# Patient Record
Sex: FEMALE | Race: WHITE | Employment: OTHER | URBAN - METROPOLITAN AREA
[De-identification: names, ages, dates, MRNs, and addresses within clinical notes are randomized per-mention and may not be internally consistent; named-entity substitution may affect disease eponyms.]

---

## 2017-02-27 ENCOUNTER — HOSPITAL ENCOUNTER (OUTPATIENT)
Dept: MRI IMAGING | Age: 72
Discharge: HOME | End: 2017-02-27
Attending: FAMILY MEDICINE
Payer: MEDICARE

## 2017-02-27 ENCOUNTER — HOSPITAL ENCOUNTER (OUTPATIENT)
Dept: ULTRASOUND IMAGING | Age: 72
Discharge: HOME | End: 2017-02-27
Attending: FAMILY MEDICINE
Payer: MEDICARE

## 2017-02-27 DIAGNOSIS — G45.9 TRANSIENT CEREBRAL ISCHEMIA, UNSPECIFIED TYPE: ICD-10-CM

## 2017-02-27 DIAGNOSIS — I77.9 BILATERAL CAROTID ARTERY DISEASE (HCC): ICD-10-CM

## 2017-02-27 DIAGNOSIS — R20.0 NUMBNESS: ICD-10-CM

## 2017-02-27 PROCEDURE — 93880 EXTRACRANIAL BILAT STUDY: CPT

## 2017-02-27 PROCEDURE — 70551 MRI BRAIN STEM W/O DYE: CPT

## 2017-06-13 PROBLEM — R06.02 SHORTNESS OF BREATH: Status: ACTIVE | Noted: 2017-06-13

## 2017-06-13 PROBLEM — Z86.73 H/O TIA (TRANSIENT ISCHEMIC ATTACK) AND STROKE: Status: ACTIVE | Noted: 2017-06-13

## 2017-06-13 PROBLEM — I50.22 SYSTOLIC CHF, CHRONIC (HCC): Status: ACTIVE | Noted: 2017-06-13

## 2017-07-19 ENCOUNTER — HOSPITAL ENCOUNTER (OUTPATIENT)
Dept: LAB | Age: 72
Discharge: HOME | End: 2017-07-19
Attending: INTERNAL MEDICINE

## 2017-07-19 ENCOUNTER — HOSPITAL ENCOUNTER (OUTPATIENT)
Dept: LAB | Age: 72
Discharge: HOME | End: 2017-07-19
Attending: INTERNAL MEDICINE
Payer: MEDICARE

## 2017-07-19 DIAGNOSIS — R06.02 SHORTNESS OF BREATH: ICD-10-CM

## 2017-07-19 DIAGNOSIS — I50.22 SYSTOLIC CHF, CHRONIC (HCC): ICD-10-CM

## 2017-07-19 LAB
ANION GAP BLD CALC-SCNC: 7 MMOL/L
BASOPHILS # BLD AUTO: 0.1 K/UL (ref 0–0.2)
BASOPHILS # BLD: 1 % (ref 0–2)
BUN SERPL-MCNC: 15 MG/DL (ref 8–23)
CALCIUM SERPL-MCNC: 9.5 MG/DL (ref 8.3–10.4)
CHLORIDE SERPL-SCNC: 103 MMOL/L (ref 98–107)
CO2 SERPL-SCNC: 30 MMOL/L (ref 21–32)
CREAT SERPL-MCNC: 0.8 MG/DL (ref 0.6–1)
DIFFERENTIAL METHOD BLD: ABNORMAL
EOSINOPHIL # BLD: 0.1 K/UL (ref 0–0.8)
EOSINOPHIL NFR BLD: 2 % (ref 0.5–7.8)
ERYTHROCYTE [DISTWIDTH] IN BLOOD BY AUTOMATED COUNT: 12.7 % (ref 11.9–14.6)
GLUCOSE SERPL-MCNC: 96 MG/DL (ref 65–100)
HCT VFR BLD AUTO: 41 % (ref 35.8–46.3)
HGB BLD-MCNC: 13.4 G/DL (ref 11.7–15.4)
INR PPP: 1 (ref 0.9–1.2)
LYMPHOCYTES # BLD AUTO: 28 % (ref 13–44)
LYMPHOCYTES # BLD: 1.9 K/UL (ref 0.5–4.6)
MCH RBC QN AUTO: 30.2 PG (ref 26.1–32.9)
MCHC RBC AUTO-ENTMCNC: 32.7 G/DL (ref 31.4–35)
MCV RBC AUTO: 92.6 FL (ref 79.6–97.8)
MONOCYTES # BLD: 1.1 K/UL (ref 0.1–1.3)
MONOCYTES NFR BLD AUTO: 16 % (ref 4–12)
NEUTS SEG # BLD: 3.6 K/UL (ref 1.7–8.2)
NEUTS SEG NFR BLD AUTO: 53 % (ref 43–78)
PLATELET # BLD AUTO: 187 K/UL (ref 150–450)
PMV BLD AUTO: 10.6 FL (ref 10.8–14.1)
POTASSIUM SERPL-SCNC: 3.9 MMOL/L (ref 3.5–5.1)
PROTHROMBIN TIME: 10 SEC (ref 9.6–12)
RBC # BLD AUTO: 4.43 M/UL (ref 4.05–5.25)
SODIUM SERPL-SCNC: 140 MMOL/L (ref 136–145)
WBC # BLD AUTO: 6.8 K/UL (ref 4.3–11.1)

## 2017-07-19 PROCEDURE — 80048 BASIC METABOLIC PNL TOTAL CA: CPT | Performed by: INTERNAL MEDICINE

## 2017-07-19 PROCEDURE — 85025 COMPLETE CBC W/AUTO DIFF WBC: CPT | Performed by: INTERNAL MEDICINE

## 2017-07-19 PROCEDURE — 36415 COLL VENOUS BLD VENIPUNCTURE: CPT | Performed by: INTERNAL MEDICINE

## 2017-07-19 PROCEDURE — 85610 PROTHROMBIN TIME: CPT | Performed by: INTERNAL MEDICINE

## 2017-07-20 NOTE — PROGRESS NOTES
Patient pre-assessment complete for [facility] poss with Dr Vidhya Michael scheduled for 17 at 11am, arrival time 9am. Patient verified using . Patient instructed to bring all home medications in labeled bottles on the day of procedure. NPO status reinforced. Patient informed to take a full dose aspirin 325mg  or 81 mg x 4 on the day of procedure. Instructed they can take all other medications excluding vitamins & supplements. Patient verbalizes understanding of all instructions & denies any questions at this time.

## 2017-07-21 ENCOUNTER — HOSPITAL ENCOUNTER (OUTPATIENT)
Dept: CARDIAC CATH/INVASIVE PROCEDURES | Age: 72
Discharge: HOME | End: 2017-07-21
Attending: INTERNAL MEDICINE | Admitting: INTERNAL MEDICINE
Payer: MEDICARE

## 2017-07-21 VITALS
WEIGHT: 134 LBS | SYSTOLIC BLOOD PRESSURE: 108 MMHG | OXYGEN SATURATION: 93 % | DIASTOLIC BLOOD PRESSURE: 53 MMHG | RESPIRATION RATE: 18 BRPM | BODY MASS INDEX: 23.74 KG/M2 | TEMPERATURE: 98.4 F | HEIGHT: 63 IN | HEART RATE: 76 BPM

## 2017-07-21 LAB
ATRIAL RATE: 75 BPM
CALCULATED P AXIS, ECG09: 63 DEGREES
CALCULATED R AXIS, ECG10: -61 DEGREES
CALCULATED T AXIS, ECG11: 2 DEGREES
DIAGNOSIS, 93000: NORMAL
P-R INTERVAL, ECG05: 142 MS
Q-T INTERVAL, ECG07: 416 MS
QRS DURATION, ECG06: 112 MS
QTC CALCULATION (BEZET), ECG08: 464 MS
VENTRICULAR RATE, ECG03: 75 BPM

## 2017-07-21 PROCEDURE — 77030004534 HC CATH ANGI DX INFN CARD -A

## 2017-07-21 PROCEDURE — 77030029997 HC DEV COM RDL R BND TELE -B

## 2017-07-21 PROCEDURE — C1769 GUIDE WIRE: HCPCS

## 2017-07-21 PROCEDURE — 77030015766

## 2017-07-21 PROCEDURE — 93458 L HRT ARTERY/VENTRICLE ANGIO: CPT

## 2017-07-21 PROCEDURE — C1894 INTRO/SHEATH, NON-LASER: HCPCS

## 2017-07-21 PROCEDURE — 74011250636 HC RX REV CODE- 250/636: Performed by: INTERNAL MEDICINE

## 2017-07-21 PROCEDURE — 74011000250 HC RX REV CODE- 250: Performed by: INTERNAL MEDICINE

## 2017-07-21 PROCEDURE — 74011000258 HC RX REV CODE- 258: Performed by: INTERNAL MEDICINE

## 2017-07-21 PROCEDURE — 93005 ELECTROCARDIOGRAM TRACING: CPT | Performed by: INTERNAL MEDICINE

## 2017-07-21 PROCEDURE — 74011636320 HC RX REV CODE- 636/320: Performed by: INTERNAL MEDICINE

## 2017-07-21 PROCEDURE — 74011250636 HC RX REV CODE- 250/636

## 2017-07-21 PROCEDURE — 99152 MOD SED SAME PHYS/QHP 5/>YRS: CPT

## 2017-07-21 RX ORDER — GUAIFENESIN 100 MG/5ML
81-324 LIQUID (ML) ORAL
Status: DISCONTINUED | OUTPATIENT
Start: 2017-07-21 | End: 2017-07-21 | Stop reason: SDUPTHER

## 2017-07-21 RX ORDER — GUAIFENESIN 100 MG/5ML
81-324 LIQUID (ML) ORAL
Status: DISCONTINUED | OUTPATIENT
Start: 2017-07-21 | End: 2017-07-21 | Stop reason: HOSPADM

## 2017-07-21 RX ORDER — SODIUM CHLORIDE 9 MG/ML
75 INJECTION, SOLUTION INTRAVENOUS CONTINUOUS
Status: DISCONTINUED | OUTPATIENT
Start: 2017-07-21 | End: 2017-07-21 | Stop reason: HOSPADM

## 2017-07-21 RX ORDER — ONDANSETRON 2 MG/ML
4 INJECTION INTRAMUSCULAR; INTRAVENOUS
Status: CANCELLED | OUTPATIENT
Start: 2017-07-21

## 2017-07-21 RX ORDER — LIDOCAINE HYDROCHLORIDE 20 MG/ML
1-20 INJECTION, SOLUTION INFILTRATION; PERINEURAL
Status: DISCONTINUED | OUTPATIENT
Start: 2017-07-21 | End: 2017-07-21 | Stop reason: HOSPADM

## 2017-07-21 RX ORDER — DIAZEPAM 5 MG/1
5 TABLET ORAL ONCE
Status: DISCONTINUED | OUTPATIENT
Start: 2017-07-21 | End: 2017-07-21 | Stop reason: HOSPADM

## 2017-07-21 RX ORDER — FENTANYL CITRATE 50 UG/ML
25-100 INJECTION, SOLUTION INTRAMUSCULAR; INTRAVENOUS
Status: DISCONTINUED | OUTPATIENT
Start: 2017-07-21 | End: 2017-07-21 | Stop reason: HOSPADM

## 2017-07-21 RX ORDER — HYDROCODONE BITARTRATE AND ACETAMINOPHEN 5; 325 MG/1; MG/1
1 TABLET ORAL
Status: CANCELLED | OUTPATIENT
Start: 2017-07-21

## 2017-07-21 RX ORDER — HEPARIN SODIUM 200 [USP'U]/100ML
3 INJECTION, SOLUTION INTRAVENOUS CONTINUOUS
Status: DISCONTINUED | OUTPATIENT
Start: 2017-07-21 | End: 2017-07-21 | Stop reason: HOSPADM

## 2017-07-21 RX ORDER — SODIUM CHLORIDE 0.9 % (FLUSH) 0.9 %
5-10 SYRINGE (ML) INJECTION EVERY 8 HOURS
Status: CANCELLED | OUTPATIENT
Start: 2017-07-21

## 2017-07-21 RX ORDER — MIDAZOLAM HYDROCHLORIDE 1 MG/ML
.5-5 INJECTION, SOLUTION INTRAMUSCULAR; INTRAVENOUS
Status: DISCONTINUED | OUTPATIENT
Start: 2017-07-21 | End: 2017-07-21 | Stop reason: HOSPADM

## 2017-07-21 RX ORDER — SODIUM CHLORIDE 0.9 % (FLUSH) 0.9 %
5-10 SYRINGE (ML) INJECTION AS NEEDED
Status: CANCELLED | OUTPATIENT
Start: 2017-07-21

## 2017-07-21 RX ORDER — ACETAMINOPHEN 325 MG/1
650 TABLET ORAL
Status: CANCELLED | OUTPATIENT
Start: 2017-07-21

## 2017-07-21 RX ORDER — SODIUM CHLORIDE 9 MG/ML
75 INJECTION, SOLUTION INTRAVENOUS CONTINUOUS
Status: CANCELLED | OUTPATIENT
Start: 2017-07-21

## 2017-07-21 RX ADMIN — FENTANYL CITRATE 25 MCG: 50 INJECTION, SOLUTION INTRAMUSCULAR; INTRAVENOUS at 11:03

## 2017-07-21 RX ADMIN — FENTANYL CITRATE 50 MCG: 50 INJECTION, SOLUTION INTRAMUSCULAR; INTRAVENOUS at 10:47

## 2017-07-21 RX ADMIN — MIDAZOLAM HYDROCHLORIDE 1 MG: 1 INJECTION, SOLUTION INTRAMUSCULAR; INTRAVENOUS at 11:03

## 2017-07-21 RX ADMIN — HEPARIN SODIUM 2 ML: 10000 INJECTION, SOLUTION INTRAVENOUS; SUBCUTANEOUS at 11:03

## 2017-07-21 RX ADMIN — MIDAZOLAM HYDROCHLORIDE 2 MG: 1 INJECTION, SOLUTION INTRAMUSCULAR; INTRAVENOUS at 10:48

## 2017-07-21 RX ADMIN — LIDOCAINE HYDROCHLORIDE 60 MG: 20 INJECTION, SOLUTION INFILTRATION; PERINEURAL at 11:00

## 2017-07-21 RX ADMIN — SODIUM CHLORIDE 75 ML/HR: 900 INJECTION, SOLUTION INTRAVENOUS at 09:35

## 2017-07-21 RX ADMIN — HEPARIN SODIUM 3 ML/HR: 200 INJECTION, SOLUTION INTRAVENOUS at 10:47

## 2017-07-21 RX ADMIN — IOPAMIDOL 70 ML: 755 INJECTION, SOLUTION INTRAVENOUS at 11:06

## 2017-07-21 NOTE — PROCEDURES
Janis Ferrer 42       Name:  Michael Alonzo   MR#:  [id number]   :  1945   Account #:  [de-identified]   Date of Adm:  2017       DATE OF PROCEDURE: 2017     PROCEDURES: Left heart catheterization, selective coronary   arteriography, left ventriculogram via the right radial   approach. INDICATION: LV dysfunction and abnormal stress test suggestive   of apical defect and considered moderate risk scan. [facility]   Cardiovascular class III anginal symptoms of dyspnea with mild   exertion. Cardiac catheterization arranged by Dr. Alannah Oleary. TECHNICAL FACTORS: After informed consent was obtained, the   patient was brought to the cardiac catheterization lab. Right   radial artery was prepped and draped in the usual sterile   fashion. Utilizing modified Seldinger technique and   micropuncture needle, the right radial artery was entered. A 6-  Armenian Terumo Slender sheath was placed without difficulty. A   radial cocktail consisting of 2000 units of heparin, 2 mg   verapamil, 200 mcg nitroglycerin was administered. A 5-Armenian   Tiger 4.0 catheter was used to selectively engage the ostium of   the left main coronary artery and right coronary artery   respectively. Selective injections in various projections were   performed. A pigtail catheter was used to cross the aortic valve   and enter the left ventricle. Hemodynamic measurements and left   ventriculogram were obtained. Left ventricular aortic pressure   gradient was obtained by pullback technique. At the conclusion of diagnostic procedure, the radial sheath was   removed and a pneumatic band was placed with excellent   hemostasis. No complications were encountered. SEDATION: The patient received 3 mg Versed and 75 mcg of   fentanyl. Sedation was administered by Sabra Rhoades RN, under my   supervision. Sedation start time was 10:50 a.m. with the   procedure end time of 11:12 a.m.      CONTRAST: Isovue 70.     HEMODYNAMIC RESULTS     1. Aortic pressure was 103/59 with a mean of 76.   2. Left ventricular end-diastolic pressure was 6.   3. There was no significant gradient across the aortic valve. ANGIOGRAPHIC RESULTS   1. Left main coronary artery: Large caliber vessel. Angiographically normal.   2. LAD: Medium caliber vessel proximally, becomes small caliber   after the origin of the first diagonal artery; 10% to 20%   proximal irregularities with mild calcification, 10% mid luminal   irregularities. 3. First diagonal artery: Small caliber vessel. Patent. 4. Ramus intermediate: Medium caliber vessel, 10% to 20%   proximal stenosis. 5. Left circumflex: Medium caliber vessel, 20% mid stenosis. 6. First obtuse marginal artery: Small caliber vessel. Patent. 7. Right coronary artery: Medium caliber dominant vessel, 20%   mid stenosis. 8. Right PDA: Very small caliber vessel. Patent. 9. Right posterolateral branch: Medium caliber vessel. Patent. 10. Left ventriculogram performed in BOLAND projection shows mildly   reduced LV systolic function, EF 61% to 50%. Mild global   hypokinesis. No mitral regurgitation. Aortic root is nondilated. CONCLUSION   1. Mild nonobstructive coronary artery disease. 2. Mildly reduced left ventricular systolic function. PLAN: Ongoing medical therapy. Followup with Dr. Pattie Lion in 2   weeks for arteriotomy site check.         MD TONY Olguin / Rosa Odom   D:  07/21/2017   11:28   T:  07/21/2017   12:22   Job #:  [id number]

## 2017-07-21 NOTE — PROGRESS NOTES
TRANSFER - OUT REPORT:    Verbal report given to gianfranco hernandez(name) on Cheli Jobs  being transferred to cpru(unit) for routine progression of care       Report consisted of patients Situation, Background, Assessment and   Recommendations(SBAR). Information from the following report(s) SBAR was reviewed with the receiving nurse. Lines:   Peripheral IV 07/21/17 Right Antecubital (Active)       Peripheral IV 07/21/17 Left Antecubital (Active)        Opportunity for questions and clarification was provided.       Patient transported with:   Brittany Lui  No intervention  Right wrist r band 10ml  No bleeding or hematoma  Versed 3mg  Fentanyl 75mcg

## 2017-07-21 NOTE — IP AVS SNAPSHOT
Edward Duff 
 
 
 [address] [facility] [address] [facility] 
[phone] Patient: Leia Jacobo MRN: [id number] XMA:4/20/3404 Discharge Summary 7/21/2017 Leia Jacobo MRN[de-identified]  [id number] Admission Information Provider Pager Service Admission Date Expected D/C Date Yolanda Pires MD  CARDIAC CATH LAB 7/21/2017 Actual LOS Patient Class 0 days OUTPATIENT Follow-up Information Follow up With Details Comments Contact Info Alfred Grajeda DO On 8/21/2017 1130 am  [address] [facility] [address] [facility] [address] 
[phone] Current Discharge Medication List  
  
ASK your doctor about these medications Dose & Instructions Dispensing Information Comments Morning Noon Evening Bedtime  
 albuterol 90 mcg/actuation inhaler Commonly known as:  PROAIR HFA Your last dose was: Your next dose is:    
   
   
 Dose:  2 Puff Take 2 Puffs by inhalation every four (4) hours as needed for Wheezing or Shortness of Breath. Quantity:  3 Inhaler Refills:  3  
     
   
   
   
  
 aspirin delayed-release 81 mg tablet Your last dose was: Your next dose is: Take  by mouth daily. Refills:  0  
     
   
   
   
  
 atorvastatin 40 mg tablet Commonly known as:  LIPITOR Your last dose was: Your next dose is: TAKE 1 TABLET DAILY Quantity:  90 Tab Refills:  3 CALCIUM + D PO Your last dose was: Your next dose is:    
   
   
 Dose:  1 Tab Take 1 Tab by mouth daily. 600 mg calcium with vitamin d Refills:  0  
     
   
   
   
  
 citalopram 20 mg tablet Commonly known as:  Colleantoinette Diaz Your last dose was: Your next dose is:    
   
   
 Dose:  20 mg Take 1 Tab by mouth daily. Quantity:  90 Tab Refills:  3 mesalamine 500 mg CR capsule Commonly known as:  PENTASA Your last dose was: Your next dose is: Take 2 tablets BID Quantity:  1 Cap Refills:  0  
     
   
   
   
  
 metoprolol succinate 50 mg XL tablet Commonly known as:  TOPROL-XL Your last dose was: Your next dose is:    
   
   
 Dose:  50 mg Take 1 Tab by mouth daily. Quantity:  90 Tab Refills:  3  
     
   
   
   
  
 nitroglycerin 0.4 mg SL tablet Commonly known as:  NITROSTAT Your last dose was: Your next dose is:    
   
   
 Place 1 sl under the tongue q 5 min prn cp, max 3 sl in a 15-min time period. Call 911 if no relief after the 3rd sl. Quantity:  1 Bottle Refills:  prn PREVACID 30 mg capsule Generic drug:  lansoprazole Your last dose was: Your next dose is: Take  by mouth Daily (before breakfast). Refills:  0  
     
   
   
   
  
 traMADol 50 mg tablet Commonly known as:  ULTRAM  
   
Your last dose was: Your next dose is: Take 1 tablet by mouth TID Quantity:  270 Tab Refills:  1 General Information Please provide this summary of care documentation to your next provider. Allergies Unspecified:  Hydrocodone-homatropine;  Morphine Current Immunizations  Never Reviewed Name Date Influenza Vaccine 11/14/2012 Pneumococcal Conjugate (PCV-13) 7/6/2016 Pneumococcal Vaccine (Unspecified Type) 3/20/2013 Discharge Instructions Discharge Instructions HEART CATHETERIZATION/ANGIOGRAPHY DISCHARGE INSTRUCTIONS 1. Check puncture site frequently for swelling or bleeding. If there is any bleeding, lie down and apply pressure over the area with a clean towel or washcloth.  Notify your doctor for any redness, swelling, drainage, or oozing from the puncture site. Notify your doctor for any fever or chills. 2. If the extremity becomes cold, numb, or painful call Dr. Jennifer Murphy at [phone]. 
3. Activity should be limited for the next 48 hours. Climb stairs as little as possible and avoid any stooping, bending, or strenuous activity for 48 hours. No heavy lifting (anything over 10 pounds) for 3 days. 4. You may resume your usual diet. Drink more fluids than usual. 
5. Have a responsible person drive you home and stay with you for at least 24 hours after your heart catheterization/angiography. 6. You may remove bandage from your {ARM/GROIN:25124} in 24 hours. You may shower in 24 hours. No tub baths, hot tubs, or swimming for 1 week. Do not place any lotions, creams, powders, or ointments over puncture site for 1 week. You may place a clean band-aid over the puncture site each day for 5 days. Change daily. I have read the above instructions and have had the opportunity to ask questions. Patient: ________________________   Date: 7/21/2017 Witness: _______________________   Date: 7/21/2017 Discharge Orders None  
  
` Patient Signature:  ____________________________________________________________ Date:  ____________________________________________________________  
  
 Richmond Knowles Provider Signature:  ____________________________________________________________ Date:  ____________________________________________________________

## 2017-07-21 NOTE — DISCHARGE INSTRUCTIONS
HEART CATHETERIZATION/ANGIOGRAPHY DISCHARGE INSTRUCTIONS    1. Check puncture site frequently for swelling or bleeding. If there is any bleeding, lie down and apply pressure over the area with a clean towel or washcloth. Notify your doctor for any redness, swelling, drainage, or oozing from the puncture site. Notify your doctor for any fever or chills. 2. If the extremity becomes cold, numb, or painful call Dr. Kimi Arango at [phone].  3. Activity should be limited for the next 48 hours. Climb stairs as little as possible and avoid any stooping, bending, or strenuous activity for 48 hours. No heavy lifting (anything over 10 pounds) for 3 days. 4. You may resume your usual diet. Drink more fluids than usual.  5. Have a responsible person drive you home and stay with you for at least 24 hours after your heart catheterization/angiography. 6. You may remove bandage from your {ARM/GROIN:80300} in 24 hours. You may shower in 24 hours. No tub baths, hot tubs, or swimming for 1 week. Do not place any lotions, creams, powders, or ointments over puncture site for 1 week. You may place a clean band-aid over the puncture site each day for 5 days. Change daily. I have read the above instructions and have had the opportunity to ask questions.       Patient: ________________________   Date: 7/21/2017    Witness: _______________________   Date: 7/21/2017

## 2017-07-21 NOTE — PROCEDURES
Brief Cardiac Procedure Note    Patient: Park Reid MRN: [id number]  SSN: [id number]    YOB: 1945  Age: 74 y.o. Sex: female      Date of Procedure: 7/21/2017     Pre-procedure Diagnosis: Chest pain    Post-procedure Diagnosis: Non-cardiac chest pain    Procedure: Left Heart Catheterization    Brief Description of Procedure: As above    Performed By: Lucrecia Manzanares MD     Assistants: None    Anesthesia: Moderate Sedation    Estimated Blood Loss: Less than 10 mL      Specimens: None    Implants: None    Findings: Mild non-obstructive CAD. Normal LV function    Complications: None    Recommendations: Continue medical therapy.     Signed By: Lucrecia Manzanares MD     July 21, 2017

## 2017-07-21 NOTE — PROGRESS NOTES
Discharge instructions given per orders, voiced good understanding of post cath care, medications & follow up care. Denies any questions. VSS, INTs dc'd, right radial puncture site dry, clean, with sterile dressing intact. No s/s of bleeding or hematoma noted at time of discharge.

## 2017-07-21 NOTE — PROGRESS NOTES
Patient received to [address] [place] [address] room # 8  Ambulatory from [facility]. Patient scheduled for [facility] today with Dr Elmore Simmonds. Procedure reviewed & questions answered, voiced good understanding consent obtained & placed on chart. All medications and medical history reviewed. Will prep patient per orders. Patient & family updated on plan of care.

## 2017-07-21 NOTE — PROGRESS NOTES
Dr. Laura Bhatt at bedside to discuss results of heart cath with patient and family. Opportunity given for questions and comments.

## 2017-08-21 PROBLEM — I42.8 NICM (NONISCHEMIC CARDIOMYOPATHY) (HCC): Status: ACTIVE | Noted: 2017-08-21

## 2018-03-26 ENCOUNTER — ANESTHESIA EVENT (OUTPATIENT)
Dept: SURGERY | Age: 73
End: 2018-03-26
Payer: MEDICARE

## 2018-03-26 NOTE — PROGRESS NOTES
Patient pre-assessment complete for ICD with Dr Satya Wagner scheduled for 3/27/18 at 3pm, arrival time 1pm. Patient verified using . Patient instructed to bring all home medications in labeled bottles on the day of procedure. NPO status reinforced. Instructed they can take all other medications excluding vitamins & supplements. Patient verbalizes understanding of all instructions & denies any questions at this time. Per patient procedure was scheduled for 10am with arrival at [address] with Dr Satya Wagner to confirm time of 3pm. Pt called back & informed 3pm is the correct procedure time with arrival at 1pm. Voiced good understanding.

## 2018-03-27 ENCOUNTER — APPOINTMENT (OUTPATIENT)
Dept: GENERAL RADIOLOGY | Age: 73
End: 2018-03-27
Attending: INTERNAL MEDICINE
Payer: MEDICARE

## 2018-03-27 ENCOUNTER — APPOINTMENT (OUTPATIENT)
Dept: CARDIAC CATH/INVASIVE PROCEDURES | Age: 73
End: 2018-03-27
Payer: MEDICARE

## 2018-03-27 ENCOUNTER — HOSPITAL ENCOUNTER (OUTPATIENT)
Age: 73
Setting detail: OBSERVATION
Discharge: HOME | End: 2018-03-28
Attending: INTERNAL MEDICINE | Admitting: INTERNAL MEDICINE
Payer: MEDICARE

## 2018-03-27 ENCOUNTER — ANESTHESIA (OUTPATIENT)
Dept: SURGERY | Age: 73
End: 2018-03-27
Payer: MEDICARE

## 2018-03-27 PROBLEM — I42.9 CARDIOMYOPATHY (HCC): Status: ACTIVE | Noted: 2018-03-27

## 2018-03-27 PROBLEM — Z95.810 PRESENCE OF AUTOMATIC (IMPLANTABLE) CARDIAC DEFIBRILLATOR: Status: ACTIVE | Noted: 2018-03-27

## 2018-03-27 LAB
ANION GAP SERPL CALC-SCNC: 4 MMOL/L (ref 7–16)
ATRIAL RATE: 81 BPM
BUN SERPL-MCNC: 18 MG/DL (ref 8–23)
CALCIUM SERPL-MCNC: 10.2 MG/DL (ref 8.3–10.4)
CALCULATED P AXIS, ECG09: 61 DEGREES
CALCULATED R AXIS, ECG10: -56 DEGREES
CALCULATED T AXIS, ECG11: 52 DEGREES
CHLORIDE SERPL-SCNC: 106 MMOL/L (ref 98–107)
CO2 SERPL-SCNC: 30 MMOL/L (ref 21–32)
CREAT SERPL-MCNC: 0.95 MG/DL (ref 0.6–1)
DIAGNOSIS, 93000: NORMAL
ERYTHROCYTE [DISTWIDTH] IN BLOOD BY AUTOMATED COUNT: 13.6 % (ref 11.9–14.6)
GLUCOSE SERPL-MCNC: 90 MG/DL (ref 65–100)
HCT VFR BLD AUTO: 42.5 % (ref 35.8–46.3)
HGB BLD-MCNC: 14.1 G/DL (ref 11.7–15.4)
INR PPP: 0.9
MAGNESIUM SERPL-MCNC: 1.6 MG/DL (ref 1.8–2.4)
MCH RBC QN AUTO: 30.4 PG (ref 26.1–32.9)
MCHC RBC AUTO-ENTMCNC: 33.2 G/DL (ref 31.4–35)
MCV RBC AUTO: 91.6 FL (ref 79.6–97.8)
P-R INTERVAL, ECG05: 140 MS
PLATELET # BLD AUTO: 210 K/UL (ref 150–450)
PMV BLD AUTO: 10.5 FL (ref 10.8–14.1)
POTASSIUM SERPL-SCNC: 4.2 MMOL/L (ref 3.5–5.1)
PROTHROMBIN TIME: 12.1 SEC (ref 11.5–14.5)
Q-T INTERVAL, ECG07: 400 MS
QRS DURATION, ECG06: 106 MS
QTC CALCULATION (BEZET), ECG08: 464 MS
RBC # BLD AUTO: 4.64 M/UL (ref 4.05–5.25)
SODIUM SERPL-SCNC: 140 MMOL/L (ref 136–145)
VENTRICULAR RATE, ECG03: 81 BPM
WBC # BLD AUTO: 7.4 K/UL (ref 4.3–11.1)

## 2018-03-27 PROCEDURE — 74011000250 HC RX REV CODE- 250

## 2018-03-27 PROCEDURE — 74011250636 HC RX REV CODE- 250/636: Performed by: INTERNAL MEDICINE

## 2018-03-27 PROCEDURE — C1894 INTRO/SHEATH, NON-LASER: HCPCS

## 2018-03-27 PROCEDURE — 85610 PROTHROMBIN TIME: CPT | Performed by: INTERNAL MEDICINE

## 2018-03-27 PROCEDURE — 99218 HC RM OBSERVATION: CPT

## 2018-03-27 PROCEDURE — 77030012935 HC DRSG AQUACEL BMS -B

## 2018-03-27 PROCEDURE — 76060000032 HC ANESTHESIA 0.5 TO 1 HR: Performed by: INTERNAL MEDICINE

## 2018-03-27 PROCEDURE — 77030013687 HC GD NDL BARD -B

## 2018-03-27 PROCEDURE — C1892 INTRO/SHEATH,FIXED,PEEL-AWAY: HCPCS

## 2018-03-27 PROCEDURE — 85027 COMPLETE CBC AUTOMATED: CPT | Performed by: INTERNAL MEDICINE

## 2018-03-27 PROCEDURE — C1722 AICD, SINGLE CHAMBER: HCPCS

## 2018-03-27 PROCEDURE — 74011000272 HC RX REV CODE- 272: Performed by: INTERNAL MEDICINE

## 2018-03-27 PROCEDURE — 74011250636 HC RX REV CODE- 250/636

## 2018-03-27 PROCEDURE — 74011250636 HC RX REV CODE- 250/636: Performed by: ANESTHESIOLOGY

## 2018-03-27 PROCEDURE — 83735 ASSAY OF MAGNESIUM: CPT | Performed by: INTERNAL MEDICINE

## 2018-03-27 PROCEDURE — 93005 ELECTROCARDIOGRAM TRACING: CPT | Performed by: INTERNAL MEDICINE

## 2018-03-27 PROCEDURE — 74011250637 HC RX REV CODE- 250/637: Performed by: INTERNAL MEDICINE

## 2018-03-27 PROCEDURE — 33249 INSJ/RPLCMT DEFIB W/LEAD(S): CPT

## 2018-03-27 PROCEDURE — 71045 X-RAY EXAM CHEST 1 VIEW: CPT

## 2018-03-27 PROCEDURE — 80048 BASIC METABOLIC PNL TOTAL CA: CPT | Performed by: INTERNAL MEDICINE

## 2018-03-27 PROCEDURE — 74011000250 HC RX REV CODE- 250: Performed by: INTERNAL MEDICINE

## 2018-03-27 PROCEDURE — A4565 SLINGS: HCPCS

## 2018-03-27 PROCEDURE — 76937 US GUIDE VASCULAR ACCESS: CPT

## 2018-03-27 PROCEDURE — 77030008467 HC STPLR SKN COVD -B

## 2018-03-27 PROCEDURE — C1777 LEAD, AICD, ENDO SINGLE COIL: HCPCS

## 2018-03-27 RX ORDER — ATORVASTATIN CALCIUM 40 MG/1
40 TABLET, FILM COATED ORAL
Status: DISCONTINUED | OUTPATIENT
Start: 2018-03-27 | End: 2018-03-28 | Stop reason: HOSPADM

## 2018-03-27 RX ORDER — EPHEDRINE SULFATE 50 MG/ML
INJECTION, SOLUTION INTRAVENOUS AS NEEDED
Status: DISCONTINUED | OUTPATIENT
Start: 2018-03-27 | End: 2018-03-27 | Stop reason: HOSPADM

## 2018-03-27 RX ORDER — CEFAZOLIN SODIUM/WATER 2 G/20 ML
2 SYRINGE (ML) INTRAVENOUS ONCE
Status: COMPLETED | OUTPATIENT
Start: 2018-03-27 | End: 2018-03-27

## 2018-03-27 RX ORDER — NALBUPHINE HYDROCHLORIDE 20 MG/ML
5 INJECTION, SOLUTION INTRAMUSCULAR; INTRAVENOUS; SUBCUTANEOUS
Status: DISCONTINUED | OUTPATIENT
Start: 2018-03-27 | End: 2018-03-27 | Stop reason: HOSPADM

## 2018-03-27 RX ORDER — METOPROLOL SUCCINATE 50 MG/1
50 TABLET, EXTENDED RELEASE ORAL DAILY
Status: DISCONTINUED | OUTPATIENT
Start: 2018-03-28 | End: 2018-03-28 | Stop reason: HOSPADM

## 2018-03-27 RX ORDER — SODIUM CHLORIDE 0.9 % (FLUSH) 0.9 %
5-10 SYRINGE (ML) INJECTION AS NEEDED
Status: DISCONTINUED | OUTPATIENT
Start: 2018-03-27 | End: 2018-03-28 | Stop reason: HOSPADM

## 2018-03-27 RX ORDER — FENTANYL CITRATE 50 UG/ML
INJECTION, SOLUTION INTRAMUSCULAR; INTRAVENOUS AS NEEDED
Status: DISCONTINUED | OUTPATIENT
Start: 2018-03-27 | End: 2018-03-27 | Stop reason: HOSPADM

## 2018-03-27 RX ORDER — CITALOPRAM 20 MG/1
20 TABLET, FILM COATED ORAL DAILY
Status: DISCONTINUED | OUTPATIENT
Start: 2018-03-28 | End: 2018-03-28 | Stop reason: HOSPADM

## 2018-03-27 RX ORDER — SODIUM CHLORIDE, SODIUM LACTATE, POTASSIUM CHLORIDE, CALCIUM CHLORIDE 600; 310; 30; 20 MG/100ML; MG/100ML; MG/100ML; MG/100ML
INJECTION, SOLUTION INTRAVENOUS
Status: DISCONTINUED | OUTPATIENT
Start: 2018-03-27 | End: 2018-03-27 | Stop reason: HOSPADM

## 2018-03-27 RX ORDER — BUDESONIDE 3 MG/1
6 CAPSULE, COATED PELLETS ORAL 3 TIMES DAILY
Status: DISCONTINUED | OUTPATIENT
Start: 2018-03-27 | End: 2018-03-28 | Stop reason: HOSPADM

## 2018-03-27 RX ORDER — CEFAZOLIN SODIUM/WATER 2 G/20 ML
2 SYRINGE (ML) INTRAVENOUS EVERY 8 HOURS
Status: COMPLETED | OUTPATIENT
Start: 2018-03-28 | End: 2018-03-28

## 2018-03-27 RX ORDER — SODIUM CHLORIDE 0.9 % (FLUSH) 0.9 %
5-10 SYRINGE (ML) INJECTION EVERY 8 HOURS
Status: DISCONTINUED | OUTPATIENT
Start: 2018-03-27 | End: 2018-03-28 | Stop reason: HOSPADM

## 2018-03-27 RX ORDER — ASPIRIN 81 MG/1
81 TABLET ORAL DAILY
Status: DISCONTINUED | OUTPATIENT
Start: 2018-03-28 | End: 2018-03-28 | Stop reason: HOSPADM

## 2018-03-27 RX ORDER — ONDANSETRON 2 MG/ML
4 INJECTION INTRAMUSCULAR; INTRAVENOUS
Status: DISCONTINUED | OUTPATIENT
Start: 2018-03-27 | End: 2018-03-28 | Stop reason: HOSPADM

## 2018-03-27 RX ORDER — ACETAMINOPHEN 325 MG/1
650 TABLET ORAL
Status: DISCONTINUED | OUTPATIENT
Start: 2018-03-27 | End: 2018-03-28 | Stop reason: HOSPADM

## 2018-03-27 RX ORDER — MAGNESIUM SULFATE HEPTAHYDRATE 40 MG/ML
2 INJECTION, SOLUTION INTRAVENOUS ONCE
Status: COMPLETED | OUTPATIENT
Start: 2018-03-27 | End: 2018-03-28

## 2018-03-27 RX ORDER — HYDROMORPHONE HYDROCHLORIDE 2 MG/ML
0.5 INJECTION, SOLUTION INTRAMUSCULAR; INTRAVENOUS; SUBCUTANEOUS
Status: DISCONTINUED | OUTPATIENT
Start: 2018-03-27 | End: 2018-03-27 | Stop reason: HOSPADM

## 2018-03-27 RX ORDER — SODIUM CHLORIDE, SODIUM LACTATE, POTASSIUM CHLORIDE, CALCIUM CHLORIDE 600; 310; 30; 20 MG/100ML; MG/100ML; MG/100ML; MG/100ML
100 INJECTION, SOLUTION INTRAVENOUS CONTINUOUS
Status: DISCONTINUED | OUTPATIENT
Start: 2018-03-27 | End: 2018-03-28 | Stop reason: HOSPADM

## 2018-03-27 RX ORDER — FLUMAZENIL 0.1 MG/ML
0.2 INJECTION INTRAVENOUS
Status: DISCONTINUED | OUTPATIENT
Start: 2018-03-27 | End: 2018-03-27 | Stop reason: HOSPADM

## 2018-03-27 RX ORDER — PROPOFOL 10 MG/ML
INJECTION, EMULSION INTRAVENOUS
Status: DISCONTINUED | OUTPATIENT
Start: 2018-03-27 | End: 2018-03-27 | Stop reason: HOSPADM

## 2018-03-27 RX ORDER — NALOXONE HYDROCHLORIDE 0.4 MG/ML
0.1 INJECTION, SOLUTION INTRAMUSCULAR; INTRAVENOUS; SUBCUTANEOUS
Status: DISCONTINUED | OUTPATIENT
Start: 2018-03-27 | End: 2018-03-27 | Stop reason: HOSPADM

## 2018-03-27 RX ORDER — PANTOPRAZOLE SODIUM 40 MG/1
40 TABLET, DELAYED RELEASE ORAL
Status: DISCONTINUED | OUTPATIENT
Start: 2018-03-28 | End: 2018-03-28 | Stop reason: HOSPADM

## 2018-03-27 RX ORDER — LIDOCAINE HYDROCHLORIDE 10 MG/ML
0.1 INJECTION INFILTRATION; PERINEURAL AS NEEDED
Status: DISCONTINUED | OUTPATIENT
Start: 2018-03-27 | End: 2018-03-27 | Stop reason: HOSPADM

## 2018-03-27 RX ORDER — PROPOFOL 10 MG/ML
INJECTION, EMULSION INTRAVENOUS AS NEEDED
Status: DISCONTINUED | OUTPATIENT
Start: 2018-03-27 | End: 2018-03-27 | Stop reason: HOSPADM

## 2018-03-27 RX ORDER — MESALAMINE 500 MG/1
1000 CAPSULE, EXTENDED RELEASE ORAL 2 TIMES DAILY
Status: DISCONTINUED | OUTPATIENT
Start: 2018-03-28 | End: 2018-03-28 | Stop reason: HOSPADM

## 2018-03-27 RX ORDER — BUPIVACAINE HYDROCHLORIDE AND EPINEPHRINE 5; 5 MG/ML; UG/ML
30 INJECTION, SOLUTION EPIDURAL; INTRACAUDAL; PERINEURAL ONCE
Status: COMPLETED | OUTPATIENT
Start: 2018-03-27 | End: 2018-03-27

## 2018-03-27 RX ADMIN — BUDESONIDE 6 MG: 3 CAPSULE ORAL at 22:36

## 2018-03-27 RX ADMIN — ATORVASTATIN CALCIUM 40 MG: 40 TABLET, FILM COATED ORAL at 22:35

## 2018-03-27 RX ADMIN — SODIUM CHLORIDE, SODIUM LACTATE, POTASSIUM CHLORIDE, AND CALCIUM CHLORIDE 100 ML/HR: 600; 310; 30; 20 INJECTION, SOLUTION INTRAVENOUS at 22:40

## 2018-03-27 RX ADMIN — FENTANYL CITRATE 25 MCG: 50 INJECTION, SOLUTION INTRAMUSCULAR; INTRAVENOUS at 20:00

## 2018-03-27 RX ADMIN — MAGNESIUM SULFATE HEPTAHYDRATE 2 G: 40 INJECTION, SOLUTION INTRAVENOUS at 22:41

## 2018-03-27 RX ADMIN — PROPOFOL 30 MG: 10 INJECTION, EMULSION INTRAVENOUS at 20:04

## 2018-03-27 RX ADMIN — NEOMYCIN AND POLYMYXIN B SULFATES: 40; 200000 IRRIGANT IRRIGATION at 20:04

## 2018-03-27 RX ADMIN — SODIUM CHLORIDE, SODIUM LACTATE, POTASSIUM CHLORIDE, CALCIUM CHLORIDE: 600; 310; 30; 20 INJECTION, SOLUTION INTRAVENOUS at 19:47

## 2018-03-27 RX ADMIN — FENTANYL CITRATE 25 MCG: 50 INJECTION, SOLUTION INTRAMUSCULAR; INTRAVENOUS at 20:07

## 2018-03-27 RX ADMIN — BUPIVACAINE HYDROCHLORIDE AND EPINEPHRINE BITARTRATE 150 MG: 5; .005 INJECTION, SOLUTION EPIDURAL; INTRACAUDAL; PERINEURAL at 20:09

## 2018-03-27 RX ADMIN — PROPOFOL 100 MCG/KG/MIN: 10 INJECTION, EMULSION INTRAVENOUS at 20:03

## 2018-03-27 RX ADMIN — EPHEDRINE SULFATE 10 MG: 50 INJECTION, SOLUTION INTRAVENOUS at 20:16

## 2018-03-27 RX ADMIN — PROPOFOL 20 MG: 10 INJECTION, EMULSION INTRAVENOUS at 20:07

## 2018-03-27 RX ADMIN — Medication 2 G: at 19:54

## 2018-03-27 RX ADMIN — EPHEDRINE SULFATE 10 MG: 50 INJECTION, SOLUTION INTRAVENOUS at 20:08

## 2018-03-27 RX ADMIN — FENTANYL CITRATE 50 MCG: 50 INJECTION, SOLUTION INTRAMUSCULAR; INTRAVENOUS at 19:54

## 2018-03-27 RX ADMIN — Medication 10 ML: at 22:46

## 2018-03-27 NOTE — IP AVS SNAPSHOT
Linette Robert 
 
 
 [address] [facility] [address] [facility] 
[phone] Patient: Meño Hare MRN: [id number] [facility]:1/67/8506 A check fay indicates which time of day the medication should be taken. My Medications CHANGE how you take these medications Instructions Each Dose to Equal  
 Morning Noon Evening Bedtime  
 traMADol 50 mg tablet Commonly known as:  ULTRAM  
What changed:   
- when to take this 
- reasons to take this 
- additional instructions Take 1 tablet by mouth TID CONTINUE taking these medications Instructions Each Dose to Equal  
 Morning Noon Evening Bedtime  
 aspirin delayed-release 81 mg tablet Take  by mouth daily. atorvastatin 40 mg tablet Commonly known as:  LIPITOR  
   
 TAKE 1 TABLET DAILY  
     
   
   
   
  
 budesonide 3 mg capsule Commonly known as:  ENTOCORT EC Take 6 mg by mouth three (3) times daily. 6 mg CALCIUM + D PO Take 1 Tab by mouth daily. 600 mg calcium with vitamin d  
 1 Tab  
    
  
   
   
   
  
 citalopram 20 mg tablet Commonly known as:  Genice Annette Take 1 Tab by mouth daily. 20 mg  
    
  
   
   
   
  
 mesalamine 500 mg CR capsule Commonly known as:  PENTASA Take 2 tablets BID  
     
   
   
   
  
 metoprolol succinate 50 mg XL tablet Commonly known as:  TOPROL-XL Take 1 Tab by mouth daily. 50 mg  
    
  
   
   
   
  
 nitroglycerin 0.4 mg SL tablet Commonly known as:  NITROSTAT Place 1 sl under the tongue q 5 min prn cp, max 3 sl in a 15-min time period. Call 911 if no relief after the 3rd sl. PREVACID 30 mg capsule Generic drug:  lansoprazole Take  by mouth Daily (before breakfast).

## 2018-03-27 NOTE — PROGRESS NOTES
The patient received shared decision making for ICD. All questions were answered prior to procedure.    Soto Neal NP.

## 2018-03-27 NOTE — IP AVS SNAPSHOT
Lakia Quam 
 
 
 2[address] [facility] 322 [facility] 
[phone] Patient: Padmaja Wilson MRN: [id number] AXC:8/23/[id number] About your hospitalization You were admitted on:  March 27, 2018 You last received care in the:  [facility] 3 TELEMETRY You were discharged on:  March 28, 2018 Why you were hospitalized Your primary diagnosis was:  Not on File Your diagnoses also included:  Presence Of Automatic (Implantable) Cardiac Defibrillator, Cardiomyopathy (Hcc) Follow-up Information Follow up With Details Comments Contact Info Judi Rider MD  message left, office will call you with appointment [address] [place] [address] 
[phone] [facility] OFFICE On 4/10/2018 site check 4/10 @ 9:30 with Ware 2 [id number] [place] [address] 
[phone] Your Scheduled Appointments Tuesday April 10, 2018  9:30 AM EDT  
OFFICE DEVICE CHECKS with [facility] 39 [facility] OFFICE ([address] [place] [address]) [address] [place] [address]  
[phone] This upcoming device check will take place IN OUR OFFICE. Please arrive 15 minutes early to review any necessary paperwork requirements. If you have any further questions or need to change this appointment, we are happy to help and can be reached at [phone]. Discharge Orders None A check fay indicates which time of day the medication should be taken. My Medications CHANGE how you take these medications Instructions Each Dose to Equal  
 Morning Noon Evening Bedtime  
 traMADol 50 mg tablet Commonly known as:  ULTRAM  
What changed:   
- when to take this 
- reasons to take this 
- additional instructions Take 1 tablet by mouth TID CONTINUE taking these medications Instructions Each Dose to Equal  
 Morning Noon Evening Bedtime  
 aspirin delayed-release 81 mg tablet Take  by mouth daily. atorvastatin 40 mg tablet Commonly known as:  LIPITOR  
   
 TAKE 1 TABLET DAILY  
     
   
   
   
  
 budesonide 3 mg capsule Commonly known as:  ENTOCORT EC Take 6 mg by mouth three (3) times daily. 6 mg CALCIUM + D PO Take 1 Tab by mouth daily. 600 mg calcium with vitamin d  
 1 Tab  
    
  
   
   
   
  
 citalopram 20 mg tablet Commonly known as:  Leldon Leriche Take 1 Tab by mouth daily. 20 mg  
    
  
   
   
   
  
 mesalamine 500 mg CR capsule Commonly known as:  PENTASA Take 2 tablets BID  
     
   
   
   
  
 metoprolol succinate 50 mg XL tablet Commonly known as:  TOPROL-XL Take 1 Tab by mouth daily. 50 mg  
    
  
   
   
   
  
 nitroglycerin 0.4 mg SL tablet Commonly known as:  NITROSTAT Place 1 sl under the tongue q 5 min prn cp, max 3 sl in a 15-min time period. Call 911 if no relief after the 3rd sl. PREVACID 30 mg capsule Generic drug:  lansoprazole Take  by mouth Daily (before breakfast). Opioid Education Prescription Opioids: What You Need to Know: 
 
Prescription opioids can be used to help relieve moderate-to-severe pain and are often prescribed following a surgery or injury, or for certain health conditions. These medications can be an important part of treatment but also come with serious risks. Opioids are strong pain medicines. Examples include hydrocodone, oxycodone, fentanyl, and morphine. Heroin is an example of an illegal opioid. It is important to work with your health care provider to make sure you are getting the safest, most effective care. WHAT ARE THE RISKS AND SIDE EFFECTS OF OPIOID USE? Prescription opioids carry serious risks of addiction and overdose, especially with prolonged use. An opioid overdose, often marked by slow breathing, can cause sudden death. The use of prescription opioids can have a number of side effects as well, even when taken as directed. · Tolerance-meaning you might need to take more of a medication for the same pain relief · Physical dependence-meaning you have symptoms of withdrawal when the medication is stopped. Withdrawal symptoms can include nausea, sweating, chills, diarrhea, stomach cramps, and muscle aches. Withdrawal can last up to several weeks, depending on which drug you took and how long you took it. · Increased sensitivity to pain · Constipation · Nausea, vomiting, and dry mouth · Sleepiness and dizziness · Confusion · Depression · Low levels of testosterone that can result in lower sex drive, energy, and strength · Itching and sweating RISKS ARE GREATER WITH:      
· History of drug misuse, substance use disorder, or overdose · Mental health conditions (such as depression or anxiety) · Sleep apnea · Older age (72 years or older) · Pregnancy Avoid alcohol while taking prescription opioids. Also, unless specifically advised by your health care provider, medications to avoid include: · Benzodiazepines (such as Xanax or Valium) · Muscle relaxants (such as Soma or Flexeril) · Hypnotics (such as Ambien or Lunesta) · Other prescription opioids KNOW YOUR OPTIONS Talk to your health care provider about ways to manage your pain that don't involve prescription opioids. Some of these options may actually work better and have fewer risks and side effects. Options may include: 
· Pain relievers such as acetaminophen, ibuprofen, and naproxen · Some medications that are also used for depression or seizures · Physical therapy and exercise · Counseling to help patients learn how to cope better with triggers of pain and stress. · Application of heat or cold compress · Massage therapy · Relaxation techniques Be Informed Make sure you know the name of your medication, how much and how often to take it, and its potential risks & side effects. IF YOU ARE PRESCRIBED OPIOIDS FOR PAIN: 
· Never take opioids in greater amounts or more often than prescribed. Remember the goal is not to be pain-free but to manage your pain at a tolerable level. · Follow up with your primary care provider to: · Work together to create a plan on how to manage your pain. · Talk about ways to help manage your pain that don't involve prescription opioids. · Talk about any and all concerns and side effects. · Help prevent misuse and abuse. · Never sell or share prescription opioids · Help prevent misuse and abuse. · Store prescription opioids in a secure place and out of reach of others (this may include visitors, children, friends, and family). · Safely dispose of unused/unwanted prescription opioids: Find your community drug take-back program or your pharmacy mail-back program, or flush them down the toilet, following guidance from the Food and Drug Administration (www.fda.gov/Drugs/ResourcesForYou). · Visit www.cdc.gov/drugoverdose to learn about the risks of opioid abuse and overdose. · If you believe you may be struggling with addiction, tell your health care provider and ask for guidance or call [facility] at 2-403-306-LNOU. Discharge Instructions Learning About ICDs (Implantable Cardioverter-Defibrillators) What is an ICD (implantable cardioverter-defibrillator)? An ICD (implantable cardioverter-defibrillator) is a small, battery-powered device. It fixes serious changes in your heartbeat. ICDs are used in people who have had a life-threatening heart rhythm or are at high risk of having one. The ICD is placed under the skin of the chest. It's attached to one or two wires (called leads). Most of the time, these leads go into the heart through a vein. How does an ICD work? An ICD is always checking your heart rate and rhythm. If the ICD detects a life-threatening, rapid heart rhythm, it tries to slow the rhythm to get it back to normal. If the bad rhythm doesn't stop, the ICD sends an electric shock to the heart. This restores a normal rhythm. The device then goes back to its watchful mode. An ICD also can fix a heart rate that is too fast or too slow. It does this without using a shock. It can send out electrical pulses to speed up a heart rate that is too slow. Or it can slow down a fast heart rate by matching the pace and bringing the heart rate back to normal. 
Your doctor will check the ICD regularly to make sure it is working right and isn't causing any problems. Your doctor will also check the battery to see if it needs to be replaced. How is an ICD placed? Your doctor will put the ICD under the skin in your chest during minor surgery. You will likely have medicine to make you feel relaxed and sleepy during the surgery. Your doctor makes a small cut (incision) in your upper chest. He or she puts one or two leads (wires) through the cut. Most of the time, the leads go into a large blood vessel in the upper chest. Then your doctor guides the leads through the blood vessel into your heart. Your doctor connects the leads to the ICD and places it in your chest. Then the incision is closed. Your doctor also programs the ICD. Most people spend the night in the hospital, just to make sure that the device is working and that there are no problems from the surgery. How does it feel to get a shock? The shock from an ICD hurts briefly. People feel it in different ways.  It's been described as feeling like a punch in the chest. But the shock is a sign that the ICD is doing its job. It's there to save your life. You won't feel any pain if the ICD uses electrical pulses to fix a heart rate that is too fast or too slow. There's no way to know how often a shock might occur. It might never happen. Not knowing when or if a shock might happen may make you nervous. Knowing what to do if you get shocked can help. Ask your doctor for an action plan. This plan will guide you step-by-step if a shock happens. What else should you know? You can live a normal life with your ICD. Here are a few tips for living well with your ICD. · Avoid strong magnetic and electrical fields. These can keep your device from working right. Most office equipment and home appliances are safe to use. Check with your doctor about which things you should use with caution and which you should stay away from. · Be sure that any doctor, dentist, or other health professional you see knows that you have an ICD. · Always carry a card that tells what kind of device you have. Wear medical alert jewelry that says you have an ICD. You can buy this at most [employer]. · If you do get a shock, follow your action plan for what to do. · You can lead an active life with an ICD. Ask your doctor what sort of activity and intensity is safe for you. As you plan for your future and your end of life, be sure to include plans for your ICD. You can make the decision to turn off your ICD as part of the medical treatment you want at the end of life. Follow-up care is a key part of your treatment and safety. Be sure to make and go to all appointments, and call your doctor if you are having problems. It's also a good idea to know your test results and keep a list of the medicines you take. Where can you learn more? Go to http://nicola-john paul.info/. Enter Q223 in the search box to learn more about \"Learning About ICDs (Implantable Cardioverter-Defibrillators). \" Current as of: September 21, 2016 Content Version: 11.4 © 2417-5528 [employer]. Care instructions adapted under license by [employer]TE (which disclaims liability or warranty for this information). If you have questions about a medical condition or this instruction, always ask your healthcare professional. [address] any warranty or liability for your use of this information. Learning About ICD Shocks What are ICDs and ICD shocks? An implantable cardioverter-defibrillator (ICD) is a device that is placed under the skin of your chest. It has thin wires (called leads). Most of the time, these leads are placed inside the heart. The ICD is always checking your heart. If it detects a life-threatening rapid heart rhythm, it tries to slow the rhythm to get it back to normal. If the dangerous rhythm does not stop, the ICD sends an electric shock to the heart to restore a normal rhythm. The device then goes back to its watchful mode. The idea of living with an ICD and getting shocked worries some people. The shock can be uncomfortable. It may feel like you are being kicked in the chest. For many people, getting a shock can cause anxiety and depression. It's normal to be worried about living with an ICD. After all, you don't know when a shock might occur, and a shock could be a reminder that your heart is not as healthy as it could be. But an ICD is an important part of your treatment. It can save your life. If you take a few simple steps, you can feel better about having an ICD. How can you get over your fears about the ICD? Know your ICD treatment · Learn how the ICD works, what it does, and how it keeps you safe. This can help reduce any anxiety you may feel. · Keep your regular doctor appointments. Your doctor: ¨ Sets both the rate at which a shock will occur and the level of shock needed to restore your heart to a normal rate. ¨ Checks to see whether the ICD has given you any shocks since your last visit. This helps your doctor know if your medicines need to be adjusted. ¨ Checks the ICD battery and replaces it as needed. · Talk with others who have an ICD. Ask them if they have been shocked and what it was like. Ask them how they cope with it. Talking with others can help you feel better. · Always carry your ICD identity card, a list of all the medicines you are taking, and your doctor's name and phone number. This will help you get the best possible treatment if you get a shock and need help. Make an action plan Talk to your doctor about making an action plan for what to do if you get shocked. Here is an example: · After one shock: 
¨ Call 911 or other emergency services right away if you feel bad or have symptoms like chest pain. ¨ Call your doctor soon if you feel fine right away after the shock. Your doctor may want to talk about the shock and schedule a follow-up visit. · If you get a second shock in a 24-hour period, call your doctor right away. Call even if you feel fine right away. Stay calm after a shock · Follow the action plan you made with your doctor. · Do some breathing exercises. They may help you relax. ¨ Sit or lie in a comfortable position. Put one hand on your belly just below your ribs and the other hand on your chest. 
¨ Take a deep breath in through your nose, and let your belly push your hand out. Your chest should not move. ¨ Breathe out through pursed lips as if you were whistling. Feel the hand on your belly go in, and use it to push all the air out. ¨ Breathe in and out like this until you feel more relaxed. · Keep a good attitude. When you've had a shock, you may question how healthy you are or worry about getting another shock. But try to focus on the positive things in your life, like loving relationships, pleasant activities, or good friends. · Don't make changes in what you do. You may want to avoid an action because you think it caused the shock. But a shock can occur at any time, and you can't prevent shocks by your actions alone. Don't stop doing things you enjoy to try to avoid a shock. Follow-up care is a key part of your treatment and safety. Be sure to make and go to all appointments, and call your doctor if you are having problems. It's also a good idea to know your test results and keep a list of the medicines you take. Where can you learn more? Go to http://nicola-john paul.info/. Enter K316 in the search box to learn more about \"Learning About ICD Shocks. \" Current as of: September 21, 2016 Content Version: 11.4 © 4225-1401 Litographs. Care instructions adapted under license by en[employer]ast (which disclaims liability or warranty for this information). If you have questions about a medical condition or this instruction, always ask your healthcare professional. [place] [address] any warranty or liability for your use of this information. PACEMAKER INSTRUCTIONS SHEET 
· Keep your incision dry for 10 days. DO NOT put salves, ointments, and/or lotions on the incision. Only take a tub bath during this time; NO showers. · The pieces of tape on the incision will come off by themselves when you begin washing the site. Please do not pull or tear them off. · You may use your pacemaker arm; but DO NOT raise the arm higher than your shoulder for the first two weeks to prevent the pacemaker lead from moving. DO NOT immobilize your pacemaker arm. · Call us IMMEDIATELY if you develop fever, pain, redness, and/or drainage at the pacemaker arm. · Do not lift more than 10 pounds for 2 weeks and 20 pounds for 1 month. · Microwaves WILL NOT harm your pacemaker. Warning does not apply to you.  
· Avoid activities which can reprogram your pacemaker such as arc welding, ham radios, and tanning booths. At airports, always show your pacemaker identification card. You may walk through the metal detector, but do not allow the hand held wand near your pacemaker. Do not have a MRI or NMR scan without talking to your cardiologist. 
· Your pacemakers function will be evaluated over the telephone. Within 3 weeks you should receive a schedule. If you do not receive a schedule, or if you have questions, you may call [phone]. · Remove the battery from the transmitter after each use. Always keep a spare 9 volt battery. · The pacemaker battery is tested by the heart rate with the magnet applied. This test is done each time you transmit your ECG so it is very important that you follow your schedule. · Carry your pacemaker identification card at all times. Within 6 weeks, you should receive your permanent card. Keep your temporary card as a spare. Call [phone] if you do not receive your card or if you lose your card. · Always show the doctor or dentist your pacemaker identification card. · If you have questions about your pacemaker or office appointment, please call the office of [address] [place] [address] 121 cardiology at [phone]. · Following the pacemaker implant, you will need to return to the clinic to see your doctor within 1 week. If you did not receive an appointment, please call [phone]. ACO Transitions of Care Introducing [employer] [address] Sarah Price offers a voluntary care coordination program to provide high quality service and care to Coni Murphy fee-for-service beneficiaries. Versa Oliver was designed to help you enhance your health and well-being through the following services: ? Transitions of Care  support for individuals who are transitioning from one care setting to another (example: Hospital to home). ?  Chronic and Complex Care Coordination  support for individuals and caregivers of those with serious or chronic illnesses or with more than one chronic (ongoing) condition and those who take a number of different medications. If you meet specific medical criteria, a Pending sale to [facility] [address] may call you directly to coordinate your care with your primary care physician and your other care providers. For questions about the [facility] programs, please, contact your physicians office. For general questions or additional information about Accountable Care Organizations: 
Please visit www.medicare.gov/acos. html or call 1-800-MEDICARE (4-396.160.3532) TTY users should call 2-240.896.6933. [employer] Announcement We are excited to announce that we are making your provider's discharge notes available to you in [employer]. You will see these notes when they are completed and signed by the physician that discharged you from your recent hospital stay. If you have any questions or concerns about any information you see in [employer], please call the Health Information Department where you were seen or reach out to your Primary Care Provider for more information about your plan of care. Introducing [facility]! Viktor Beard introduces [employer] patient portal. Now you can access parts of your medical record, email your doctor's office, and request medication refills online. 1. In your internet browser, go to [email or web address] 2. Click on the First Time User? Click Here link in the Sign In box. You will see the New Member Sign Up page. 3. Enter your [employer] Access Code exactly as it appears below. You will not need to use this code after youve completed the sign-up process. If you do not sign up before the expiration date, you must request a new code. · [employer] Access Code: [email or web address] Expires: 6/20/2018  2:42 PM 
 
4.  Enter the last four digits of your Social Security Number (xxxx) and Date of Birth (mm/dd/yyyy) as indicated and click Submit. You will be taken to the next sign-up page. 5. Create a [employer] Mediat ID. This will be your myMedScore login ID and cannot be changed, so think of one that is secure and easy to remember. 6. Create a [employer] password. You can change your password at any time. 7. Enter your Password Reset Question and Answer. This can be used at a later time if you forget your password. 8. Enter your e-mail address. You will receive e-mail notification when new information is available in [address] 9. Click Sign Up. You can now view and download portions of your medical record. 10. Click the Download Summary menu link to download a portable copy of your medical information. If you have questions, please visit the Frequently Asked Questions section of the myMedScore website. Remember, [employer]Score is NOT to be used for urgent needs. For medical emergencies, dial 911. Now available from your iPhone and Android! Introducing José Miguel Negrete As a [facility] patient, I wanted to make you aware of our electronic visit tool called José Miguel Birdfin. [facility] 24/7 allows you to connect within minutes with a medical provider 24 hours a day, seven days a week via a mobile device or tablet or logging into a secure website from your computer. You can access José Miguel Negrete from anywhere in the [place]. A virtual visit might be right for you when you have a simple condition and feel like you just dont want to get out of bed, or cant get away from work for an appointment, when your regular [facility] provider is not available (evenings, weekends or holidays), or when youre out of town and need minor care. Electronic visits cost only $49 and if the [facility] 24/7 provider determines a prescription is needed to treat your condition, one can be electronically transmitted to a nearby pharmacy*. Please take a moment to enroll today if you have not already done so. The enrollment process is free and takes just a few minutes. To enroll, please download the [employer] 24/7 radha to your tablet or phone, or visit [email or web address] to enroll on your computer. And, as an [address] [place] [address] patient with a [employer] account, the results of your visits will be scanned into your electronic medical record and your primary care provider will be able to view the scanned results. We urge you to continue to see your regular [employer] provider for your ongoing medical care. And while your primary care provider may not be the one available when you seek a [employer] virtual visit, the peace of mind you get from getting a real diagnosis real time can be priceless. For more information on [employer], view our Frequently Asked Questions (FAQs) at [email or web address]. Sincerely, 
 
Clint Saldana MD 
Chief Medical Officer [facility] Sarah Price *:  certain medications cannot be prescribed via [employer] Providers Seen During Your Hospitalization Provider Specialty Primary office phone Aly Marrero MD Cardiology [phone] Your Primary Care Physician (PCP) Primary Care Physician Office Phone Office Fax Davidkennedy Rondon [phone] You are allergic to the following Allergen Reactions Hydrocodone-Homatropine Unknown (comments) Morphine Other (comments)  
 hallucinations Recent Documentation Height Weight Breastfeeding? BMI OB Status Smoking Status 1.6 m 59.1 kg No 23.06 kg/m2 Postmenopausal Current [facility] Emergency Contacts Name Discharge Info Relation Home Work Mobile Claudette Bermudez DISCHARGE CAREGIVER [3] Daughter [21] [phone] Fausto Wallace  Daughter [21] [phone] Patient Belongings The following personal items are in your possession at time of discharge: 
     Visual Aid: Glasses, With patient Please provide this summary of care documentation to your next provider. Signatures-by signing, you are acknowledging that this After Visit Summary has been reviewed with you and you have received a copy. Patient Signature:  ____________________________________________________________ Date:  ____________________________________________________________  
  
Nanetta [place]as Provider Signature:  ____________________________________________________________ Date:  ____________________________________________________________

## 2018-03-27 NOTE — ANESTHESIA PREPROCEDURE EVALUATION
Anesthetic History   No history of anesthetic complications            Review of Systems / Medical History      Pulmonary    COPD: moderate      Smoker  Asthma        Neuro/Psych         TIA and psychiatric history     Cardiovascular                  Exercise tolerance: <4 METS  Comments: Recent cath 45 to 50% EF, mild nonobstructive CAD   GI/Hepatic/Renal     GERD: well controlled           Endo/Other        Arthritis     Other Findings            Physical Exam    Airway  Mallampati: II  TM Distance: > 6 cm  Neck ROM: normal range of motion   Mouth opening: Normal     Cardiovascular    Rhythm: regular           Dental    Dentition: Full upper dentures     Pulmonary                 Abdominal  GI exam deferred       Other Findings            Anesthetic Plan    ASA: 3  Anesthesia type: total IV anesthesia          Induction: Intravenous  Anesthetic plan and risks discussed with: Patient

## 2018-03-28 VITALS
DIASTOLIC BLOOD PRESSURE: 80 MMHG | OXYGEN SATURATION: 92 % | HEART RATE: 82 BPM | WEIGHT: 130.2 LBS | HEIGHT: 63 IN | SYSTOLIC BLOOD PRESSURE: 118 MMHG | BODY MASS INDEX: 23.07 KG/M2 | TEMPERATURE: 98.4 F | RESPIRATION RATE: 17 BRPM

## 2018-03-28 LAB
ANION GAP SERPL CALC-SCNC: 5 MMOL/L (ref 7–16)
ATRIAL RATE: 91 BPM
BUN SERPL-MCNC: 19 MG/DL (ref 8–23)
CALCIUM SERPL-MCNC: 8.9 MG/DL (ref 8.3–10.4)
CALCULATED P AXIS, ECG09: 41 DEGREES
CALCULATED R AXIS, ECG10: -69 DEGREES
CALCULATED T AXIS, ECG11: 68 DEGREES
CHLORIDE SERPL-SCNC: 104 MMOL/L (ref 98–107)
CO2 SERPL-SCNC: 32 MMOL/L (ref 21–32)
CREAT SERPL-MCNC: 0.81 MG/DL (ref 0.6–1)
DIAGNOSIS, 93000: NORMAL
GLUCOSE SERPL-MCNC: 115 MG/DL (ref 65–100)
MAGNESIUM SERPL-MCNC: 2.3 MG/DL (ref 1.8–2.4)
P-R INTERVAL, ECG05: 146 MS
POTASSIUM SERPL-SCNC: 3.7 MMOL/L (ref 3.5–5.1)
Q-T INTERVAL, ECG07: 400 MS
QRS DURATION, ECG06: 120 MS
QTC CALCULATION (BEZET), ECG08: 492 MS
SODIUM SERPL-SCNC: 141 MMOL/L (ref 136–145)
VENTRICULAR RATE, ECG03: 91 BPM

## 2018-03-28 PROCEDURE — 99218 HC RM OBSERVATION: CPT

## 2018-03-28 PROCEDURE — 83735 ASSAY OF MAGNESIUM: CPT | Performed by: INTERNAL MEDICINE

## 2018-03-28 PROCEDURE — 74011250637 HC RX REV CODE- 250/637: Performed by: INTERNAL MEDICINE

## 2018-03-28 PROCEDURE — 36415 COLL VENOUS BLD VENIPUNCTURE: CPT | Performed by: INTERNAL MEDICINE

## 2018-03-28 PROCEDURE — 80048 BASIC METABOLIC PNL TOTAL CA: CPT | Performed by: INTERNAL MEDICINE

## 2018-03-28 PROCEDURE — 74011250636 HC RX REV CODE- 250/636: Performed by: INTERNAL MEDICINE

## 2018-03-28 RX ADMIN — PANTOPRAZOLE SODIUM 40 MG: 40 TABLET, DELAYED RELEASE ORAL at 09:17

## 2018-03-28 RX ADMIN — Medication 2 G: at 11:00

## 2018-03-28 RX ADMIN — Medication 2 G: at 04:28

## 2018-03-28 RX ADMIN — CITALOPRAM HYDROBROMIDE 20 MG: 20 TABLET ORAL at 09:17

## 2018-03-28 RX ADMIN — METOPROLOL SUCCINATE 50 MG: 50 TABLET, EXTENDED RELEASE ORAL at 09:16

## 2018-03-28 RX ADMIN — BUDESONIDE 6 MG: 3 CAPSULE ORAL at 08:00

## 2018-03-28 RX ADMIN — ASPIRIN 81 MG: 81 TABLET, COATED ORAL at 09:16

## 2018-03-28 RX ADMIN — ACETAMINOPHEN 650 MG: 325 TABLET ORAL at 01:59

## 2018-03-28 NOTE — PROGRESS NOTES
IV removed. Site clean dry and intact. Left subclavian site with no swelling or bleeding. Tele monitor removed and returned to monitor room. Pt ready for discharge.

## 2018-03-28 NOTE — ANESTHESIA POSTPROCEDURE EVALUATION
Post-Anesthesia Evaluation and Assessment    Patient: Alex Melo MRN: [id number]  SSN: [id number]    YOB: 1945  Age: 74 y.o. Sex: female       Cardiovascular Function/Vital Signs  Visit Vitals    /67 (BP 1 Location: Right arm, BP Patient Position: At rest)    Pulse 97    Temp 36.7 °C (98 °F)    Resp 20    Ht 5' 3\" (1.6 m)    Wt 59.9 kg (132 lb)    SpO2 97%    Breastfeeding No    BMI 23.38 kg/m2       Patient is status post total IV anesthesia anesthesia for Procedure(s):  ICD. Nausea/Vomiting: None    Postoperative hydration reviewed and adequate. Pain:  Pain Scale 1: Numeric (0 - 10) (03/27/18 2046)  Pain Intensity 1: 0 (03/27/18 2046)   Managed    Neurological Status: At baseline    Mental Status and Level of Consciousness: Arousable    Pulmonary Status:   O2 Device: Nasal cannula (03/27/18 2046)   Adequate oxygenation and airway patent    Complications related to anesthesia: None    Post-anesthesia assessment completed.  No concerns    Signed By: Diana Wellington MD     March 27, 2018

## 2018-03-28 NOTE — ROUTINE PROCESS
TRANSFER - OUT REPORT:    Verbal report given to Jesse Perez on Brandon Ed  being transferred to 3rd floor tele  for routine progression of care       Report consisted of patients Situation, Background, Assessment and   Recommendations(SBAR). Information from the following report(s) SBAR was reviewed with the receiving nurse. Lines:   Peripheral IV 03/27/18 Left Antecubital (Active)        Opportunity for questions and clarification was provided. Patient transported with:   O2 @ 2 liters     Dr Toro Camilo notified of patient status. Discharged from care. Pt transported to the floor awake, vss, and icd incision site dry and intact.

## 2018-03-28 NOTE — PROGRESS NOTES
TRANSFER - OUT REPORT:    Verbal report given to Jesse Perez on [facility]  being transferred to [facility] (unit) for routine progression of care       Report consisted of patients Situation, Background, Assessment and   Recommendations(SBAR). Information from the following report(s) SBAR, Procedure Summary and Recent Results was reviewed with the receiving nurse. Lines:   Peripheral IV 03/27/18 Left Antecubital (Active)        Opportunity for questions and clarification was provided.       Patient transported with:   Registered Nurse

## 2018-03-28 NOTE — PROGRESS NOTES
Admission skin assessment completed. Pt's skin warm, dry, intact. Heels intact. Dressing to left subclavian s/p pacer.  Unable to visualize sacrum due to strict bedrest post-procedure    3/28/18 0230 sacrum visualized and intact

## 2018-03-28 NOTE — DISCHARGE SUMMARY
Physician Discharge Summary     Patient ID:  Briseyda Metcalf  [id number]  15 y.o.  1945    Admit date: 3/27/2018    Discharge date and time: No discharge date for patient encounter. Admitting Physician: Vicenta Kumari MD     Primary Physician: Destiney Colindres MD    Discharge Physician: Manny Cooper MD    Admission Diagnoses: ACC/AHA stage C chronic systolic heart failure ([address] [I50.2*    Discharge Diagnoses:   Patient Active Problem List    Diagnosis Date Noted    Presence of automatic (implantable) cardiac defibrillator 03/27/2018    Cardiomyopathy ([address] 03/27/2018    NICM (nonischemic cardiomyopathy) ([facility] 58/39/7119    Systolic CHF, chronic ([address] 06/13/2017    Shortness of breath 06/13/2017    H/O TIA (transient ischemic attack) and stroke 06/13/2017    Tobacco use disorder 07/20/2016    Asbestosis ([address] 07/20/2016    Carotid artery disease (HCC)     Elevated LFTs     Lymphoma ([address]     Abnormal mammogram     Colitis, ulcerative ([address]     Chronic hoarseness     Menopause     Hypomagnesemia 08/27/2015    Colon cancer ([address] 08/27/2015    Osteoarthrosis, unspecified whether generalized or localized, unspecified site 08/27/2015    Depression 07/09/2015    GERD (gastroesophageal reflux disease) 07/09/2015    Osteoporosis 07/09/2015       Cardiology Procedures this admission:    1. ICD implant    Hospital Course: Patient had cardiac device placement. Post device CXR and device check were stable. Please see operation report for full implant details. Discharge Exam:     Visit Vitals    /54    Pulse 95    Temp 98.8 °F (37.1 °C)    Resp 18    Ht 5' 3\" (1.6 m)    Wt 130 lb 3.2 oz (59.1 kg)    SpO2 93%    Breastfeeding No    BMI 23.06 kg/m2     General Appearance:  Well developed, well nourished,alert and oriented x 3, and individual in no acute distress. Ears/Nose/Mouth/Throat:   Hearing grossly normal.         Neck: Supple.    Chest:   Lungs clear to auscultation bilaterally. Cardiovascular:  Regular rate and rhythm, S1, S2 normal, no murmur. Abdomen:   Soft, non-tender, bowel sounds are active. Extremities: No edema bilaterally. Skin: Warm and dry. Disposition: Patient will be discharged home    Patient Instructions:   Current Discharge Medication List      CONTINUE these medications which have NOT CHANGED    Details   budesonide (ENTOCORT EC) 3 mg capsule Take 6 mg by mouth three (3) times daily. metoprolol succinate (TOPROL-XL) 50 mg XL tablet Take 1 Tab by mouth daily. Qty: 90 Tab, Refills: 3    Associated Diagnoses: Palpitation; Hyperlipidemia, unspecified hyperlipidemia type      atorvastatin (LIPITOR) 40 mg tablet TAKE 1 TABLET DAILY  Qty: 90 Tab, Refills: 3      citalopram (CELEXA) 20 mg tablet Take 1 Tab by mouth daily. Qty: 90 Tab, Refills: 3      nitroglycerin (NITROSTAT) 0.4 mg SL tablet Place 1 sl under the tongue q 5 min prn cp, max 3 sl in a 15-min time period. Call 911 if no relief after the 3rd sl. Qty: 1 Bottle, Refills: prn      CALCIUM CARBONATE/VITAMIN D3 (CALCIUM + D PO) Take 1 Tab by mouth daily. 600 mg calcium with vitamin d      mesalamine (PENTASA) 500 mg CR capsule Take 2 tablets BID  Qty: 1 Cap, Refills: 0      traMADol (ULTRAM) 50 mg tablet Take 1 tablet by mouth TID  Qty: 270 Tab, Refills: 1      aspirin delayed-release 81 mg tablet Take  by mouth daily. lansoprazole (PREVACID) 30 mg capsule Take  by mouth Daily (before breakfast). Referenced discharge instructions provided by nursing for diet and activity.     Follow-up with Pacemaker/ICD Clinic in 12 to 14 days    Signed:  Gladys Wong MD  3/28/2018  7:35 AM

## 2018-03-28 NOTE — PROGRESS NOTES
TRANSFER - IN REPORT:    Verbal report received from [facility] (name) on Padmaja Wilson  being received from [facility] (unit) for routine progression of care      Report consisted of patients Situation, Background, Assessment and   Recommendations(SBAR). Information from the following report(s) SBAR, Kardex, [facility] and Recent Results was reviewed with the receiving nurse. Opportunity for questions and clarification was provided.         Awaiting pt's arrival to floor

## 2018-03-28 NOTE — PHYSICIAN ADVISORY
Letter of Determination: Outpatient Status Appropriate    This patient was originally hospitalized as Outpatient Status with observation services on 3/27/2018 for scheduled implantable cardiac defibrillator placement. This patient is appropriate for Outpatient Status in accordance with CMS regulation Section 43 .3 and the Inpatient Only List. There does not appear to be sufficient medical necessity documented to support observation services. It is our recommendation that this patient's hospitalization status should be OUTPATIENT status.      The final decision regarding the patient's hospitalization status depends on the attending physician's judgement.       uSjey Underwood MD, UNIQUE,   Physician Vitor Ball.

## 2018-03-28 NOTE — DISCHARGE INSTRUCTIONS
Learning About ICDs (Implantable Cardioverter-Defibrillators)  What is an ICD (implantable cardioverter-defibrillator)? An ICD (implantable cardioverter-defibrillator) is a small, battery-powered device. It fixes serious changes in your heartbeat. ICDs are used in people who have had a life-threatening heart rhythm or are at high risk of having one. The ICD is placed under the skin of the chest. It's attached to one or two wires (called leads). Most of the time, these leads go into the heart through a vein. How does an ICD work? An ICD is always checking your heart rate and rhythm. If the ICD detects a life-threatening, rapid heart rhythm, it tries to slow the rhythm to get it back to normal. If the bad rhythm doesn't stop, the ICD sends an electric shock to the heart. This restores a normal rhythm. The device then goes back to its watchful mode. An ICD also can fix a heart rate that is too fast or too slow. It does this without using a shock. It can send out electrical pulses to speed up a heart rate that is too slow. Or it can slow down a fast heart rate by matching the pace and bringing the heart rate back to normal.  Your doctor will check the ICD regularly to make sure it is working right and isn't causing any problems. Your doctor will also check the battery to see if it needs to be replaced. How is an ICD placed? Your doctor will put the ICD under the skin in your chest during minor surgery. You will likely have medicine to make you feel relaxed and sleepy during the surgery. Your doctor makes a small cut (incision) in your upper chest. He or she puts one or two leads (wires) through the cut. Most of the time, the leads go into a large blood vessel in the upper chest. Then your doctor guides the leads through the blood vessel into your heart. Your doctor connects the leads to the ICD and places it in your chest. Then the incision is closed. Your doctor also programs the ICD.   Most people spend the night in the hospital, just to make sure that the device is working and that there are no problems from the surgery. How does it feel to get a shock? The shock from an ICD hurts briefly. People feel it in different ways. It's been described as feeling like a punch in the chest. But the shock is a sign that the ICD is doing its job. It's there to save your life. You won't feel any pain if the ICD uses electrical pulses to fix a heart rate that is too fast or too slow. There's no way to know how often a shock might occur. It might never happen. Not knowing when or if a shock might happen may make you nervous. Knowing what to do if you get shocked can help. Ask your doctor for an action plan. This plan will guide you step-by-step if a shock happens. What else should you know? You can live a normal life with your ICD. Here are a few tips for living well with your ICD. · Avoid strong magnetic and electrical fields. These can keep your device from working right. Most office equipment and home appliances are safe to use. Check with your doctor about which things you should use with caution and which you should stay away from. · Be sure that any doctor, dentist, or other health professional you see knows that you have an ICD. · Always carry a card that tells what kind of device you have. Wear medical alert jewelry that says you have an ICD. You can buy this at most drugstores. · If you do get a shock, follow your action plan for what to do. · You can lead an active life with an ICD. Ask your doctor what sort of activity and intensity is safe for you. As you plan for your future and your end of life, be sure to include plans for your ICD. You can make the decision to turn off your ICD as part of the medical treatment you want at the end of life. Follow-up care is a key part of your treatment and safety. Be sure to make and go to all appointments, and call your doctor if you are having problems.  It's also a good idea to know your test results and keep a list of the medicines you take. Where can you learn more? Go to http://nicola-john paul.info/. Enter B116 in the search box to learn more about \"Learning About ICDs (Implantable Cardioverter-Defibrillators). \"  Current as of: September 21, 2016  Content Version: 11.4  © 7807-1753 [employer]. Care instructions adapted under license by [employer] (which disclaims liability or warranty for this information). If you have questions about a medical condition or this instruction, always ask your healthcare professional. [place] [address] any warranty or liability for your use of this information. Learning About ICD Shocks  What are ICDs and ICD shocks? An implantable cardioverter-defibrillator (ICD) is a device that is placed under the skin of your chest. It has thin wires (called leads). Most of the time, these leads are placed inside the heart. The ICD is always checking your heart. If it detects a life-threatening rapid heart rhythm, it tries to slow the rhythm to get it back to normal. If the dangerous rhythm does not stop, the ICD sends an electric shock to the heart to restore a normal rhythm. The device then goes back to its watchful mode. The idea of living with an ICD and getting shocked worries some people. The shock can be uncomfortable. It may feel like you are being kicked in the chest. For many people, getting a shock can cause anxiety and depression. It's normal to be worried about living with an ICD. After all, you don't know when a shock might occur, and a shock could be a reminder that your heart is not as healthy as it could be. But an ICD is an important part of your treatment. It can save your life. If you take a few simple steps, you can feel better about having an ICD. How can you get over your fears about the ICD?   Know your ICD treatment  · Learn how the ICD works, what it does, and how it keeps you safe. This can help reduce any anxiety you may feel. · Keep your regular doctor appointments. Your doctor:  ¨ Sets both the rate at which a shock will occur and the level of shock needed to restore your heart to a normal rate. ¨ Checks to see whether the ICD has given you any shocks since your last visit. This helps your doctor know if your medicines need to be adjusted. ¨ Checks the ICD battery and replaces it as needed. · Talk with others who have an ICD. Ask them if they have been shocked and what it was like. Ask them how they cope with it. Talking with others can help you feel better. · Always carry your ICD identity card, a list of all the medicines you are taking, and your doctor's name and phone number. This will help you get the best possible treatment if you get a shock and need help. Make an action plan  Talk to your doctor about making an action plan for what to do if you get shocked. Here is an example:  · After one shock:  ¨ Call 911 or other emergency services right away if you feel bad or have symptoms like chest pain. ¨ Call your doctor soon if you feel fine right away after the shock. Your doctor may want to talk about the shock and schedule a follow-up visit. · If you get a second shock in a 24-hour period, call your doctor right away. Call even if you feel fine right away. Stay calm after a shock  · Follow the action plan you made with your doctor. · Do some breathing exercises. They may help you relax. ¨ Sit or lie in a comfortable position. Put one hand on your belly just below your ribs and the other hand on your chest.  ¨ Take a deep breath in through your nose, and let your belly push your hand out. Your chest should not move. ¨ Breathe out through pursed lips as if you were whistling. Feel the hand on your belly go in, and use it to push all the air out. ¨ Breathe in and out like this until you feel more relaxed. · Keep a good attitude.  When you've had a shock, you may question how healthy you are or worry about getting another shock. But try to focus on the positive things in your life, like loving relationships, pleasant activities, or good friends. · Don't make changes in what you do. You may want to avoid an action because you think it caused the shock. But a shock can occur at any time, and you can't prevent shocks by your actions alone. Don't stop doing things you enjoy to try to avoid a shock. Follow-up care is a key part of your treatment and safety. Be sure to make and go to all appointments, and call your doctor if you are having problems. It's also a good idea to know your test results and keep a list of the medicines you take. Where can you learn more? Go to http://nicola-john paul.info/. Enter F284 in the search box to learn more about \"Learning About ICD Shocks. \"  Current as of: September 21, 2016  Content Version: 11.4  © 3768-5238 [employer]. Care instructions adapted under license by [employer] (which disclaims liability or warranty for this information). If you have questions about a medical condition or this instruction, always ask your healthcare professional. [address] any warranty or liability for your use of this information. PACEMAKER INSTRUCTIONS SHEET  · Keep your incision dry for 10 days. DO NOT put salves, ointments, and/or lotions on the incision. Only take a tub bath during this time; NO showers. · The pieces of tape on the incision will come off by themselves when you begin washing the site. Please do not pull or tear them off. · You may use your pacemaker arm; but DO NOT raise the arm higher than your shoulder for the first two weeks to prevent the pacemaker lead from moving. DO NOT immobilize your pacemaker arm. · Call us IMMEDIATELY if you develop fever, pain, redness, and/or drainage at the pacemaker arm.   · Do not lift more than 10 pounds for 2 weeks and 20 pounds for 1 month. · Microwaves WILL NOT harm your pacemaker. Warning does not apply to you. · Avoid activities which can reprogram your pacemaker such as arc welding, ham radios, and tanning booths. At airports, always show your pacemaker identification card. You may walk through the metal detector, but do not allow the hand held wand near your pacemaker. Do not have a MRI or NMR scan without talking to your cardiologist.  · Your pacemakers function will be evaluated over the telephone. Within 3 weeks you should receive a schedule. If you do not receive a schedule, or if you have questions, you may call [phone]. · Remove the battery from the transmitter after each use. Always keep a spare 9 volt battery. · The pacemaker battery is tested by the heart rate with the magnet applied. This test is done each time you transmit your ECG so it is very important that you follow your schedule. · Carry your pacemaker identification card at all times. Within 6 weeks, you should receive your permanent card. Keep your temporary card as a spare. Call [phone] if you do not receive your card or if you lose your card. · Always show the doctor or dentist your pacemaker identification card. · If you have questions about your pacemaker or office appointment, please call the office of [address] [place] [address] [address] cardiology at [phone]. · Following the pacemaker implant, you will need to return to the clinic to see your doctor within 1 week. If you did not receive an appointment, please call [phone].

## 2018-03-28 NOTE — PROGRESS NOTES
Bedside and Verbal shift change report given to [facility], RN (oncoming nurse) by self (offgoing nurse). Report included the following information SBAR, Kardex, MAR and Recent Results.

## 2018-03-28 NOTE — PROCEDURES
PRE-ELECTROPHYSIOLOGY STUDY DIAGNOSES  1. Chronic systolic heart failure, ejection fraction of 30%  2. Non ischemic dilated cardiomyopathy. 3. Class III heart failure symptoms. 4. Chronic systolic heart failure for a minimum of 3 months duration on optimal medical therapy. 5. Primary prevention indications for defibrillator  6. Life expectancy greater than 1 year. PROCEDURE PERFORMED  1. Insertion of an implantable cardioverter defibrillator. Anesthesia: MAC     Estimated Blood Loss: Less than 10 mL     Specimens: * No specimens in log *     Procedure: After informed consent was obtained, the patient was brought to the Electrophysiology Laboratory in a fasting state and was prepped and draped in sterile fashion. Prophylactic antibiotic was administered 10 minutes prior to skin incision: (Ancef 2 gms). Conscious sedation was administered with continuous oxygen saturation measurement and blood pressure measurement by anesthesiology. Local anesthetic (sensorcaine) was delivered to the left pectoral region and an incision was made parallel to the deltopectoral groove. A subcutaneous pocket was created using blunt dissection and electrocautery, and adequate hemostasis was established. Access x 1 was obtained using a modified Seldinger technique under ultrasound guidance with placement of a J tipped wire down into the RA and advanced below the diaphragm. Next, placement of a 7Fr peel-away sheath over the guidewire was performed. A permanent RV single coil ICD lead was then advanced under fluoroscopic guidance via the 8Fr sheath to the location of the RV apex where a stable position with satisfactory sensing and pacing characteristics was obtained. The peel away sheath was removed and the guiding sheath was split. A hemostatic suture was placed around the lead sleeve and then secured to the muscle. The lead slack and position was assessed under fluoroscopy while securing the lead collar to ensure proper length. Final lead testing via the pacing system analyzer (PSA) demonstrated stable sensing, impedance and pacing thresholds. High output pacing @ 10V was performed during deep inspiration without causing diaphragmatic pacing. The lead pin was then cleaned with antibiotic soaked gauze, dried gently, and attached to a new ICD generator. Pins were directly observed to pass the tip electrode, and the ring hex wrench screws were secured, and lead tug tested. The device and lead were gently positioned within the pocket. The pocket was irrigated copiously with a saline antimicrobial solution prior to placing the device in the pocket. The wound was closed with multiple layers of absorbable suture followed by 3-0 prolene. Fluoroscopic images were interpreted by me, in multiple projections. Final device position was confirmed by stored fluoroscopic image from device pocket to lead tips in AP projection. The patient tolerated the procedure well and left the lab in good condition.      Lead Data:    Pulse Generator Model #  Serial # Location Implant   [id number] [facility] [id number] Left Pectoral Implant       Lead Model Number  Serial Number Lead position Implant   RV [id number] [facility] [id number] RV Depue Implant       Lead Sensitivity and Threshold  Lead R or P sensitivity (mv) Threshold (V) Threshold PW (msec) Impedance (ohms) Final output Voltage (V) Final PW (msec)   RV 12 0.50 0.5 1000 2.5 .50     Bradycardia Settings  Ghassan Mode LRL URL Pace AVD (ms) Sense AVD (ms) Rate Response Mode Switching Mode SW Rate   VVI 40 120 NA NA N N N       Tachycardia Settings  Zone Type VT-1 VT-2 VF   ON/OFF/  MONITOR MONITOR ON ON   Zone Rate 150 171  100 Intervals 222   1st Therapy Type  ATP-burst x3 Shock   Energy (J)  85% 30   2nd Therapy Type  Shock Shock   Energy (J)  30 40   3rd Therapy Type  Shock Shock   Energy (J)  40 40   4th Therapy Type  Shock Shock   Energy (J)  40 40   5th Therapy Type  Shock Shock   Energy (J)  40 40   6th Therapy Type   Shock   Energy (J)   40     Defibrillation Threshold Testing  DFT# How induced Successful test? Shock Imped (ohms) Energy (J) Charge time (sec) Rescue needed? Defib threshold (J)   No                      Contrast:  0 ml    Fluoro Time:   1.6 minutes    Complications: None    IMPRESSION: Successful implantation of [employer] implantable cardioverter defibrillator for primary prevention of sudden death. Aurora Ware MD, MS  Clinical Cardiac Electrophysiology  3/27/2018  8:31 PM

## 2018-03-28 NOTE — PROGRESS NOTES
Bedside and Verbal shift change report given to self (oncoming nurse) by Juve Morrison (offgoing nurse). Report included the following information SBAR, Kardex, MAR and Recent Results.

## 2018-03-29 ENCOUNTER — HOSPITAL ENCOUNTER (EMERGENCY)
Age: 73
Discharge: HOME | End: 2018-03-29
Attending: EMERGENCY MEDICINE
Payer: MEDICARE

## 2018-03-29 ENCOUNTER — APPOINTMENT (OUTPATIENT)
Dept: GENERAL RADIOLOGY | Age: 73
End: 2018-03-29
Attending: EMERGENCY MEDICINE
Payer: MEDICARE

## 2018-03-29 VITALS
DIASTOLIC BLOOD PRESSURE: 56 MMHG | TEMPERATURE: 98.2 F | HEART RATE: 85 BPM | RESPIRATION RATE: 16 BRPM | OXYGEN SATURATION: 93 % | SYSTOLIC BLOOD PRESSURE: 118 MMHG

## 2018-03-29 DIAGNOSIS — I95.1 ORTHOSTATIC HYPOTENSION: Primary | ICD-10-CM

## 2018-03-29 LAB
ALBUMIN SERPL-MCNC: 2.7 G/DL (ref 3.2–4.6)
ALBUMIN/GLOB SERPL: 0.8 {RATIO} (ref 1.2–3.5)
ALP SERPL-CCNC: 51 U/L (ref 50–136)
ALT SERPL-CCNC: 12 U/L (ref 12–65)
ANION GAP SERPL CALC-SCNC: 5 MMOL/L (ref 7–16)
AST SERPL-CCNC: 24 U/L (ref 15–37)
ATRIAL RATE: 76 BPM
BASOPHILS # BLD: 0 K/UL (ref 0–0.2)
BASOPHILS NFR BLD: 0 % (ref 0–2)
BILIRUB SERPL-MCNC: 0.6 MG/DL (ref 0.2–1.1)
BNP SERPL-MCNC: 238 PG/ML
BUN SERPL-MCNC: 10 MG/DL (ref 8–23)
CALCIUM SERPL-MCNC: 8.5 MG/DL (ref 8.3–10.4)
CALCULATED P AXIS, ECG09: 68 DEGREES
CALCULATED R AXIS, ECG10: -63 DEGREES
CALCULATED T AXIS, ECG11: 35 DEGREES
CHLORIDE SERPL-SCNC: 110 MMOL/L (ref 98–107)
CO2 SERPL-SCNC: 28 MMOL/L (ref 21–32)
CREAT SERPL-MCNC: 0.68 MG/DL (ref 0.6–1)
DIAGNOSIS, 93000: NORMAL
DIFFERENTIAL METHOD BLD: NORMAL
EOSINOPHIL # BLD: 0.1 K/UL (ref 0–0.8)
EOSINOPHIL NFR BLD: 2 % (ref 0.5–7.8)
ERYTHROCYTE [DISTWIDTH] IN BLOOD BY AUTOMATED COUNT: 13.4 % (ref 11.9–14.6)
GLOBULIN SER CALC-MCNC: 3.4 G/DL (ref 2.3–3.5)
GLUCOSE SERPL-MCNC: 94 MG/DL (ref 65–100)
HCT VFR BLD AUTO: 37.4 % (ref 35.8–46.3)
HGB BLD-MCNC: 11.9 G/DL (ref 11.7–15.4)
IMM GRANULOCYTES # BLD: 0 K/UL (ref 0–0.5)
IMM GRANULOCYTES NFR BLD AUTO: 0 % (ref 0–5)
LYMPHOCYTES # BLD: 2.2 K/UL (ref 0.5–4.6)
LYMPHOCYTES NFR BLD: 27 % (ref 13–44)
MCH RBC QN AUTO: 29.2 PG (ref 26.1–32.9)
MCHC RBC AUTO-ENTMCNC: 31.8 G/DL (ref 31.4–35)
MCV RBC AUTO: 91.9 FL (ref 79.6–97.8)
MONOCYTES # BLD: 0.9 K/UL (ref 0.1–1.3)
MONOCYTES NFR BLD: 11 % (ref 4–12)
NEUTS SEG # BLD: 4.7 K/UL (ref 1.7–8.2)
NEUTS SEG NFR BLD: 60 % (ref 43–78)
P-R INTERVAL, ECG05: 168 MS
PLATELET # BLD AUTO: 153 K/UL (ref 150–450)
PMV BLD AUTO: 10.9 FL (ref 10.8–14.1)
POTASSIUM SERPL-SCNC: 3.8 MMOL/L (ref 3.5–5.1)
PROT SERPL-MCNC: 6.1 G/DL (ref 6.3–8.2)
Q-T INTERVAL, ECG07: 404 MS
QRS DURATION, ECG06: 112 MS
QTC CALCULATION (BEZET), ECG08: 454 MS
RBC # BLD AUTO: 4.07 M/UL (ref 4.05–5.25)
SODIUM SERPL-SCNC: 143 MMOL/L (ref 136–145)
VENTRICULAR RATE, ECG03: 76 BPM
WBC # BLD AUTO: 7.9 K/UL (ref 4.3–11.1)

## 2018-03-29 PROCEDURE — 99285 EMERGENCY DEPT VISIT HI MDM: CPT | Performed by: EMERGENCY MEDICINE

## 2018-03-29 PROCEDURE — 80053 COMPREHEN METABOLIC PANEL: CPT | Performed by: EMERGENCY MEDICINE

## 2018-03-29 PROCEDURE — 71045 X-RAY EXAM CHEST 1 VIEW: CPT

## 2018-03-29 PROCEDURE — 83880 ASSAY OF NATRIURETIC PEPTIDE: CPT | Performed by: EMERGENCY MEDICINE

## 2018-03-29 PROCEDURE — 85025 COMPLETE CBC W/AUTO DIFF WBC: CPT | Performed by: EMERGENCY MEDICINE

## 2018-03-29 PROCEDURE — 93005 ELECTROCARDIOGRAM TRACING: CPT | Performed by: EMERGENCY MEDICINE

## 2018-03-29 NOTE — Clinical Note
Monitor your blood pressure Drink plenty of fluids Call and arrange follow-up with cardiology Return to the ER for any new or worsening symptoms

## 2018-03-29 NOTE — ED NOTES
Patient ambulatory to and from restroom with this RN. Patient denies experiencing dizziness or any chest pain at this time. Patient has returned to stretcher with cardiac monitoring and cycling vitals in place. Will continue to monitor. Family at bedside.

## 2018-03-29 NOTE — DISCHARGE INSTRUCTIONS
Orthostatic Hypotension: Care Instructions  Your Care Instructions    Orthostatic hypotension is a quick drop in blood pressure. It happens when you get up from sitting or lying down. You may feel faint, lightheaded, or dizzy. When a person sits up or stands up, the body changes the way it pumps blood. This can slow the flow of blood to the brain for a very short time. And that can make you feel lightheaded. Many medicines can cause this problem, especially in older people. Lack of fluids (dehydration) or illnesses such as diabetes or heart disease also can cause it. Follow-up care is a key part of your treatment and safety. Be sure to make and go to all appointments, and call your doctor if you are having problems. It's also a good idea to know your test results and keep a list of the medicines you take. How can you care for yourself at home? · Tell your doctor about any problems you have with your medicines. · If your doctor prescribes medicine to help prevent a low blood pressure problem, take it exactly as prescribed. Call your doctor if you think you are having a problem with your medicine. · Drink plenty of fluids, enough so that your urine is light yellow or clear like water. Choose water and other caffeine-free clear liquids. If you have kidney, heart, or liver disease and have to limit fluids, talk with your doctor before you increase the amount of fluids you drink. · Limit or avoid alcohol and caffeine. · Get up slowly from bed or after sitting for a long time. If you are in bed, roll to your side and swing your legs over the edge of the bed and onto the floor. Push your body up to a sitting position. Wait for a while before you slowly stand up. If you are dizzy or lightheaded, sit or lie down. When should you call for help? Call 911 anytime you think you may need emergency care. For example, call if:  ? · You passed out (lost consciousness). ? Watch closely for changes in your health, and be sure to contact your doctor if:  ? · You do not get better as expected. Where can you learn more? Go to http://nicola-john paul.info/. Enter T904 in the search box to learn more about \"Orthostatic Hypotension: Care Instructions. \"  Current as of: September 21, 2016  Content Version: 11.4  © 3731-5934 [employer]. Care instructions adapted under license by [employer] (which disclaims liability or warranty for this information). If you have questions about a medical condition or this instruction, always ask your healthcare professional. [place] [address] any warranty or liability for your use of this information.

## 2018-03-29 NOTE — ED TRIAGE NOTES
Patient arrives via [facility] from home. Patient woke up this morning, went to make coffee and became dizziness and began not feeling well. Patient reports lying down in bed and felt fine afterwards. EMS reports positive orthostatics 140/ supine to 100/ standing. Patient recently had pacemaker placed several days ago. Patient alert and oriented x4, appears in no acute distress.

## 2018-03-29 NOTE — ED NOTES
I have reviewed discharge instructions with the patient. The patient verbalized understanding. Patient left ED via Discharge Method: ambulatory to Home with transport from daughters. The patient is ambulatory upon exit and appears in no acute distress. The patient has been provided discharge instructions and follow up information. The patient and daughters do not have any questions at this time. Opportunity for questions and clarification provided. Patient given 0 scripts. To continue your aftercare when you leave the hospital, you may receive an automated call from our care team to check in on how you are doing. This is a free service and part of our promise to provide the best care and service to meet your aftercare needs.  If you have questions, or wish to unsubscribe from this service please call [phone]. Thank you for Choosing our [facility] Emergency Department.

## 2018-03-29 NOTE — ED PROVIDER NOTES
HPI Comments: Patient presents to the ER complaining of dizziness and near syncope. Patient reportedly was found this morning, After eating breakfast she stood up and walked into the living room chills and became very lightheaded and dizzy, report some diaphoresis and nausea. Reports she had to sit down with some improvement in symptoms. EMS was called, paralysis from be orthostatic with a blood pressure changed from 140 supine to 075 systolic standing. IV fluids were started. Patient had a recent hospitalization for ICD placement secondary to congestive heart failure. She denies any fevers or chills. Does report some minimal cough. Denies any vomiting or diarrhea. Patient is a 74 y.o. female presenting with dizziness. The history is provided by the patient. Dizziness   This is a new problem. The current episode started 1 to 2 hours ago. The problem has been resolved. There was left facial focality noted. Pertinent negatives include no focal weakness, no slurred speech, no speech difficulty, no movement disorder and no unresponsiveness. Associated symptoms include nausea. Pertinent negatives include no chest pain and no confusion.         Past Medical History:   Diagnosis Date    Abnormal mammogram     Acute chest syndrome ([address] 7/20/2016    Asbestosis ([address] 7/20/2016    Asthma     Carotid artery disease (HCC)     Carotid artery stenosis without cerebral infarction 7/20/2016    Chronic hoarseness     Chronic obstructive pulmonary disease (HCC)     Colitis, ulcerative (HCC)     Colon cancer (HCC)     x2    Depression 7/9/2015    Dyslipidemia 7/20/2016    Elevated LFTs     GERD (gastroesophageal reflux disease) 7/9/2015    GERD & CROHNS & hx of colon cancer    Heart failure ([address]     High cholesterol 7/9/2015    Hyperlipidemia 7/20/2016    Hypertriglyceridemia     Hypomagnesemia     Lymphoma (HCC)     Menopause     Osteoarthrosis, unspecified whether generalized or localized, unspecified site 8/27/2015    Osteoporosis 7/9/2015    Stroke [facility])     TIA    Tobacco use disorder 7/20/2016       Past Surgical History:   Procedure Laterality Date    HX APPENDECTOMY      HX BREAST BIOPSY      Benign    HX CHOLECYSTECTOMY      HX COLECTOMY      COLON CANCER x 2         Family History:   Problem Relation Age of Onset    Cancer Mother      Bone CA    Heart Failure Father        Social History     Social History    Marital status:      Spouse name: N/A    Number of children: N/A    Years of education: N/A     Occupational History    Not on file. Social History Main Topics    Smoking status: Current Every Day Smoker     Packs/day: 0.50    Smokeless tobacco: Never Used    Alcohol use No    Drug use: No    Sexual activity: Not on file     Other Topics Concern    Not on file     Social History Narrative         ALLERGIES: Hydrocodone-homatropine and Morphine    Review of Systems   Constitutional: Negative for fatigue and unexpected weight change. HENT: Negative for dental problem and drooling. Eyes: Negative for photophobia and visual disturbance. Respiratory: Negative for chest tightness and stridor. Cardiovascular: Negative for chest pain and leg swelling. Gastrointestinal: Positive for nausea. Negative for abdominal pain. Endocrine: Negative for polydipsia and polyphagia. Genitourinary: Negative for dysuria and flank pain. Musculoskeletal: Negative for back pain. Skin: Negative for pallor and rash. Allergic/Immunologic: Negative for food allergies and immunocompromised state. Neurological: Positive for dizziness and light-headedness. Negative for tremors, focal weakness, speech difficulty and weakness. Hematological: Negative for adenopathy. Does not bruise/bleed easily. Psychiatric/Behavioral: Negative for behavioral problems and confusion. All other systems reviewed and are negative. There were no vitals filed for this visit. Physical Exam   Constitutional: She appears well-developed and well-nourished. HENT:   Head: Normocephalic and atraumatic. Mouth/Throat: Oropharynx is clear and moist.   Eyes: Conjunctivae and EOM are normal. Pupils are equal, round, and reactive to light. No scleral icterus. Neck: Normal range of motion. Neck supple. No tracheal deviation present. No thyromegaly present. Cardiovascular: Normal rate, regular rhythm and intact distal pulses. Pulmonary/Chest: Effort normal and breath sounds normal. No respiratory distress. She has no wheezes. Abdominal: Soft. Bowel sounds are normal. She exhibits no distension. There is no tenderness. Musculoskeletal: Normal range of motion. She exhibits no edema, tenderness or deformity. Nursing note and vitals reviewed. MDM  Number of Diagnoses or Management Options  Diagnosis management comments: Symptoms seem consistent with orthostatic near syncope  We'll check basic labs here as well as chest x-ray given her report of cough  Also obtain EKG and continue to monitor vital signs    12:19 PM  Normal basic labs here including normal EKG  Symptomatically patient feels improved. She has ambulated to the bathroom without any difficulty. We'll discharge home, have her monitor her blood pressure at home.   Follow up with cardiology as scheduled       Amount and/or Complexity of Data Reviewed  Clinical lab tests: ordered and reviewed  Tests in the radiology section of CPT®: ordered and reviewed    Risk of Complications, Morbidity, and/or Mortality  Presenting problems: moderate  Diagnostic procedures: moderate  Management options: moderate    Patient Progress  Patient progress: stable        ED Course       Procedures      Results Include:    Recent Results (from the past 24 hour(s))   EKG, 12 LEAD, INITIAL    Collection Time: 03/29/18  9:38 AM   Result Value Ref Range    Ventricular Rate 76 BPM    Atrial Rate 76 BPM    P-R Interval 168 ms    QRS Duration 112 ms    Q-T Interval 404 ms    QTC Calculation (Bezet) 454 ms    Calculated P Axis 68 degrees    Calculated R Axis -63 degrees    Calculated T Axis 35 degrees    Diagnosis       !! AGE AND GENDER SPECIFIC ECG ANALYSIS !! Normal sinus rhythm  Incomplete right bundle branch block  Left anterior fascicular block  Septal infarct , age undetermined  Abnormal ECG  When compared with ECG of 27-MAR-2018 22:46,  T wave inversion no longer evident in Anterior leads  Confirmed by Demi Dsouza MD (), IGLESIA BATISTA ([id number]) on 3/29/2018 11:42:10 AM     CBC WITH AUTOMATED DIFF    Collection Time: 03/29/18  9:46 AM   Result Value Ref Range    WBC 7.9 4.3 - 11.1 K/uL    RBC 4.07 4.05 - 5.25 M/uL    HGB 11.9 11.7 - 15.4 g/dL    HCT 37.4 35.8 - 46.3 %    MCV 91.9 79.6 - 97.8 FL    MCH 29.2 26.1 - 32.9 PG    MCHC 31.8 31.4 - 35.0 g/dL    RDW 13.4 11.9 - 14.6 %    PLATELET 404 997 - 541 K/uL    MPV 10.9 10.8 - 14.1 FL    DF AUTOMATED      NEUTROPHILS 60 43 - 78 %    LYMPHOCYTES 27 13 - 44 %    MONOCYTES 11 4.0 - 12.0 %    EOSINOPHILS 2 0.5 - 7.8 %    BASOPHILS 0 0.0 - 2.0 %    IMMATURE GRANULOCYTES 0 0.0 - 5.0 %    ABS. NEUTROPHILS 4.7 1.7 - 8.2 K/UL    ABS. LYMPHOCYTES 2.2 0.5 - 4.6 K/UL    ABS. MONOCYTES 0.9 0.1 - 1.3 K/UL    ABS. EOSINOPHILS 0.1 0.0 - 0.8 K/UL    ABS. BASOPHILS 0.0 0.0 - 0.2 K/UL    ABS. IMM. GRANS. 0.0 0.0 - 0.5 K/UL   METABOLIC PANEL, COMPREHENSIVE    Collection Time: 03/29/18  9:46 AM   Result Value Ref Range    Sodium 143 136 - 145 mmol/L    Potassium 3.8 3.5 - 5.1 mmol/L    Chloride 110 (H) 98 - 107 mmol/L    CO2 28 21 - 32 mmol/L    Anion gap 5 (L) 7 - 16 mmol/L    Glucose 94 65 - 100 mg/dL    BUN 10 8 - 23 MG/DL    Creatinine 0.68 0.6 - 1.0 MG/DL    GFR est AA >60 >60 ml/min/1.73m2    GFR est non-AA >60 >60 ml/min/1.73m2    Calcium 8.5 8.3 - 10.4 MG/DL    Bilirubin, total 0.6 0.2 - 1.1 MG/DL    ALT (SGPT) 12 12 - 65 U/L    AST (SGOT) 24 15 - 37 U/L    Alk.  phosphatase 51 50 - 136 U/L    Protein, total 6.1 (L) 6.3 - 8.2 g/dL    Albumin 2.7 (L) 3.2 - 4.6 g/dL    Globulin 3.4 2.3 - 3.5 g/dL    A-G Ratio 0.8 (L) 1.2 - 3.5     BNP    Collection Time: 03/29/18  9:46 AM   Result Value Ref Range     pg/mL

## 2018-08-14 PROBLEM — I47.29 NSVT (NONSUSTAINED VENTRICULAR TACHYCARDIA): Status: ACTIVE | Noted: 2018-08-14

## 2018-10-12 ENCOUNTER — HOSPITAL ENCOUNTER (OUTPATIENT)
Dept: MAMMOGRAPHY | Age: 73
Discharge: HOME | End: 2018-10-12
Attending: FAMILY MEDICINE
Payer: MEDICARE

## 2018-10-12 DIAGNOSIS — Z12.39 BREAST CANCER SCREENING: ICD-10-CM

## 2018-10-12 DIAGNOSIS — Z00.00 MEDICARE ANNUAL WELLNESS VISIT, SUBSEQUENT: ICD-10-CM

## 2018-10-12 PROCEDURE — 77067 SCR MAMMO BI INCL CAD: CPT

## 2018-11-02 ENCOUNTER — HOSPITAL ENCOUNTER (OUTPATIENT)
Dept: ULTRASOUND IMAGING | Age: 73
Discharge: HOME | End: 2018-11-02
Attending: FAMILY MEDICINE
Payer: MEDICARE

## 2018-11-02 DIAGNOSIS — G45.9 TIA (TRANSIENT ISCHEMIC ATTACK): ICD-10-CM

## 2018-11-02 DIAGNOSIS — I77.9 BILATERAL CAROTID ARTERY DISEASE, UNSPECIFIED TYPE (HCC): ICD-10-CM

## 2018-11-02 PROCEDURE — 93880 EXTRACRANIAL BILAT STUDY: CPT

## 2018-11-14 ENCOUNTER — HOSPITAL ENCOUNTER (EMERGENCY)
Age: 73
Discharge: HOME | End: 2018-11-14
Attending: EMERGENCY MEDICINE
Payer: MEDICARE

## 2018-11-14 ENCOUNTER — APPOINTMENT (OUTPATIENT)
Dept: GENERAL RADIOLOGY | Age: 73
End: 2018-11-14
Attending: EMERGENCY MEDICINE
Payer: MEDICARE

## 2018-11-14 ENCOUNTER — HOME HEALTH ADMISSION (OUTPATIENT)
Dept: HOME HEALTH SERVICES | Facility: HOME HEALTH | Age: 73
End: 2018-11-14
Payer: MEDICARE

## 2018-11-14 VITALS
SYSTOLIC BLOOD PRESSURE: 112 MMHG | TEMPERATURE: 98.3 F | RESPIRATION RATE: 16 BRPM | OXYGEN SATURATION: 97 % | DIASTOLIC BLOOD PRESSURE: 70 MMHG | HEART RATE: 80 BPM

## 2018-11-14 DIAGNOSIS — S39.012A BACK STRAIN, INITIAL ENCOUNTER: Primary | ICD-10-CM
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
## 2018-11-14 LAB
ALBUMIN SERPL-MCNC: 3.3 G/DL (ref 3.2–4.6)
ALBUMIN/GLOB SERPL: 0.8 {RATIO} (ref 1.2–3.5)
ALP SERPL-CCNC: 55 U/L (ref 50–136)
ALT SERPL-CCNC: 27 U/L (ref 12–65)
ANION GAP SERPL CALC-SCNC: 2 MMOL/L (ref 7–16)
AST SERPL-CCNC: 23 U/L (ref 15–37)
BASOPHILS # BLD: 0 K/UL (ref 0–0.2)
BASOPHILS NFR BLD: 0 % (ref 0–2)
BILIRUB SERPL-MCNC: 1 MG/DL (ref 0.2–1.1)
BUN SERPL-MCNC: 12 MG/DL (ref 8–23)
CALCIUM SERPL-MCNC: 9.3 MG/DL (ref 8.3–10.4)
CHLORIDE SERPL-SCNC: 98 MMOL/L (ref 98–107)
CO2 SERPL-SCNC: 34 MMOL/L (ref 21–32)
CREAT SERPL-MCNC: 0.93 MG/DL (ref 0.6–1)
DIFFERENTIAL METHOD BLD: ABNORMAL
EOSINOPHIL # BLD: 0 K/UL (ref 0–0.8)
EOSINOPHIL NFR BLD: 0 % (ref 0.5–7.8)
ERYTHROCYTE [DISTWIDTH] IN BLOOD BY AUTOMATED COUNT: 13.2 %
GLOBULIN SER CALC-MCNC: 4.2 G/DL (ref 2.3–3.5)
GLUCOSE SERPL-MCNC: 123 MG/DL (ref 65–100)
HCT VFR BLD AUTO: 41.9 % (ref 35.8–46.3)
HGB BLD-MCNC: 13.5 G/DL (ref 11.7–15.4)
IMM GRANULOCYTES # BLD: 0.1 K/UL (ref 0–0.5)
IMM GRANULOCYTES NFR BLD AUTO: 1 % (ref 0–5)
LIPASE SERPL-CCNC: 74 U/L (ref 73–393)
LYMPHOCYTES # BLD: 1.7 K/UL (ref 0.5–4.6)
LYMPHOCYTES NFR BLD: 18 % (ref 13–44)
MCH RBC QN AUTO: 30.5 PG (ref 26.1–32.9)
MCHC RBC AUTO-ENTMCNC: 32.2 G/DL (ref 31.4–35)
MCV RBC AUTO: 94.8 FL (ref 79.6–97.8)
MONOCYTES # BLD: 1.4 K/UL (ref 0.1–1.3)
MONOCYTES NFR BLD: 15 % (ref 4–12)
NEUTS SEG # BLD: 6 K/UL (ref 1.7–8.2)
NEUTS SEG NFR BLD: 65 % (ref 43–78)
NRBC # BLD: 0 K/UL (ref 0–0.2)
PLATELET # BLD AUTO: 188 K/UL (ref 150–450)
PMV BLD AUTO: 11.1 FL (ref 9.4–12.3)
POTASSIUM SERPL-SCNC: 3.5 MMOL/L (ref 3.5–5.1)
PROT SERPL-MCNC: 7.5 G/DL (ref 6.3–8.2)
RBC # BLD AUTO: 4.42 M/UL (ref 4.05–5.2)
SODIUM SERPL-SCNC: 134 MMOL/L (ref 136–145)
WBC # BLD AUTO: 9.2 K/UL (ref 4.3–11.1)

## 2018-11-14 PROCEDURE — 99283 EMERGENCY DEPT VISIT LOW MDM: CPT | Performed by: EMERGENCY MEDICINE

## 2018-11-14 PROCEDURE — 80053 COMPREHEN METABOLIC PANEL: CPT

## 2018-11-14 PROCEDURE — 85025 COMPLETE CBC W/AUTO DIFF WBC: CPT

## 2018-11-14 PROCEDURE — 83690 ASSAY OF LIPASE: CPT

## 2018-11-14 PROCEDURE — 72100 X-RAY EXAM L-S SPINE 2/3 VWS: CPT

## 2018-11-14 RX ORDER — METHOCARBAMOL 750 MG/1
750 TABLET, FILM COATED ORAL 4 TIMES DAILY
Qty: 30 TAB | Refills: 0 | Status: SHIPPED | OUTPATIENT
Start: 2018-11-14 | End: 2018-11-19

## 2018-11-14 RX ORDER — OXYCODONE HYDROCHLORIDE 5 MG/1
5 TABLET ORAL
Qty: 15 TAB | Refills: 0 | Status: SHIPPED | OUTPATIENT
Start: 2018-11-14 | End: 2019-01-04

## 2018-11-14 RX ORDER — AMOXICILLIN AND CLAVULANATE POTASSIUM 875; 125 MG/1; MG/1
1 TABLET, FILM COATED ORAL 2 TIMES DAILY
Qty: 14 TAB | Refills: 0 | Status: SHIPPED | OUTPATIENT
Start: 2018-11-14 | End: 2019-01-04

## 2018-11-14 NOTE — ED TRIAGE NOTES
Patient complains of mid back pain that started Sunday while she was bending over. Patient she is unable to stand for more than a few minutes due to pain. Patient states pain radiates into her diffuse upper abdomen as well. Patient also complains of cat bite to left hand that happened on Sunday as well and has been get more red and swollen since. Patient unsure of last tetanus shot was and states it was her cat who is up to date on his shots.

## 2018-11-14 NOTE — ED PROVIDER NOTES
Patient states she has been having back pain since Saturday. She states she bent over forward to  some shoes off the floor when she had the sudden onset of lower back discomfort. She states it did not really hurt at that time but started hurting about an hour later. She has had constant achy lower back pain since that time much worse with ambulation. She went to [facility] where CT scans were done of her abdomen without any definitive diagnosis. She was sent home with a prescription for prednisone which she has been taking without any improvement. She has some Ultram at home which she has been taking with some improvement in her symptoms. She came here today because she continues to have pain. Patient also states that her cat bit her on the left hand a few days ago. The area has become swollen and red. She has not taken any medicine for her symptoms. Elements of this note were created using speech recognition software. As such, errors of speech recognition may be present. Past Medical History:  
Diagnosis Date  Abnormal mammogram   
 Acute chest syndrome ([address] 7/20/2016  Asbestosis ([address] 7/20/2016  Asthma  Carotid artery disease ([address]  Carotid artery stenosis without cerebral infarction 7/20/2016  Chronic hoarseness  Chronic obstructive pulmonary disease ([address]  Colitis, ulcerative ([address]  Colon cancer ([address] x2  
 Depression 7/9/2015  Dyslipidemia 7/20/2016  Elevated LFTs  GERD (gastroesophageal reflux disease) 7/9/2015 GERD & CROHNS & hx of colon cancer  Heart failure ([address]  High cholesterol 7/9/2015  Hyperlipidemia 7/20/2016  Hypertriglyceridemia  Hypomagnesemia  Lymphoma ([address]  Menopause  Osteoarthrosis, unspecified whether generalized or localized, unspecified site 8/27/2015  Osteoporosis 7/9/2015  Stroke ([address] TIA  Tobacco use disorder 7/20/2016 Past Surgical History:  
Procedure Laterality Date  HX APPENDECTOMY  HX BREAST BIOPSY Benign  HX CHOLECYSTECTOMY  HX COLECTOMY    
 COLON CANCER x 2 Family History:  
Problem Relation Age of Onset  Cancer Mother Bone CA  Heart Failure Father  Breast Cancer Neg Hx Social History Socioeconomic History  Marital status:  Spouse name: Not on file  Number of children: Not on file  Years of education: Not on file  Highest education level: Not on file Social Needs  Financial resource strain: Not on file  Food insecurity - worry: Not on file  Food insecurity - inability: Not on file  Transportation needs - medical: Not on file  Transportation needs - non-medical: Not on file Occupational History  Not on file Tobacco Use  Smoking status: Current Every Day Smoker Packs/day: 0.50  Smokeless tobacco: Never Used Substance and Sexual Activity  Alcohol use: No  
 Drug use: No  
 Sexual activity: Not on file Other Topics Concern  Not on file Social History Narrative  Not on file ALLERGIES: Hydrocodone-homatropine; Hydromorphone; and Morphine Review of Systems Constitutional: Negative for chills and fever. Gastrointestinal: Negative for nausea and vomiting. All other systems reviewed and are negative. Vitals:  
 11/14/18 1342 BP: 114/73 Pulse: 88 Resp: 18 Temp: 98.3 °F (36.8 °C) SpO2: 96% Physical Exam  
Constitutional: She is oriented to person, place, and time. She appears well-developed and well-nourished. HENT:  
Head: Normocephalic and atraumatic. Eyes: Conjunctivae are normal. Pupils are equal, round, and reactive to light. Neck: Normal range of motion. Neck supple. Musculoskeletal: She exhibits tenderness. She exhibits no edema. Back: 
 
     Hands: 
Tenderness to palpation lower back as indicated.  She also has some mild edema to the dorsum of her left hand as indicated with mild erythema Neurological: She is alert and oriented to person, place, and time. Skin: Skin is warm and dry. Psychiatric: She has a normal mood and affect. Her behavior is normal.  
Nursing note and vitals reviewed. MDM Number of Diagnoses or Management Options Back strain, initial encounter: new and does not require workup Cat bite, initial encounter:  
Diagnosis management comments: 3:56 PM discussed results with patient. Will have  see patient and daughter for home health evaluation Amount and/or Complexity of Data Reviewed Tests in the radiology section of CPT®: ordered and reviewed Risk of Complications, Morbidity, and/or Mortality Presenting problems: moderate Diagnostic procedures: low Management options: low Patient Progress Patient progress: improved Procedures

## 2018-11-14 NOTE — PROGRESS NOTES
[facility] Face to Face Encounter Patients Name: Pete Crane    YOB: 1945 Ordering Physician: Dr. Deion Augustine Primary Diagnosis: back pain Date of Face to Face:   11/14/2018 Face to Face Encounter findings are related to primary reason for home care:   yes. 1. I certify that the patient needs intermittent care as follows: skilled nursing care:  skilled observation/assessment, patient education 
physical therapy: strengthening, stretching/ROM, transfer training and gait/stair training 
occupational therapy:  ADL safety (ie. cooking, bathing, dressing), ROM and pt/caregiver education 2. I certify that this patient is homebound, that is: 1) patient requires the use of a none device, special transportation, or assistance of another to leave the home; or 2) patient's condition makes leaving the home medically contraindicated; and 3) patient has a normal inability to leave the home and leaving the home requires considerable and taxing effort. Patient may leave the home for infrequent and short duration for medical reasons, and occasional absences for non-medical reasons. Homebound status is due to the following functional limitations: Patient with strength deficits limiting the performance of all ADL's without caregiver assistance or the use of an assistive device. Patient with poor safety awareness and is at risk for falls without assistance of another person and the use of an assistive device. Patient with poor ambulation endurance limiting their safe ability to ascend/descend the required number of steps to leave the home. 3. I certify that this patient is under my care and that I, or a nurse practitioner or  [id number], or clinical nurse specialist, or certified nurse midwife, working with me, had a Face-to-Face Encounter that meets the physician Face-to-Face Encounter requirements.   The following are the clinical findings from the [address] [place] [address] encounter that support the need for skilled services and is a summary of the encounter:  
 
See attached progess note Elvira Griggs RN 
11/14/2018 THE FOLLOWING TO BE COMPLETED BY THE COMMUNITY PHYSICIAN: 
 
I concur with the findings described above from the F2F encounter that this patient is homebound and in need of a skilled service. Certifying Physician: _____________________________________ Printed Certifying Physician Name: _____________________________________ Date: _________________

## 2018-11-14 NOTE — PROGRESS NOTES
Met with patient and family per MD request/ patient agreeable to [place]. Referral entry, order and F2F already in system for [facility] (PT/OT/RN). Verified address and phone.

## 2018-11-14 NOTE — ED NOTES
I have reviewed discharge instructions with the patient. The patient verbalized understanding. Patient left ED via Discharge Method: ambulatory to Home with family. Opportunity for questions and clarification provided. Patient given 3 scripts. To continue your aftercare when you leave the hospital, you may receive an automated call from our care team to check in on how you are doing. This is a free service and part of our promise to provide the best care and service to meet your aftercare needs.  If you have questions, or wish to unsubscribe from this service please call [phone]. Thank you for Choosing our [facility] Emergency Department.

## 2018-11-16 ENCOUNTER — HOME CARE VISIT (OUTPATIENT)
Dept: SCHEDULING | Facility: HOME HEALTH | Age: 73
End: 2018-11-16
Payer: MEDICARE

## 2018-11-16 PROCEDURE — G0299 HHS/HOSPICE OF RN EA 15 MIN: HCPCS

## 2018-11-16 PROCEDURE — 400013 HH SOC

## 2018-11-16 PROCEDURE — 3331090001 HH PPS REVENUE CREDIT

## 2018-11-16 PROCEDURE — 3331090002 HH PPS REVENUE DEBIT

## 2018-11-17 PROCEDURE — 3331090002 HH PPS REVENUE DEBIT

## 2018-11-17 PROCEDURE — 3331090001 HH PPS REVENUE CREDIT

## 2018-11-18 PROCEDURE — 3331090002 HH PPS REVENUE DEBIT

## 2018-11-18 PROCEDURE — 3331090001 HH PPS REVENUE CREDIT

## 2018-11-19 VITALS
RESPIRATION RATE: 20 BRPM | HEIGHT: 63 IN | OXYGEN SATURATION: 95 % | HEART RATE: 87 BPM | BODY MASS INDEX: 23.21 KG/M2 | DIASTOLIC BLOOD PRESSURE: 78 MMHG | WEIGHT: 131 LBS | TEMPERATURE: 97.7 F | SYSTOLIC BLOOD PRESSURE: 134 MMHG

## 2018-11-19 PROCEDURE — 3331090001 HH PPS REVENUE CREDIT

## 2018-11-19 PROCEDURE — 3331090002 HH PPS REVENUE DEBIT

## 2018-11-20 ENCOUNTER — HOME CARE VISIT (OUTPATIENT)
Dept: SCHEDULING | Facility: HOME HEALTH | Age: 73
End: 2018-11-20
Payer: MEDICARE

## 2018-11-20 ENCOUNTER — HOME CARE VISIT (OUTPATIENT)
Dept: HOME HEALTH SERVICES | Facility: HOME HEALTH | Age: 73
End: 2018-11-20
Payer: MEDICARE

## 2018-11-20 VITALS
DIASTOLIC BLOOD PRESSURE: 66 MMHG | HEART RATE: 75 BPM | TEMPERATURE: 97.8 F | SYSTOLIC BLOOD PRESSURE: 110 MMHG | RESPIRATION RATE: 18 BRPM

## 2018-11-20 PROCEDURE — 3331090002 HH PPS REVENUE DEBIT

## 2018-11-20 PROCEDURE — 3331090001 HH PPS REVENUE CREDIT

## 2018-11-20 PROCEDURE — G0151 HHCP-SERV OF PT,EA 15 MIN: HCPCS

## 2018-11-21 PROCEDURE — 3331090001 HH PPS REVENUE CREDIT

## 2018-11-21 PROCEDURE — 3331090002 HH PPS REVENUE DEBIT

## 2018-11-22 PROCEDURE — 3331090001 HH PPS REVENUE CREDIT

## 2018-11-22 PROCEDURE — 3331090002 HH PPS REVENUE DEBIT

## 2018-11-23 ENCOUNTER — HOME CARE VISIT (OUTPATIENT)
Dept: SCHEDULING | Facility: HOME HEALTH | Age: 73
End: 2018-11-23
Payer: MEDICARE

## 2018-11-23 ENCOUNTER — APPOINTMENT (OUTPATIENT)
Dept: GENERAL RADIOLOGY | Age: 73
End: 2018-11-23
Payer: MEDICARE

## 2018-11-23 ENCOUNTER — HOSPITAL ENCOUNTER (EMERGENCY)
Age: 73
Discharge: HOME | End: 2018-11-23
Payer: MEDICARE

## 2018-11-23 ENCOUNTER — APPOINTMENT (OUTPATIENT)
Dept: CT IMAGING | Age: 73
End: 2018-11-23
Payer: MEDICARE

## 2018-11-23 VITALS
HEIGHT: 63 IN | TEMPERATURE: 98.1 F | BODY MASS INDEX: 23.39 KG/M2 | DIASTOLIC BLOOD PRESSURE: 65 MMHG | WEIGHT: 132 LBS | SYSTOLIC BLOOD PRESSURE: 102 MMHG | RESPIRATION RATE: 18 BRPM | OXYGEN SATURATION: 99 % | HEART RATE: 98 BPM

## 2018-11-23 DIAGNOSIS — S22.070A CLOSED WEDGE COMPRESSION FRACTURE OF TENTH THORACIC VERTEBRA, INITIAL ENCOUNTER: Primary | ICD-10-CM
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
## 2018-11-23 PROCEDURE — 3331090001 HH PPS REVENUE CREDIT

## 2018-11-23 PROCEDURE — 72100 X-RAY EXAM L-S SPINE 2/3 VWS: CPT

## 2018-11-23 PROCEDURE — 73080 X-RAY EXAM OF ELBOW: CPT

## 2018-11-23 PROCEDURE — 99283 EMERGENCY DEPT VISIT LOW MDM: CPT

## 2018-11-23 PROCEDURE — 72128 CT CHEST SPINE W/O DYE: CPT

## 2018-11-23 PROCEDURE — 3331090002 HH PPS REVENUE DEBIT

## 2018-11-23 NOTE — ED NOTES
I have reviewed discharge instructions with the patient. The patient verbalized understanding. Patient left ED via wheelchair to Home with daughter. Opportunity for questions and clarification provided. Patient given 0 scripts. To continue your aftercare when you leave the hospital, you may receive an automated call from our care team to check in on how you are doing. This is a free service and part of our promise to provide the best care and service to meet your aftercare needs.  If you have questions, or wish to unsubscribe from this service please call [phone]. Thank you for Choosing our [facility] Emergency Department.

## 2018-11-23 NOTE — DISCHARGE INSTRUCTIONS
Preventing Falls: Care Instructions  Your Care Instructions    Getting around your home safely can be a challenge if you have injuries or health problems that make it easy for you to fall. Loose rugs and furniture in walkways are among the dangers for many older people who have problems walking or who have poor eyesight. People who have conditions such as arthritis, osteoporosis, or dementia also have to be careful not to fall. You can make your home safer with a few simple measures. Follow-up care is a key part of your treatment and safety. Be sure to make and go to all appointments, and call your doctor if you are having problems. It's also a good idea to know your test results and keep a list of the medicines you take. How can you care for yourself at home? Taking care of yourself  · You may get dizzy if you do not drink enough water. To prevent dehydration, drink plenty of fluids, enough so that your urine is light yellow or clear like water. Choose water and other caffeine-free clear liquids. If you have kidney, heart, or liver disease and have to limit fluids, talk with your doctor before you increase the amount of fluids you drink. · Exercise regularly to improve your strength, muscle tone, and balance. Walk if you can. Swimming may be a good choice if you cannot walk easily. · Have your vision and hearing checked each year or any time you notice a change. If you have trouble seeing and hearing, you might not be able to avoid objects and could lose your balance. · Know the side effects of the medicines you take. Ask your doctor or pharmacist whether the medicines you take can affect your balance. Sleeping pills or sedatives can affect your balance. · Limit the amount of alcohol you drink. Alcohol can impair your balance and other senses. · Ask your doctor whether calluses or corns on your feet need to be removed.  If you wear loose-fitting shoes because of calluses or corns, you can lose your balance and fall. · Talk to your doctor if you have numbness in your feet. Preventing falls at home  · Remove raised doorway thresholds, throw rugs, and clutter. Repair loose carpet or raised areas in the floor. · Move furniture and electrical cords to keep them out of walking paths. · Use nonskid floor wax, and wipe up spills right away, especially on ceramic tile floors. · If you use a walker or cane, put rubber tips on it. If you use crutches, clean the bottoms of them regularly with an abrasive pad, such as steel wool. · Keep your house well lit, especially [place], and outside walkways. Use night-lights in areas such as hallways and bathrooms. Add extra light switches or use remote switches (such as switches that go on or off when you clap your hands) to make it easier to turn lights on if you have to get up during the night. · Install sturdy handrails on stairways. · Move items in your cabinets so that the things you use a lot are on the lower shelves (about waist level). · Keep a cordless phone and a flashlight with new batteries by your bed. If possible, put a phone in each of the main rooms of your house, or carry a cell phone in case you fall and cannot reach a phone. Or, you can wear a device around your neck or wrist. You push a button that sends a signal for help. · Wear low-heeled shoes that fit well and give your feet good support. Use footwear with nonskid soles. Check the heels and soles of your shoes for wear. Repair or replace worn heels or soles. · Do not wear socks without shoes on wood floors. · Walk on the grass when the sidewalks are slippery. If you live in an area that gets snow and ice in the winter, sprinkle salt on slippery steps and sidewalks. Preventing falls in the bath  · Install grab bars and nonskid mats inside and outside your shower or tub and near the toilet and sinks. · Use shower chairs and bath benches.   · Use a hand-held shower head that will allow you to sit while showering. · Get into a tub or shower by putting the weaker leg in first. Get out of a tub or shower with your strong side first.  · Repair loose toilet seats and consider installing a raised toilet seat to make getting on and off the toilet easier. · Keep your bathroom door unlocked while you are in the shower. Where can you learn more? Go to http://nicola-john paul.info/. Enter 0476 [id number] in the search box to learn more about \"Preventing Falls: Care Instructions. \"  Current as of: March 16, 2018  Content Version: 11.8  © 1214-7599 [employer]. Care instructions adapted under license by [employer] (which disclaims liability or warranty for this information). If you have questions about a medical condition or this instruction, always ask your healthcare professional. [facility] any warranty or liability for your use of this information. Learning About Osteopenia  What is osteopenia? Osteopenia is a decrease in thickness, or density, in bones. That means the bones become thinner and weaker. It is much more common in women than in men. It is an early form of osteoporosis, a condition in which the bones are so thin and weak that they can break easily. It's important to know that osteopenia is not a disease. It can happen normally with aging. Having osteopenia means that there is a greater risk that you may get osteoporosis. It also means that you are more likely to break a bone than someone who does not have osteopenia. But not everyone with osteopenia gets osteoporosis or breaks a bone. Osteopenia doesn't cause any symptoms. It's usually found with a type of X-ray called a bone density test. Osteopenia means that your bone density result (T-score) is between -1.0 and -2.5. What increases the risk for osteopenia? Things that increase your risk include:  · Having a family history of osteoporosis. · Being thin.   · Being white or .  · Getting too little physical activity. · Smoking. · Drinking too much alcohol often. · Using certain medicines such as steroids. How can you prevent osteoporosis? There are things you can do to slow down osteopenia and prevent osteoporosis. Certain lifestyle changes will help slow the loss of bone density. · Eat food that has plenty of calcium and vitamin D. Yogurt, cheese, milk, and dark green vegetables are high in calcium. Eggs, fatty fish, cereal, and fortified milk are high in vitamin D.  · Talk to your doctor about taking a calcium supplement that has vitamin D in it. · Get regular exercise. ? Do 30 minutes of weight-bearing exercise on most days of the week. Walking, jogging, stair climbing, and dancing are good choices. ? Do resistance exercises with weights or elastic bands 2 or 3 days a week. · Limit alcohol to 2 drinks a day for men and 1 drink a day for women. Too much alcohol can cause health problems. · Do not smoke. Smoking can make bones thin faster. If you need help quitting, talk to your doctor about stop-smoking programs and medicines. These can increase your chances of quitting for good. Prescription medicines are available for treating bone thinning. But these are more often used to treat osteoporosis. Follow-up care is a key part of your treatment and safety. Be sure to make and go to all appointments, and call your doctor if you are having problems. It's also a good idea to know your test results and keep a list of the medicines you take. Where can you learn more? Go to http://nicola-john paul.info/. Enter E160 in the search box to learn more about \"Learning About Osteopenia. \"  Current as of: March 16, 2018  Content Version: 11.8  © 7509-5209 [employer]. Care instructions adapted under license by [employer] (which disclaims liability or warranty for this information).  If you have questions about a medical condition or this instruction, always ask your healthcare professional. [place] [address] any warranty or liability for your use of this information.

## 2018-11-23 NOTE — ED PROVIDER NOTES
38-year-old female complaining of left elbow pain and low back pain. Patient was in the bathroom last night and turned get her wheelchair and lost her balance and fell striking her elbow and into her back. Patient has an expansive list medical problems. The history is provided by the patient and a relative. Back Pain This is a new problem. The current episode started 1 to 2 hours ago. The problem occurs constantly. The pain is associated with a fall. The pain is present in the lumbar spine. The pain is at a severity of 5/10. The pain is moderate. Pertinent negatives include no chest pain, no headaches, no abdominal pain and no leg pain. She has tried nothing for the symptoms. Risk factors include a history of osteoporosis. The patient's surgical history non-contributory Past Medical History:  
Diagnosis Date  Abnormal mammogram   
 Acute chest syndrome ([address] 7/20/2016  Asbestosis ([address] 7/20/2016  Asthma  Carotid artery disease ([address]  Carotid artery stenosis without cerebral infarction 7/20/2016  Chronic hoarseness  Chronic obstructive pulmonary disease ([address]  Colitis, ulcerative ([address]  Colon cancer ([address] x2  
 Depression 7/9/2015  Dyslipidemia 7/20/2016  Elevated LFTs  GERD (gastroesophageal reflux disease) 7/9/2015 GERD & CROHNS & hx of colon cancer  Heart failure ([address]  High cholesterol 7/9/2015  Hyperlipidemia 7/20/2016  Hypertriglyceridemia  Hypomagnesemia  Lymphoma ([address]  Menopause  Osteoarthrosis, unspecified whether generalized or localized, unspecified site 8/27/2015  Osteoporosis 7/9/2015  Stroke ([address] TIA  Tobacco use disorder 7/20/2016 Past Surgical History:  
Procedure Laterality Date  HX APPENDECTOMY  HX BREAST BIOPSY Benign  HX CHOLECYSTECTOMY  HX COLECTOMY    
 COLON CANCER x 2 Family History:  
Problem Relation Age of Onset  Cancer Mother Bone CA  Heart Failure Father  Breast Cancer Neg Hx Social History Socioeconomic History  Marital status:  Spouse name: Not on file  Number of children: Not on file  Years of education: Not on file  Highest education level: Not on file Social Needs  Financial resource strain: Not on file  Food insecurity - worry: Not on file  Food insecurity - inability: Not on file  Transportation needs - medical: Not on file  Transportation needs - non-medical: Not on file Occupational History  Not on file Tobacco Use  Smoking status: Current Every Day Smoker Packs/day: 0.50  Smokeless tobacco: Never Used Substance and Sexual Activity  Alcohol use: No  
 Drug use: No  
 Sexual activity: Not on file Other Topics Concern  Not on file Social History Narrative  Not on file ALLERGIES: Hydrocodone-homatropine; Hydromorphone; and Morphine Review of Systems Constitutional: Negative. Negative for activity change. HENT: Negative. Eyes: Negative. Respiratory: Negative. Cardiovascular: Negative. Negative for chest pain. Gastrointestinal: Negative. Negative for abdominal pain. Genitourinary: Negative. Musculoskeletal: Positive for back pain. Skin: Negative. Neurological: Negative. Negative for headaches. Psychiatric/Behavioral: Negative. All other systems reviewed and are negative. Vitals:  
 11/23/18 0440 BP: 120/69 Pulse: 92 Resp: 16 Temp: 97.9 °F (36.6 °C) SpO2: 92% Weight: 59.9 kg (132 lb) Height: 5' 3\" (1.6 m) Physical Exam  
Constitutional: She is oriented to person, place, and time. She appears well-developed and well-nourished. No distress. HENT:  
Head: Normocephalic and atraumatic.   
Right Ear: External ear normal.  
Left Ear: External ear normal.  
Nose: Nose normal.  
Eyes: Conjunctivae and EOM are normal. Pupils are equal, round, and reactive to light. Right eye exhibits no discharge. Left eye exhibits no discharge. No scleral icterus. Neck: Normal range of motion. Cardiovascular: Regular rhythm. Pulmonary/Chest: Effort normal and breath sounds normal. No stridor. No respiratory distress. She has no wheezes. She has no rales. Abdominal: Soft. Bowel sounds are normal. She exhibits no distension. There is no tenderness. Musculoskeletal: Normal range of motion. Left elbow: Tenderness found. Lumbar back: She exhibits pain. Neurological: She is alert and oriented to person, place, and time. She exhibits normal muscle tone. Coordination normal.  
Skin: Skin is warm and dry. No rash noted. Psychiatric: She has a normal mood and affect. Her behavior is normal.  
Nursing note and vitals reviewed. MDM Number of Diagnoses or Management Options Closed wedge compression fracture of tenth thoracic vertebra, initial encounter [facility]): new and requires workup Fall, initial encounter: new and requires workup Diagnosis management comments: Patient has what appears to be new compression fracture of T10 on T11 kyphoplasty of T12 no retropulsion of bony fragments. Most likely due to the fall and having Osteopenia. Patient has appointment with him on Monday. Patient has plenty of pain medicines. Amount and/or Complexity of Data Reviewed Tests in the radiology section of CPT®: ordered and reviewed Risk of Complications, Morbidity, and/or Mortality Presenting problems: moderate Diagnostic procedures: moderate Management options: moderate Patient Progress Patient progress: stable Procedures

## 2018-11-23 NOTE — ED TRIAGE NOTES
Pt arrives from home with daughter. Pt complains of left elbow pain and low back pain following a fall tonight. States she was up to bathroom when she lost her balance turning for her wheelchair. Her left elbow struck her dresser on fall to floor. No head injury denies LOC or blood thinners.

## 2018-11-24 PROCEDURE — 3331090002 HH PPS REVENUE DEBIT

## 2018-11-24 PROCEDURE — 3331090001 HH PPS REVENUE CREDIT

## 2018-11-25 PROCEDURE — 3331090001 HH PPS REVENUE CREDIT

## 2018-11-25 PROCEDURE — 3331090002 HH PPS REVENUE DEBIT

## 2018-11-26 ENCOUNTER — HOME CARE VISIT (OUTPATIENT)
Dept: HOME HEALTH SERVICES | Facility: HOME HEALTH | Age: 73
End: 2018-11-26
Payer: MEDICARE

## 2018-11-26 PROCEDURE — 3331090002 HH PPS REVENUE DEBIT

## 2018-11-26 PROCEDURE — 3331090001 HH PPS REVENUE CREDIT

## 2018-11-27 ENCOUNTER — HOME CARE VISIT (OUTPATIENT)
Dept: HOME HEALTH SERVICES | Facility: HOME HEALTH | Age: 73
End: 2018-11-27
Payer: MEDICARE

## 2018-11-27 PROCEDURE — 3331090002 HH PPS REVENUE DEBIT

## 2018-11-27 PROCEDURE — 3331090001 HH PPS REVENUE CREDIT

## 2018-11-27 PROCEDURE — 3331090003 HH PPS REVENUE ADJ

## 2018-11-28 PROCEDURE — 3331090002 HH PPS REVENUE DEBIT

## 2018-11-28 PROCEDURE — 3331090001 HH PPS REVENUE CREDIT

## 2018-11-29 ENCOUNTER — HOME CARE VISIT (OUTPATIENT)
Dept: HOME HEALTH SERVICES | Facility: HOME HEALTH | Age: 73
End: 2018-11-29
Payer: MEDICARE

## 2018-11-29 PROCEDURE — 3331090002 HH PPS REVENUE DEBIT

## 2018-11-29 PROCEDURE — 3331090001 HH PPS REVENUE CREDIT

## 2018-11-30 PROCEDURE — 3331090002 HH PPS REVENUE DEBIT

## 2018-11-30 PROCEDURE — 3331090001 HH PPS REVENUE CREDIT

## 2018-12-01 PROCEDURE — 3331090001 HH PPS REVENUE CREDIT

## 2018-12-01 PROCEDURE — 3331090002 HH PPS REVENUE DEBIT

## 2018-12-02 PROCEDURE — 3331090001 HH PPS REVENUE CREDIT

## 2018-12-02 PROCEDURE — 3331090002 HH PPS REVENUE DEBIT

## 2018-12-03 PROCEDURE — 3331090002 HH PPS REVENUE DEBIT

## 2018-12-03 PROCEDURE — 3331090001 HH PPS REVENUE CREDIT

## 2018-12-04 PROCEDURE — 3331090001 HH PPS REVENUE CREDIT

## 2018-12-04 PROCEDURE — 3331090002 HH PPS REVENUE DEBIT

## 2018-12-05 PROCEDURE — 3331090001 HH PPS REVENUE CREDIT

## 2018-12-05 PROCEDURE — 3331090002 HH PPS REVENUE DEBIT

## 2018-12-06 PROCEDURE — 3331090001 HH PPS REVENUE CREDIT

## 2018-12-06 PROCEDURE — 3331090002 HH PPS REVENUE DEBIT

## 2018-12-07 ENCOUNTER — HOME CARE VISIT (OUTPATIENT)
Dept: HOME HEALTH SERVICES | Facility: HOME HEALTH | Age: 73
End: 2018-12-07
Payer: MEDICARE

## 2018-12-07 PROCEDURE — 3331090002 HH PPS REVENUE DEBIT

## 2018-12-07 PROCEDURE — 3331090001 HH PPS REVENUE CREDIT

## 2018-12-08 PROCEDURE — 3331090001 HH PPS REVENUE CREDIT

## 2018-12-08 PROCEDURE — 3331090002 HH PPS REVENUE DEBIT

## 2018-12-09 PROCEDURE — 3331090001 HH PPS REVENUE CREDIT

## 2018-12-09 PROCEDURE — 3331090002 HH PPS REVENUE DEBIT

## 2018-12-10 PROCEDURE — 3331090001 HH PPS REVENUE CREDIT

## 2018-12-10 PROCEDURE — 3331090002 HH PPS REVENUE DEBIT

## 2018-12-11 PROCEDURE — 3331090002 HH PPS REVENUE DEBIT

## 2018-12-11 PROCEDURE — 3331090001 HH PPS REVENUE CREDIT

## 2018-12-12 PROCEDURE — 3331090002 HH PPS REVENUE DEBIT

## 2018-12-12 PROCEDURE — 3331090001 HH PPS REVENUE CREDIT

## 2018-12-13 PROCEDURE — 3331090002 HH PPS REVENUE DEBIT

## 2018-12-13 PROCEDURE — 3331090001 HH PPS REVENUE CREDIT

## 2018-12-14 PROCEDURE — 3331090002 HH PPS REVENUE DEBIT

## 2018-12-14 PROCEDURE — 3331090001 HH PPS REVENUE CREDIT

## 2018-12-15 PROCEDURE — 3331090001 HH PPS REVENUE CREDIT

## 2018-12-15 PROCEDURE — 3331090002 HH PPS REVENUE DEBIT

## 2018-12-16 PROCEDURE — 3331090002 HH PPS REVENUE DEBIT

## 2018-12-16 PROCEDURE — 3331090001 HH PPS REVENUE CREDIT

## 2018-12-17 PROCEDURE — 3331090002 HH PPS REVENUE DEBIT

## 2018-12-17 PROCEDURE — 3331090001 HH PPS REVENUE CREDIT

## 2018-12-18 PROCEDURE — 3331090002 HH PPS REVENUE DEBIT

## 2018-12-18 PROCEDURE — 3331090001 HH PPS REVENUE CREDIT

## 2018-12-19 PROCEDURE — 3331090001 HH PPS REVENUE CREDIT

## 2018-12-19 PROCEDURE — 3331090002 HH PPS REVENUE DEBIT

## 2018-12-20 PROCEDURE — 3331090002 HH PPS REVENUE DEBIT

## 2018-12-20 PROCEDURE — 3331090001 HH PPS REVENUE CREDIT

## 2018-12-21 PROCEDURE — 3331090002 HH PPS REVENUE DEBIT

## 2018-12-21 PROCEDURE — 3331090001 HH PPS REVENUE CREDIT

## 2018-12-22 PROCEDURE — 3331090001 HH PPS REVENUE CREDIT

## 2018-12-22 PROCEDURE — 3331090002 HH PPS REVENUE DEBIT

## 2018-12-23 PROCEDURE — 3331090002 HH PPS REVENUE DEBIT

## 2018-12-23 PROCEDURE — 3331090001 HH PPS REVENUE CREDIT

## 2018-12-24 PROCEDURE — 3331090001 HH PPS REVENUE CREDIT

## 2018-12-24 PROCEDURE — 3331090002 HH PPS REVENUE DEBIT

## 2018-12-25 PROCEDURE — 3331090002 HH PPS REVENUE DEBIT

## 2018-12-25 PROCEDURE — 3331090001 HH PPS REVENUE CREDIT

## 2018-12-26 PROCEDURE — 3331090002 HH PPS REVENUE DEBIT

## 2018-12-26 PROCEDURE — 3331090001 HH PPS REVENUE CREDIT

## 2018-12-27 PROCEDURE — 3331090001 HH PPS REVENUE CREDIT

## 2018-12-27 PROCEDURE — 3331090002 HH PPS REVENUE DEBIT

## 2018-12-28 PROCEDURE — 3331090001 HH PPS REVENUE CREDIT

## 2018-12-28 PROCEDURE — 3331090002 HH PPS REVENUE DEBIT

## 2018-12-29 PROCEDURE — 3331090002 HH PPS REVENUE DEBIT

## 2018-12-29 PROCEDURE — 3331090001 HH PPS REVENUE CREDIT

## 2018-12-30 PROCEDURE — 3331090001 HH PPS REVENUE CREDIT

## 2018-12-30 PROCEDURE — 3331090002 HH PPS REVENUE DEBIT

## 2018-12-31 PROCEDURE — 3331090002 HH PPS REVENUE DEBIT

## 2018-12-31 PROCEDURE — 3331090001 HH PPS REVENUE CREDIT

## 2019-01-01 PROCEDURE — 3331090001 HH PPS REVENUE CREDIT

## 2019-01-01 PROCEDURE — 3331090002 HH PPS REVENUE DEBIT

## 2019-01-02 PROCEDURE — 3331090002 HH PPS REVENUE DEBIT

## 2019-01-02 PROCEDURE — 3331090001 HH PPS REVENUE CREDIT

## 2019-01-03 ENCOUNTER — HOME CARE VISIT (OUTPATIENT)
Dept: HOME HEALTH SERVICES | Facility: HOME HEALTH | Age: 74
End: 2019-01-03
Payer: MEDICARE

## 2019-01-03 PROCEDURE — 3331090002 HH PPS REVENUE DEBIT

## 2019-01-03 PROCEDURE — 3331090001 HH PPS REVENUE CREDIT

## 2019-01-30 ENCOUNTER — HOSPITAL ENCOUNTER (OUTPATIENT)
Dept: MAMMOGRAPHY | Age: 74
Discharge: HOME | End: 2019-01-30
Attending: FAMILY MEDICINE
Payer: MEDICARE

## 2019-01-30 DIAGNOSIS — M81.0 SENILE OSTEOPOROSIS: ICD-10-CM

## 2019-01-30 PROCEDURE — 77080 DXA BONE DENSITY AXIAL: CPT

## 2019-10-02 ENCOUNTER — HOSPITAL ENCOUNTER (OUTPATIENT)
Dept: LAB | Age: 74
Discharge: HOME | End: 2019-10-02
Payer: MEDICARE

## 2019-10-02 DIAGNOSIS — I42.8 NICM (NONISCHEMIC CARDIOMYOPATHY) (HCC): ICD-10-CM

## 2019-10-02 DIAGNOSIS — R06.02 SHORTNESS OF BREATH: ICD-10-CM

## 2019-10-02 DIAGNOSIS — I42.0 DILATED CARDIOMYOPATHY (HCC): ICD-10-CM

## 2019-10-02 DIAGNOSIS — I50.22 SYSTOLIC CHF, CHRONIC (HCC): ICD-10-CM

## 2019-10-02 LAB
ANION GAP SERPL CALC-SCNC: 6 MMOL/L (ref 7–16)
BNP SERPL-MCNC: 131 PG/ML
BUN SERPL-MCNC: 13 MG/DL (ref 8–23)
CALCIUM SERPL-MCNC: 10.1 MG/DL (ref 8.3–10.4)
CHLORIDE SERPL-SCNC: 101 MMOL/L (ref 98–107)
CO2 SERPL-SCNC: 30 MMOL/L (ref 21–32)
CREAT SERPL-MCNC: 0.9 MG/DL (ref 0.6–1)
GLUCOSE SERPL-MCNC: 89 MG/DL (ref 65–100)
MAGNESIUM SERPL-MCNC: 1.3 MG/DL (ref 1.8–2.4)
POTASSIUM SERPL-SCNC: 4 MMOL/L (ref 3.5–5.1)
SODIUM SERPL-SCNC: 137 MMOL/L (ref 136–145)

## 2019-10-02 PROCEDURE — 83880 ASSAY OF NATRIURETIC PEPTIDE: CPT

## 2019-10-02 PROCEDURE — 80048 BASIC METABOLIC PNL TOTAL CA: CPT

## 2019-10-02 PROCEDURE — 83735 ASSAY OF MAGNESIUM: CPT

## 2019-10-02 PROCEDURE — 36415 COLL VENOUS BLD VENIPUNCTURE: CPT

## 2019-10-30 ENCOUNTER — HOSPITAL ENCOUNTER (OUTPATIENT)
Dept: GENERAL RADIOLOGY | Age: 74
Discharge: HOME | End: 2019-10-30
Attending: PHYSICIAN ASSISTANT
Payer: MEDICARE

## 2019-10-30 DIAGNOSIS — R06.09 DOE (DYSPNEA ON EXERTION): ICD-10-CM

## 2019-10-30 PROCEDURE — 71046 X-RAY EXAM CHEST 2 VIEWS: CPT

## 2019-11-15 ENCOUNTER — HOSPITAL ENCOUNTER (OUTPATIENT)
Dept: CT IMAGING | Age: 74
Discharge: HOME | End: 2019-11-15
Attending: PHYSICIAN ASSISTANT
Payer: MEDICARE

## 2019-11-15 VITALS — BODY MASS INDEX: 29.45 KG/M2 | HEIGHT: 60 IN | WEIGHT: 150 LBS

## 2019-11-15 DIAGNOSIS — Z77.128 EXPOSURE TO ENVIRONMENTAL HAZARD: ICD-10-CM

## 2019-11-15 DIAGNOSIS — Z87.891 PERSONAL HISTORY OF SMOKING: ICD-10-CM

## 2019-11-15 DIAGNOSIS — F17.200 TOBACCO USE DISORDER: ICD-10-CM

## 2019-11-15 DIAGNOSIS — J61 ASBESTOSIS (HCC): ICD-10-CM

## 2019-11-15 DIAGNOSIS — R06.02 SHORTNESS OF BREATH: ICD-10-CM

## 2019-11-15 PROCEDURE — G0297 LDCT FOR LUNG CA SCREEN: HCPCS

## 2019-11-17 NOTE — PROGRESS NOTES
Please call pt and let her know that her screening CT shows no sign of lung nodules or masses. Everything is clear. She will need another one in one year for continued surveillance. I will place order now. Thank you!

## 2019-11-21 ENCOUNTER — HOSPITAL ENCOUNTER (OUTPATIENT)
Dept: LAB | Age: 74
Discharge: HOME | End: 2019-11-21
Attending: INTERNAL MEDICINE
Payer: MEDICARE

## 2019-11-21 DIAGNOSIS — I50.22 SYSTOLIC CHF, CHRONIC (HCC): ICD-10-CM

## 2019-11-21 LAB
ALBUMIN SERPL-MCNC: 3.5 G/DL (ref 3.2–4.6)
ALBUMIN/GLOB SERPL: 0.9 {RATIO} (ref 1.2–3.5)
ALP SERPL-CCNC: 56 U/L (ref 50–136)
ALT SERPL-CCNC: 20 U/L (ref 12–65)
ANION GAP SERPL CALC-SCNC: 7 MMOL/L (ref 7–16)
AST SERPL-CCNC: 22 U/L (ref 15–37)
BILIRUB SERPL-MCNC: 0.9 MG/DL (ref 0.2–1.1)
BNP SERPL-MCNC: 629 PG/ML
BUN SERPL-MCNC: 15 MG/DL (ref 8–23)
CALCIUM SERPL-MCNC: 9 MG/DL (ref 8.3–10.4)
CHLORIDE SERPL-SCNC: 104 MMOL/L (ref 98–107)
CO2 SERPL-SCNC: 28 MMOL/L (ref 21–32)
CREAT SERPL-MCNC: 1.1 MG/DL (ref 0.6–1)
GLOBULIN SER CALC-MCNC: 3.8 G/DL (ref 2.3–3.5)
GLUCOSE SERPL-MCNC: 93 MG/DL (ref 65–100)
MAGNESIUM SERPL-MCNC: 1.9 MG/DL (ref 1.8–2.4)
POTASSIUM SERPL-SCNC: 3.9 MMOL/L (ref 3.5–5.1)
PROT SERPL-MCNC: 7.3 G/DL (ref 6.3–8.2)
SODIUM SERPL-SCNC: 139 MMOL/L (ref 136–145)
TSH SERPL DL<=0.005 MIU/L-ACNC: 1.08 UIU/ML (ref 0.36–3.74)

## 2019-11-21 PROCEDURE — 84443 ASSAY THYROID STIM HORMONE: CPT

## 2019-11-21 PROCEDURE — 83735 ASSAY OF MAGNESIUM: CPT

## 2019-11-21 PROCEDURE — 80053 COMPREHEN METABOLIC PANEL: CPT

## 2019-11-21 PROCEDURE — 83880 ASSAY OF NATRIURETIC PEPTIDE: CPT

## 2019-11-21 PROCEDURE — 36415 COLL VENOUS BLD VENIPUNCTURE: CPT

## 2019-11-22 PROBLEM — J44.9 CHRONIC OBSTRUCTIVE PULMONARY DISEASE (HCC): Status: ACTIVE | Noted: 2019-11-22

## 2020-08-11 ENCOUNTER — HOSPITAL ENCOUNTER (OUTPATIENT)
Dept: GENERAL RADIOLOGY | Age: 75
Discharge: HOME | End: 2020-08-11
Attending: INTERNAL MEDICINE
Payer: MEDICARE

## 2020-08-11 DIAGNOSIS — R05.9 COUGH: ICD-10-CM

## 2020-08-11 PROCEDURE — 71046 X-RAY EXAM CHEST 2 VIEWS: CPT

## 2020-11-18 ENCOUNTER — HOSPITAL ENCOUNTER (OUTPATIENT)
Dept: CT IMAGING | Age: 75
Discharge: HOME | End: 2020-11-18
Attending: PHYSICIAN ASSISTANT
Payer: MEDICARE

## 2020-11-18 VITALS — WEIGHT: 160 LBS | BODY MASS INDEX: 29.44 KG/M2 | HEIGHT: 62 IN

## 2020-11-18 DIAGNOSIS — Z87.891 PERSONAL HISTORY OF SMOKING: ICD-10-CM

## 2020-11-18 DIAGNOSIS — F17.200 TOBACCO USE DISORDER: ICD-10-CM

## 2020-11-18 DIAGNOSIS — J61 ASBESTOSIS (HCC): ICD-10-CM

## 2020-11-18 PROCEDURE — G0297 LDCT FOR LUNG CA SCREEN: HCPCS

## 2021-05-07 ENCOUNTER — HOSPITAL ENCOUNTER (OUTPATIENT)
Dept: LAB | Age: 76
Discharge: HOME | End: 2021-05-07
Payer: MEDICARE

## 2021-05-07 ENCOUNTER — HOSPITAL ENCOUNTER (OUTPATIENT)
Dept: GENERAL RADIOLOGY | Age: 76
Discharge: HOME | End: 2021-05-07
Payer: MEDICARE

## 2021-05-07 DIAGNOSIS — I50.22 SYSTOLIC CHF, CHRONIC (HCC): ICD-10-CM

## 2021-05-07 DIAGNOSIS — R06.02 SHORTNESS OF BREATH: ICD-10-CM

## 2021-05-07 LAB — BNP SERPL-MCNC: 589 PG/ML

## 2021-05-07 PROCEDURE — 83880 ASSAY OF NATRIURETIC PEPTIDE: CPT

## 2021-05-07 PROCEDURE — 36415 COLL VENOUS BLD VENIPUNCTURE: CPT

## 2021-05-22 ENCOUNTER — HOSPITAL ENCOUNTER (OUTPATIENT)
Dept: MAMMOGRAPHY | Age: 76
Discharge: HOME | End: 2021-05-22
Attending: INTERNAL MEDICINE
Payer: MEDICARE

## 2021-05-22 DIAGNOSIS — M81.0 OSTEOPOROSIS, POST-MENOPAUSAL: ICD-10-CM

## 2021-05-22 PROCEDURE — 77080 DXA BONE DENSITY AXIAL: CPT

## 2021-06-22 ENCOUNTER — HOSPITAL ENCOUNTER (OUTPATIENT)
Dept: PHYSICAL THERAPY | Age: 76
Discharge: HOME | End: 2021-06-22
Attending: INTERNAL MEDICINE

## 2021-06-28 ENCOUNTER — HOSPITAL ENCOUNTER (OUTPATIENT)
Dept: PHYSICAL THERAPY | Age: 76
Discharge: HOME | End: 2021-06-28

## 2021-06-28 NOTE — PROGRESS NOTES
Sathish Villegas  : 1945  [place]  Secondary: [address] [facility]  [address] , [address], [place]  [phone]   [phone]        OUTPATIENT DAILY NOTE    NAME/AGE/GENDER: Sathish Villegas is a 64 y.o. female. DATE: 2021    Ms. Jacky Mei for today's appointment due to not having transportation.     Gregoria Soulier, PT

## 2021-07-16 ENCOUNTER — HOSPITAL ENCOUNTER (OUTPATIENT)
Dept: LAB | Age: 76
Discharge: HOME | End: 2021-07-16
Payer: MEDICARE

## 2021-07-16 DIAGNOSIS — I50.22 SYSTOLIC CHF, CHRONIC (HCC): ICD-10-CM

## 2021-07-16 LAB
ALBUMIN SERPL-MCNC: 3.5 G/DL (ref 3.2–4.6)
ALBUMIN/GLOB SERPL: 0.9 {RATIO} (ref 1.2–3.5)
ALP SERPL-CCNC: 65 U/L (ref 50–136)
ALT SERPL-CCNC: 25 U/L (ref 12–65)
ANION GAP SERPL CALC-SCNC: 4 MMOL/L (ref 7–16)
AST SERPL-CCNC: 26 U/L (ref 15–37)
BASOPHILS # BLD: 0.1 K/UL (ref 0–0.2)
BASOPHILS NFR BLD: 1 % (ref 0–2)
BILIRUB SERPL-MCNC: 0.8 MG/DL (ref 0.2–1.1)
BNP SERPL-MCNC: 1207 PG/ML
BUN SERPL-MCNC: 11 MG/DL (ref 8–23)
CALCIUM SERPL-MCNC: 9.3 MG/DL (ref 8.3–10.4)
CHLORIDE SERPL-SCNC: 106 MMOL/L (ref 98–107)
CO2 SERPL-SCNC: 27 MMOL/L (ref 21–32)
CREAT SERPL-MCNC: 0.84 MG/DL (ref 0.6–1)
DIFFERENTIAL METHOD BLD: ABNORMAL
EOSINOPHIL # BLD: 0.2 K/UL (ref 0–0.8)
EOSINOPHIL NFR BLD: 3 % (ref 0.5–7.8)
ERYTHROCYTE [DISTWIDTH] IN BLOOD BY AUTOMATED COUNT: 15.7 % (ref 11.9–14.6)
GLOBULIN SER CALC-MCNC: 3.7 G/DL (ref 2.3–3.5)
GLUCOSE SERPL-MCNC: 92 MG/DL (ref 65–100)
HCT VFR BLD AUTO: 39.6 % (ref 35.8–46.3)
HGB BLD-MCNC: 12.6 G/DL (ref 11.7–15.4)
IMM GRANULOCYTES # BLD AUTO: 0 K/UL (ref 0–0.5)
IMM GRANULOCYTES NFR BLD AUTO: 0 % (ref 0–5)
LYMPHOCYTES # BLD: 1.3 K/UL (ref 0.5–4.6)
LYMPHOCYTES NFR BLD: 19 % (ref 13–44)
MAGNESIUM SERPL-MCNC: 1.9 MG/DL (ref 1.8–2.4)
MCH RBC QN AUTO: 28.8 PG (ref 26.1–32.9)
MCHC RBC AUTO-ENTMCNC: 31.8 G/DL (ref 31.4–35)
MCV RBC AUTO: 90.6 FL (ref 79.6–97.8)
MONOCYTES # BLD: 0.9 K/UL (ref 0.1–1.3)
MONOCYTES NFR BLD: 14 % (ref 4–12)
NEUTS SEG # BLD: 4.2 K/UL (ref 1.7–8.2)
NEUTS SEG NFR BLD: 63 % (ref 43–78)
NRBC # BLD: 0 K/UL (ref 0–0.2)
PLATELET # BLD AUTO: 182 K/UL (ref 150–450)
PMV BLD AUTO: 11.1 FL (ref 9.4–12.3)
POTASSIUM SERPL-SCNC: 4.3 MMOL/L (ref 3.5–5.1)
PROT SERPL-MCNC: 7.2 G/DL (ref 6.3–8.2)
RBC # BLD AUTO: 4.37 M/UL (ref 4.05–5.2)
SODIUM SERPL-SCNC: 137 MMOL/L (ref 136–145)
TSH SERPL DL<=0.005 MIU/L-ACNC: 1.54 UIU/ML (ref 0.36–3.74)
WBC # BLD AUTO: 6.7 K/UL (ref 4.3–11.1)

## 2021-07-16 PROCEDURE — 83735 ASSAY OF MAGNESIUM: CPT

## 2021-07-16 PROCEDURE — 85025 COMPLETE CBC W/AUTO DIFF WBC: CPT

## 2021-07-16 PROCEDURE — 84443 ASSAY THYROID STIM HORMONE: CPT

## 2021-07-16 PROCEDURE — 80053 COMPREHEN METABOLIC PANEL: CPT

## 2021-07-16 PROCEDURE — 83880 ASSAY OF NATRIURETIC PEPTIDE: CPT

## 2021-07-16 PROCEDURE — 36415 COLL VENOUS BLD VENIPUNCTURE: CPT

## 2021-11-22 ENCOUNTER — HOSPITAL ENCOUNTER (OUTPATIENT)
Dept: CT IMAGING | Age: 76
Discharge: HOME | End: 2021-11-22
Attending: INTERNAL MEDICINE
Payer: MEDICARE

## 2021-11-22 DIAGNOSIS — J44.9 CHRONIC OBSTRUCTIVE PULMONARY DISEASE, UNSPECIFIED COPD TYPE (HCC): ICD-10-CM

## 2021-11-22 DIAGNOSIS — J92.0 PLEURAL PLAQUE DUE TO ASBESTOS EXPOSURE: ICD-10-CM

## 2021-11-22 PROCEDURE — 71250 CT THORAX DX C-: CPT

## 2022-01-01 NOTE — DISCHARGE INSTRUCTIONS
Follow-up with your doctor next week. Return to the emergency department if your hand starts to look worse despite the antibiotics.
12

## 2022-03-18 PROBLEM — I47.29 NSVT (NONSUSTAINED VENTRICULAR TACHYCARDIA): Status: ACTIVE | Noted: 2018-08-14

## 2022-03-19 PROBLEM — Z86.73 H/O TIA (TRANSIENT ISCHEMIC ATTACK) AND STROKE: Status: ACTIVE | Noted: 2017-06-13

## 2022-03-19 PROBLEM — J44.9 CHRONIC OBSTRUCTIVE PULMONARY DISEASE (HCC): Status: ACTIVE | Noted: 2019-11-22

## 2022-03-19 PROBLEM — I42.8 NICM (NONISCHEMIC CARDIOMYOPATHY) (HCC): Status: ACTIVE | Noted: 2017-08-21

## 2022-03-19 PROBLEM — Z95.810 PRESENCE OF AUTOMATIC (IMPLANTABLE) CARDIAC DEFIBRILLATOR: Status: ACTIVE | Noted: 2018-03-27

## 2022-03-19 PROBLEM — I42.9 CARDIOMYOPATHY (HCC): Status: ACTIVE | Noted: 2018-03-27

## 2022-03-19 PROBLEM — R06.02 SHORTNESS OF BREATH: Status: ACTIVE | Noted: 2017-06-13

## 2022-03-20 PROBLEM — I50.22 SYSTOLIC CHF, CHRONIC (HCC): Status: ACTIVE | Noted: 2017-06-13

## 2022-07-19 DIAGNOSIS — M81.0 POSTMENOPAUSAL OSTEOPOROSIS: Primary | ICD-10-CM

## 2022-07-20 ENCOUNTER — TELEMEDICINE (OUTPATIENT)
Dept: RHEUMATOLOGY | Age: 77
End: 2022-07-20
Payer: MEDICARE

## 2022-07-20 DIAGNOSIS — R06.02 SOBOE (SHORTNESS OF BREATH ON EXERTION): ICD-10-CM

## 2022-07-20 DIAGNOSIS — M81.0 POSTMENOPAUSAL OSTEOPOROSIS: Primary | ICD-10-CM

## 2022-07-20 DIAGNOSIS — Z51.81 ENCOUNTER FOR MONITORING DENOSUMAB THERAPY: ICD-10-CM

## 2022-07-20 DIAGNOSIS — Z79.899 ENCOUNTER FOR MONITORING DENOSUMAB THERAPY: ICD-10-CM

## 2022-07-20 DIAGNOSIS — M81.0 POSTMENOPAUSAL OSTEOPOROSIS: ICD-10-CM

## 2022-07-20 DIAGNOSIS — J31.0 CHRONIC RHINITIS: ICD-10-CM

## 2022-07-20 LAB
ALBUMIN SERPL-MCNC: 3.7 G/DL (ref 3.2–4.6)
ALBUMIN/GLOB SERPL: 0.9 {RATIO} (ref 1.2–3.5)
ALP SERPL-CCNC: 56 U/L (ref 50–136)
ALT SERPL-CCNC: 21 U/L (ref 12–65)
ANION GAP SERPL CALC-SCNC: 2 MMOL/L (ref 7–16)
AST SERPL-CCNC: 22 U/L (ref 15–37)
BILIRUB SERPL-MCNC: 0.9 MG/DL (ref 0.2–1.1)
BUN SERPL-MCNC: 17 MG/DL (ref 8–23)
CALCIUM SERPL-MCNC: 10 MG/DL (ref 8.3–10.4)
CHLORIDE SERPL-SCNC: 101 MMOL/L (ref 98–107)
CO2 SERPL-SCNC: 31 MMOL/L (ref 21–32)
CREAT SERPL-MCNC: 1.1 MG/DL (ref 0.6–1)
GLOBULIN SER CALC-MCNC: 4.1 G/DL (ref 2.3–3.5)
GLUCOSE SERPL-MCNC: 107 MG/DL (ref 65–100)
POTASSIUM SERPL-SCNC: 4.5 MMOL/L (ref 3.5–5.1)
PROT SERPL-MCNC: 7.8 G/DL (ref 6.3–8.2)
SODIUM SERPL-SCNC: 134 MMOL/L (ref 136–145)

## 2022-07-20 PROCEDURE — 99214 OFFICE O/P EST MOD 30 MIN: CPT | Performed by: INTERNAL MEDICINE

## 2022-07-20 PROCEDURE — 1123F ACP DISCUSS/DSCN MKR DOCD: CPT | Performed by: INTERNAL MEDICINE

## 2022-07-20 NOTE — Clinical Note
Can you get patient set up for her next Prolia injection 6 months after this one on same day office visit?

## 2022-07-20 NOTE — PROGRESS NOTES
Jan 2019              - recurrent fractures:multiple compression fractures L spine s/p kyphoplasty in 2012, 2018,  hx fractured ribs              - Prolia: started April 2019 - last May 2020 - then Jan 2021              - Failed therapies: fosamax              - Risk factors: early menopause in 45s, multiple falls, Fam hx of OP in mother & sisters  Crohn's - following with Dr. 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 Reports more SOBOE chronically - worse this morning in particular when she was trying to rush to get to labs. No assoc cough, fever. Reports runny nose- asks if this might be related to prolia - ongoing few months ago. Labs are not available yet. Crohn's is under good control. Using PPI  No recent falls or fractures. No planned dental procedures. Hasn't seen dentist recently but thinks she might need to have all her lower teeth pulled. Hasn't seen dentist in years. Previously told she has gum issues    No issues with frequent infections      Taking citracal 2 tabs daily. Yogurt: 2-3x/week, cheese daily basis    Not active at home - usually watches TV and not physically active. The most recent, and if available, past bone density study details are as follows. Date L1-4 spine? BMD L1-4 spine T-score Femoral Neck BMD Femoral Neck T-score Lowest  T-score    2019  0.988 -1.7 0.6 -3.2      6/2021 1.117 -0.6 0.627 -3.0                          REVIEW OF SYSTEMS:  A total of 10 systems (musculoskeletal system as stated in the HPI and the following 9 systems) were reviewed with patient today and were negative except as stated in the HPI and except for the following (depicted with an \"X\"):        \"X\" Constitutional  \"X\" HEENT /Mouth  \"X\" Cardiovascular and  Respiratory (2 systems)  \"X\" Gastrointestinal Fever/chills   Hair loss  x Shortness of breath   Upset stomach    Falls   Dry mouth  x Coughing   Diarrhea / constipation    Wt loss   Mouth sores   Wheezing   Heartburn    Wt gain   Ringing ears  x Chest pain   Dark or bloody stools    Night sweats   Diff. swallowing   None of above   Nausea or vomiting   X None of above  X None of above     x None of above                \"X\" Integumentary  \"X\" Neurological  \"X\" Genitourinary  \"X\" Psychiatric    Easy bruising   Numbness/ tingling   Female problems   Depression    Rashes   Weakness   Problems with urination   Feeling anxious    Sun sensitivity   Headaches  X None of above   Problems sleeping   X None of above  x None of above     X None of above       Adherent to medications without side effects. PHYSICAL EXAMINATION:  Constitutional: well developed, no acute distress  HEENT: head is normocephalic & atraumatic. Hearing grossly normal. conjunctiva & EOM are normal   Pulmonary: normal effort without accessory muscle use  Neurologic: alert, no cranial nerve deficit  Skin: normal in color, no facial flushing  Psychiatric: normal mood and affect    Other pertinent observable physical exam findings:-        We discussed the expected course, resolution and complications of the diagnosis(es) in detail. Medication risks, benefits, costs, interactions, and alternatives were discussed as indicated. I advised her to contact the office if her condition worsens, changes or fails to improve as anticipated. She expressed understanding with the diagnosis(es) and plan. Pursuant to the emergency declaration under the [facility], [facility] waiver authority and the [employer] and [employer] General Act, this Virtual  Visit was conducted, with patient's consent, to reduce the patient's risk of exposure to COVID-19 and provide continuity of care for an established patient.      Services were provided through a video synchronous discussion virtually to substitute for in-person clinic visit.     Rosie Arciniega MD

## 2022-07-25 ENCOUNTER — TELEPHONE (OUTPATIENT)
Dept: RHEUMATOLOGY | Age: 77
End: 2022-07-25

## 2022-07-25 NOTE — TELEPHONE ENCOUNTER
----- Message from Kris Baron MA sent at 7/20/2022  1:55 PM EDT -----  Regarding: prolia  Needs Prolia 7/26 or later    MD Kris Coyne MA  Can you get patient set up for her next Prolia injection 6 months after thisone on same day office visit?

## 2022-07-25 NOTE — TELEPHONE ENCOUNTER
----- Message from Jose Ramos MA sent at 7/25/2022  8:42 AM EDT -----  Regarding: MD Jose Alcazar MA    Please let patient know her labs are normal. Elina Dominguez for Prolia

## 2022-07-27 ENCOUNTER — NURSE ONLY (OUTPATIENT)
Dept: RHEUMATOLOGY | Age: 77
End: 2022-07-27
Payer: MEDICARE

## 2022-07-27 VITALS — HEART RATE: 90 BPM | SYSTOLIC BLOOD PRESSURE: 111 MMHG | DIASTOLIC BLOOD PRESSURE: 58 MMHG | TEMPERATURE: 97.4 F

## 2022-07-27 DIAGNOSIS — M81.0 POSTMENOPAUSAL OSTEOPOROSIS: Primary | ICD-10-CM

## 2022-07-27 PROCEDURE — 96372 THER/PROPH/DIAG INJ SC/IM: CPT | Performed by: INTERNAL MEDICINE

## 2022-07-27 NOTE — PROGRESS NOTES
[id number] [address], [address]  Office : [phone], Fax: [phone]       # 7 Prolia 60mg/ml injected [place] today into right  arm. Patient tolerated injection well. Advised patient to continue with calcium and vitamin D post injection. Advised patient to call with any problems post injection. Medication ordered by Dr. Raj Lafleur. Pre-infusion/injection questionairre for osteoporosis    1. Have you had or are you planning any dental work?    no  2. Are you taking your calcium and vitamin D? yes    3. When was your last osteoporosis treatment? 1/26/2022    4. Have you had any recent fractures?    no

## 2022-07-30 RX ORDER — SACUBITRIL AND VALSARTAN 49; 51 MG/1; MG/1
TABLET, FILM COATED ORAL
Qty: 180 TABLET | Refills: 3 | Status: SHIPPED | OUTPATIENT
Start: 2022-07-30

## 2022-08-08 PROCEDURE — 93295 DEV INTERROG REMOTE 1/2/MLT: CPT | Performed by: INTERNAL MEDICINE

## 2022-08-08 PROCEDURE — 93296 REM INTERROG EVL PM/IDS: CPT | Performed by: INTERNAL MEDICINE

## 2022-09-14 ENCOUNTER — OFFICE VISIT (OUTPATIENT)
Dept: FAMILY MEDICINE CLINIC | Facility: CLINIC | Age: 77
End: 2022-09-14
Payer: MEDICARE

## 2022-09-14 VITALS
BODY MASS INDEX: 28.49 KG/M2 | HEIGHT: 62 IN | SYSTOLIC BLOOD PRESSURE: 122 MMHG | WEIGHT: 154.8 LBS | DIASTOLIC BLOOD PRESSURE: 80 MMHG

## 2022-09-14 DIAGNOSIS — Z95.810 PRESENCE OF AUTOMATIC (IMPLANTABLE) CARDIAC DEFIBRILLATOR: Primary | ICD-10-CM

## 2022-09-14 DIAGNOSIS — I50.22 SYSTOLIC CHF, CHRONIC (HCC): ICD-10-CM

## 2022-09-14 DIAGNOSIS — I10 PRIMARY HYPERTENSION: ICD-10-CM

## 2022-09-14 DIAGNOSIS — R26.81 UNSTEADY: Primary | ICD-10-CM

## 2022-09-14 DIAGNOSIS — Z95.810 PRESENCE OF AUTOMATIC (IMPLANTABLE) CARDIAC DEFIBRILLATOR: ICD-10-CM

## 2022-09-14 DIAGNOSIS — R41.3 MEMORY LOSS: ICD-10-CM

## 2022-09-14 DIAGNOSIS — J44.9 CHRONIC OBSTRUCTIVE PULMONARY DISEASE, UNSPECIFIED COPD TYPE (HCC): ICD-10-CM

## 2022-09-14 PROCEDURE — 1090F PRES/ABSN URINE INCON ASSESS: CPT | Performed by: FAMILY MEDICINE

## 2022-09-14 PROCEDURE — 3023F SPIROM DOC REV: CPT | Performed by: FAMILY MEDICINE

## 2022-09-14 PROCEDURE — G8399 PT W/DXA RESULTS DOCUMENT: HCPCS | Performed by: FAMILY MEDICINE

## 2022-09-14 PROCEDURE — 99214 OFFICE O/P EST MOD 30 MIN: CPT | Performed by: FAMILY MEDICINE

## 2022-09-14 PROCEDURE — G8427 DOCREV CUR MEDS BY ELIG CLIN: HCPCS | Performed by: FAMILY MEDICINE

## 2022-09-14 PROCEDURE — 1123F ACP DISCUSS/DSCN MKR DOCD: CPT | Performed by: FAMILY MEDICINE

## 2022-09-14 PROCEDURE — 1036F TOBACCO NON-USER: CPT | Performed by: FAMILY MEDICINE

## 2022-09-14 PROCEDURE — G8417 CALC BMI ABV UP PARAM F/U: HCPCS | Performed by: FAMILY MEDICINE

## 2022-09-14 SDOH — ECONOMIC STABILITY: FOOD INSECURITY: WITHIN THE PAST 12 MONTHS, YOU WORRIED THAT YOUR FOOD WOULD RUN OUT BEFORE YOU GOT MONEY TO BUY MORE.: NEVER TRUE

## 2022-09-14 SDOH — ECONOMIC STABILITY: FOOD INSECURITY: WITHIN THE PAST 12 MONTHS, THE FOOD YOU BOUGHT JUST DIDN'T LAST AND YOU DIDN'T HAVE MONEY TO GET MORE.: NEVER TRUE

## 2022-09-14 ASSESSMENT — PATIENT HEALTH QUESTIONNAIRE - PHQ9
SUM OF ALL RESPONSES TO PHQ QUESTIONS 1-9: 0
1. LITTLE INTEREST OR PLEASURE IN DOING THINGS: 0
SUM OF ALL RESPONSES TO PHQ QUESTIONS 1-9: 0
2. FEELING DOWN, DEPRESSED OR HOPELESS: 0
SUM OF ALL RESPONSES TO PHQ9 QUESTIONS 1 & 2: 0

## 2022-09-14 ASSESSMENT — SOCIAL DETERMINANTS OF HEALTH (SDOH): HOW HARD IS IT FOR YOU TO PAY FOR THE VERY BASICS LIKE FOOD, HOUSING, MEDICAL CARE, AND HEATING?: NOT HARD AT ALL

## 2022-09-14 NOTE — PROGRESS NOTES
SUBJECTIVE:   Luba Eubanks is a 76 y.o. female who has a past medical history significant for hypertension, high cholesterol, chronic systolic heart failure, ulcerative colitis, lymphoma, TIA, COPD, recurrent depression and bilateral carotid artery disease. Review of systems reveals no complaints of chest pain, shortness of breath, orthopnea or PND. GI and  review of systems is unremarkable. Current medicines are listed in EMR and reviewed today. Patient is accompanied today by her daughter. Currently there is concerns about the patient's deteriorating functional status and her ability to live independently. There is been some decline in cognitive function and memory. Her mobility has also declined. Patient would like to continue to live independently as long as possible and they are requesting a form be filled out from the department of veterans affairs to allow for home assistance. Patient needs assistance with bathing secondary to instability and unsteadiness. She cannot prepare her own meals except for those that are microwavable. Family is helping with medication management. She does have transportation issues as she no longer drives. There are no reports of active chest pain, shortness of breath, orthopnea or PND. Current medicines are listed in the EMR and reviewed today. HPI  See above    Past Medical History, Past Surgical History, Family history, Social History, and Medications were all reviewed with the patient today and updated as necessary.        Current Outpatient Medications   Medication Sig Dispense Refill    cyanocobalamin 1000 MCG tablet Take 1,000 mcg by mouth daily      ENTRESTO 49-51 MG per tablet TAKE 1 TABLET TWICE A  tablet 3    OXYGEN 2 lpm qhs      acetaminophen (TYLENOL) 500 MG tablet Take by mouth every 6 hours as needed      aspirin 81 MG EC tablet Take by mouth daily      atorvastatin (LIPITOR) 40 MG tablet TAKE 1 TABLET DAILY      budesonide (PULMICORT) 0.5 MG/2ML nebulizer suspension Inhale 500 mcg into the lungs 2 times daily      citalopram (CELEXA) 20 MG tablet TAKE 1 TABLET DAILY      denosumab (PROLIA) 60 MG/ML SOSY SC injection Inject 60 mg into the skin      diclofenac sodium (VOLTAREN) 1 % GEL Apply 4 g topically 4 times daily      furosemide (LASIX) 40 MG tablet Take 40 mg by mouth daily      ipratropium-albuterol (DUONEB) 0.5-2.5 (3) MG/3ML SOLN nebulizer solution Inhale 3 mLs into the lungs 3 times daily as needed      lansoprazole (PREVACID) 30 MG delayed release capsule Take 15 mg by mouth every morning (before breakfast)      magnesium oxide (MAG-OX) 400 (240 Mg) MG tablet Take 400 mg by mouth 3 times daily      mesalamine (PENTASA) 500 MG extended release capsule The details of the medication are not available because there are pending changes by a home health clinician.      metoprolol succinate (TOPROL XL) 50 MG extended release tablet Take 50 mg by mouth daily      nitroGLYCERIN (NITROSTAT) 0.4 MG SL tablet Place 1 sl under the tongue q 5 min prn cp, max 3 sl in a 15-min time period. Call 911 if no relief after the 3rd sl. No current facility-administered medications for this visit.      Allergies   Allergen Reactions    Tramadol Other (See Comments)     Causes hallucinations    Hydromorphone Other (See Comments)     hallucinations    Morphine Other (See Comments)     hallucinations     Patient Active Problem List   Diagnosis    NSVT (nonsustained ventricular tachycardia) (HCC)    Abnormal mammogram    Hypomagnesemia    Depression    GERD (gastroesophageal reflux disease)    Shortness of breath    Cardiomyopathy (HCC)    Presence of automatic (implantable) cardiac defibrillator    H/O TIA (transient ischemic attack) and stroke    Chronic obstructive pulmonary disease (HCC)    Colitis, ulcerative ([address]    Personal history of tobacco use, presenting hazards to health    Menopause    Lymphoma ([address]    Elevated LFTs    NICM (nonischemic cardiomyopathy) (HCC)    Osteoporosis    Systolic CHF, chronic (HCC)    Chronic hoarseness    Colon cancer (HCC)    Carotid artery disease (HCC)     Past Medical History:   Diagnosis Date    Abnormal mammogram     Acute chest syndrome ([facility] 7/20/2016    Asbestosis ([facility] 7/20/2016    Asthma     Carotid artery disease (HCC)     Carotid artery stenosis without cerebral infarction 7/20/2016    CHF (congestive heart failure) (HCC)     Chronic hoarseness     Chronic obstructive pulmonary disease (HCC)     Colitis, ulcerative (HCC)     Colitis, ulcerative (HCC)     Colon cancer (HCC)     x2    Compression fx, lumbar spine ([facility]     she had 4, 1 from bending over and the other 2 from falling    Depression 7/9/2015    Dyslipidemia 7/20/2016    Elevated LFTs     GERD (gastroesophageal reflux disease) 7/9/2015    GERD & CROHNS & hx of colon cancer    Heart failure ([facility]     High cholesterol 7/9/2015    Hyperlipidemia 7/20/2016    Hypertriglyceridemia     Hypokalemia     Hypomagnesemia     Lymphoma (HCC)     Menopause     Osteoarthrosis, unspecified whether generalized or localized, unspecified site 8/27/2015    Osteoporosis 7/9/2015    Stroke ([facility]     TIA    Tobacco use disorder 7/20/2016     Past Surgical History:   Procedure Laterality Date    APPENDECTOMY      BREAST BIOPSY      Benign    CHOLECYSTECTOMY      OTHER SURGICAL HISTORY  11/2018    back surgery, cement put in, Dr Peggy Adams x 2     Family History   Problem Relation Age of Onset    Cancer Mother         Bone CA, brain tumor    Heart Failure Father     Breast Cancer Neg Hx      Social History     Tobacco Use    Smoking status: Former     Packs/day: 1.00     Types: Cigarettes     Quit date: 11/26/2018     Years since quitting: 3.8    Smokeless tobacco: Never   Substance Use Topics    Alcohol use: No         Review of Systems  See above    OBJECTIVE:  /80   Ht 5' 2\" (1.575 m)   Wt 154 lb 12.8 oz (70.2 kg)   BMI 28.31 kg/m²      Physical Exam  Constitutional:       General: She is not in acute distress. Appearance: Normal appearance. She is not ill-appearing. HENT:      Head: Normocephalic and atraumatic. Cardiovascular:      Rate and Rhythm: Normal rate and regular rhythm. Heart sounds: Normal heart sounds. No murmur heard. Pulmonary:      Effort: Pulmonary effort is normal.      Breath sounds: Normal breath sounds. No wheezing or rhonchi. Musculoskeletal:         General: Normal range of motion. Cervical back: Normal range of motion and neck supple. Right lower leg: No edema. Left lower leg: No edema. Comments: Patient has generalized weakness in both upper and lower extremities. She is ambulating with the use of a walker. Skin:     General: Skin is warm. Findings: No rash. Neurological:      Mental Status: She is alert. Psychiatric:         Mood and Affect: Mood normal.         Behavior: Behavior normal.         Thought Content: Thought content normal.         Judgment: Judgment normal.       Medical problems and test results were reviewed with the patient today. ASSESSMENT and PLAN    1. Unsteadiness. Fall precautions provided. I do believe the patient would qualify and benefit from home assistance. 2.  Hypertension. /80. Continue current therapy. 3.  Cognitive impairment. Family is engaged in assisting with transportation and medication management. No cooking is occurring in the home other than microwavable food. 4.  COPD. Patient reports breathing is \"okay\". 5.  Chronic systolic heart failure. Encourage daily weights and follow-up with cardiology. No orthopnea reported. Elements of this note have been dictated using speech recognition software. As a result, errors of speech recognition may have occurred.

## 2022-09-28 RX ORDER — METOPROLOL SUCCINATE 50 MG/1
50 TABLET, EXTENDED RELEASE ORAL DAILY
Qty: 90 TABLET | Refills: 3 | Status: SHIPPED | OUTPATIENT
Start: 2022-09-28

## 2022-09-28 RX ORDER — LANOLIN ALCOHOL/MO/W.PET/CERES
400 CREAM (GRAM) TOPICAL 3 TIMES DAILY
Qty: 90 TABLET | Refills: 3 | Status: SHIPPED | OUTPATIENT
Start: 2022-09-28

## 2022-09-28 RX ORDER — METOPROLOL SUCCINATE 50 MG/1
TABLET, EXTENDED RELEASE ORAL
Qty: 90 TABLET | Refills: 3 | OUTPATIENT
Start: 2022-09-28

## 2022-09-28 NOTE — TELEPHONE ENCOUNTER
MEDICATION REFILL REQUEST      Name of Medication:  magnesium Oxide  Dose:  400 mg  Frequency:  3 a day  Quantity:  270  Days' supply:  90    Pharmacy Name/Location:  no pharmacy inf o left on message    MEDICATION REFILL REQUEST      Name of Medication:  Metoprolol Succinate  Dose:  50 mg  Frequency:  1 a day  Quantity:  90  Days' supply:  ?80    Pharmacy Name/Location:  No Pharmacy info left on message

## 2022-10-21 ENCOUNTER — TELEPHONE (OUTPATIENT)
Dept: CARDIOLOGY CLINIC | Age: 77
End: 2022-10-21

## 2022-10-21 NOTE — TELEPHONE ENCOUNTER
----- Message from Misael Arizmendi DO sent at 10/20/2022 10:14 PM EDT -----  Please call the patient. CAROTID US was ok, nothing major or concerning, NO BAD blockages SEEN on the study. Keep regular appointment time, no med changes. Will review more at follow up and get plan for the future.     Thanks,   Ajay Cordova

## 2022-11-03 ENCOUNTER — OFFICE VISIT (OUTPATIENT)
Dept: CARDIOLOGY CLINIC | Age: 77
End: 2022-11-03
Payer: MEDICARE

## 2022-11-03 VITALS
HEIGHT: 62 IN | DIASTOLIC BLOOD PRESSURE: 62 MMHG | BODY MASS INDEX: 28.71 KG/M2 | SYSTOLIC BLOOD PRESSURE: 116 MMHG | HEART RATE: 84 BPM | WEIGHT: 156 LBS

## 2022-11-03 DIAGNOSIS — I42.0 DILATED CARDIOMYOPATHY (HCC): ICD-10-CM

## 2022-11-03 DIAGNOSIS — I50.22 SYSTOLIC CHF, CHRONIC (HCC): ICD-10-CM

## 2022-11-03 DIAGNOSIS — I47.29 NSVT (NONSUSTAINED VENTRICULAR TACHYCARDIA): ICD-10-CM

## 2022-11-03 DIAGNOSIS — Z95.810 PRESENCE OF AUTOMATIC (IMPLANTABLE) CARDIAC DEFIBRILLATOR: ICD-10-CM

## 2022-11-03 DIAGNOSIS — I42.8 NICM (NONISCHEMIC CARDIOMYOPATHY) (HCC): Primary | ICD-10-CM

## 2022-11-03 DIAGNOSIS — I25.119 CORONARY ARTERY DISEASE INVOLVING NATIVE CORONARY ARTERY OF NATIVE HEART WITH ANGINA PECTORIS (HCC): ICD-10-CM

## 2022-11-03 DIAGNOSIS — I65.23 BILATERAL CAROTID ARTERY STENOSIS: ICD-10-CM

## 2022-11-03 LAB
ALBUMIN SERPL-MCNC: 3.9 G/DL (ref 3.2–4.6)
ALBUMIN/GLOB SERPL: 1.1 {RATIO} (ref 0.4–1.6)
ALP SERPL-CCNC: 49 U/L (ref 50–136)
ALT SERPL-CCNC: 24 U/L (ref 12–65)
ANION GAP SERPL CALC-SCNC: 5 MMOL/L (ref 2–11)
AST SERPL-CCNC: 16 U/L (ref 15–37)
BILIRUB SERPL-MCNC: 0.9 MG/DL (ref 0.2–1.1)
BUN SERPL-MCNC: 17 MG/DL (ref 8–23)
CALCIUM SERPL-MCNC: 10.3 MG/DL (ref 8.3–10.4)
CHLORIDE SERPL-SCNC: 100 MMOL/L (ref 101–110)
CO2 SERPL-SCNC: 30 MMOL/L (ref 21–32)
CREAT SERPL-MCNC: 1.1 MG/DL (ref 0.6–1)
ERYTHROCYTE [DISTWIDTH] IN BLOOD BY AUTOMATED COUNT: 13 % (ref 11.9–14.6)
GLOBULIN SER CALC-MCNC: 3.4 G/DL (ref 2.8–4.5)
GLUCOSE SERPL-MCNC: 102 MG/DL (ref 65–100)
HCT VFR BLD AUTO: 43.6 % (ref 35.8–46.3)
HGB BLD-MCNC: 13.6 G/DL (ref 11.7–15.4)
MCH RBC QN AUTO: 29.6 PG (ref 26.1–32.9)
MCHC RBC AUTO-ENTMCNC: 31.2 G/DL (ref 31.4–35)
MCV RBC AUTO: 95 FL (ref 82–102)
NRBC # BLD: 0 K/UL (ref 0–0.2)
NT PRO BNP: 347 PG/ML
PLATELET # BLD AUTO: 182 K/UL (ref 150–450)
PMV BLD AUTO: 11.2 FL (ref 9.4–12.3)
POTASSIUM SERPL-SCNC: 5.1 MMOL/L (ref 3.5–5.1)
PROT SERPL-MCNC: 7.3 G/DL (ref 6.3–8.2)
RBC # BLD AUTO: 4.59 M/UL (ref 4.05–5.2)
SODIUM SERPL-SCNC: 135 MMOL/L (ref 133–143)
WBC # BLD AUTO: 7.5 K/UL (ref 4.3–11.1)

## 2022-11-03 PROCEDURE — G8417 CALC BMI ABV UP PARAM F/U: HCPCS | Performed by: INTERNAL MEDICINE

## 2022-11-03 PROCEDURE — 1123F ACP DISCUSS/DSCN MKR DOCD: CPT | Performed by: INTERNAL MEDICINE

## 2022-11-03 PROCEDURE — G8484 FLU IMMUNIZE NO ADMIN: HCPCS | Performed by: INTERNAL MEDICINE

## 2022-11-03 PROCEDURE — G8427 DOCREV CUR MEDS BY ELIG CLIN: HCPCS | Performed by: INTERNAL MEDICINE

## 2022-11-03 PROCEDURE — 1090F PRES/ABSN URINE INCON ASSESS: CPT | Performed by: INTERNAL MEDICINE

## 2022-11-03 PROCEDURE — 1036F TOBACCO NON-USER: CPT | Performed by: INTERNAL MEDICINE

## 2022-11-03 PROCEDURE — 99215 OFFICE O/P EST HI 40 MIN: CPT | Performed by: INTERNAL MEDICINE

## 2022-11-03 PROCEDURE — G8399 PT W/DXA RESULTS DOCUMENT: HCPCS | Performed by: INTERNAL MEDICINE

## 2022-11-03 RX ORDER — CITALOPRAM 20 MG/1
TABLET ORAL
Qty: 90 TABLET | Refills: 3 | Status: SHIPPED | OUTPATIENT
Start: 2022-11-03

## 2022-11-03 RX ORDER — USTEKINUMAB 90 MG/ML
INJECTION, SOLUTION SUBCUTANEOUS
COMMUNITY
Start: 2022-09-28

## 2022-11-03 NOTE — PROGRESS NOTES
[address], [address] [facility], [address] [place] [address]  PHONE: [phone]     22    NAME:  Natalio Polanco  : 1945  MRN: [id number]       SUBJECTIVE:   Natalio Polanco is a 76 y.o. female seen for a follow up visit regarding the following:     Chief Complaint   Patient presents with    Cardiomyopathy          HPI:   Here for vascular dz follow up.   2017 TIA sx  She has crohn's dz. Echo  showing EF 30-35%, mod MR   2017 and 2018 and 10/2022 Carotid with mod DEIDRE and LICA stenosis   [facility] : mild CAD   3/2018: ICD implant   NST 2021: low risk   Echo 3/2022: EF 40-45%     Not as active now. CHUA worsening now, SOB worsening. More angina now. Using walker, seeing ortho for knee injections. No edema, no lasix needed. She has NYHA Class II sx now. Energy poor now. Stamina poor. Patient denies recent history of orthopnea, PND, excessive dizziness and/or syncope. Past Medical History, Past Surgical History, Family history, Social History, and Medications were all reviewed with the patient today and updated as necessary.      Current Outpatient Medications   Medication Sig Dispense Refill    STELARA 90 MG/ML SOSY prefilled syringe Every 8 weeks      magnesium oxide (MAG-OX) 400 (240 Mg) MG tablet Take 1 tablet by mouth 3 times daily 90 tablet 3    metoprolol succinate (TOPROL XL) 50 MG extended release tablet Take 1 tablet by mouth daily 90 tablet 3    cyanocobalamin 1000 MCG tablet Take 1,000 mcg by mouth daily      ENTRESTO 49-51 MG per tablet TAKE 1 TABLET TWICE A  tablet 3    OXYGEN 2 lpm qhs      acetaminophen (TYLENOL) 500 MG tablet Take by mouth every 6 hours as needed      aspirin 81 MG EC tablet Take by mouth daily      atorvastatin (LIPITOR) 40 MG tablet TAKE 1 TABLET DAILY      budesonide (PULMICORT) 0.5 MG/2ML nebulizer suspension Inhale 500 mcg into the lungs 2 times daily      citalopram (CELEXA) 20 MG tablet TAKE 1 TABLET DAILY denosumab (PROLIA) 60 MG/ML SOSY SC injection Inject 60 mg into the skin      diclofenac sodium (VOLTAREN) 1 % GEL Apply 4 g topically 4 times daily      furosemide (LASIX) 40 MG tablet Take 40 mg by mouth daily      ipratropium-albuterol (DUONEB) 0.5-2.5 (3) MG/3ML SOLN nebulizer solution Inhale 3 mLs into the lungs 3 times daily as needed      lansoprazole (PREVACID) 30 MG delayed release capsule Take 15 mg by mouth every morning (before breakfast)      mesalamine (PENTASA) 500 MG extended release capsule The details of the medication are not available because there are pending changes by a home health clinician. nitroGLYCERIN (NITROSTAT) 0.4 MG SL tablet Place 1 sl under the tongue q 5 min prn cp, max 3 sl in a 15-min time period. Call 911 if no relief after the 3rd sl. No current facility-administered medications for this visit.         Allergies   Allergen Reactions    Tramadol Other (See Comments)     Causes hallucinations    Hydromorphone Other (See Comments)     hallucinations    Morphine Other (See Comments)     hallucinations     Patient Active Problem List    Diagnosis Date Noted    Presence of automatic (implantable) cardiac defibrillator 03/27/2018     Priority: High    Coronary artery disease involving native coronary artery of native heart with angina pectoris ([address] 11/03/2022     Priority: Medium    Chronic obstructive pulmonary disease ([address] 11/22/2019    NSVT (nonsustained ventricular tachycardia) 08/14/2018    Cardiomyopathy ([address] 03/27/2018    NICM (nonischemic cardiomyopathy) ([address] 08/21/2017    Shortness of breath 06/13/2017    H/O TIA (transient ischemic attack) and stroke 68/46/6761    Systolic CHF, chronic ([address] 06/13/2017    Personal history of tobacco use, presenting hazards to health 07/20/2016    Abnormal mammogram     Colitis, ulcerative ([address]     Menopause     Lymphoma (HCC)     Elevated LFTs     Chronic hoarseness     Carotid artery disease (HCC)     Hypomagnesemia 08/27/2015    Colon cancer ([facility] 08/27/2015    Depression 07/09/2015    GERD (gastroesophageal reflux disease) 07/09/2015    Osteoporosis 07/09/2015      Past Surgical History:   Procedure Laterality Date    APPENDECTOMY      BREAST BIOPSY      Benign    CHOLECYSTECTOMY      OTHER SURGICAL HISTORY  11/2018    back surgery, cement put in, Dr Marilyn Cho x 2     Family History   Problem Relation Age of Onset    Cancer Mother         Bone CA, brain tumor    Heart Failure Father     Breast Cancer Neg Hx      Social History     Tobacco Use    Smoking status: Former     Packs/day: 1.00     Types: Cigarettes     Quit date: 11/26/2018     Years since quitting: 3.9    Smokeless tobacco: Never   Substance Use Topics    Alcohol use: No         ROS:  Constitutional:   Negative for fevers and unexplained weight loss. Eyes:   Negative for visual disturbance. ENT:   Negative for significant hearing loss and tinnitus. Respiratory:   Negative for hemoptysis. Cardiovascular:   Negative except as noted in HPI. Gastrointestinal:   Negative for melena and abdominal pain. Genitourinary:   Negative for hematuria, renal stones. Integumentary:   Negative for rash or non-healing wounds  Hematologic/Lymphatic:   Negative for excessive bleeding hx or clotting disorder. Musculoskeletal:  Negative for active, unexplained/severe joint pain. Neurological:   Negative for stroke. Behavioral/Psych:   Negative for suicidal ideations. Endocrine:   Negative for uncontrolled diabetic symptoms including polyuria, polydipsia and poor wound healing.          PHYSICAL EXAM:     /62   Pulse 84   Ht 5' 2\" (1.575 m)   Wt 156 lb (70.8 kg)   BMI 28.53 kg/m²    General/Constitutional:   Alert and oriented x 3, no acute distress  HEENT:   normocephalic, atraumatic, moist mucous membranes  Neck:   No JVD or carotid bruits bilaterally  Cardiovascular:   regular rate and rhythm, no murmur/rub/gallop appreciated  Pulmonary:   clear to auscultation bilaterally, no respiratory distress  Abdomen:   soft, non-tender, non-distended  Ext:   No sig LE edema bilaterally  Skin:  warm and dry, no obvious rashes seen  Neuro:   no obvious sensory or motor deficits  Psychiatric:   normal mood and affect      Lab Results   Component Value Date/Time     07/20/2022 10:20 AM    K 4.5 07/20/2022 10:20 AM     07/20/2022 10:20 AM    CO2 31 07/20/2022 10:20 AM    BUN 17 07/20/2022 10:20 AM    CREATININE 1.10 07/20/2022 10:20 AM    GLUCOSE 107 07/20/2022 10:20 AM    CALCIUM 10.0 07/20/2022 10:20 AM        Lab Results   Component Value Date    WBC 6.7 07/16/2021    HGB 12.6 07/16/2021    HCT 39.6 07/16/2021    MCV 90.9 02/17/2022     07/16/2021       Lab Results   Component Value Date    TSH 1.370 02/17/2022       No results found for: LABA1C  No results found for: EAG    Lab Results   Component Value Date    CHOL 288 (H) 02/17/2022    CHOL 154 09/24/2019     Lab Results   Component Value Date    TRIG 246 (H) 02/17/2022    TRIG 114 09/24/2019     Lab Results   Component Value Date    HDL 51 02/17/2022    HDL 60 09/24/2019     Lab Results   Component Value Date    LDLCALC 190 (H) 02/17/2022    LDLCALC 71 09/24/2019     Lab Results   Component Value Date    LABVLDL 23 09/24/2019    VLDL 47 (H) 02/17/2022     No results found for: CHOLHDLRATIO        I have Independently reviewed prior care notes, any ER records available, cardiac testing, labs and results with the patient and before seeing the patient today. Also independently reviewed outside records when available. ASSESSMENT:    Edie Vaughn was seen today for cardiomyopathy.     Diagnoses and all orders for this visit:    NICM (nonischemic cardiomyopathy) ([facility]    NSVT (nonsustained ventricular tachycardia)    Presence of automatic (implantable) cardiac defibrillator    Systolic CHF, chronic [facility])  -     Case Request Cardiac Cath Lab  -     Comprehensive Metabolic Panel; Future  -     CBC; Future  -     Brain Natriuretic Peptide; Future  -     Case Request Cardiac Cath Lab    Bilateral carotid artery stenosis    Dilated cardiomyopathy [facility])  -     Case Request Cardiac Cath Lab    Coronary artery disease involving native coronary artery of native heart with angina pectoris ([facility]        PLAN:      1. NICM/cSHF:  Remain on GDMT. On entresto. Lasix PRN. rare lasix use now. 2. CAD:  Remain on ASA and statin. She has quit smoking now. More angina now, worsening. No edema. CHUA worsening. Could be COPD related, but will get LHC/RHC. Seeing Dr. Eric Cabral with Pulm soon as well. Plan on LHC and RHC given accelerating sx, CHUA, SOB. Check labs beforehand. Plan for Left and RIGHT Heart Catheterization and possible Percutaneous Coronary Intervention (PCI) at [facility] due to Worsening angina within the last 2 mos as described in HPI. Discussed risk of cardiac catheterization and potential PCI with the patient in detail. These risks include, but are not limited to, bleeding, stroke, heart attack, cardiac arrhythmias, allergic reactions, atheroemboli, acute kidney injury and cardiac arrest/death. Local complications at the site of catheter insertion were also reviewed and discussed. The patient voiced complete understanding about these risks. The patient agrees to proceed with the aforementioned associated risks. 3. Carotid dz:      Remain on ASA and statin. US ok.       4. TIA:  Follow for pAF. Remain on ASA and statin. Follow on device. 5. NSVT: remain on BB, follow on device checks. 6. ICD: Follow on remote checks. Last reviewed. 7. HPL: added back lipitor. Patient has been instructed and agrees to call our office with any issues or other concerns related to their cardiac condition(s) and/or complaint(s).         Return for Return After Test.       MARNIE SANON DO  11/3/2022

## 2022-11-11 NOTE — PROGRESS NOTES
Patient pre-assessment complete for ansley de la paz scheduled for [address] [place] [address], arrival time 0600. Patient verified using . Patient instructed to bring a list of all home medications on the day of procedure. NPO status reinforced. Patient informed to take a full dose aspirin 325mg  or 81 mg x 4 on the day of procedure. Patient instructed to HOLD Lasix. Instructed they can take all other medications excluding vitamins & supplements. Patient verbalizes understanding of all instructions & denies any questions at this time.

## 2022-11-14 ENCOUNTER — HOSPITAL ENCOUNTER (OUTPATIENT)
Age: 77
Setting detail: OUTPATIENT SURGERY
Discharge: HOME HEALTH | End: 2022-11-14
Attending: INTERNAL MEDICINE | Admitting: INTERNAL MEDICINE
Payer: MEDICARE

## 2022-11-14 VITALS
HEIGHT: 62 IN | WEIGHT: 156 LBS | TEMPERATURE: 98.1 F | SYSTOLIC BLOOD PRESSURE: 108 MMHG | RESPIRATION RATE: 20 BRPM | HEART RATE: 68 BPM | DIASTOLIC BLOOD PRESSURE: 66 MMHG | OXYGEN SATURATION: 100 % | BODY MASS INDEX: 28.71 KG/M2

## 2022-11-14 DIAGNOSIS — R06.02 SHORTNESS OF BREATH: ICD-10-CM

## 2022-11-14 LAB
ANION GAP SERPL CALC-SCNC: 3 MMOL/L (ref 2–11)
BUN SERPL-MCNC: 20 MG/DL (ref 8–23)
CALCIUM SERPL-MCNC: 9.8 MG/DL (ref 8.3–10.4)
CHLORIDE SERPL-SCNC: 101 MMOL/L (ref 101–110)
CO2 SERPL-SCNC: 30 MMOL/L (ref 21–32)
CREAT SERPL-MCNC: 1.2 MG/DL (ref 0.6–1)
ECHO BSA: 1.76 M2
EKG ATRIAL RATE: 77 BPM
EKG DIAGNOSIS: NORMAL
EKG P AXIS: 74 DEGREES
EKG P-R INTERVAL: 150 MS
EKG Q-T INTERVAL: 412 MS
EKG QRS DURATION: 106 MS
EKG QTC CALCULATION (BAZETT): 466 MS
EKG R AXIS: -61 DEGREES
EKG T AXIS: 0 DEGREES
EKG VENTRICULAR RATE: 77 BPM
GLUCOSE SERPL-MCNC: 122 MG/DL (ref 65–100)
POTASSIUM SERPL-SCNC: 4.1 MMOL/L (ref 3.5–5.1)
SODIUM SERPL-SCNC: 134 MMOL/L (ref 133–143)

## 2022-11-14 PROCEDURE — C1894 INTRO/SHEATH, NON-LASER: HCPCS | Performed by: INTERNAL MEDICINE

## 2022-11-14 PROCEDURE — 93453 R&L HRT CATH W/VENTRICLGRPHY: CPT | Performed by: INTERNAL MEDICINE

## 2022-11-14 PROCEDURE — 93460 R&L HRT ART/VENTRICLE ANGIO: CPT | Performed by: INTERNAL MEDICINE

## 2022-11-14 PROCEDURE — C1769 GUIDE WIRE: HCPCS | Performed by: INTERNAL MEDICINE

## 2022-11-14 PROCEDURE — C1751 CATH, INF, PER/CENT/MIDLINE: HCPCS | Performed by: INTERNAL MEDICINE

## 2022-11-14 PROCEDURE — 2500000003 HC RX 250 WO HCPCS: Performed by: INTERNAL MEDICINE

## 2022-11-14 PROCEDURE — 99153 MOD SED SAME PHYS/QHP EA: CPT | Performed by: INTERNAL MEDICINE

## 2022-11-14 PROCEDURE — C1760 CLOSURE DEV, VASC: HCPCS | Performed by: INTERNAL MEDICINE

## 2022-11-14 PROCEDURE — 6360000002 HC RX W HCPCS: Performed by: INTERNAL MEDICINE

## 2022-11-14 PROCEDURE — 82810 BLOOD GASES O2 SAT ONLY: CPT | Performed by: INTERNAL MEDICINE

## 2022-11-14 PROCEDURE — C1887 CATHETER, GUIDING: HCPCS | Performed by: INTERNAL MEDICINE

## 2022-11-14 PROCEDURE — 6360000004 HC RX CONTRAST MEDICATION: Performed by: INTERNAL MEDICINE

## 2022-11-14 PROCEDURE — 2709999900 HC NON-CHARGEABLE SUPPLY: Performed by: INTERNAL MEDICINE

## 2022-11-14 PROCEDURE — 99152 MOD SED SAME PHYS/QHP 5/>YRS: CPT | Performed by: INTERNAL MEDICINE

## 2022-11-14 PROCEDURE — 80048 BASIC METABOLIC PNL TOTAL CA: CPT

## 2022-11-14 PROCEDURE — 93005 ELECTROCARDIOGRAM TRACING: CPT | Performed by: INTERNAL MEDICINE

## 2022-11-14 DEVICE — ANGIO-SEAL VIP VASCULAR CLOSURE DEVICE
Type: IMPLANTABLE DEVICE | Status: FUNCTIONAL
Brand: ANGIO-SEAL

## 2022-11-14 RX ORDER — SODIUM CHLORIDE 0.9 % (FLUSH) 0.9 %
5-40 SYRINGE (ML) INJECTION EVERY 12 HOURS SCHEDULED
OUTPATIENT
Start: 2022-11-14

## 2022-11-14 RX ORDER — MIDAZOLAM HYDROCHLORIDE 1 MG/ML
INJECTION INTRAMUSCULAR; INTRAVENOUS PRN
Status: DISCONTINUED | OUTPATIENT
Start: 2022-11-14 | End: 2022-11-14 | Stop reason: HOSPADM

## 2022-11-14 RX ORDER — SODIUM CHLORIDE 9 MG/ML
INJECTION, SOLUTION INTRAVENOUS CONTINUOUS
Status: DISCONTINUED | OUTPATIENT
Start: 2022-11-14 | End: 2022-11-14 | Stop reason: HOSPADM

## 2022-11-14 RX ORDER — HEPARIN SODIUM 200 [USP'U]/100ML
INJECTION, SOLUTION INTRAVENOUS CONTINUOUS PRN
Status: DISCONTINUED | OUTPATIENT
Start: 2022-11-14 | End: 2022-11-14 | Stop reason: HOSPADM

## 2022-11-14 RX ORDER — ASPIRIN 81 MG/1
324 TABLET, CHEWABLE ORAL ONCE
Status: DISCONTINUED | OUTPATIENT
Start: 2022-11-14 | End: 2022-11-14 | Stop reason: HOSPADM

## 2022-11-14 RX ORDER — HYDRALAZINE HYDROCHLORIDE 20 MG/ML
10 INJECTION INTRAMUSCULAR; INTRAVENOUS EVERY 10 MIN PRN
OUTPATIENT
Start: 2022-11-14

## 2022-11-14 RX ORDER — ONDANSETRON 2 MG/ML
4 INJECTION INTRAMUSCULAR; INTRAVENOUS EVERY 6 HOURS PRN
OUTPATIENT
Start: 2022-11-14

## 2022-11-14 RX ORDER — LIDOCAINE HYDROCHLORIDE 10 MG/ML
INJECTION, SOLUTION INFILTRATION; PERINEURAL PRN
Status: DISCONTINUED | OUTPATIENT
Start: 2022-11-14 | End: 2022-11-14 | Stop reason: HOSPADM

## 2022-11-14 RX ORDER — SODIUM CHLORIDE 9 MG/ML
INJECTION, SOLUTION INTRAVENOUS PRN
OUTPATIENT
Start: 2022-11-14

## 2022-11-14 RX ORDER — ACETAMINOPHEN 325 MG/1
650 TABLET ORAL EVERY 4 HOURS PRN
OUTPATIENT
Start: 2022-11-14

## 2022-11-14 RX ADMIN — SODIUM CHLORIDE: 9 INJECTION, SOLUTION INTRAVENOUS at 07:06

## 2022-11-14 ASSESSMENT — PAIN SCALES - GENERAL
PAINLEVEL_OUTOF10: 0
PAINLEVEL_OUTOF10: 0

## 2022-11-14 NOTE — PROGRESS NOTES
Patient received to [facility] room # 10. Ambulatory from [facility]. Patient scheduled for [facility] today with Dr Sierra Hunter. Procedure reviewed & questions answered, voiced good understanding consent obtained & placed on chart. All medications and medical history reviewed. Will prep patient per orders. Patient & family updated on plan of care. The patient has a fraility score of 4-VULNERABLE, based on use of walker for ambulation.     Patient took 324mg ASA at 0500 prior to arrival.

## 2022-11-14 NOTE — PROGRESS NOTES
TRANSFER - OUT REPORT:    Verbal report given to RN on Natalio Polanco  being transferred to [facility] for routine progression of patient care       Report consisted of patient's Situation, Background, Assessment and   Recommendations(SBAR). Information from the following report(s) Nurse Handoff Report was reviewed with the receiving nurse. [place] Assessment: No data recorded  Lines:   Peripheral IV 11/14/22 Right Antecubital (Active)       Peripheral IV 11/14/22 Left Antecubital (Active)        Opportunity for questions and clarification was provided.       Patient transported with:  Registered Nurse    [address] [facility]  Diagnostic  RFA - 6 fr Angioseal  RFV - 7 fr sheath pulled in lab, soft, tegaderm in place    2 mg Versed  25 mcg Fentanyl

## 2022-11-14 NOTE — PROGRESS NOTES
TRANSFER - IN REPORT:    Verbal report received from Juan Manuel marcano on [employer]  being received from [facility] for routine progression of patient care      Report consisted of patient's Situation, Background, Assessment and   Recommendations(SBAR). Information from the following report(s) Nurse Handoff Report was reviewed with the receiving nurse. Opportunity for questions and clarification was provided. Assessment completed upon patient's arrival to unit and care assumed.

## 2022-11-14 NOTE — Clinical Note
Prepped: right groin, right radial and right brachial. Site was clipped and prepped. Prepped with: ChloraPrep. Patient was draped after wet prep solution dried.

## 2022-12-09 ENCOUNTER — OFFICE VISIT (OUTPATIENT)
Dept: CARDIOLOGY CLINIC | Age: 77
End: 2022-12-09
Payer: MEDICARE

## 2022-12-09 VITALS
DIASTOLIC BLOOD PRESSURE: 78 MMHG | BODY MASS INDEX: 27.8 KG/M2 | WEIGHT: 152 LBS | SYSTOLIC BLOOD PRESSURE: 116 MMHG | HEART RATE: 84 BPM

## 2022-12-09 DIAGNOSIS — I42.0 DILATED CARDIOMYOPATHY (HCC): ICD-10-CM

## 2022-12-09 DIAGNOSIS — Z95.810 PRESENCE OF AUTOMATIC (IMPLANTABLE) CARDIAC DEFIBRILLATOR: ICD-10-CM

## 2022-12-09 DIAGNOSIS — I47.29 NSVT (NONSUSTAINED VENTRICULAR TACHYCARDIA): ICD-10-CM

## 2022-12-09 DIAGNOSIS — I50.22 SYSTOLIC CHF, CHRONIC (HCC): ICD-10-CM

## 2022-12-09 DIAGNOSIS — I42.8 NICM (NONISCHEMIC CARDIOMYOPATHY) (HCC): Primary | ICD-10-CM

## 2022-12-09 DIAGNOSIS — Z09 HOSPITAL DISCHARGE FOLLOW-UP: ICD-10-CM

## 2022-12-09 PROCEDURE — 1111F DSCHRG MED/CURRENT MED MERGE: CPT | Performed by: INTERNAL MEDICINE

## 2022-12-09 PROCEDURE — 1090F PRES/ABSN URINE INCON ASSESS: CPT | Performed by: INTERNAL MEDICINE

## 2022-12-09 PROCEDURE — G8399 PT W/DXA RESULTS DOCUMENT: HCPCS | Performed by: INTERNAL MEDICINE

## 2022-12-09 PROCEDURE — 99214 OFFICE O/P EST MOD 30 MIN: CPT | Performed by: INTERNAL MEDICINE

## 2022-12-09 PROCEDURE — G8428 CUR MEDS NOT DOCUMENT: HCPCS | Performed by: INTERNAL MEDICINE

## 2022-12-09 PROCEDURE — 1123F ACP DISCUSS/DSCN MKR DOCD: CPT | Performed by: INTERNAL MEDICINE

## 2022-12-09 PROCEDURE — G8484 FLU IMMUNIZE NO ADMIN: HCPCS | Performed by: INTERNAL MEDICINE

## 2022-12-09 PROCEDURE — G8417 CALC BMI ABV UP PARAM F/U: HCPCS | Performed by: INTERNAL MEDICINE

## 2022-12-09 PROCEDURE — 1036F TOBACCO NON-USER: CPT | Performed by: INTERNAL MEDICINE

## 2022-12-09 RX ORDER — LANOLIN ALCOHOL/MO/W.PET/CERES
400 CREAM (GRAM) TOPICAL 3 TIMES DAILY
Qty: 90 TABLET | Refills: 3 | Status: SHIPPED | OUTPATIENT
Start: 2022-12-09

## 2022-12-09 RX ORDER — SACUBITRIL AND VALSARTAN 49; 51 MG/1; MG/1
1 TABLET, FILM COATED ORAL 2 TIMES DAILY
Qty: 180 TABLET | Refills: 3 | Status: SHIPPED | OUTPATIENT
Start: 2022-12-09

## 2022-12-09 RX ORDER — METOPROLOL SUCCINATE 50 MG/1
50 TABLET, EXTENDED RELEASE ORAL DAILY
Qty: 90 TABLET | Refills: 3 | Status: SHIPPED | OUTPATIENT
Start: 2022-12-09

## 2022-12-09 NOTE — PROGRESS NOTES
[address], [address] [place] [address]  PHONE: [phone]     22    NAME:  Natalio Polanco  : 1945  MRN: [id number]       SUBJECTIVE:   Natalio Polanco is a 76 y.o. female seen for a follow up visit regarding the following:     Chief Complaint   Patient presents with    Follow-Up from [facility]        HPI:  Here for vascular dz follow up.   2017 TIA sx  She has crohn's dz. Echo  showing EF 30-35%, mod MR   2017 and 2018 and 10/2022 Carotid with mod DEIDRE and LICA stenosis   2387: ICD implant   Echo 3/2022: EF 40-45%    LHC/RHC  2022:     Mild nonobstructive coronary artery disease    Low normal LV systolic function    Mild pulmonary hypertension      Not as active, CHUA ok now. No CP, pressure. Using walker, seeing ortho for knee injections. No edema, no lasix needed. She has NYHA Class II sx now. Energy poor now. Stamina poor. Patient denies recent history of orthopnea, PND, excessive dizziness and/or syncope. Past Medical History, Past Surgical History, Family history, Social History, and Medications were all reviewed with the patient today and updated as necessary.      Current Outpatient Medications   Medication Sig Dispense Refill    magnesium oxide (MAG-OX) 400 (240 Mg) MG tablet Take 1 tablet by mouth 3 times daily 90 tablet 3    metoprolol succinate (TOPROL XL) 50 MG extended release tablet Take 1 tablet by mouth daily 90 tablet 3    citalopram (CELEXA) 20 MG tablet TAKE 1 TABLET DAILY 90 tablet 3    STELARA 90 MG/ML SOSY prefilled syringe Every 8 weeks      cyanocobalamin 1000 MCG tablet Take 1,000 mcg by mouth daily      ENTRESTO 49-51 MG per tablet TAKE 1 TABLET TWICE A  tablet 3    OXYGEN 2 lpm qhs      acetaminophen (TYLENOL) 500 MG tablet Take by mouth every 6 hours as needed      aspirin 81 MG EC tablet Take by mouth daily      atorvastatin (LIPITOR) 40 MG tablet TAKE 1 TABLET DAILY      budesonide (PULMICORT) 0.5 MG/2ML nebulizer suspension Inhale 500 mcg into the lungs 2 times daily      denosumab (PROLIA) 60 MG/ML SOSY SC injection Inject 60 mg into the skin      diclofenac sodium (VOLTAREN) 1 % GEL Apply 4 g topically 4 times daily      furosemide (LASIX) 40 MG tablet Take 40 mg by mouth daily      ipratropium-albuterol (DUONEB) 0.5-2.5 (3) MG/3ML SOLN nebulizer solution Inhale 3 mLs into the lungs 3 times daily as needed      lansoprazole (PREVACID) 30 MG delayed release capsule Take 15 mg by mouth every morning (before breakfast)      mesalamine (PENTASA) 500 MG extended release capsule The details of the medication are not available because there are pending changes by a home health clinician. nitroGLYCERIN (NITROSTAT) 0.4 MG SL tablet Place 1 sl under the tongue q 5 min prn cp, max 3 sl in a 15-min time period. Call 911 if no relief after the 3rd sl. No current facility-administered medications for this visit.         Allergies   Allergen Reactions    Tramadol Other (See Comments)     Causes hallucinations    Hydromorphone Other (See Comments)     hallucinations    Morphine Other (See Comments)     hallucinations     Patient Active Problem List    Diagnosis Date Noted    Presence of automatic (implantable) cardiac defibrillator 03/27/2018     Priority: High    Coronary artery disease involving native coronary artery of native heart with angina pectoris ([address] 11/03/2022     Priority: Medium    Chronic obstructive pulmonary disease ([address] 11/22/2019    NSVT (nonsustained ventricular tachycardia) 08/14/2018    Cardiomyopathy ([address] 03/27/2018    NICM (nonischemic cardiomyopathy) ([address] 08/21/2017    Shortness of breath 06/13/2017    H/O TIA (transient ischemic attack) and stroke 77/46/7195    Systolic CHF, chronic ([address] 06/13/2017    Personal history of tobacco use, presenting hazards to health 07/20/2016    Abnormal mammogram     Colitis, ulcerative ([address]     Menopause Lymphoma ([facility]     Elevated LFTs     Chronic hoarseness     Carotid artery disease (HCC)     Hypomagnesemia 2015    Col[facility] cancer ([facility] 2015    Depression 2015    GERD (gastroesophageal reflux disease) 2015    Osteoporosis 2015      Past Surgical History:   Procedure Laterality Date    APPENDECTOMY      BREAST BIOPSY      Benign    CARDIAC PROCEDURE N/A 2022    LEFT AND RIGHT HEART CATH / CORONARY ANGIOGRAPHY performed by Ayesha Osorio MD at [address] [place] [address] CATH LAB    CHOLECYSTECTOMY      OTHER SURGICAL HISTORY  2018    back surgery, cement put in, Dr Chrales Achilles x 2     Family History   Problem Relation Age of Onset    Cancer Mother         Bone CA, brain tumor    Heart Failure Father     Breast Cancer Neg Hx      Social History     Tobacco Use    Smoking status: Former     Packs/day: 1.00     Types: Cigarettes     Quit date: 2018     Years since quittin.0    Smokeless tobacco: Never   Substance Use Topics    Alcohol use: No         ROS:    No obvious pertinent positives on review of systems except for what was outlined in the HPI today.       PHYSICAL EXAM:     /78   Pulse 84   Wt 152 lb (68.9 kg)   BMI 27.80 kg/m²    General/Constitutional:   Alert and oriented x 3, no acute distress  HEENT:   normocephalic, atraumatic, moist mucous membranes  Neck:   No JVD or carotid bruits bilaterally  Cardiovascular:   regular rate and rhythm, no murmur/rub/gallop appreciated  Pulmonary:   clear to auscultation bilaterally, no respiratory distress  Abdomen:   soft, non-tender, non-distended  Ext:   No sig LE edema bilaterally  Skin:  warm and dry, no obvious rashes seen  Neuro:   no obvious sensory or motor deficits  Psychiatric:   normal mood and affect      Lab Results   Component Value Date/Time     2022 06:31 AM    K 4.1 2022 06:31 AM     2022 06:31 AM    CO2 30 2022 06:31 AM    BUN 20 11/14/2022 06:31 AM    CREATININE 1.20 11/14/2022 06:31 AM    GLUCOSE 122 11/14/2022 06:31 AM    CALCIUM 9.8 11/14/2022 06:31 AM        Lab Results   Component Value Date    WBC 7.5 11/03/2022    HGB 13.6 11/03/2022    HCT 43.6 11/03/2022    MCV 95.0 11/03/2022     11/03/2022       Lab Results   Component Value Date    TSH 1.370 02/17/2022       No results found for: LABA1C  No results found for: EAG    Lab Results   Component Value Date    CHOL 288 (H) 02/17/2022    CHOL 154 09/24/2019     Lab Results   Component Value Date    TRIG 246 (H) 02/17/2022    TRIG 114 09/24/2019     Lab Results   Component Value Date    HDL 51 02/17/2022    HDL 60 09/24/2019     Lab Results   Component Value Date    LDLCALC 190 (H) 02/17/2022    LDLCALC 71 09/24/2019     Lab Results   Component Value Date    LABVLDL 23 09/24/2019    VLDL 47 (H) 02/17/2022     No results found for: CHOLHDLRATIO        I have Independently reviewed prior care notes, any ER records available, cardiac testing, labs and results with the patient and before seeing the patient today. Also independently reviewed outside records when available. ASSESSMENT:    Jef Rojas was seen today for follow-up from hospital and shortness of breath. Diagnoses and all orders for this visit:    NICM (nonischemic cardiomyopathy) (HCC)    NSVT (nonsustained ventricular tachycardia)    Systolic CHF, chronic (HCC)    Presence of automatic (implantable) cardiac defibrillator    Dilated cardiomyopathy ([facility]    Other orders  -     magnesium oxide (MAG-OX) 400 (240 Mg) MG tablet; Take 1 tablet by mouth 3 times daily  -     metoprolol succinate (TOPROL XL) 50 MG extended release tablet; Take 1 tablet by mouth daily        PLAN:    1. NICM/cSHF:  Remain on GDMT. On entresto. Lasix PRN. rare lasix use now. CHUA multifactorial from SHF, pHTN, age, hip and msk pains and lack of conditioning. Back to Pulm. 2. CAD:  Remain on ASA and statin.    She has quit smoking now. More angina now, worsening. No edema. 3. Carotid dz:      Remain on ASA and statin. US ok.       4. TIA:  Follow for pAF. Remain on ASA and statin. Follow on device. 5. NSVT: remain on BB, follow on device checks. 6. ICD: Follow on remote checks. Last reviewed. 7. HPL: added back lipitor. Patient has been instructed and agrees to call our office with any issues or other concerns related to their cardiac condition(s) and/or complaint(s). Return in about 6 months (around 6/9/2023).        MARNIE SANON,   12/9/2022

## 2022-12-13 DIAGNOSIS — Z95.810 PRESENCE OF AUTOMATIC (IMPLANTABLE) CARDIAC DEFIBRILLATOR: ICD-10-CM

## 2022-12-14 ENCOUNTER — TELEPHONE (OUTPATIENT)
Dept: RHEUMATOLOGY | Age: 77
End: 2022-12-14

## 2022-12-14 NOTE — TELEPHONE ENCOUNTER
Spoke to patient's daughter Anum Painter. She called to see when patient had an appointment. Pt is having pain in hip and leg, appears to be non-traumatic but unsure. Present for several days, asking about treatment here for this since she thinks it could be related to her osteoporosis. Follow up for March was made on 11/27 by ash and Cx 12/1 by kathie with note that says pt has transferred care. Per kaylee she is not aware of this and she is taking over mothers care (listed as legal guardian) and if patient called and did this herself she needs to un-do this. Anum Painter says her mom is having declining health including issues w dementia and she is having to manage interactions closely. She asks about \"cream for pain\" and PO meds that could be sent in, other than toradol which she says made patient have reaction (hallucinations). Told her that if the pain cream was diclofenac gel as noted in patient's chart, this is available OTC. Recommended that she first contact PCP to have patient assessed and Tx and I will be happy to help her set up another appt. She asks about her mom's Prolia. Told her Katherine Feng will assist her with prolia scheduling, confirmed that last injection was 7/27/22. Anum Painter is calling PCP to have patient evaluated for hip and leg pain and wcb to set up another follow up. PER KAYLEE: she is the only person to handle patient's affairs or make changes or schedule appointments to prevent confusion.

## 2022-12-16 ENCOUNTER — HOSPITAL ENCOUNTER (OUTPATIENT)
Dept: GENERAL RADIOLOGY | Age: 77
Discharge: HOME | End: 2022-12-19

## 2022-12-16 ENCOUNTER — OFFICE VISIT (OUTPATIENT)
Dept: FAMILY MEDICINE CLINIC | Facility: CLINIC | Age: 77
End: 2022-12-16
Payer: MEDICARE

## 2022-12-16 VITALS
BODY MASS INDEX: 29.26 KG/M2 | HEIGHT: 62 IN | WEIGHT: 159 LBS | SYSTOLIC BLOOD PRESSURE: 118 MMHG | DIASTOLIC BLOOD PRESSURE: 76 MMHG

## 2022-12-16 DIAGNOSIS — M25.551 RIGHT HIP PAIN: ICD-10-CM

## 2022-12-16 DIAGNOSIS — M25.551 RIGHT HIP PAIN: Primary | ICD-10-CM

## 2022-12-16 DIAGNOSIS — G89.29 CHRONIC RIGHT-SIDED LOW BACK PAIN WITHOUT SCIATICA: ICD-10-CM

## 2022-12-16 DIAGNOSIS — R82.90 BAD ODOR OF URINE: ICD-10-CM

## 2022-12-16 DIAGNOSIS — M54.50 CHRONIC RIGHT-SIDED LOW BACK PAIN WITHOUT SCIATICA: ICD-10-CM

## 2022-12-16 DIAGNOSIS — I10 PRIMARY HYPERTENSION: ICD-10-CM

## 2022-12-16 LAB
BILIRUBIN, URINE, POC: NEGATIVE
BLOOD URINE, POC: NEGATIVE
GLUCOSE URINE, POC: NEGATIVE
KETONES, URINE, POC: NEGATIVE
LEUKOCYTE ESTERASE, URINE, POC: NEGATIVE
NITRITE, URINE, POC: NEGATIVE
PH, URINE, POC: 6.5 (ref 4.6–8)
PROTEIN,URINE, POC: NEGATIVE
SPECIFIC GRAVITY, URINE, POC: 1.01 (ref 1–1.03)
URINALYSIS CLARITY, POC: CLEAR
URINALYSIS COLOR, POC: YELLOW
UROBILINOGEN, POC: NORMAL

## 2022-12-16 PROCEDURE — 1090F PRES/ABSN URINE INCON ASSESS: CPT | Performed by: FAMILY MEDICINE

## 2022-12-16 PROCEDURE — 1123F ACP DISCUSS/DSCN MKR DOCD: CPT | Performed by: FAMILY MEDICINE

## 2022-12-16 PROCEDURE — G8484 FLU IMMUNIZE NO ADMIN: HCPCS | Performed by: FAMILY MEDICINE

## 2022-12-16 PROCEDURE — G8417 CALC BMI ABV UP PARAM F/U: HCPCS | Performed by: FAMILY MEDICINE

## 2022-12-16 PROCEDURE — 99214 OFFICE O/P EST MOD 30 MIN: CPT | Performed by: FAMILY MEDICINE

## 2022-12-16 PROCEDURE — 3074F SYST BP LT 130 MM HG: CPT | Performed by: FAMILY MEDICINE

## 2022-12-16 PROCEDURE — G8427 DOCREV CUR MEDS BY ELIG CLIN: HCPCS | Performed by: FAMILY MEDICINE

## 2022-12-16 PROCEDURE — G8399 PT W/DXA RESULTS DOCUMENT: HCPCS | Performed by: FAMILY MEDICINE

## 2022-12-16 PROCEDURE — 3078F DIAST BP <80 MM HG: CPT | Performed by: FAMILY MEDICINE

## 2022-12-16 PROCEDURE — 1036F TOBACCO NON-USER: CPT | Performed by: FAMILY MEDICINE

## 2022-12-16 PROCEDURE — 81003 URINALYSIS AUTO W/O SCOPE: CPT | Performed by: FAMILY MEDICINE

## 2022-12-16 RX ORDER — CELECOXIB 200 MG/1
200 CAPSULE ORAL 2 TIMES DAILY
Qty: 20 CAPSULE | Refills: 0 | Status: SHIPPED | OUTPATIENT
Start: 2022-12-16

## 2022-12-16 ASSESSMENT — PATIENT HEALTH QUESTIONNAIRE - PHQ9
SUM OF ALL RESPONSES TO PHQ QUESTIONS 1-9: 0
SUM OF ALL RESPONSES TO PHQ QUESTIONS 1-9: 0
2. FEELING DOWN, DEPRESSED OR HOPELESS: 0
1. LITTLE INTEREST OR PLEASURE IN DOING THINGS: 0
SUM OF ALL RESPONSES TO PHQ QUESTIONS 1-9: 0
SUM OF ALL RESPONSES TO PHQ9 QUESTIONS 1 & 2: 0
SUM OF ALL RESPONSES TO PHQ QUESTIONS 1-9: 0

## 2022-12-16 NOTE — PROGRESS NOTES
SUBJECTIVE:   Ginny Shine is a 76 y.o. female who has a past medical history significant for hypertension, high cholesterol, chronic systolic heart failure, ulcerative colitis, lymphoma, TIA, COPD, recurrent depression and bilateral carotid artery disease. Patient presents today reporting that she has acute on chronic right hip and lower back pain. This pain has worsened over the last 2 to 3 weeks. There is been no fall reported. Patient reports some mild constipation but this is not new. She reports no melena or hematochezia, abdominal pain. She reports no dysuria or urgency but does report that her urine has had a strong odor to it. Patient has a history of degenerative arthritis and osteoporosis. Current medicines are listed in the EMR and reviewed today. HPI  See above    Past Medical History, Past Surgical History, Family history, Social History, and Medications were all reviewed with the patient today and updated as necessary.        Current Outpatient Medications   Medication Sig Dispense Refill    magnesium oxide (MAG-OX) 400 (240 Mg) MG tablet Take 1 tablet by mouth 3 times daily 90 tablet 3    metoprolol succinate (TOPROL XL) 50 MG extended release tablet Take 1 tablet by mouth daily 90 tablet 3    sacubitril-valsartan (ENTRESTO) 49-51 MG per tablet Take 1 tablet by mouth 2 times daily 180 tablet 3    citalopram (CELEXA) 20 MG tablet TAKE 1 TABLET DAILY 90 tablet 3    STELARA 90 MG/ML SOSY prefilled syringe Every 8 weeks      cyanocobalamin 1000 MCG tablet Take 1,000 mcg by mouth daily      OXYGEN 2 lpm qhs      acetaminophen (TYLENOL) 500 MG tablet Take by mouth every 6 hours as needed      aspirin 81 MG EC tablet Take by mouth daily      atorvastatin (LIPITOR) 40 MG tablet TAKE 1 TABLET DAILY      budesonide (PULMICORT) 0.5 MG/2ML nebulizer suspension Inhale 500 mcg into the lungs 2 times daily      denosumab (PROLIA) 60 MG/ML SOSY SC injection Inject 60 mg into the skin diclofenac sodium (VOLTAREN) 1 % GEL Apply 4 g topically 4 times daily      furosemide (LASIX) 40 MG tablet Take 40 mg by mouth daily      ipratropium-albuterol (DUONEB) 0.5-2.5 (3) MG/3ML SOLN nebulizer solution Inhale 3 mLs into the lungs 3 times daily as needed      lansoprazole (PREVACID) 30 MG delayed release capsule Take 15 mg by mouth every morning (before breakfast)      mesalamine (PENTASA) 500 MG extended release capsule The details of the medication are not available because there are pending changes by a home health clinician. nitroGLYCERIN (NITROSTAT) 0.4 MG SL tablet Place 1 sl under the tongue q 5 min prn cp, max 3 sl in a 15-min time period. Call 911 if no relief after the 3rd sl. No current facility-administered medications for this visit.      Allergies   Allergen Reactions    Tramadol Other (See Comments)     Causes hallucinations    Hydromorphone Other (See Comments)     hallucinations    Morphine Other (See Comments)     hallucinations     Patient Active Problem List   Diagnosis    NSVT (nonsustained ventricular tachycardia)    Abnormal mammogram    Hypomagnesemia    Depression    GERD (gastroesophageal reflux disease)    Shortness of breath    Cardiomyopathy (HCC)    Presence of automatic (implantable) cardiac defibrillator    H/O TIA (transient ischemic attack) and stroke    Chronic obstructive pulmonary disease (HCC)    Colitis, ulcerative ([address]    Personal history of tobacco use, presenting hazards to health    Menopause    Lymphoma ([address]    Elevated LFTs    NICM (nonischemic cardiomyopathy) ([address]    Osteoporosis    Systolic CHF, chronic (HCC)    Chronic hoarseness    Colon cancer (HCC)    Carotid artery disease (HCC)    Coronary artery disease involving native coronary artery of native heart with angina pectoris [facility])     Past Medical History:   Diagnosis Date    Abnormal mammogram     Acute chest syndrome ([address] 7/20/2016    Asbestosis ([address] 7/20/2016    Asthma     Carotid artery disease ([facility]     Carotid artery stenosis without cerebral infarction 2016    CHF (congestive heart failure) (HCC)     Chronic hoarseness     Chronic obstructive pulmonary disease (HCC)     Colitis, ulcerative (HCC)     Colitis, ulcerative (HCC)     Colon cancer (HCC)     x2    Compression fx, lumbar spine ([facility]     she had 4, 1 from bending over and the other 2 from falling    Coronary artery disease involving native coronary artery of native heart with angina pectoris ([facility] 11/3/2022    Depression 2015    Dyslipidemia 2016    Elevated LFTs     GERD (gastroesophageal reflux disease) 2015    GERD & CROHNS & hx of colon cancer    Heart failure ([facility]     High cholesterol 2015    Hyperlipidemia 2016    Hypertriglyceridemia     Hypokalemia     Hypomagnesemia     Lymphoma (HCC)     Menopause     Osteoarthrosis, unspecified whether generalized or localized, unspecified site 2015    Osteoporosis 2015    Stroke ([email or web address]     TIA    Tobacco use disorder 2016     Past Surgical History:   Procedure Laterality Date    APPENDECTOMY      BREAST BIOPSY      Benign    CARDIAC PROCEDURE N/A 2022    LEFT AND RIGHT HEART CATH / CORONARY ANGIOGRAPHY performed by Penelope Mayorga MD at [address] [place] [address] CATH LAB    CHOLECYSTECTOMY      OTHER SURGICAL HISTORY  2018    back surgery, cement put in, Dr Lewis Marrow x 2     Family History   Problem Relation Age of Onset    Cancer Mother         Bone CA, brain tumor    Heart Failure Father     Breast Cancer Neg Hx      Social History     Tobacco Use    Smoking status: Former     Packs/day: 1.00     Types: Cigarettes     Quit date: 2018     Years since quittin.0    Smokeless tobacco: Never   Substance Use Topics    Alcohol use: No         Review of Systems  See above    OBJECTIVE:  /76   Ht 5' 2\" (1.575 m)   Wt 159 lb (72.1 kg)   BMI 29.08 kg/m²      Physical Exam  Constitutional:       General: She is not in acute distress. Appearance: Normal appearance. She is not ill-appearing. HENT:      Head: Normocephalic and atraumatic. Cardiovascular:      Rate and Rhythm: Normal rate and regular rhythm. Heart sounds: Normal heart sounds. No murmur heard. Pulmonary:      Effort: Pulmonary effort is normal.      Breath sounds: Normal breath sounds. No wheezing or rhonchi. Musculoskeletal:         General: Normal range of motion. Cervical back: Normal range of motion and neck supple. Right lower leg: No edema. Left lower leg: No edema. Comments: Patient has some palpable discomfort over the right trochanteric bursa. There is no palpable discomfort along her lumbar spine. Skin:     General: Skin is warm. Findings: No rash. Neurological:      Mental Status: She is alert and oriented to person, place, and time. Psychiatric:         Mood and Affect: Mood normal.         Behavior: Behavior normal.         Thought Content: Thought content normal.         Judgment: Judgment normal.       Medical problems and test results were reviewed with the patient today. ASSESSMENT and PLAN    1. Right hip pain. Check x-ray. Suspect degenerative arthritis. Refer back to orthopedist.  Celebrex 200 mg twice a day for 10 days. Patient strictly instructed to monitor for any GI distress including but not limited to pain, nausea, melena. 2.  Low back pain. Acute on chronic. As above. Unclear as to whether or not the pain is radiating from her hip to her back or from her back to her hip. We will ask orthopedist to help distinguish. 3.  Malodorous urine. UA is clear. Push fluids. Likely dehydration. 4.  Hypertension. /76. Continue current therapy. Elements of this note have been dictated using speech recognition software. As a result, errors of speech recognition may have occurred.

## 2022-12-22 ENCOUNTER — TELEPHONE (OUTPATIENT)
Dept: FAMILY MEDICINE CLINIC | Facility: CLINIC | Age: 77
End: 2022-12-22

## 2022-12-22 DIAGNOSIS — G89.29 CHRONIC RIGHT-SIDED LOW BACK PAIN WITHOUT SCIATICA: Primary | ICD-10-CM

## 2022-12-22 DIAGNOSIS — M54.50 CHRONIC RIGHT-SIDED LOW BACK PAIN WITHOUT SCIATICA: Primary | ICD-10-CM

## 2022-12-22 NOTE — TELEPHONE ENCOUNTER
----- Message from Sebastián Small MD sent at 12/19/2022  1:31 PM EST -----  Please inform patient that her x-rays show arthritis and osteopenia.   There is some compression at L1 but not a compression fracture.  ----- Message -----  From: Allyn Fuentes Incoming Orders Results To [facility]  Sent: 12/16/2022   7:37 PM EST  To: Sebastián Small MD

## 2023-01-09 ENCOUNTER — TELEPHONE (OUTPATIENT)
Dept: ORTHOPEDIC SURGERY | Age: 78
End: 2023-01-09

## 2023-01-09 NOTE — TELEPHONE ENCOUNTER
I left  pt  2  VM  that  I needed to move  her appt  from  [address]  to  [facility]   because  she saw  [facility] in  2018      I scheduled  her an appt  and  told  he r the  details

## 2023-01-19 ENCOUNTER — OFFICE VISIT (OUTPATIENT)
Dept: PULMONOLOGY | Age: 78
End: 2023-01-19
Payer: MEDICARE

## 2023-01-19 VITALS
HEIGHT: 63 IN | RESPIRATION RATE: 16 BRPM | DIASTOLIC BLOOD PRESSURE: 78 MMHG | BODY MASS INDEX: 28.17 KG/M2 | OXYGEN SATURATION: 97 % | SYSTOLIC BLOOD PRESSURE: 112 MMHG | HEART RATE: 92 BPM | TEMPERATURE: 98.2 F | WEIGHT: 159 LBS

## 2023-01-19 DIAGNOSIS — Z85.79 HISTORY OF LYMPHOMA: ICD-10-CM

## 2023-01-19 DIAGNOSIS — G47.34 NOCTURNAL HYPOXIA: ICD-10-CM

## 2023-01-19 DIAGNOSIS — J92.0 PLEURAL PLAQUE WITH PRESENCE OF ASBESTOS: ICD-10-CM

## 2023-01-19 DIAGNOSIS — Z87.891 PERSONAL HISTORY OF NICOTINE DEPENDENCE: ICD-10-CM

## 2023-01-19 DIAGNOSIS — J44.9 STAGE 3 SEVERE COPD BY GOLD CLASSIFICATION (HCC): Primary | ICD-10-CM

## 2023-01-19 PROCEDURE — 1123F ACP DISCUSS/DSCN MKR DOCD: CPT | Performed by: NURSE PRACTITIONER

## 2023-01-19 PROCEDURE — G8417 CALC BMI ABV UP PARAM F/U: HCPCS | Performed by: NURSE PRACTITIONER

## 2023-01-19 PROCEDURE — G8484 FLU IMMUNIZE NO ADMIN: HCPCS | Performed by: NURSE PRACTITIONER

## 2023-01-19 PROCEDURE — 1090F PRES/ABSN URINE INCON ASSESS: CPT | Performed by: NURSE PRACTITIONER

## 2023-01-19 PROCEDURE — 1036F TOBACCO NON-USER: CPT | Performed by: NURSE PRACTITIONER

## 2023-01-19 PROCEDURE — 94664 DEMO&/EVAL PT USE INHALER: CPT | Performed by: NURSE PRACTITIONER

## 2023-01-19 PROCEDURE — 99214 OFFICE O/P EST MOD 30 MIN: CPT | Performed by: NURSE PRACTITIONER

## 2023-01-19 PROCEDURE — G8427 DOCREV CUR MEDS BY ELIG CLIN: HCPCS | Performed by: NURSE PRACTITIONER

## 2023-01-19 PROCEDURE — G8399 PT W/DXA RESULTS DOCUMENT: HCPCS | Performed by: NURSE PRACTITIONER

## 2023-01-19 PROCEDURE — 3023F SPIROM DOC REV: CPT | Performed by: NURSE PRACTITIONER

## 2023-01-19 RX ORDER — ALBUTEROL SULFATE 2.5 MG/3ML
2.5 SOLUTION RESPIRATORY (INHALATION) EVERY 6 HOURS PRN
Qty: 120 EACH | Refills: 5 | Status: SHIPPED | OUTPATIENT
Start: 2023-01-19

## 2023-01-19 RX ORDER — ALBUTEROL SULFATE 90 UG/1
2 AEROSOL, METERED RESPIRATORY (INHALATION) 4 TIMES DAILY PRN
Qty: 18 G | Refills: 5 | Status: SHIPPED | OUTPATIENT
Start: 2023-01-19

## 2023-01-19 RX ORDER — FLUTICASONE FUROATE, UMECLIDINIUM BROMIDE AND VILANTEROL TRIFENATATE 100; 62.5; 25 UG/1; UG/1; UG/1
1 POWDER RESPIRATORY (INHALATION) DAILY
Qty: 1 EACH | Refills: 3 | Status: SHIPPED | OUTPATIENT
Start: 2023-01-19

## 2023-01-19 RX ORDER — ATORVASTATIN CALCIUM 40 MG/1
TABLET, FILM COATED ORAL
Qty: 90 TABLET | Refills: 3 | Status: SHIPPED | OUTPATIENT
Start: 2023-01-19

## 2023-01-19 NOTE — PROGRESS NOTES
Name:  Darryl Rivera  YOB: 1945   MRN: [id number]      Office Visit: 1/19/2023        ASSESSMENT AND PLAN:  (Medical Decision Making)    Impression: 78-year-old female with stage III COPD, asbestos exposure with pleural plaques, congestive heart failure, former smoker and nocturnal hypoxia. 1. Stage 3 severe COPD by GOLD classification ([address]  -- We will change nebulizer solutions to Trelegy. Adding the long-acting bronchodilator along with minimizing how much time she is on machine. --Change DuoNeb to albuterol so nebulizer solution is appropriate. Can use as needed. Patient is given verbal instructions on proper use of prescribed inhaler and is able to give adequate return demonstration. Inhaler education was indicated due to new therapy. --Patient has had more shortness of breath, so if these changes in medications do not offer some improvement in 3 months, may need to repeat CT with her history of lymphoma and colon cancer.  --will get PFT's after starting trelegy. 2. Pleural plaque with presence of asbestos  --Noted on CT scan in the past.    --Follow-up CT scheduled for 1/25/2023 to ensure stability. 3. Personal history of nicotine dependence    4. Nocturnal hypoxia  --Wearing 2 L at night    5. History of lymphoma    Orders Placed This Encounter   Medications    fluticasone-umeclidin-vilant (TRELEGY ELLIPTA) 100-62.5-25 MCG/ACT AEPB inhaler     Sig: Inhale 1 puff into the lungs daily     Dispense:  1 each     Refill:  3    albuterol (PROVENTIL) (2.5 MG/3ML) 0.083% nebulizer solution     Sig: Take 3 mLs by nebulization every 6 hours as needed for Wheezing or Shortness of Breath     Dispense:  120 each     Refill:  5    albuterol sulfate HFA (VENTOLIN HFA) 108 (90 Base) MCG/ACT inhaler     Sig: Inhale 2 puffs into the lungs 4 times daily as needed for Wheezing     Dispense:  18 g     Refill:  5     No orders of the defined types were placed in this encounter.     Follow-up and Dispositions    Return for copd, CPFT, Misael Thomson. Joey Smith, APRN - CNP    Collaborating physician is Melissa Oseguera MD       _________________________________________________________________________    HISTORY OF PRESENT ILLNESS:    Ms. Mc Stone is a 76 y.o. female who is seen at [facility] today for  COPD (SOB)    Ms. Mike Luque is a 64 y.o. female who presents with a history of previous tobacco and asbestos exposure with resultant COPD and pleural plaques with nocturnal hypoxia here for  follow up appointment. She states her breathing seems worse. She is short of breath with any exertion. She is doing her DuoNeb and Pulmicort twice a day, is frustrated that she is on the machine several times per day. She is on 2L O2 with sleep. She has a pulse ox at home. She states her oxygenation is in the 90's on RA during the day. She has a 50 pk/yr smoking history. Last CT was November 2021 which showed stability of pleural plaques. She has had some cardiac issues including worsening HF and had PPM/defibrillator placed. Repeat echo shows EF 40-45%. Hx of lymphoma and colon cancer x2. Has not had a colonoscopy recently due to heart failure and lung disease. REVIEW OF SYSTEMS: 10 point review of systems is negative except as reported in HPI. PHYSICAL EXAM: Body mass index is 28.17 kg/m². Vitals:    01/19/23 1450   BP: 112/78   Pulse: 92   Resp: 16   Temp: 98.2 °F (36.8 °C)   SpO2: 97%   Weight: 159 lb (72.1 kg)   Height: 5' 3\" (1.6 m)         General:   Alert, cooperative, no distress, appears stated age. Eyes:   Conjunctivae/corneas clear. PERRL        Mouth/Throat:  Lips, mucosa, and tongue normal. Teeth and gums normal.        Lungs:   Bilateral lung sounds are diminished on room air, saturations were 97%     Heart:   Regular rate and rhythm, S1, S2 normal, no murmur, click, rub or gallop. Abdomen:    Soft, non-tender.      Extremities: Extremities normal, atraumatic, no cyanosis or edema. Skin:  Skin color normal. No rashes or lesions     Neurologic:  A&Ox3     DIAGNOSTIC TESTS:                                                                                    LABS:   Lab Results   Component Value Date/Time    WBC 7.5 11/03/2022 12:01 PM    HGB 13.6 11/03/2022 12:01 PM    HCT 43.6 11/03/2022 12:01 PM     11/03/2022 12:01 PM    TSH 1.370 02/17/2022 02:55 AM    NTPROBNP 347 11/03/2022 12:01 PM     Imaging: I performed an independent interpretation of the patient's images. CXR:   XR LUMBAR SPINE (MIN 4 VIEWS) 12/16/2022    Narrative  PELVIS AND RIGHT HIP, 3 views. INDICATION: Chronic low back pain which radiates into the right leg    TECHNIQUE: AP view of the pelvis and coned down AP and frogleg lateral of the  hip. FINDINGS:  Pelvic bony ring: There is intact. Bone mineral density: Decreased. SI joints: Symmetric with partial ankylosis. Pubic rami: Appear intact. Lower lumbar spine: Mild DJD    Femoral head:  Has a round smooth contour. Small marginal osteophytes. Joint space: Symmetric. Osteophytes off the acetabulum. .  Femoral neck and proximal femoral shaft:  Appear intact. Impression  Osteopenia and osteoarthritis. LUMBAR SPINE, 5 views. INDICATION: Low back pain. COMPARISON: None. TECHNIQUE: AP, lateral, bilateral oblique and cone-down lumbosacral junction  views. FINDINGS:  Spinal alignment:  Lumbar spinal alignment unremarkable. There is a kyphosis lower thoracic spine. Status post [id number] kyphoplasty. Disk spaces: Maintained. Vertebral bodies: Mild compression L1. There are osteophytes. Pedicles:  Appear intact. SI joints:  Appear symmetric. Facet joints:  Lower facet arthropathy  Other:  Aortic calcifications    IMPRESSION: Mild compression of L1. Facet arthropathy.  Chronic changes thoracic  spine    CT Chest:       CT CHEST WO CONTRAST 11/22/2021    Narrative  CT OF THE CHEST WITHOUT CONTRAST    HISTORY: Pulmonary nodules    COMPARISON: 11/18/2020    TECHNIQUE: A helical acquisition was performed through the chest utilizing 4.4VK  slice thickness without intravenous contrast. Coronal and sagittal reformats  were obtained. Dose reduction techniques used: Automated exposure control, adjustment of the  mAs and/or kVp according to patient's size, and iterative reconstruction  techniques. FINDINGS:  *  PLEURA/PERICARDIUM: Bilateral calcified pleural plaques. *  LUNGS: Linear scar of the right upper lobe. *  UMAIR/MEDIASTINUM: Calcified adenopathy. Large hiatal hernia. *  TRACHEOBRONCHIAL TREE: Within normal limits. *  AORTA/PULMONARY VESSELS: Within normal limits. *  CORONARY ARTERIES: Moderate coronary artery calcification is seen. *  CHEST WALL/AXILLA: Within normal limits. *  VISUALIZED UPPER ABDOMEN: Cholecystectomy. *  SPINE / BONES: Within normal limits. *  ADDITIONAL COMMENTS: None. Impression  No acute abnormalities. Findings consistent with previous asbestos exposure. Old granulomatous disease. Coronary artery disease. Date of Dictation: 11/22/2021 9:59 AM    Nuclear Medicine: No results found for this or any previous visit from the past 3650 days. PFTs:         Date:     12/2019         FVC     1.59-71%         FEV1     0.93-55%         FEV1/FVC     59%         FEF 25-75%              Bronchodilator Response     none         TLC     4.%         RV     3.%              DLCO     52%           FeNO: No results found for this or any previous visit. FeNO and Likelihood of Eosinophilic Asthma   Unlikely Intermediate Likely   <25 ppb 25-50 ppb >50ppb   Exercise Oximetry: 1/19/2023-Resting O2 saturations were 96% on RA. While walking, lowest O2 reading was 94% and highest HR was 103.   This was a negative exercise oximetry test.    Echo:   TRANSTHORACIC ECHOCARDIOGRAM (TTE) COMPLETE (CONTRAST/BUBBLE/3D PRN) 03/14/2022    Narrative  This is a summary report. The complete report is available in the patient's medical record. If you cannot access the medical record, please contact the sending organization for a detailed fax or copy. Left Ventricle: Left ventricle size is normal. Normal wall thickness. Global hypokinesis present. Reduced left ventricular systolic function with a visually estimated EF of 40 - 45%. Aortic Valve: Mild sclerosis of the aortic valve cusp. Mitral Valve: Mild annular calcification of the mitral valve. Mild transvalvular regurgitation. Left Atrium: Left atrium is dilated.     [facility] Reference Info:                                                                                                                  Past Medical History:   Diagnosis Date    Abnormal mammogram     Acute chest syndrome ([address] 7/20/2016    Asbestosis ([address] 7/20/2016    Asthma     Carotid artery disease (HCC)     Carotid artery stenosis without cerebral infarction 7/20/2016    CHF (congestive heart failure) (HCC)     Chronic hoarseness     Chronic obstructive pulmonary disease (HCC)     Colitis, ulcerative (HCC)     Colitis, ulcerative (HCC)     Colon cancer (HCC)     x2    Compression fx, lumbar spine ([address]     she had 4, 1 from bending over and the other 2 from falling    Coronary artery disease involving native coronary artery of native heart with angina pectoris ([address] 11/3/2022    Depression 7/9/2015    Dyslipidemia 7/20/2016    Elevated LFTs     GERD (gastroesophageal reflux disease) 7/9/2015    GERD & CROHNS & hx of colon cancer    Heart failure ([address]     High cholesterol 7/9/2015    Hyperlipidemia 7/20/2016    Hypertriglyceridemia     Hypokalemia     Hypomagnesemia     Lymphoma (HCC)     Menopause     Osteoarthrosis, unspecified whether generalized or localized, unspecified site 8/27/2015    Osteoporosis 7/9/2015    Stroke ([address]     TIA    Tobacco use disorder 7/20/2016        Tobacco Use      Smoking status: Former        Packs/day: 1.00 Types: Cigarettes        Quit date: 2018        Years since quittin.1      Smokeless tobacco: Never    Allergies   Allergen Reactions    Tramadol Other (See Comments)     Causes hallucinations    Hydromorphone Other (See Comments)     hallucinations    Morphine Other (See Comments)     hallucinations     Current Outpatient Medications   Medication Instructions    acetaminophen (TYLENOL) 500 MG tablet Oral, EVERY 6 HOURS PRN    albuterol (PROVENTIL) 2.5 mg, Nebulization, EVERY 6 HOURS PRN    albuterol sulfate HFA (VENTOLIN HFA) 108 (90 Base) MCG/ACT inhaler 2 puffs, Inhalation, 4 TIMES DAILY PRN    aspirin 81 MG EC tablet Oral, DAILY    atorvastatin (LIPITOR) 40 MG tablet TAKE 1 TABLET DAILY    celecoxib (CELEBREX) 200 mg, Oral, 2 TIMES DAILY    citalopram (CELEXA) 20 MG tablet TAKE 1 TABLET DAILY    cyanocobalamin 1,000 mcg, Oral, DAILY    denosumab (PROLIA) 60 mg, SubCUTAneous    diclofenac sodium (VOLTAREN) 4 g, Topical, 4 TIMES DAILY    fluticasone-umeclidin-vilant (TRELEGY ELLIPTA) 100-62.5-25 MCG/ACT AEPB inhaler 1 puff, Inhalation, DAILY    furosemide (LASIX) 40 mg, Oral, DAILY    lansoprazole (PREVACID) 15 mg, Oral, DAILY BEFORE BREAKFAST    magnesium oxide (MAG-OX) 400 mg, Oral, 3 TIMES DAILY    mesalamine (PENTASA) 500 MG extended release capsule The details of the medication are not available because there are pending changes by a home health clinician.    metoprolol succinate (TOPROL XL) 50 mg, Oral, DAILY    nitroGLYCERIN (NITROSTAT) 0.4 MG SL tablet Place 1 sl under the tongue q 5 min prn cp, max 3 sl in a 15-min time period. Call 911 if no relief after the 3rd sl.     OXYGEN 2 lpm qhs    sacubitril-valsartan (ENTRESTO) 49-51 MG per tablet 1 tablet, Oral, 2 TIMES DAILY    STELARA 90 MG/ML SOSY prefilled syringe Every 8 weeks

## 2023-01-20 RX ORDER — NITROGLYCERIN 0.4 MG/1
TABLET SUBLINGUAL
Qty: 25 TABLET | Refills: 11 | Status: SHIPPED | OUTPATIENT
Start: 2023-01-20

## 2023-01-25 ENCOUNTER — OFFICE VISIT (OUTPATIENT)
Dept: ORTHOPEDIC SURGERY | Age: 78
End: 2023-01-25
Payer: MEDICARE

## 2023-01-25 DIAGNOSIS — J92.9 PLEURAL PLAQUE: ICD-10-CM

## 2023-01-25 DIAGNOSIS — M48.061 FORAMINAL STENOSIS OF LUMBAR REGION: ICD-10-CM

## 2023-01-25 DIAGNOSIS — M48.062 LUMBAR STENOSIS WITH NEUROGENIC CLAUDICATION: ICD-10-CM

## 2023-01-25 DIAGNOSIS — M51.36 DDD (DEGENERATIVE DISC DISEASE), LUMBAR: Primary | ICD-10-CM

## 2023-01-25 PROCEDURE — G8484 FLU IMMUNIZE NO ADMIN: HCPCS | Performed by: PHYSICIAN ASSISTANT

## 2023-01-25 PROCEDURE — 99204 OFFICE O/P NEW MOD 45 MIN: CPT | Performed by: PHYSICIAN ASSISTANT

## 2023-01-25 PROCEDURE — 1036F TOBACCO NON-USER: CPT | Performed by: PHYSICIAN ASSISTANT

## 2023-01-25 PROCEDURE — G8399 PT W/DXA RESULTS DOCUMENT: HCPCS | Performed by: PHYSICIAN ASSISTANT

## 2023-01-25 PROCEDURE — G8427 DOCREV CUR MEDS BY ELIG CLIN: HCPCS | Performed by: PHYSICIAN ASSISTANT

## 2023-01-25 PROCEDURE — 1123F ACP DISCUSS/DSCN MKR DOCD: CPT | Performed by: PHYSICIAN ASSISTANT

## 2023-01-25 PROCEDURE — G8417 CALC BMI ABV UP PARAM F/U: HCPCS | Performed by: PHYSICIAN ASSISTANT

## 2023-01-25 PROCEDURE — 1090F PRES/ABSN URINE INCON ASSESS: CPT | Performed by: PHYSICIAN ASSISTANT

## 2023-01-25 RX ORDER — FAMOTIDINE 40 MG/1
TABLET, FILM COATED ORAL
COMMUNITY
Start: 2023-01-19

## 2023-01-25 NOTE — PATIENT INSTRUCTIONS
Epidural Steroid Injection / Selective Nerve Root Block  What is it? An epidural steroid injection (ELIJAH) is an injection of an anti-inflammatory medication directly on the sac that covers the spinal cord and nerves. A Selective Nerve Root Block (SNRB) is an injection that is directed around a specific nerve. Your physician usually orders an injection  when you have symptoms of an irritated spinal nerve. These symptoms can include back, buttock or leg pain, weakness, or numbness. Irritated spinal nerves are often caused by disc herniations or a tight spinal canal (stenosis). The goal of the steroid injection is to reduce the inflammation around the spinal cord and nerves, which should reduce the amount of back and leg pain you are experiencing. Epidural injections are often done in series. It would not be unusual to have two or three injections in a row 10 to 14 days apart. The reason for multiple injections is that the relief from the injection may wear off over time. And sometimes it takes multiple doses of steroid injection to obtain the most anti-inflammatory effect around the nerves. How is it performed? You will be asked to lie on your side or stomach on the x-ray table. This will allow the physician to position you in the best way possible to access your back. Next, the skin will be cleaned and numbed with a local anesthetic. An X-ray machine will then be used to guide a small gauge needle into the space over your spinal sac. A small amount of dye will then be injected to insure the needle is in the proper position. Finally, a mixture of numbing medicine and anti-inflammatory (steroid/cortisone) will be injected. How long does it take? All together it should take about 1 hour. The back and legs may feel weak or numb for a couple of hours after the injection. Plan the have someone drive you home. Are there any restrictions after the ELIJAH?    Try to spend the remainder of the evening resting as much as possible. You may return to your normal activities the day after the procedure, including returning to work. What are the risks? The most common risk is a spinal headache. This happens when the needle passes through the spinal sac and can result in leakage of spinal fluid. This is usually treated with lying in a flat position and high fluid intake. Rarely, spinal headaches lasting more than a few days can be treated with a small procedure called a blood patch. Another risk, though very small, is the injection of the medication into one of the tiny blood vessels in the spinal canal.  This can result in serious complications such as seizures, cardiac arrest and even death. Fortunately, these complications are quite rare. Other rare complications include infection, bleeding into the spinal canal (epidural hematoma), loss of bladder control, and injury to a nerve with the needle. Who should not have an ELIJAH? The injection of a steroid anywhere in the body can cause a significant increase in the blood sugar level. Diabetics are very sensitive to steroids and should have them administered with caution. Diabetic patients can have injections but need to watch their blood sugar after the injection and discuss with their Primary Care Physician a plan to manage elevations in blood sugar. Steroids also decrease the body's ability to fight infection. Thus, any person with an active infection should not take a steroid medication until the infection has been cleared completely. If you are taking an antibiotic for an active infection, please notify our office.    Additionally, some patients may have anatomic abnormalities or have had previous back surgeries which may not allow the needle to pass into the spinal canal.     Medications that result in thinning of the blood such as coumadin, aspirin, Plavix, Eliquis, Xarelto and many anti-inflammatory medications need to be discontinued 5-7 days prior to the injection. Check with your doctor regarding specific drugs you are taking. Med    [facility]    MD Shonda Ugarte MD Amy [place], PA-C Margarett Martin, NP    Main Office  [address] [facility],[address], [address], [facility] [address] [place] [address]       For Appointments at either location, please call [phone] that result in thinning of blood such as Coumadin, Aspirin, Plavix, and many anti-inflammatories, need to Pre procedure teaching sheet: Epidural spinal injection, selective nerve root block injection, sacroiliac injection and facet block injections. You have been scheduled for spinal injection. This injection is to help decrease her back and or leg pain. A local anesthetic will be used to numb the area. Then, a spinal needle will be placed in the appropriate place according to the type of injection ordered by your physician. A steroid with or without local anesthetic will be injected. A small amount of contrast dye will be used to better visualize the injection site. You will be lying on your stomach. A series of 3 injections may be performed as these are ordered 2 to 3 weeks apart. Be aware, steroids can take 2 to 3 days to begin working, but can take 7 to 10 days for full effectiveness. Therefore, you may not have relief immediately. Please be patient and give the injection time to work. You should not resume any type of strenuous workout routine for at least 3 days following the procedure. Walking is fine. Prior to your procedure    - Please call your primary care physician if you are on blood thinners such as Coumadin, warfarin, heparin, Plavix, Lovenox, Effient, Eliquis, Xarelto, Brilinta, or aspirin or other blood thinner as these will need to be stopped for 3 to 7 days before the injections.   We will need verbal approval to stop the thinners and proceed with the injection from the physician treating you with the blood thinners prior to the appointment date. If your physician tells you it is not safe to stop the blood thinner, you must call our office and discuss this with us. We may need to cancel the procedure. - Please do not take any nonsteroidal anti-inflammatory medications (NSAIDs) such as Advil, ibuprofen, Celebrex, meloxicam or Aleve for 3 days before your injection.    - We cannot give you an injection if you are presently taking an antibiotic. - Please continue your other medications as prescribed. -You must bring someone to drive you home. - You may eat prior to your procedure, but we asked that you do not eat a heavy meal within 2 to 4 hours of your procedure. You may drink liquids up to 1 to 2 hours before your appointment time. - If you are diabetic, please adjust your medications as appropriate. If steroid is used be aware that they may increase your blood sugar. Closely monitor your blood sugars after the procedure. - If you are sick, running a fever, have a virus or are put on antibiotics during the week before your procedure, please call to reschedule. We cannot do injections if you are sick. Day of procedure    - Arrive 15 minutes before your procedure time, register at the . - A staff member will call you from the waiting area and bring you to the exam room. - You may or may not be asked to change into shorts. Please leave valuables at home. If you wear clothing with elastic waist, you will not be required to change. - The nurse will talk with you and have you sign a consent form before your injection. If you have any allergies to Betadine, iodine, shellfish or x-ray dye, please tell the nurse at this time. - You will be positioned on the table on your stomach. A special x-ray machine is used to mp the correct position of the needle. We will clean the area with Betadine or Hibiclens.   Sterile towels were placed around the area to be injected. - The doctor will use a local anesthetic to numb the skin. This burns like a bee sting for about 20 seconds. As the needle is advanced, you will feel pressure. - Once the needle is in the appropriate space, the medication will be injected. Once the needle is removed, your back will be cleaned. You will move back to the exam room. - You are required to stay 20 to 30 minutes following the procedure. Although not expected you may have some numbness from the local anesthetic. If this were to interfere with her walking, you will not be discharged from the office until it is safe for you to leave. - Your discharge instructions and follow-up care will be discussed with you prior to leaving the office.           PRODUCTS THAT MAY THIN THE BLOOD    Medications, over-the-counter medications and herbal supplements    Aches-N-Pain    Disalcid   Meclenofenamate   Plavix  Acetylsalicylic acid (ASA)  Ag's Pills   Meclomen    Ponstel  Actron     Dolobid   Medipren    Relefen  Advil     Dristan    Mefenamic acid   Robaxisal  Aleve     Ecotrin    Meloxicam    Rufen  Rasheeda-[place]    Empirin   Menadol    Saleto  Anacin     Equagesic   Methocarbamol   Salsalate  Anaprox    Etodolac   Midol     Sine-aid  Ansaid     Excedrin   Mobic     Sine-off  Arthritis Pain formula   Feldene   Motrin     Sominex  Ascriptin    Fenoprofen   Nabumetone    Stanback  Aspergum    Feverfew   Nalfon     Sulindac  Aspirin     Florinal   Naprosyn    Talwin Compound  Tirso     Flurbiprofen   Naproxen    Ticlid  BC Powders    Genpril    Norgesic    iclopidine  Bufferin    Goody's   Nuprin     Tolectin  Cataflam    Haltrin    Nyquil     Tolmetin  Clinoril     IBU    Orudis     Toradol  Clopidogrel    Ibuprofen   Oruvail     Trental  Coricidin    Indocin    Oxaprozin    Triclosan  Coumadin    Indomethacin   Pamprin    Vanquish  Darvon Compound   Ketoprofen   Pepto Bismol    Vitamin - E  Daypro     Ketorolac   Percodan    Voltaren  Diflunisal Kaopectate   Lovenox   Piroxicam    Warfarin    Diclofenac Lodine       Phenaphen    Xarelto  Eliquis       Enoxaparin

## 2023-01-25 NOTE — PROGRESS NOTES
Name: Aruna Yang  YOB: 1945  Gender: female  MRN: [id number]    CC: Hip Pain (Bilateral hip and buttock pain with right leg pain and bilateral knee pain)       HPI: This is a 76y.o. year old female who  has a past medical history of Abnormal mammogram, Acute chest syndrome ([address] Asbestosis ([address] Asthma, Carotid artery disease ([address] Carotid artery stenosis without cerebral infarction, CHF (congestive heart failure) ([address] Chronic hoarseness, Chronic obstructive pulmonary disease ([address] Colitis, ulcerative ([address] Colitis, ulcerative ([address] Colon cancer ([address] Compression fx, lumbar spine ([address] Coronary artery disease involving native coronary artery of native heart with angina pectoris ([address] Depression, Dyslipidemia, Elevated LFTs, GERD (gastroesophageal reflux disease), Heart failure ([address] High cholesterol, Hyperlipidemia, Hypertriglyceridemia, Hypokalemia, Hypomagnesemia, Lymphoma ([address] Menopause, Osteoarthrosis, unspecified whether generalized or localized, unspecified site, Osteoporosis, Stroke ([address] and Tobacco use disorder. .  Patient is here today with bilateral hip and knee pain. Pain radiates from the buttocks all the way down the anterior leg to her shin. It is worse with standing getting up and down and walking. She has known history of osteoporosis, she has had compression fractures of T10, 11 and 12 with kyphoplasty's done by Dr. Maycol Harmon. She has DJD of bilateral knees. She thought she was here today for her knee pain. She has not had a recent injury or fall. History was obtained by patient and daughter     AMB PAIN ASSESSMENT 1/25/2023   Location of Pain Buttocks   Location Modifiers Right;Left   Severity of Pain 9   Duration of Pain Persistent   Frequency of Pain Constant   Date Pain First Started 5/17/1995   Aggravating Factors Standing;Walking; Other (Comment)   Limiting Behavior Some   Relieving Factors Nsaids; Other (Comment)   Result of Injury No Work-Related Injury No   Are there other pain locations you wish to document? No            ROS/Meds/PSH/PMH/FH/SH: I personally reviewed the patient's collected intake data. Below are the pertinents:    Allergies   Allergen Reactions    Adhesive Tape Other (See Comments)     Other reaction(s): Other- (not listed) - Allergy  Tears skin , skin thin   Tears skin , skin thin       Tramadol Other (See Comments)     Causes hallucinations    Hydrocodone     Hydrocodone Bit-Homatrop Mbr Hallucinations     Other reaction(s): Unknown (comments)    Hydromorphone Other (See Comments)     hallucinations    Morphine Other (See Comments)     hallucinations         Current Outpatient Medications:     nitroGLYCERIN (NITROSTAT) 0.4 MG SL tablet, Place 1 sl under the tongue q 5 min prn cp, max 3 sl in a 15-min time period.  Call 911 if no relief after the 3rd sl., Disp: 25 tablet, Rfl: 11    atorvastatin (LIPITOR) 40 MG tablet, TAKE 1 TABLET DAILY, Disp: 90 tablet, Rfl: 3    fluticasone-umeclidin-vilant (TRELEGY ELLIPTA) 100-62.5-25 MCG/ACT AEPB inhaler, Inhale 1 puff into the lungs daily, Disp: 1 each, Rfl: 3    albuterol (PROVENTIL) (2.5 MG/3ML) 0.083% nebulizer solution, Take 3 mLs by nebulization every 6 hours as needed for Wheezing or Shortness of Breath, Disp: 120 each, Rfl: 5    albuterol sulfate HFA (VENTOLIN HFA) 108 (90 Base) MCG/ACT inhaler, Inhale 2 puffs into the lungs 4 times daily as needed for Wheezing, Disp: 18 g, Rfl: 5    magnesium oxide (MAG-OX) 400 (240 Mg) MG tablet, Take 1 tablet by mouth 3 times daily, Disp: 90 tablet, Rfl: 3    metoprolol succinate (TOPROL XL) 50 MG extended release tablet, Take 1 tablet by mouth daily, Disp: 90 tablet, Rfl: 3    sacubitril-valsartan (ENTRESTO) 49-51 MG per tablet, Take 1 tablet by mouth 2 times daily, Disp: 180 tablet, Rfl: 3    citalopram (CELEXA) 20 MG tablet, TAKE 1 TABLET DAILY, Disp: 90 tablet, Rfl: 3    STELARA 90 MG/ML SOSY prefilled syringe, Every 8 weeks, Disp: , Rfl:     cyanocobalamin 1000 MCG tablet, Take 1,000 mcg by mouth daily, Disp: , Rfl:     OXYGEN, 2 lpm qhs, Disp: , Rfl:     acetaminophen (TYLENOL) 500 MG tablet, Take by mouth every 6 hours as needed, Disp: , Rfl:     aspirin 81 MG EC tablet, Take by mouth daily, Disp: , Rfl:     denosumab (PROLIA) 60 MG/ML SOSY SC injection, Inject 60 mg into the skin, Disp: , Rfl:     diclofenac sodium (VOLTAREN) 1 % GEL, Apply 4 g topically 4 times daily, Disp: , Rfl:     furosemide (LASIX) 40 MG tablet, Take 40 mg by mouth daily, Disp: , Rfl:     mesalamine (PENTASA) 500 MG extended release capsule, The details of the medication are not available because there are pending changes by a home health clinician., Disp: , Rfl:     famotidine (PEPCID) 40 MG tablet, , Disp: , Rfl:     celecoxib (CELEBREX) 200 MG capsule, Take 1 capsule by mouth 2 times daily (Patient not taking: No sig reported), Disp: 20 capsule, Rfl: 0    lansoprazole (PREVACID) 30 MG delayed release capsule, Take 15 mg by mouth every morning (before breakfast) (Patient not taking: Reported on 1/25/2023), Disp: , Rfl:     Past Surgical History:   Procedure Laterality Date    APPENDECTOMY      BREAST BIOPSY      Benign    CARDIAC PROCEDURE N/A 11/14/2022    LEFT AND RIGHT HEART CATH / CORONARY ANGIOGRAPHY performed by Maddison Yost MD at [address] [place] [address] CATH LAB    CHOLECYSTECTOMY      OTHER SURGICAL HISTORY  11/2018    back surgery, cement put in, Dr Young Sanford x 2       Patient Active Problem List   Diagnosis    NSVT (nonsustained ventricular tachycardia)    Abnormal mammogram    Hypomagnesemia    Depression    GERD (gastroesophageal reflux disease)    Shortness of breath    Cardiomyopathy ([address]    Presence of automatic (implantable) cardiac defibrillator    H/O TIA (transient ischemic attack) and stroke    Chronic obstructive pulmonary disease ([address]    Colitis, ulcerative ([address]    Personal history of tobacco use, presenting hazards to health    Menopause    Lymphoma ([facility]    Elevated LFTs    NICM (nonischemic cardiomyopathy) (HCC)    Osteoporosis    Systolic CHF, chronic (HCC)    Chronic hoarseness    Colon cancer (HCC)    Carotid artery disease (HCC)    Coronary artery disease involving native coronary artery of native heart with angina pectoris ([facility]         Tobacco:  reports that she quit smoking about 4 years ago. Her smoking use included cigarettes. She smoked an average of 1 pack per day. She has never used smokeless tobacco.  Alcohol:   Social History     Substance and Sexual Activity   Alcohol Use No        Physical Exam:   @VS1@   GENERAL:  Adult in no acute distress, frail Patient is appropriately conversant  MSK:  Examination of the lumbar spine reveals thoracic kyphosis   There is mild tenderness to palpation along the spinous processes and paraspinal musculature. The patient ambulates with a antalgic gait. ROM of bilateral hip(s) reveals no irritability. NEURO:  Cranial nerves grossly intact. No motor deficits. Straight leg testing is negative bilateral  Sensory testing reveals intact sensation to light touch and in the distribution of the L3-S1 dermatomes bilaterally  Ankle jerk is negative for clonus    Reflexes   Right Left   Quadriceps (L4) 2 2   Achilles (S1) 2 2     Strength testing in the lower extremity reveals the following based on the 5 point grading scale:     HF (L2) H Ab (L5) KE (L3/4) ADF (L4) EHL (L5) A Ev (S1) APF (S1)   Right 5 5 5 4 5 5 5   Left 5 5 5 5 5 5 5     PSYCH:  Alert and oriented X 3. Appropriate affect. Intact judgment and insight. Radiographic Studies:   Xray Result (most recent):  XR LUMBAR SPINE (MIN 4 VIEWS) 12/16/2022    Narrative  PELVIS AND RIGHT HIP, 3 views. INDICATION: Chronic low back pain which radiates into the right leg    TECHNIQUE: AP view of the pelvis and coned down AP and frogleg lateral of the  hip. FINDINGS:  Pelvic bony ring:  There is intact. Bone mineral density: Decreased. SI joints: Symmetric with partial ankylosis. Pubic rami: Appear intact. Lower lumbar spine: Mild DJD    Femoral head:  Has a round smooth contour. Small marginal osteophytes. Joint space: Symmetric. Osteophytes off the acetabulum. .  Femoral neck and proximal femoral shaft:  Appear intact. Impression  Osteopenia and osteoarthritis. LUMBAR SPINE, 5 views. INDICATION: Low back pain. COMPARISON: None. TECHNIQUE: AP, lateral, bilateral oblique and cone-down lumbosacral junction  views. FINDINGS:  Spinal alignment:  Lumbar spinal alignment unremarkable. There is a kyphosis lower thoracic spine. Status post [id number] kyphoplasty. Disk spaces: Maintained. Vertebral bodies: Mild compression L1. There are osteophytes. Pedicles:  Appear intact. SI joints:  Appear symmetric. Facet joints:  Lower facet arthropathy  Other:  Aortic calcifications    IMPRESSION: Mild compression of L1. Facet arthropathy. Chronic changes thoracic  spine    I independently reviewed the x-rays of the lumbar spine. There is a mild compression of L1. This compression was noted on a chest x-ray May 7, 2021. This is unchanged. CT Lumbar Spine wo Contrast    Impression  Performed by [facility] P[facility]      1. Age indeterminate, but favored acute to subacute, compression fracture involving the superior endplate of L1 with approximately 15% height loss and 3 mm retropulsion without significant spinal canal stenosis. 2.  Chronic compression fracture deformity of T12 with approximately 80% height loss and prior vertebral augmentation. Vertebral augmentation is also partially visualized at T11.     3.  Multilevel, multifactorial lumbar spondylosis most notable at L5-S1 with severe bilateral neural foraminal stenosis and L4-L5 with moderate spinal canal stenosis and moderate bilateral neural foraminal stenosis. Assessment/Plan:         Diagnosis Orders   1.  DDD (degenerative disc disease), lumbar  Ambulatory referral to Orthopedic Surgery      2. Pleural plaque  CT CHEST WO CONTRAST    Ambulatory referral to Orthopedic Surgery      3. Lumbar stenosis with neurogenic claudication  Ambulatory referral to Orthopedic Surgery      4. Foraminal stenosis of lumbar region  Ambulatory referral to Orthopedic Surgery            This patient's clinical history and physical exam is consistent with neurogenic claudication which is likely due to lumbar stenosis. I do not have the images from her CT scan of the lumbar spine from March 2019 however we do have a report. The report notes multifactorial lumbar spondylosis most notable at L5-S1 with severe bilateral foraminal stenosis and L4-5 with moderate spinal stenosis and moderate bilateral foraminal stenosis. I discussed the natural history of lumbar stenosis in that it is a degenerative condition that is usually slowly progressive resulting in gradual loss of mobility. I reassured the patient that this is not a condition that typically predisposes her   to an acute paraplegia; however, the more neurologic deficits acquired and the longer they go untreated, the less likely she is to have neurological improvements after an operation. She understands that conservative treatments typically include physical therapy, oral and/or epidural steroids, pain management, and simple observation. And, that these treatments do not address the anatomical pinching of the spinal nerves, but rather help patients cope with the resulting symptoms. I also discussed potential surgical intervention if the symptoms fail to improve or there is a progressive neurologic deficit or conservative management has been exhausted. Due to her osteoporosis, I would want to avoid surgical intervention on her spine however she does not have spondylolisthesis and laminectomy would likely be the surgery of choice. She is not interested in surgery on her back either. We have discussed nonsurgical management including the role of lumbar injections. She would like to think about this I would refer her to Dr. Jessica Valles for evaluation and treatment and consideration of lumbar injections, likely L4-5.      -Referral to [facility] non-surgical spine provider for evaluation and treatment. No orders of the defined types were placed in this encounter. Orders Placed This Encounter   Procedures    Ambulatory referral to Orthopedic Surgery        4 This is a chronic illness/condition with exacerbation and progression      Return for eval/treat appt with Dr. Jessica Valles. Vasiliy Marroquin PA-C  01/25/23      Elements of this note were created using speech recognition software. As such, errors of speech recognition may be present.

## 2023-02-08 PROCEDURE — 93296 REM INTERROG EVL PM/IDS: CPT | Performed by: INTERNAL MEDICINE

## 2023-02-08 PROCEDURE — 93295 DEV INTERROG REMOTE 1/2/MLT: CPT | Performed by: INTERNAL MEDICINE

## 2023-02-08 NOTE — TELEPHONE ENCOUNTER
MEDICATION REFILL REQUEST      Name of Medication:  magnesium   Dose:  400 mg  Frequency:    Quantity:    Days' supply:  80      Pharmacy Name/Location:  did not leave

## 2023-02-22 ENCOUNTER — OFFICE VISIT (OUTPATIENT)
Dept: FAMILY MEDICINE CLINIC | Facility: CLINIC | Age: 78
End: 2023-02-22
Payer: MEDICARE

## 2023-02-22 VITALS
DIASTOLIC BLOOD PRESSURE: 70 MMHG | HEIGHT: 63 IN | SYSTOLIC BLOOD PRESSURE: 100 MMHG | WEIGHT: 162 LBS | BODY MASS INDEX: 28.7 KG/M2

## 2023-02-22 DIAGNOSIS — R05.1 ACUTE COUGH: ICD-10-CM

## 2023-02-22 DIAGNOSIS — Z86.73 H/O TIA (TRANSIENT ISCHEMIC ATTACK) AND STROKE: ICD-10-CM

## 2023-02-22 DIAGNOSIS — J45.21 MILD INTERMITTENT ASTHMATIC BRONCHITIS WITH ACUTE EXACERBATION: ICD-10-CM

## 2023-02-22 DIAGNOSIS — G89.29 CHRONIC MIDLINE LOW BACK PAIN WITHOUT SCIATICA: ICD-10-CM

## 2023-02-22 DIAGNOSIS — E78.00 PURE HYPERCHOLESTEROLEMIA: ICD-10-CM

## 2023-02-22 DIAGNOSIS — K51.919 ULCERATIVE COLITIS WITH COMPLICATION, UNSPECIFIED LOCATION (HCC): ICD-10-CM

## 2023-02-22 DIAGNOSIS — Z00.00 MEDICARE ANNUAL WELLNESS VISIT, SUBSEQUENT: Primary | ICD-10-CM

## 2023-02-22 DIAGNOSIS — I10 PRIMARY HYPERTENSION: ICD-10-CM

## 2023-02-22 DIAGNOSIS — I50.22 SYSTOLIC CHF, CHRONIC (HCC): ICD-10-CM

## 2023-02-22 DIAGNOSIS — I42.8 NICM (NONISCHEMIC CARDIOMYOPATHY) (HCC): ICD-10-CM

## 2023-02-22 DIAGNOSIS — M54.50 CHRONIC MIDLINE LOW BACK PAIN WITHOUT SCIATICA: ICD-10-CM

## 2023-02-22 DIAGNOSIS — J44.9 CHRONIC OBSTRUCTIVE PULMONARY DISEASE, UNSPECIFIED COPD TYPE (HCC): ICD-10-CM

## 2023-02-22 DIAGNOSIS — F33.9 RECURRENT DEPRESSION (HCC): ICD-10-CM

## 2023-02-22 DIAGNOSIS — Z87.81 HISTORY OF COMPRESSION FRACTURE OF SPINE: ICD-10-CM

## 2023-02-22 LAB
ALBUMIN SERPL-MCNC: 3.7 G/DL (ref 3.2–4.6)
ALBUMIN/GLOB SERPL: 1 (ref 0.4–1.6)
ALP SERPL-CCNC: 56 U/L (ref 50–136)
ALT SERPL-CCNC: 32 U/L (ref 12–65)
ANION GAP SERPL CALC-SCNC: 7 MMOL/L (ref 2–11)
AST SERPL-CCNC: 18 U/L (ref 15–37)
BASOPHILS # BLD: 0.1 K/UL (ref 0–0.2)
BASOPHILS NFR BLD: 1 % (ref 0–2)
BILIRUB SERPL-MCNC: 0.6 MG/DL (ref 0.2–1.1)
BUN SERPL-MCNC: 19 MG/DL (ref 8–23)
CALCIUM SERPL-MCNC: 10.7 MG/DL (ref 8.3–10.4)
CHLORIDE SERPL-SCNC: 100 MMOL/L (ref 101–110)
CHOLEST SERPL-MCNC: 168 MG/DL
CO2 SERPL-SCNC: 29 MMOL/L (ref 21–32)
CREAT SERPL-MCNC: 1.3 MG/DL (ref 0.6–1)
DIFFERENTIAL METHOD BLD: ABNORMAL
EOSINOPHIL # BLD: 0.3 K/UL (ref 0–0.8)
EOSINOPHIL NFR BLD: 3 % (ref 0.5–7.8)
ERYTHROCYTE [DISTWIDTH] IN BLOOD BY AUTOMATED COUNT: 13.1 % (ref 11.9–14.6)
GLOBULIN SER CALC-MCNC: 3.8 G/DL (ref 2.8–4.5)
GLUCOSE SERPL-MCNC: 99 MG/DL (ref 65–100)
HCT VFR BLD AUTO: 42.1 % (ref 35.8–46.3)
HDLC SERPL-MCNC: 67 MG/DL (ref 40–60)
HDLC SERPL: 2.5
HGB BLD-MCNC: 13.2 G/DL (ref 11.7–15.4)
IMM GRANULOCYTES # BLD AUTO: 0 K/UL (ref 0–0.5)
IMM GRANULOCYTES NFR BLD AUTO: 0 % (ref 0–5)
LDLC SERPL CALC-MCNC: 76.8 MG/DL
LYMPHOCYTES # BLD: 1.8 K/UL (ref 0.5–4.6)
LYMPHOCYTES NFR BLD: 18 % (ref 13–44)
MCH RBC QN AUTO: 29.9 PG (ref 26.1–32.9)
MCHC RBC AUTO-ENTMCNC: 31.4 G/DL (ref 31.4–35)
MCV RBC AUTO: 95.2 FL (ref 82–102)
MONOCYTES # BLD: 1.4 K/UL (ref 0.1–1.3)
MONOCYTES NFR BLD: 14 % (ref 4–12)
NEUTS SEG # BLD: 6.7 K/UL (ref 1.7–8.2)
NEUTS SEG NFR BLD: 64 % (ref 43–78)
NRBC # BLD: 0 K/UL (ref 0–0.2)
PLATELET # BLD AUTO: 181 K/UL (ref 150–450)
PMV BLD AUTO: 11.5 FL (ref 9.4–12.3)
POTASSIUM SERPL-SCNC: 4.1 MMOL/L (ref 3.5–5.1)
PROT SERPL-MCNC: 7.5 G/DL (ref 6.3–8.2)
RBC # BLD AUTO: 4.42 M/UL (ref 4.05–5.2)
SODIUM SERPL-SCNC: 136 MMOL/L (ref 133–143)
TRIGL SERPL-MCNC: 121 MG/DL (ref 35–150)
TSH, 3RD GENERATION: 1.07 UIU/ML (ref 0.36–3.74)
VLDLC SERPL CALC-MCNC: 24.2 MG/DL (ref 6–23)
WBC # BLD AUTO: 10.3 K/UL (ref 4.3–11.1)

## 2023-02-22 PROCEDURE — 3074F SYST BP LT 130 MM HG: CPT | Performed by: FAMILY MEDICINE

## 2023-02-22 PROCEDURE — G8427 DOCREV CUR MEDS BY ELIG CLIN: HCPCS | Performed by: FAMILY MEDICINE

## 2023-02-22 PROCEDURE — 1090F PRES/ABSN URINE INCON ASSESS: CPT | Performed by: FAMILY MEDICINE

## 2023-02-22 PROCEDURE — 1036F TOBACCO NON-USER: CPT | Performed by: FAMILY MEDICINE

## 2023-02-22 PROCEDURE — 3023F SPIROM DOC REV: CPT | Performed by: FAMILY MEDICINE

## 2023-02-22 PROCEDURE — G8399 PT W/DXA RESULTS DOCUMENT: HCPCS | Performed by: FAMILY MEDICINE

## 2023-02-22 PROCEDURE — 99213 OFFICE O/P EST LOW 20 MIN: CPT | Performed by: FAMILY MEDICINE

## 2023-02-22 PROCEDURE — G8484 FLU IMMUNIZE NO ADMIN: HCPCS | Performed by: FAMILY MEDICINE

## 2023-02-22 PROCEDURE — G8417 CALC BMI ABV UP PARAM F/U: HCPCS | Performed by: FAMILY MEDICINE

## 2023-02-22 PROCEDURE — 1123F ACP DISCUSS/DSCN MKR DOCD: CPT | Performed by: FAMILY MEDICINE

## 2023-02-22 PROCEDURE — 3078F DIAST BP <80 MM HG: CPT | Performed by: FAMILY MEDICINE

## 2023-02-22 PROCEDURE — G0439 PPPS, SUBSEQ VISIT: HCPCS | Performed by: FAMILY MEDICINE

## 2023-02-22 RX ORDER — AZITHROMYCIN 250 MG/1
TABLET, FILM COATED ORAL
Qty: 6 TABLET | Refills: 0 | Status: SHIPPED | OUTPATIENT
Start: 2023-02-22

## 2023-02-22 SDOH — ECONOMIC STABILITY: INCOME INSECURITY: HOW HARD IS IT FOR YOU TO PAY FOR THE VERY BASICS LIKE FOOD, HOUSING, MEDICAL CARE, AND HEATING?: NOT HARD AT ALL

## 2023-02-22 SDOH — ECONOMIC STABILITY: FOOD INSECURITY: WITHIN THE PAST 12 MONTHS, THE FOOD YOU BOUGHT JUST DIDN'T LAST AND YOU DIDN'T HAVE MONEY TO GET MORE.: NEVER TRUE

## 2023-02-22 SDOH — ECONOMIC STABILITY: FOOD INSECURITY: WITHIN THE PAST 12 MONTHS, YOU WORRIED THAT YOUR FOOD WOULD RUN OUT BEFORE YOU GOT MONEY TO BUY MORE.: NEVER TRUE

## 2023-02-22 SDOH — ECONOMIC STABILITY: HOUSING INSECURITY
IN THE LAST 12 MONTHS, WAS THERE A TIME WHEN YOU DID NOT HAVE A STEADY PLACE TO SLEEP OR SLEPT IN A SHELTER (INCLUDING NOW)?: NO

## 2023-02-22 ASSESSMENT — PATIENT HEALTH QUESTIONNAIRE - PHQ9
SUM OF ALL RESPONSES TO PHQ QUESTIONS 1-9: 0
SUM OF ALL RESPONSES TO PHQ QUESTIONS 1-9: 0
SUM OF ALL RESPONSES TO PHQ9 QUESTIONS 1 & 2: 0
1. LITTLE INTEREST OR PLEASURE IN DOING THINGS: 0
SUM OF ALL RESPONSES TO PHQ QUESTIONS 1-9: 0
2. FEELING DOWN, DEPRESSED OR HOPELESS: 0
SUM OF ALL RESPONSES TO PHQ QUESTIONS 1-9: 0

## 2023-02-22 ASSESSMENT — LIFESTYLE VARIABLES
HOW OFTEN DO YOU HAVE A DRINK CONTAINING ALCOHOL: NEVER
HOW MANY STANDARD DRINKS CONTAINING ALCOHOL DO YOU HAVE ON A TYPICAL DAY: PATIENT DOES NOT DRINK

## 2023-02-22 NOTE — PROGRESS NOTES
SUBJECTIVE:   Gilberto Romero is a 76 y.o. female who has a past medical history significant for hypertension, high cholesterol, chronic systolic heart failure, ulcerative colitis, lymphoma, TIA, COPD, osteoporosis, spinal compression fractures, recurrent depression and bilateral carotid artery disease. Patient presents today accompanied by daughter. Since I last seen her she has been to orthopedics. She has been managed for compression fractures and spinal stenosis. She is scheduled for epidural injection. Patient reports a 3-day history of progressively worsening productive cough of a yellowish sputum, wheezing and congestion in her chest.  No fever or body aches reported. No increase in baseline shortness of breath. No orthopnea or PND. HPI  See above    Past Medical History, Past Surgical History, Family history, Social History, and Medications were all reviewed with the patient today and updated as necessary. Current Outpatient Medications   Medication Sig Dispense Refill    azithromycin (ZITHROMAX) 250 MG tablet Take 2 Tablets with food by mouth first day, then 1 tab with food by mouth daily for days 2 through 5. 6 tablet 0    famotidine (PEPCID) 40 MG tablet       nitroGLYCERIN (NITROSTAT) 0.4 MG SL tablet Place 1 sl under the tongue q 5 min prn cp, max 3 sl in a 15-min time period.  Call 911 if no relief after the 3rd sl. 25 tablet 11    atorvastatin (LIPITOR) 40 MG tablet TAKE 1 TABLET DAILY 90 tablet 3    fluticasone-umeclidin-vilant (TRELEGY ELLIPTA) 100-62.5-25 MCG/ACT AEPB inhaler Inhale 1 puff into the lungs daily 1 each 3    albuterol (PROVENTIL) (2.5 MG/3ML) 0.083% nebulizer solution Take 3 mLs by nebulization every 6 hours as needed for Wheezing or Shortness of Breath 120 each 5    albuterol sulfate HFA (VENTOLIN HFA) 108 (90 Base) MCG/ACT inhaler Inhale 2 puffs into the lungs 4 times daily as needed for Wheezing 18 g 5    magnesium oxide (MAG-OX) 400 (240 Mg) MG tablet Take 1 tablet by mouth 3 times daily 90 tablet 3    metoprolol succinate (TOPROL XL) 50 MG extended release tablet Take 1 tablet by mouth daily 90 tablet 3    sacubitril-valsartan (ENTRESTO) 49-51 MG per tablet Take 1 tablet by mouth 2 times daily 180 tablet 3    citalopram (CELEXA) 20 MG tablet TAKE 1 TABLET DAILY 90 tablet 3    STELARA 90 MG/ML SOSY prefilled syringe Every 8 weeks      cyanocobalamin 1000 MCG tablet Take 1,000 mcg by mouth daily      OXYGEN 2 lpm qhs      acetaminophen (TYLENOL) 500 MG tablet Take by mouth every 6 hours as needed      aspirin 81 MG EC tablet Take by mouth daily      denosumab (PROLIA) 60 MG/ML SOSY SC injection Inject 60 mg into the skin      diclofenac sodium (VOLTAREN) 1 % GEL Apply 4 g topically 4 times daily      furosemide (LASIX) 40 MG tablet Take 40 mg by mouth daily      mesalamine (PENTASA) 500 MG extended release capsule The details of the medication are not available because there are pending changes by a home health clinician. celecoxib (CELEBREX) 200 MG capsule Take 1 capsule by mouth 2 times daily (Patient not taking: No sig reported) 20 capsule 0    lansoprazole (PREVACID) 30 MG delayed release capsule Take 15 mg by mouth every morning (before breakfast) (Patient not taking: No sig reported)       No current facility-administered medications for this visit. Allergies   Allergen Reactions    Adhesive Tape Other (See Comments)     Other reaction(s):  Other- (not listed) - Allergy  Tears skin , skin thin   Tears skin , skin thin       Tramadol Other (See Comments)     Causes hallucinations    Hydrocodone     Hydrocodone Bit-Homatrop Mbr Hallucinations     Other reaction(s): Unknown (comments)    Hydromorphone Other (See Comments)     hallucinations    Morphine Other (See Comments)     hallucinations     Patient Active Problem List   Diagnosis    NSVT (nonsustained ventricular tachycardia)    Abnormal mammogram    Hypomagnesemia    Depression    GERD (gastroesophageal reflux disease)    Shortness of breath    Cardiomyopathy ([address]    Presence of automatic (implantable) cardiac defibrillator    H/O TIA (transient ischemic attack) and stroke    Chronic obstructive pulmonary disease (HCC)    Colitis, ulcerative ([address]    Personal history of tobacco use, presenting hazards to health    Menopause    Lymphoma ([address]    Elevated LFTs    NICM (nonischemic cardiomyopathy) (HCC)    Osteoporosis    Systolic CHF, chronic (HCC)    Chronic hoarseness    Colon cancer (HCC)    Carotid artery disease (HCC)    Coronary artery disease involving native coronary artery of native heart with angina pectoris ([address]    History of compression fracture of spine     Past Medical History:   Diagnosis Date    Abnormal mammogram     Acute chest syndrome ([address] 7/20/2016    Asbestosis ([address] 7/20/2016    Asthma     Carotid artery disease (HCC)     Carotid artery stenosis without cerebral infarction 7/20/2016    CHF (congestive heart failure) (HCC)     Chronic hoarseness     Chronic obstructive pulmonary disease (HCC)     Colitis, ulcerative (HCC)     Colitis, ulcerative (HCC)     Colon cancer (HCC)     x2    Compression fx, lumbar spine ([address]     she had 4, 1 from bending over and the other 2 from falling    Coronary artery disease involving native coronary artery of native heart with angina pectoris ([address] 11/3/2022    Depression 7/9/2015    Dyslipidemia 7/20/2016    Elevated LFTs     GERD (gastroesophageal reflux disease) 7/9/2015    GERD & CROHNS & hx of colon cancer    Heart failure ([address]     High cholesterol 7/9/2015    Hyperlipidemia 7/20/2016    Hypertriglyceridemia     Hypokalemia     Hypomagnesemia     Lymphoma (HCC)     Menopause     Osteoarthrosis, unspecified whether generalized or localized, unspecified site 8/27/2015    Osteoporosis 7/9/2015    Stroke ([address]     TIA    Tobacco use disorder 7/20/2016     Past Surgical History:   Procedure Laterality Date    APPENDECTOMY      BREAST BIOPSY      Benign CARDIAC PROCEDURE N/A 2022    LEFT AND RIGHT HEART CATH / CORONARY ANGIOGRAPHY performed by Shefali Delacruz MD at [facility] CATH LAB    CHOLECYSTECTOMY      OTHER SURGICAL HISTORY  2018    back surgery, cement put in, Dr Corinne Child x 2     Family History   Problem Relation Age of Onset    Cancer Mother         Bone CA, brain tumor    Heart Failure Father     Breast Cancer Neg Hx      Social History     Tobacco Use    Smoking status: Former     Packs/day: 1.00     Types: Cigarettes     Quit date: 2018     Years since quittin.2    Smokeless tobacco: Never   Substance Use Topics    Alcohol use: No         Review of Systems  See above    OBJECTIVE:  /70   Ht 5' 3\" (1.6 m)   Wt 162 lb (73.5 kg)   BMI 28.70 kg/m²      Physical Exam  Constitutional:       General: She is not in acute distress. Appearance: Normal appearance. She is not ill-appearing. HENT:      Head: Normocephalic and atraumatic. Right Ear: Ear canal and external ear normal. There is no impacted cerumen. Left Ear: Tympanic membrane, ear canal and external ear normal. There is no impacted cerumen. Nose: Nose normal.      Mouth/Throat:      Mouth: Mucous membranes are moist.      Pharynx: Oropharynx is clear. No oropharyngeal exudate or posterior oropharyngeal erythema. Eyes:      General: No scleral icterus. Extraocular Movements: Extraocular movements intact. Conjunctiva/sclera: Conjunctivae normal.      Pupils: Pupils are equal, round, and reactive to light. Neck:      Vascular: No carotid bruit. Cardiovascular:      Rate and Rhythm: Normal rate and regular rhythm. Pulses: Normal pulses. Heart sounds: Normal heart sounds. No murmur heard. Pulmonary:      Effort: Pulmonary effort is normal. No respiratory distress. Breath sounds: Wheezing and rhonchi present. Abdominal:      General: Abdomen is flat.  Bowel sounds are normal. There is no distension. Palpations: Abdomen is soft. There is no mass. Tenderness: There is no abdominal tenderness. Hernia: No hernia is present. Musculoskeletal:         General: No swelling, tenderness or deformity. Normal range of motion. Cervical back: Normal range of motion and neck supple. Right lower leg: No edema. Left lower leg: No edema. Lymphadenopathy:      Cervical: No cervical adenopathy. Skin:     General: Skin is warm. Findings: No rash. Neurological:      General: No focal deficit present. Mental Status: She is alert and oriented to person, place, and time. Cranial Nerves: No cranial nerve deficit. Motor: No weakness. Deep Tendon Reflexes: Reflexes normal.   Psychiatric:         Mood and Affect: Mood normal.         Behavior: Behavior normal.         Thought Content: Thought content normal.         Judgment: Judgment normal.       Medical problems and test results were reviewed with the patient today. ASSESSMENT and PLAN    1. Cough. Underlying asthmatic bronchitis with COPD exacerbation. Patient has not been using her albuterol inhaler. She is instructed to use 2 puffs every 4-6 hours as needed but particularly consistently for the next 2 to 3 days. Continue trilogy. Add Z-Marcel. We will hold off on prednisone at this point as the patient has had a lot of issues gastrointestinally as well as with her osteoporosis and compression fractures. If not improving we may need to do a short course of oral prednisone. 2.  Asthmatic bronchitis. As above. 3.  Hypertension. BP stable at 100/70. Check metabolic panel, TSH and CBC. 2.  High cholesterol. Check lipid panel. 3.  Chronic systolic heart failure. Per cardiology. Encourage daily weights. 4.  Nonischemic cardiomyopathy. Continue follow-up with cardiology. 5.  COPD. Continue follow-up with pulmonary. Treatment as outlined above. 6.  History of TIA.   No new neurological complaints. 7.  Ulcerative colitis. No GI complaints. Continue follow-up as scheduled with GI.    8.  Recurrent depression. Denies breakthrough symptoms. 9.  Compression fracture. Per orthopedics. 10.  Chronic low back pain. Compression fracture and spinal stenosis. Per orthopedics. 11.  Osteoporosis. Is being managed by rheumatology. Continue. Elements of this note have been dictated using speech recognition software. As a result, errors of speech recognition may have occurred. Home Suture Removal Text: Patient was provided a home suture removal kit and will remove their sutures at home.  If they have any questions or difficulties they will call the office.

## 2023-02-22 NOTE — PATIENT INSTRUCTIONS
Preventing Falls: Care Instructions  Overview     Getting around your home safely can be a challenge if you have injuries or health problems that make it easy for you to fall. Loose rugs and furniture in walkways are among the dangers for many older people who have problems walking or who have poor eyesight. People who have conditions such as arthritis, osteoporosis, or dementia also have to be careful not to fall. You can make your home safer with a few simple measures. Follow-up care is a key part of your treatment and safety. Be sure to make and go to all appointments, and call your doctor if you are having problems. It's also a good idea to know your test results and keep a list of the medicines you take. How can you care for yourself at home? Taking care of yourself  Exercise regularly to improve your strength, muscle tone, and balance. Walk if you can. Swimming may be a good choice if you cannot walk easily. Have your vision and hearing checked each year or any time you notice a change. If you have trouble seeing and hearing, you might not be able to avoid objects and could lose your balance. Know the side effects of the medicines you take. Ask your doctor or pharmacist whether the medicines you take can affect your balance. Sleeping pills or sedatives can affect your balance. Limit the amount of alcohol you drink. Alcohol can impair your balance and other senses. Ask your doctor whether calluses or corns on your feet need to be removed. If you wear loose-fitting shoes because of calluses or corns, you can lose your balance and fall. Talk to your doctor if you have numbness in your feet. You may get dizzy if you do not drink enough water. To prevent dehydration, drink plenty of fluids. Choose water and other clear liquids. If you have kidney, heart, or liver disease and have to limit fluids, talk with your doctor before you increase the amount of fluids you drink.   Preventing falls at home  Remove raised doorway thresholds, throw rugs, and clutter. Repair loose carpet or raised areas in the floor. Move furniture and electrical cords to keep them out of walking paths. Use nonskid floor wax, and wipe up spills right away, especially on ceramic tile floors. If you use a walker or cane, put rubber tips on it. If you use crutches, clean the bottoms of them regularly with an abrasive pad, such as steel wool. Keep your house well lit, especially stairways, porches, and outside walkways. Use night-lights in areas such as hallways and bathrooms. Add extra light switches or use remote switches (such as switches that go on or off when you clap your hands) to make it easier to turn lights on if you have to get up during the night. Install sturdy handrails on stairways. Move items in your cabinets so that the things you use a lot are on the lower shelves (about waist level). Keep a cordless phone and a flashlight with new batteries by your bed. If possible, put a phone in each of the main rooms of your house, or carry a cell phone in case you fall and cannot reach a phone. Or, you can wear a device around your neck or wrist. You push a button that sends a signal for help. Wear low-heeled shoes that fit well and give your feet good support. Use footwear with nonskid soles. Check the heels and soles of your shoes for wear. Repair or replace worn heels or soles. Do not wear socks without shoes on smooth floors, such as wood. Walk on the grass when the sidewalks are slippery. If you live in an area that gets snow and ice in the winter, sprinkle salt on slippery steps and sidewalks. Or ask a family member or friend to do this for you. Preventing falls in the bath  Install grab bars and nonskid mats inside and outside your shower or tub and near the toilet and sinks. Use shower chairs and bath benches. Use a hand-held shower head that will allow you to sit while showering.   Get into a tub or shower by putting the weaker leg in first. Get out of a tub or shower with your strong side first.  Repair loose toilet seats and consider installing a raised toilet seat to make getting on and off the toilet easier. Keep your bathroom door unlocked while you are in the shower. Where can you learn more? Go to http://www.wu.com/ and enter G117 to learn more about \"Preventing Falls: Care Instructions. \"  Current as of: May 4, 2022               Content Version: 13.5  © 9609-8695 Healthwise, [employer]. Care instructions adapted under license by [facility] ([facility]). If you have questions about a medical condition or this instruction, always ask your healthcare professional. [address] any warranty or liability for your use of this information. Learning About Being Active as an Older Adult  Why is being active important as you get older? Being active is one of the best things you can do for your health. And it's never too late to start. Being active--or getting active, if you aren't already--has definite benefits. It can:  Give you more energy,  Keep your mind sharp. Improve balance to reduce your risk of falls. Help you manage chronic illness with fewer medicines. No matter how old you are, how fit you are, or what health problems you have, there is a form of activity that will work for you. And the more physical activity you can do, the better your overall health will be. What kinds of activity can help you stay healthy? Being more active will make your daily activities easier. Physical activity includes planned exercise and things you do in daily life. There are four types of activity:  Aerobic. Doing aerobic activity makes your heart and lungs strong. Includes walking, dancing, and gardening. Aim for at least 2½ hours spread throughout the week. It improves your energy and can help you sleep better. Muscle-strengthening.   This type of activity can help maintain muscle and strengthen bones. Includes climbing stairs, using resistance bands, and lifting or carrying heavy loads. Aim for at least twice a week. It can help protect the knees and other joints. Stretching. Stretching gives you better range of motion in joints and muscles. Includes upper arm stretches, calf stretches, and gentle yoga. Aim for at least twice a week, preferably after your muscles are warmed up from other activities. It can help you function better in daily life. Balancing. This helps you stay coordinated and have good posture. Includes heel-to-toe walking, jimmy chi, and certain types of yoga. Aim for at least 3 days a week. It can reduce your risk of falling. Even if you have a hard time meeting the recommendations, it's better to be more active than less active. All activity done in each category counts toward your weekly total. You'd be surprised how daily things like carrying groceries, keeping up with grandchildren, and taking the stairs can add up. What keeps you from being active? If you've had a hard time being more active, you're not alone. Maybe you remember being able to do more. Or maybe you've never thought of yourself as being active. It's frustrating when you can't do the things you want. Being more active can help. What's holding you back? Getting started. Have a goal, but break it into easy tasks. Small steps build into big accomplishments. Staying motivated. If you feel like skipping your activity, remember your goal. Maybe you want to move better and stay independent. Every activity gets you one step closer. Not feeling your best.  Start with 5 minutes of an activity you enjoy. Prove to yourself you can do it. As you get comfortable, increase your time. You may not be where you want to be. But you're in the process of getting there. Everyone starts somewhere. How can you find safe ways to stay active?   Talk with your doctor about any physical challenges you're facing. Make a plan with your doctor if you have a health problem or aren't sure how to get started with activity. If you're already active, ask your doctor if there is anything you should change to stay safe as your body and health change. If you tend to feel dizzy after you take medicine, avoid activity at that time. Try being active before you take your medicine. This will reduce your risk of falls. If you plan to be active at home, make sure to clear your space before you get started. Remove things like TV cords, coffee tables, and throw rugs. It's safest to have plenty of space to move freely. The key to getting more active is to take it slow and steady. Try to improve only a little bit at a time. Pick just one area to improve on at first. And if an activity hurts, stop and talk to your doctor. Where can you learn more? Go to http://www.wu.com/ and enter P600 to learn more about \"Learning About Being Active as an Older Adult. \"  Current as of: October 10, 2022               Content Version: 13.5  © 1479-8723 Healthwise, [employer]. Care instructions adapted under license by [facility] ([facility]). If you have questions about a medical condition or this instruction, always ask your healthcare professional. [place] [address] any warranty or liability for your use of this information. Learning About Dental Care for Older Adults  Dental care for older adults: Overview  Dental care for older people is much the same as for younger adults. But older adults do have concerns that younger adults do not. Older adults may have problems with gum disease and decay on the roots of their teeth. They may need missing teeth replaced or broken fillings fixed. Or they may have dentures that need to be cared for. Some older adults may have trouble holding a toothbrush. You can help remind the person you are caring for to brush and floss their teeth or to clean their dentures.  In some cases, you may need to do the brushing and other dental care tasks. People who have trouble using their hands or who have dementia may need this extra help. How can you help with dental care? Normal dental care  To keep the teeth and gums healthy:  Brush the teeth with fluoride toothpaste twice a day--in the morning and at night--and floss at least once a day. Plaque can quickly build up on the teeth of older adults. Watch for the signs of gum disease. These signs include gums that bleed after brushing or after eating hard foods, such as apples. See a dentist regularly. Many experts recommend checkups every 6 months. Keep the dentist up to date on any new medications the person is taking. Encourage a balanced diet that includes whole grains, vegetables, and fruits, and that is low in saturated fat and sodium. Encourage the person you're caring for not to use tobacco products. They can affect dental and general health. Many older adults have a fixed income and feel that they can't afford dental care. But most [facility] and [facility] have programs in which dentists help older adults by lowering fees. Contact your area's public health offices or  for information about dental care in your area. Using a toothbrush  Older adults with arthritis sometimes have trouble brushing their teeth because they can't easily hold the toothbrush. Their hands and fingers may be stiff, painful, or weak. If this is the case, you can: Offer an electric toothbrush. Enlarge the handle of a non-electric toothbrush by wrapping a sponge, an elastic bandage, or adhesive tape around it. Push the toothbrush handle through a ball made of rubber or soft foam.  Make the handle longer and thicker by taping Popsicle sticks or tongue depressors to it. You may also be able to buy special toothbrushes, toothpaste dispensers, and floss holders.   Your doctor may recommend a soft-bristle toothbrush if the person you care for bleeds easily. Bleeding can happen because of a health problem or from certain medicines. A toothpaste for sensitive teeth may help if the person you care for has sensitive teeth. How do you brush and floss someone's teeth? If the person you are caring for has a hard time cleaning their teeth on their own, you may need to brush and floss their teeth for them. It may be easiest to have the person sit and face away from you, and to sit or stand behind them. That way you can steady their head against your arm as you reach around to floss and brush their teeth. Choose a place that has good lighting and is comfortable for both of you. Before you begin, gather your supplies. You will need gloves, floss, a toothbrush, and a container to hold water if you are not near a sink. Wash and dry your hands well and put on gloves. Start by flossing:  Gently work a piece of floss between each of the teeth toward the gums. A plastic flossing tool may make this easier, and they are available at most [facility]. Curve the floss around each tooth into a U-shape and gently slide it under the gum line. Move the floss firmly up and down several times to scrape off the plaque. After you've finished flossing, throw away the used floss and begin brushing:  Wet the brush and apply toothpaste. Place the brush at a 45-degree angle where the teeth meet the gums. Press firmly, and move the brush in small circles over the surface of the teeth. Be careful not to brush too hard. Vigorous brushing can make the gums pull away from the teeth and can scratch the tooth enamel. Brush all surfaces of the teeth, on the tongue side and on the cheek side. Pay special attention to the front teeth and all surfaces of the back teeth. Brush chewing surfaces with short back-and-forth strokes. After you've finished, help the person rinse the remaining toothpaste from their mouth. Where can you learn more?   Go to http://www.ClairMail.com/ and enter F944 to learn more about \"Learning About Dental Care for Older Adults. \"  Current as of: June 16, 2022               Content Version: 13.5  © 2006-2022 Healthwise, "[employer]". Care instructions adapted under license by [facility] ([facility]). If you have questions about a medical condition or this instruction, always ask your healthcare professional. [address] any warranty or liability for your use of this information. Hearing Loss: Care Instructions  Overview     Hearing loss is a sudden or slow decrease in how well you hear. It can range from mild to severe. Permanent hearing loss can occur with aging. It also can happen when you are exposed long-term to loud noise. Examples include listening to loud music, riding motorcycles, or being around other loud machines. Hearing loss can affect your work and home life. It can make you feel lonely or depressed. You may feel that you have lost your independence. But hearing aids and other devices can help you hear better and feel connected to others. Follow-up care is a key part of your treatment and safety. Be sure to make and go to all appointments, and call your doctor if you are having problems. It's also a good idea to know your test results and keep a list of the medicines you take. How can you care for yourself at home? Avoid loud noises whenever possible. This helps keep your hearing from getting worse. Always wear hearing protection around loud noises. Wear a hearing aid as directed. See a professional who can help you pick a hearing aid that fits you. Have hearing tests as your doctor suggests. They can show whether your hearing has changed. Your hearing aid may need to be adjusted. Use other devices as needed. These may include:  Telephone amplifiers and hearing aids that can connect to a television, stereo, radio, or microphone. Devices that use lights or vibrations.  These alert you to the doorbell, a ringing telephone, or a baby monitor. Television closed-captioning. This shows the words at the bottom of the screen. Most new TVs can do this. TTY (text telephone). This lets you type messages back and forth on the telephone instead of talking or listening. These devices are also called TDD. When messages are typed on the keyboard, they are sent over the phone line to a receiving TTY. The message is shown on a monitor. Use text messaging, social media, and email if it is hard for you to communicate by telephone. Try to learn a listening technique called speechreading. It is not lipreading. You pay attention to people's gestures, expressions, posture, and tone of voice. These clues can help you understand what a person is saying. Face the person you are talking to, and have them face you. Make sure the lighting is good. You need to see the other person's face clearly. Think about counseling if you need help to adjust to your hearing loss. When should you call for help? Watch closely for changes in your health, and be sure to contact your doctor if:    You think your hearing is getting worse.     You have new symptoms, such as dizziness or nausea. Where can you learn more? Go to http://www.wu.com/ and enter R798 to learn more about \"Hearing Loss: Care Instructions. \"  Current as of: May 4, 2022               Content Version: 13.5  © 1623-8302 Healthwise, Incorporated. Care instructions adapted under license by [facility] ([facility]). If you have questions about a medical condition or this instruction, always ask your healthcare professional. [place] [address] any warranty or liability for your use of this information. Learning About Activities of Daily Living  What are activities of daily living? Activities of daily living (ADLs) are the basic self-care tasks you do every day. As you age, and if you have health problems, you may find that it's harder to do these things for yourself.  That's when you may need some help. Your doctor uses ADLs to measure how much help you need. Knowing what you can and can't do for yourself is an important first step to getting help. And when you have the help you need, you can stay as independent as possible. Your doctor will want to know if you are able to do tasks such as: Take a bath or shower without help. Go to the bathroom by yourself. Dress and undress without help. Shave, comb your hair, and brush teeth on your own. Get in and out of bed or a chair without help. Feed yourself without help. If you are having trouble doing basic self-care tasks, talk with your doctor. You may want to bring a caregiver or family member who can help the doctor understand your needs and abilities. How will a doctor assess your ADLs? Asking about ADLs is part of a routine health checkup your doctor will likely do as you age. Your health check might be done in a doctor's office, in your home, or at a hospital. The goal is to find out if you are having any problems that could make your health problems worse or that make it unsafe for you to be on your own. To measure your ADLs, your doctor will ask how hard it is for you to do routine tasks. He or she may also want to know if you have changed the way you do a task because of a health problem. He or she may watch how you:  Walk back and forth. Keep your balance while you stand or walk. Move from sitting to standing or from a bed to a chair. Button or unbutton a shirt or sweater. Remove and put on your shoes. It's normal to feel a little worried or anxious if you find you can't do all the things you used to be able to do. Talking with your doctor about ADLs isn't a test that you either pass or fail. It's just a way to get more information about your health and safety. Follow-up care is a key part of your treatment and safety. Be sure to make and go to all appointments, and call your doctor if you are having problems.  It's also a good idea to know your test results and keep a list of the medicines you take. Current as of: October 6, 2021               Content Version: 13.5  © 2006-2022 Healthwise, [employer]. Care instructions adapted under license by [facility] ([facility]). If you have questions about a medical condition or this instruction, always ask your healthcare professional. [address] any warranty or liability for your use of this information. Advance Directives: Care Instructions  Overview  An advance directive is a legal way to state your wishes at the end of your life. It tells your family and your doctor what to do if you can't say what you want. There are two main types of advance directives. You can change them any time your wishes change. Living will. This form tells your family and your doctor your wishes about life support and other treatment. The form is also called a declaration. Medical power of . This form lets you name a person to make treatment decisions for you when you can't speak for yourself. This person is called a health care agent (health care proxy, health care surrogate). The form is also called a durable power of  for health care. If you do not have an advance directive, decisions about your medical care may be made by a family member, or by a doctor or a  who doesn't know you. It may help to think of an advance directive as a gift to the people who care for you. If you have one, they won't have to make tough decisions by themselves. For more information, including forms for your state, see the [address] website (www.caringinfo.org/planning/advance-directives/). Follow-up care is a key part of your treatment and safety. Be sure to make and go to all appointments, and call your doctor if you are having problems. It's also a good idea to know your test results and keep a list of the medicines you take.   What should you include in an advance directive? Many states have a unique advance directive form. (It may ask you to address specific issues.) Or you might use a universal form that's approved by many states. If your form doesn't tell you what to address, it may be hard to know what to include in your advance directive. Use the questions below to help you get started. Who do you want to make decisions about your medical care if you are not able to? What life-support measures do you want if you have a serious illness that gets worse over time or can't be cured? What are you most afraid of that might happen? (Maybe you're afraid of having pain, losing your independence, or being kept alive by machines.)  Where would you prefer to die? (Your home? A hospital? A nursing home?)  Do you want to donate your organs when you die? Do you want certain Buddhism practices performed before you die? When should you call for help? Be sure to contact your doctor if you have any questions. Where can you learn more? Go to http://www.wu.com/ and enter R264 to learn more about \"Advance Directives: Care Instructions. \"  Current as of: June 16, 2022               Content Version: 13.5  © 3392-9763 [employer]. Care instructions adapted under license by [facility] ([facility]). If you have questions about a medical condition or this instruction, always ask your healthcare professional. [place] [address] any warranty or liability for your use of this information. A Healthy Heart: Care Instructions  Your Care Instructions     Coronary artery disease, also called heart disease, occurs when a substance called plaque builds up in the vessels that supply oxygen-rich blood to your heart muscle. This can narrow the blood vessels and reduce blood flow. A heart attack happens when blood flow is completely blocked. A high-fat diet, smoking, and other factors increase the risk of heart disease.   Your doctor has found that you have a chance of having heart disease. You can do lots of things to keep your heart healthy. It may not be easy, but you can change your diet, exercise more, and quit smoking. These steps really work to lower your chance of heart disease. Follow-up care is a key part of your treatment and safety. Be sure to make and go to all appointments, and call your doctor if you are having problems. It's also a good idea to know your test results and keep a list of the medicines you take. How can you care for yourself at home? Diet    Use less salt when you cook and eat. This helps lower your blood pressure. Taste food before salting. Add only a little salt when you think you need it. With time, your taste buds will adjust to less salt.     Eat fewer snack items, fast foods, canned soups, and other high-salt, high-fat, processed foods.     Read food labels and try to avoid saturated and trans fats. They increase your risk of heart disease by raising cholesterol levels.     Limit the amount of solid fat-butter, margarine, and shortening-you eat. Use olive, peanut, or canola oil when you cook. Bake, broil, and steam foods instead of frying them.     Eat a variety of fruit and vegetables every day. Dark green, deep orange, red, or yellow fruits and vegetables are especially good for you. Examples include spinach, carrots, peaches, and berries.     Foods high in fiber can reduce your cholesterol and provide important vitamins and minerals. High-fiber foods include whole-grain cereals and breads, oatmeal, beans, brown rice, citrus fruits, and apples.     Eat lean proteins. Heart-healthy proteins include seafood, lean meats and poultry, eggs, beans, peas, nuts, seeds, and soy products.     Limit drinks and foods with added sugar. These include candy, desserts, and soda pop. Lifestyle changes    If your doctor recommends it, get more exercise. Walking is a good choice. Bit by bit, increase the amount you walk every day.  Try for at least 30 minutes on most days of the week. You also may want to swim, bike, or do other activities.     Do not smoke. If you need help quitting, talk to your doctor about stop-smoking programs and medicines. These can increase your chances of quitting for good. Quitting smoking may be the most important step you can take to protect your heart. It is never too late to quit.     Limit alcohol to 2 drinks a day for men and 1 drink a day for women. Too much alcohol can cause health problems.     Manage other health problems such as diabetes, high blood pressure, and high cholesterol. If you think you may have a problem with alcohol or drug use, talk to your doctor. Medicines    Take your medicines exactly as prescribed. Call your doctor if you think you are having a problem with your medicine.     If your doctor recommends aspirin, take the amount directed each day. Make sure you take aspirin and not another kind of pain reliever, such as acetaminophen (Tylenol). When should you call for help? Call 911 if you have symptoms of a heart attack. These may include:    Chest pain or pressure, or a strange feeling in the chest.     Sweating.     Shortness of breath.     Pain, pressure, or a strange feeling in the back, neck, jaw, or upper belly or in one or both shoulders or arms.     Lightheadedness or sudden weakness.     A fast or irregular heartbeat. After you call 911, the  may tell you to chew 1 adult-strength or 2 to 4 low-dose aspirin. Wait for an ambulance. Do not try to drive yourself. Watch closely for changes in your health, and be sure to contact your doctor if you have any problems. Where can you learn more? Go to http://www.wu.com/ and enter F075 to learn more about \"A Healthy Heart: Care Instructions. \"  Current as of: September 7, 2022               Content Version: 13.5  © 9710-6259 Healthwise, Incorporated. Care instructions adapted under license by [facility] ([facility]).  If you have questions about a medical condition or this instruction, always ask your healthcare professional. [place] [address] any warranty or liability for your use of this information. Personalized Preventive Plan for Lydia Schirmer - 2/22/2023  Medicare offers a range of preventive health benefits. Some of the tests and screenings are paid in full while other may be subject to a deductible, co-insurance, and/or copay. Some of these benefits include a comprehensive review of your medical history including lifestyle, illnesses that may run in your family, and various assessments and screenings as appropriate. After reviewing your medical record and screening and assessments performed today your provider may have ordered immunizations, labs, imaging, and/or referrals for you. A list of these orders (if applicable) as well as your Preventive Care list are included within your After Visit Summary for your review. Other Preventive Recommendations:    A preventive eye exam performed by an eye specialist is recommended every 1-2 years to screen for glaucoma; cataracts, macular degeneration, and other eye disorders. A preventive dental visit is recommended every 6 months. Try to get at least 150 minutes of exercise per week or 10,000 steps per day on a pedometer . Order or download the FREE \"Exercise & Physical Activity: Your Everyday Guide\" from [employer]. Call 6-650.301.6236 or search [employer] online. You need 8495-5880 mg of calcium and 3101-6139 IU of vitamin D per day. It is possible to meet your calcium requirement with diet alone, but a vitamin D supplement is usually necessary to meet this goal.  When exposed to the sun, use a sunscreen that protects against both UVA and UVB radiation with an SPF of 30 or greater. Reapply every 2 to 3 hours or after sweating, drying off with a towel, or swimming.   Always wear a seat belt when traveling in a car. Always wear a helmet when riding a bicycle or motorcycle.

## 2023-02-22 NOTE — PROGRESS NOTES
Medicare Annual Wellness Visit    Jamari Soni is here for Medicare AWV    Assessment & Plan   Medicare annual wellness visit, subsequent      Recommendations for Preventive Services Due: see orders and patient instructions/AVS.  Recommended screening schedule for the next 5-10 years is provided to the patient in written form: see Patient Instructions/AVS.     Return for Medicare Annual Wellness Visit in 1 year. Subjective   The following acute and/or chronic problems were also addressed today:  who has a past medical history significant for hypertension, high cholesterol, chronic systolic heart failure, ulcerative colitis, lymphoma, TIA, COPD, recurrent depression and bilateral carotid artery disease. Patient's complete Health Risk Assessment and screening values have been reviewed and are found in Flowsheets. The following problems were reviewed today and where indicated follow up appointments were made and/or referrals ordered. Positive Risk Factor Screenings with Interventions:    Fall Risk:  Do you feel unsteady or are you worried about falling? : (!) yes  2 or more falls in past year?: no  Fall with injury in past year?: no     Interventions:    Fall precautions provided    Cognitive:    Words recalled: 2 Words Recalled           Total Score Interpretation: Abnormal Mini-Cog      Interventions:  Patient declines any further evaluation or treatment           General HRA Questions:  Select all that apply: (!) New or Increased Pain (shoulder)    Pain Interventions:  Patient is being followed by orthopedics       Weight and Activity:  Physical Activity: Inactive    Days of Exercise per Week: 0 days    Minutes of Exercise per Session: 0 min     On average, how many days per week do you engage in moderate to strenuous exercise (like a brisk walk)?: 0 days  Have you lost any weight without trying in the past 3 months?: No  Body mass index: (!) 28.69      Inactivity Interventions:  Encouraged appropriate diet and exercise      Dentist Screen:  Have you seen the dentist within the past year?: (!) No    Intervention:  Advised to schedule with their dentist    Hearing Screen:  Do you or your family notice any trouble with your hearing that hasn't been managed with hearing aids?: (!) Yes    Interventions:  Patient declines any further evaluation or treatment      ADL's:   Patient reports needing help with:  Select all that apply: (!) Walking/Balance  Interventions:  Patient instructed to continue using rollator walker and fall precautions given                Objective   Vitals:    02/22/23 1123   BP: 100/70   Weight: 162 lb (73.5 kg)   Height: 5' 3\" (1.6 m)      Body mass index is 28.7 kg/m².         General Appearance: alert and oriented to person, place and time, well developed and well- nourished, in no acute distress  Skin: warm and dry, no rash or erythema  Head: normocephalic and atraumatic  Eyes: pupils equal, round, and reactive to light, extraocular eye movements intact, conjunctivae normal  ENT: tympanic membrane, external ear and ear canal normal bilaterally, nose without deformity, nasal mucosa and turbinates normal without polyps  Neck: supple and non-tender without mass, no thyromegaly or thyroid nodules, no cervical lymphadenopathy  Pulmonary/Chest: clear to auscultation bilaterally- no wheezes, rales or rhonchi, normal air movement, no respiratory distress  Cardiovascular: normal rate, regular rhythm, normal S1 and S2, no murmurs, rubs, clicks, or gallops, distal pulses intact, no carotid bruits  Abdomen: soft, non-tender, non-distended, normal bowel sounds, no masses or organomegaly  Extremities: no cyanosis, clubbing or edema  Musculoskeletal: normal range of motion, no joint swelling, deformity or tenderness  Neurologic: reflexes normal and symmetric, no cranial nerve deficit, gait, coordination and speech normal       Allergies   Allergen Reactions    Adhesive Tape Other (See Comments) Other reaction(s): Other- (not listed) - Allergy  Tears skin , skin thin   Tears skin , skin thin       Tramadol Other (See Comments)     Causes hallucinations    Hydrocodone     Hydrocodone Bit-Homatrop Mbr Hallucinations     Other reaction(s): Unknown (comments)    Hydromorphone Other (See Comments)     hallucinations    Morphine Other (See Comments)     hallucinations     Prior to Visit Medications    Medication Sig Taking? Authorizing Provider   famotidine (PEPCID) 40 MG tablet  Yes Historical Provider, MD   nitroGLYCERIN (NITROSTAT) 0.4 MG SL tablet Place 1 sl under the tongue q 5 min prn cp, max 3 sl in a 15-min time period. Call 911 if no relief after the 3rd sl.  Yes Tori Royal DO   atorvastatin (LIPITOR) 40 MG tablet TAKE 1 TABLET DAILY Yes Boy Edge MD   fluticasone-umeclidin-vilant (TRELEGY ELLIPTA) 385-05.2-65 MCG/ACT AEPB inhaler Inhale 1 puff into the lungs daily Yes LOIDA Calderon CNP   albuterol (PROVENTIL) (2.5 MG/3ML) 0.083% nebulizer solution Take 3 mLs by nebulization every 6 hours as needed for Wheezing or Shortness of Breath Yes LOIDA Willis CNP   albuterol sulfate HFA (VENTOLIN HFA) 108 (90 Base) MCG/ACT inhaler Inhale 2 puffs into the lungs 4 times daily as needed for Wheezing Yes LOIDA Willis CNP   magnesium oxide (MAG-OX) 400 (240 Mg) MG tablet Take 1 tablet by mouth 3 times daily Yes Troi Royal DO   metoprolol succinate (TOPROL XL) 50 MG extended release tablet Take 1 tablet by mouth daily Yes oTri Royal DO   sacubitril-valsartan (ENTRESTO) 49-51 MG per tablet Take 1 tablet by mouth 2 times daily Yes Tori Royal DO   citalopram (CELEXA) 20 MG tablet TAKE 1 TABLET DAILY Yes LOIDA Bruno CNP   STELARA 90 MG/ML SOSY prefilled syringe Every 8 weeks Yes Historical Provider, MD   cyanocobalamin 1000 MCG tablet Take 1,000 mcg by mouth daily Yes Historical Provider, MD   OXYGEN 2 lpm qhs Yes Ar Automatic Reconciliation acetaminophen (TYLENOL) 500 MG tablet Take by mouth every 6 hours as needed Yes Ar Automatic Reconciliation   aspirin 81 MG EC tablet Take by mouth daily Yes Ar Automatic Reconciliation   denosumab (PROLIA) 60 MG/ML SOSY SC injection Inject 60 mg into the skin Yes Ar Automatic Reconciliation   diclofenac sodium (VOLTAREN) 1 % GEL Apply 4 g topically 4 times daily Yes Ar Automatic Reconciliation   furosemide (LASIX) 40 MG tablet Take 40 mg by mouth daily Yes Ar Automatic Reconciliation   mesalamine (PENTASA) 500 MG extended release capsule The details of the medication are not available because there are pending changes by a home health clinician.  Yes Ar Automatic Reconciliation   celecoxib (CELEBREX) 200 MG capsule Take 1 capsule by mouth 2 times daily  Patient not taking: No sig reported  Olinda May MD   lansoprazole (PREVACID) 30 MG delayed release capsule Take 15 mg by mouth every morning (before breakfast)  Patient not taking: No sig reported  Ar Automatic Reconciliation       CareTeam (Including outside providers/suppliers regularly involved in providing care):   Patient Care Team:  Olinda May MD as PCP - Dee Tolbert MD as PCP - Empaneled Provider     Reviewed and updated this visit:  Tobacco  Allergies  Meds  Med Hx  Surg Hx  Soc Hx  Fam Hx             Song Sutherland MD

## 2023-02-23 ENCOUNTER — TELEPHONE (OUTPATIENT)
Dept: PULMONOLOGY | Age: 78
End: 2023-02-23

## 2023-02-23 NOTE — TELEPHONE ENCOUNTER
Patients daughter Ra Madrid says that her mother has bronchitis and she has a nebulizer but when they moved her it is missing the tubing.  So they need a new prescription for the tubing sent to DME

## 2023-03-01 ENCOUNTER — TELEPHONE (OUTPATIENT)
Dept: RHEUMATOLOGY | Age: 78
End: 2023-03-01

## 2023-03-03 DIAGNOSIS — Z79.899 ENCOUNTER FOR MONITORING DENOSUMAB THERAPY: ICD-10-CM

## 2023-03-03 DIAGNOSIS — M81.0 POSTMENOPAUSAL OSTEOPOROSIS: ICD-10-CM

## 2023-03-03 DIAGNOSIS — Z51.81 ENCOUNTER FOR MONITORING DENOSUMAB THERAPY: ICD-10-CM

## 2023-03-04 LAB
25(OH)D3 SERPL-MCNC: 35.2 NG/ML (ref 30–100)
ALBUMIN SERPL-MCNC: 3.7 G/DL (ref 3.2–4.6)
ALBUMIN/GLOB SERPL: 1 (ref 0.4–1.6)
ALP SERPL-CCNC: 58 U/L (ref 50–136)
ALT SERPL-CCNC: 21 U/L (ref 12–65)
ANION GAP SERPL CALC-SCNC: 3 MMOL/L (ref 2–11)
AST SERPL-CCNC: 19 U/L (ref 15–37)
BILIRUB SERPL-MCNC: 0.6 MG/DL (ref 0.2–1.1)
BUN SERPL-MCNC: 14 MG/DL (ref 8–23)
CALCIUM SERPL-MCNC: 10.3 MG/DL (ref 8.3–10.4)
CHLORIDE SERPL-SCNC: 103 MMOL/L (ref 101–110)
CO2 SERPL-SCNC: 30 MMOL/L (ref 21–32)
CREAT SERPL-MCNC: 1.2 MG/DL (ref 0.6–1)
GLOBULIN SER CALC-MCNC: 3.8 G/DL (ref 2.8–4.5)
GLUCOSE SERPL-MCNC: 118 MG/DL (ref 65–100)
POTASSIUM SERPL-SCNC: 4.3 MMOL/L (ref 3.5–5.1)
PROT SERPL-MCNC: 7.5 G/DL (ref 6.3–8.2)
SODIUM SERPL-SCNC: 136 MMOL/L (ref 133–143)

## 2023-03-08 ENCOUNTER — TELEMEDICINE (OUTPATIENT)
Dept: RHEUMATOLOGY | Age: 78
End: 2023-03-08
Payer: MEDICARE

## 2023-03-08 DIAGNOSIS — K21.9 GERD WITHOUT ESOPHAGITIS: ICD-10-CM

## 2023-03-08 DIAGNOSIS — Z51.81 ENCOUNTER FOR MONITORING DENOSUMAB THERAPY: ICD-10-CM

## 2023-03-08 DIAGNOSIS — G89.29 CHRONIC BACK PAIN GREATER THAN 3 MONTHS DURATION: ICD-10-CM

## 2023-03-08 DIAGNOSIS — M81.0 POSTMENOPAUSAL OSTEOPOROSIS: Primary | ICD-10-CM

## 2023-03-08 DIAGNOSIS — M54.9 CHRONIC BACK PAIN GREATER THAN 3 MONTHS DURATION: ICD-10-CM

## 2023-03-08 DIAGNOSIS — Z79.899 ENCOUNTER FOR MONITORING DENOSUMAB THERAPY: ICD-10-CM

## 2023-03-08 PROCEDURE — 1036F TOBACCO NON-USER: CPT | Performed by: INTERNAL MEDICINE

## 2023-03-08 PROCEDURE — G8484 FLU IMMUNIZE NO ADMIN: HCPCS | Performed by: INTERNAL MEDICINE

## 2023-03-08 PROCEDURE — 99214 OFFICE O/P EST MOD 30 MIN: CPT | Performed by: INTERNAL MEDICINE

## 2023-03-08 PROCEDURE — G8399 PT W/DXA RESULTS DOCUMENT: HCPCS | Performed by: INTERNAL MEDICINE

## 2023-03-08 PROCEDURE — 1123F ACP DISCUSS/DSCN MKR DOCD: CPT | Performed by: INTERNAL MEDICINE

## 2023-03-08 PROCEDURE — 1090F PRES/ABSN URINE INCON ASSESS: CPT | Performed by: INTERNAL MEDICINE

## 2023-03-08 PROCEDURE — G8417 CALC BMI ABV UP PARAM F/U: HCPCS | Performed by: INTERNAL MEDICINE

## 2023-03-08 PROCEDURE — G8427 DOCREV CUR MEDS BY ELIG CLIN: HCPCS | Performed by: INTERNAL MEDICINE

## 2023-03-08 ASSESSMENT — ROUTINE ASSESSMENT OF PATIENT INDEX DATA (RAPID3)
WHEN YOU AWAKENED IN THE MORNING OVER THE LAST WEEK, PLEASE INDICATE THE AMOUNT OF TIME IT TAKES UNTIL YOU ARE AS LIMBER AS YOU WILL BE FOR THE DAY: > 1 HOUR
ON A SCALE OF ONE TO TEN, HOW MUCH OF A PROBLEM HAS UNUSUAL FATIGUE OR TIREDNESS BEEN FOR YOU OVER THE PAST WEEK?: 8
ON A SCALE OF ONE TO TEN, HOW DIFFICULT WAS IT FOR YOU TO COMPLETE THE LISTED DAILY PHYSICAL TASKS OVER THE LAST WEEK: 0.6
ON A SCALE OF ONE TO TEN, HOW MUCH PAIN HAVE YOU HAD BECAUSE OF YOUR CONDITION OVER THE PAST WEEK?: 9
ON A SCALE OF ONE TO TEN, CONSIDERING ALL THE WAYS IN WHICH ILLNESS AND HEALTH CONDITIONS MAY AFFECT YOU AT THIS TIME, PLEASE INDICATE BELOW HOW YOU ARE DOING:: 5

## 2023-03-08 NOTE — PROGRESS NOTES
Zayda Dailey is a 76 y.o. female who was seen by synchronous (real-time) audio-video technology on 3/8/2023        I was in the office While conducting this encounter. She and/ or her healthcare decision maker is aware that this patient-initialed Telehealth encounter is a billable service with coverage as determined by her insurance carrier. She is aware that she may receive a bill and has provided verbal consent to proceed: Yes    The Virtual visit was conducted via [address] Pursuant to the emergency declaration under the [facility] waiver authority and the [employer] Act, this Virtual  Visit was conducted to reduce the patient's risk of exposure to COVID-19 and provide continuity of care for an established patient. Services were provided through a video synchronous discussion virtually to substitute for in-person clinic visit. Due to this being a TeleHealth evaluation, many elements of the physical examination are unable to be assessed. Total Time:  minutes: 21-30 minutes           Assessment & Plan:   Osteoporosis:  Give prolia now. BMP 10 days later  DEXA due May 2023-ordered. Last DEXA shows significant improvement in scores. 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 Appears there might be correlation with flare of colitis and arthralgia/arthritis. Patient will contact me should symptoms not correlate or if increasing symptoms develop.     Spinal stenosis, degenerative disc disease, lumbar: Encouraged follow-up with orthopedics    Follow-up in 6 months with CMP prior on same day Prolia injection    712  Subjective:   Zayda Dailey was seen for   Chief Complaint   Patient presents with    Follow-up    Osteoporosis   SUBJECTIVE:  Zayda Dailey is a 64 y.o. female that is here for follow-up of:     Osteoporosis              - Last DXA Jan 2019              - recurrent fractures:multiple compression fractures L spine s/p kyphoplasty in 2012, 2018,  hx fractured ribs              - Prolia: started April 2019 - last May 2020 - then Jan 2021              - Failed therapies: fosamax              - Risk factors: early menopause in 45s, multiple falls, Fam hx of OP in mother & sisters  Crohn's - following with Dr. 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 Delayed dose as pt inadvertently cancelled last appts. No recent falls or fractures. No planned dental procedures. Ongoing chronic knees hips shoulder pain that has improved. Takes stelara and mesalamine - thinks crohn's is well controlled. Saw ortho for joint pain and found to have chronic L1 compression fracture - unknown etiology. Referred to ortho for spinal stenosis - pt skipped this due to bronchitis. XR L spine 12/16/22 - SI joints with partial ankylosis    Status post [id number] kyphoplasty. Disk spaces: Maintained. Vertebral bodies: Mild compression L1. - unknown etiology      Her major concern is getting out of bed - gelling with inactivity. Labs 3/3/23 - CMP with elevated Cr, Vit D normal.     Using caltrate 1 twice daily. Using pepcid. No issues with freq infections. Considering extractions and dental implant. The most recent, and if available, past bone density study details are as follows. Date L1-4 spine? BMD L1-4 spine T-score Femoral Neck BMD Femoral Neck T-score Lowest  T-score    2019  0.988 -1.7 0.6 -3.2      2021 1.117 -0.6 0.627 -3.0                          REVIEW OF SYSTEMS:  A total of 10 systems (musculoskeletal system as stated in the HPI and the following 9 systems) were reviewed with patient today and were negative except as stated in the HPI and except for the following (depicted with an \"X\"):        \"X\" Constitutional  \"X\" HEENT /Mouth  \"X\" Cardiovascular and  Respiratory (2 systems)  \"X\" Gastrointestinal    Fever/chills   Hair loss   Shortness of breath   Upset stomach    Falls   Dry mouth  x Coughing  x Diarrhea / constipation    Wt loss   Mouth sores  x Wheezing   Heartburn    Wt gain   Ringing ears   Chest pain   Dark or bloody stools    Night sweats   Diff. swallowing   None of above   Nausea or vomiting   X None of above  X None of above      None of above                \"X\" Integumentary  \"X\" Neurological  \"X\" Genitourinary  \"X\" Psychiatric    Easy bruising   Numbness/ tingling   Female problems   Depression    Rashes   Weakness  x Problems with urination   Feeling anxious    Sun sensitivity   Headaches   None of above   Problems sleeping   X None of above  x None of above     X None of above           PHYSICAL EXAMINATION:  Constitutional: well developed, no acute distress  HEENT: head is normocephalic & atraumatic. Hearing grossly normal. conjunctiva & EOM are normal   Pulmonary: normal effort without accessory muscle use  Neurologic: alert, no cranial nerve deficit  Skin: normal in color, no facial flushing  Psychiatric: normal mood and affect    Other pertinent observable physical exam findings:-        We discussed the expected course, resolution and complications of the diagnosis(es) in detail. Medication risks, benefits, costs, interactions, and alternatives were discussed as indicated. I advised her to contact the office if her condition worsens, changes or fails to improve as anticipated. She expressed understanding with the diagnosis(es) and plan.      Pursuant to the emergency declaration under the [facility] waiver authority and the [employer] Act, this Virtual  Visit was conducted, with patient's consent, to reduce the patient's risk of exposure to COVID-19 and provide continuity of care for an established patient. Services were provided through a video synchronous discussion virtually to substitute for in-person clinic visit.     Gonzalo Casey MD

## 2023-03-08 NOTE — Clinical Note
Can you help with arranging Prolia injection now? She will need BMP 10 days later. I will see her in 6 months on the same day of next injection with CMP prior.   Thank you

## 2023-03-15 ENCOUNTER — TELEPHONE (OUTPATIENT)
Dept: RHEUMATOLOGY | Age: 78
End: 2023-03-15

## 2023-03-15 NOTE — TELEPHONE ENCOUNTER
----- Message from Toshia Loco MA sent at 3/8/2023  8:17 PM EST -----  Regarding: Frankey Mantle, MD Ortencia Converse, MA    Can you help with arranging Prolia injection now?  She will need BMP 10 days later. Shiloh Medina will see her in 6 months on the same day of next injection with CMP prior. Charu Camilo you

## 2023-03-15 NOTE — TELEPHONE ENCOUNTER
Last Prolia was 7/27/22. Was due in January. Needs bmp 10 days post Prolia. MCR and  will cover 100%. PHONE CALL to patient to schedule the Prolia. Spoke with her daughter. Scheduled for 3/20/23.  They will plan to go to [address] [place] [address] in 10 days for repeat BMP    Lab Results   Component Value Date     03/03/2023    K 4.3 03/03/2023     03/03/2023    CO2 30 03/03/2023    BUN 14 03/03/2023    CREATININE 1.20 (H) 03/03/2023    GLUCOSE 118 (H) 03/03/2023    CALCIUM 10.3 03/03/2023    PROT 7.5 03/03/2023    LABALBU 3.7 03/03/2023    BILITOT 0.6 03/03/2023    ALKPHOS 58 03/03/2023    AST 19 03/03/2023    ALT 21 03/03/2023    LABGLOM 46 (L) 03/03/2023    GFRAA >60 07/20/2022    AGRATIO 1.6 02/17/2022    GLOB 3.8 03/03/2023       Lab Results   Component Value Date/Time    VITD25 35.2 03/03/2023 11:58 AM

## 2023-03-16 DIAGNOSIS — Z95.810 PRESENCE OF AUTOMATIC (IMPLANTABLE) CARDIAC DEFIBRILLATOR: ICD-10-CM

## 2023-03-20 ENCOUNTER — NURSE ONLY (OUTPATIENT)
Dept: RHEUMATOLOGY | Age: 78
End: 2023-03-20
Payer: MEDICARE

## 2023-03-20 VITALS — DIASTOLIC BLOOD PRESSURE: 73 MMHG | TEMPERATURE: 98.1 F | HEART RATE: 81 BPM | SYSTOLIC BLOOD PRESSURE: 123 MMHG

## 2023-03-20 DIAGNOSIS — M81.0 POSTMENOPAUSAL OSTEOPOROSIS: Primary | ICD-10-CM

## 2023-03-20 PROCEDURE — 96372 THER/PROPH/DIAG INJ SC/IM: CPT | Performed by: INTERNAL MEDICINE

## 2023-03-20 NOTE — PROGRESS NOTES
[address], [address], [address]  Office : [phone], Fax: [phone]       # 8 Prolia 60mg/ml injected SC today into left  arm. Patient tolerated injection well. Advised patient to continue with calcium and vitamin D post injection. Advised patient to call with any problems post injection. Medication ordered by Dr. Addis Lynn. Pre-infusion/injection questionairre for osteoporosis    1. Have you had or are you planning any dental work?    no  2. Are you taking your calcium and vitamin D? yes    3. When was your last osteoporosis treatment? 7/27/2022    4. Have you had any recent fractures?  no

## 2023-03-28 ENCOUNTER — NURSE ONLY (OUTPATIENT)
Dept: PULMONOLOGY | Age: 78
End: 2023-03-28

## 2023-03-28 DIAGNOSIS — J44.9 STAGE 3 SEVERE COPD BY GOLD CLASSIFICATION (HCC): Primary | ICD-10-CM

## 2023-03-28 LAB
FEF25-27, POC: 0.36 L/S
FET, POC: NORMAL
FEV 1 , POC: 0.83 L
FEV1/FVC, POC: NORMAL
FVC, POC: NORMAL
LUNG AGE, POC: NORMAL
PEF, POC: 2.51 L/S

## 2023-03-28 PROCEDURE — 94010 BREATHING CAPACITY TEST: CPT | Performed by: INTERNAL MEDICINE

## 2023-03-28 PROCEDURE — 94729 DIFFUSING CAPACITY: CPT | Performed by: INTERNAL MEDICINE

## 2023-03-28 PROCEDURE — 94726 PLETHYSMOGRAPHY LUNG VOLUMES: CPT | Performed by: INTERNAL MEDICINE

## 2023-03-31 ENCOUNTER — TELEPHONE (OUTPATIENT)
Dept: PULMONOLOGY | Age: 78
End: 2023-03-31

## 2023-03-31 DIAGNOSIS — R06.02 SOB (SHORTNESS OF BREATH): ICD-10-CM

## 2023-03-31 DIAGNOSIS — Z85.72 HX OF LYMPHOMA: Primary | ICD-10-CM

## 2023-03-31 DIAGNOSIS — Z87.891 PERSONAL HISTORY OF SMOKING: ICD-10-CM

## 2023-03-31 DIAGNOSIS — J92.0 PLEURAL PLAQUE WITH PRESENCE OF ASBESTOS: ICD-10-CM

## 2023-03-31 DIAGNOSIS — J44.9 STAGE 3 SEVERE COPD BY GOLD CLASSIFICATION (HCC): ICD-10-CM

## 2023-03-31 DIAGNOSIS — Z85.038 HISTORY OF COLON CANCER: ICD-10-CM

## 2023-03-31 NOTE — TELEPHONE ENCOUNTER
Radiology needs a new CT order with a different diagnosis code . The one they have keeps throwing up ABN .

## 2023-03-31 NOTE — TELEPHONE ENCOUNTER
Additional order for CT chest placed. Spoke with Shaan Karimi in radiology scheduler, no ABN triggered on today's order. She linked appointment to order placed today.

## 2023-04-04 ENCOUNTER — TELEMEDICINE (OUTPATIENT)
Dept: FAMILY MEDICINE CLINIC | Facility: CLINIC | Age: 78
End: 2023-04-04
Payer: MEDICARE

## 2023-04-04 DIAGNOSIS — E78.00 PURE HYPERCHOLESTEROLEMIA: Primary | ICD-10-CM

## 2023-04-04 DIAGNOSIS — I50.22 SYSTOLIC CHF, CHRONIC (HCC): ICD-10-CM

## 2023-04-04 DIAGNOSIS — J44.9 CHRONIC OBSTRUCTIVE PULMONARY DISEASE, UNSPECIFIED COPD TYPE (HCC): ICD-10-CM

## 2023-04-04 DIAGNOSIS — I65.23 BILATERAL CAROTID ARTERY STENOSIS: ICD-10-CM

## 2023-04-04 DIAGNOSIS — F33.9 RECURRENT DEPRESSION (HCC): ICD-10-CM

## 2023-04-04 DIAGNOSIS — I10 PRIMARY HYPERTENSION: ICD-10-CM

## 2023-04-04 DIAGNOSIS — E78.1 PURE HYPERGLYCERIDEMIA: ICD-10-CM

## 2023-04-04 PROCEDURE — 99442 PR PHYS/QHP TELEPHONE EVALUATION 11-20 MIN: CPT | Performed by: FAMILY MEDICINE

## 2023-04-04 ASSESSMENT — PATIENT HEALTH QUESTIONNAIRE - PHQ9
SUM OF ALL RESPONSES TO PHQ QUESTIONS 1-9: 0
2. FEELING DOWN, DEPRESSED OR HOPELESS: 0
1. LITTLE INTEREST OR PLEASURE IN DOING THINGS: 0
SUM OF ALL RESPONSES TO PHQ QUESTIONS 1-9: 0
SUM OF ALL RESPONSES TO PHQ9 QUESTIONS 1 & 2: 0
SUM OF ALL RESPONSES TO PHQ QUESTIONS 1-9: 0
SUM OF ALL RESPONSES TO PHQ QUESTIONS 1-9: 0

## 2023-05-15 DIAGNOSIS — I47.29 NSVT (NONSUSTAINED VENTRICULAR TACHYCARDIA) (HCC): ICD-10-CM

## 2023-05-15 DIAGNOSIS — I42.8 NICM (NONISCHEMIC CARDIOMYOPATHY) (HCC): ICD-10-CM

## 2023-05-15 DIAGNOSIS — I50.22 SYSTOLIC CHF, CHRONIC (HCC): ICD-10-CM

## 2023-05-15 DIAGNOSIS — Z95.810 PRESENCE OF AUTOMATIC (IMPLANTABLE) CARDIAC DEFIBRILLATOR: Primary | ICD-10-CM

## 2023-05-18 ENCOUNTER — TELEPHONE (OUTPATIENT)
Dept: PULMONOLOGY | Age: 78
End: 2023-05-18

## 2023-05-18 RX ORDER — PREDNISONE 20 MG/1
20 TABLET ORAL DAILY
Qty: 15 TABLET | Refills: 0 | Status: SHIPPED | OUTPATIENT
Start: 2023-05-18

## 2023-05-18 RX ORDER — PREDNISONE 20 MG/1
20 TABLET ORAL DAILY
Qty: 15 TABLET | Refills: 0 | Status: SHIPPED | OUTPATIENT
Start: 2023-05-18 | End: 2023-05-18 | Stop reason: SDUPTHER

## 2023-05-18 NOTE — TELEPHONE ENCOUNTER
TRIAGE CALL      Complaint: congestion upper resiratory  Cough: yes  Productive:  yes yellow  Bloody Sputum:  no  Increased SOB/Wheezing:  wheezing  Duration: yesterday   Fever/Chills: no  OTC Meds tried: no    Patient started back on Trelegy like Dr. Deedee Calhoun had ask her to do. And she began having upper respiratory problems. She had tried the Trelegy before and got sick . She is suppose have a colonoscopy tomorrow morning .  She wants to know if they should cancel that

## 2023-05-18 NOTE — TELEPHONE ENCOUNTER
Last seen: 4/14/23  Hx: COPD, nocturnal hypoxia, lymphoma, colon Ca    Patient call reporting increased congestion w/ productive cough, some wheezing; has tried Trelegy again & started having upper respiratory issues again. Also notes plan to have colonoscopy tomorrow & asking if that should be canceled w/ current symptoms. Called patient number and spoke with daughter, cough is productive of yellow & white sputum, much like the last time symptoms seem to start fast, the cough causes some pain & aching in the airway; last use of Trelegy was yesterday morning; used albuterol inhaler this morning; advised to try nebulizer & use some form of albuterol every 4 hours; unsure if retaining any fluid takes lasix PRN; advised to check O2 & if below 90% use O2 during day as well. Daughter is not at home, but will implement these suggestions when she gets back to patient. Has been taking Trelegy daily since office appt 4/14, yesterday was 1st day for recurrent symptoms associating with Trelegy, has not been using albuterol. Please advise if needs to change routine inhaler? Would steroid course be recommended for COPD exacerbation?

## 2023-07-17 ENCOUNTER — OFFICE VISIT (OUTPATIENT)
Dept: ORTHOPEDIC SURGERY | Age: 78
End: 2023-07-17
Payer: MEDICARE

## 2023-07-17 DIAGNOSIS — M47.816 LUMBAR SPONDYLOSIS: Primary | ICD-10-CM

## 2023-07-17 DIAGNOSIS — M54.50 LUMBAR BACK PAIN: ICD-10-CM

## 2023-07-17 PROCEDURE — 1036F TOBACCO NON-USER: CPT | Performed by: PHYSICAL MEDICINE & REHABILITATION

## 2023-07-17 PROCEDURE — G8399 PT W/DXA RESULTS DOCUMENT: HCPCS | Performed by: PHYSICAL MEDICINE & REHABILITATION

## 2023-07-17 PROCEDURE — 1090F PRES/ABSN URINE INCON ASSESS: CPT | Performed by: PHYSICAL MEDICINE & REHABILITATION

## 2023-07-17 PROCEDURE — 99215 OFFICE O/P EST HI 40 MIN: CPT | Performed by: PHYSICAL MEDICINE & REHABILITATION

## 2023-07-17 PROCEDURE — G8428 CUR MEDS NOT DOCUMENT: HCPCS | Performed by: PHYSICAL MEDICINE & REHABILITATION

## 2023-07-17 PROCEDURE — G8417 CALC BMI ABV UP PARAM F/U: HCPCS | Performed by: PHYSICAL MEDICINE & REHABILITATION

## 2023-07-17 PROCEDURE — 1123F ACP DISCUSS/DSCN MKR DOCD: CPT | Performed by: PHYSICAL MEDICINE & REHABILITATION

## 2023-07-17 NOTE — PROGRESS NOTES
Date:  07/17/23     HPI:  I am seeing Lawyer Beard in evalution/folowup. Extended office visit of 45 minutes. She has had lumbar spine pain for several years, has had 3 kyphoplasty's by Dr Demetra Saeed. The family feels the last 1 was around 3-5 years ago. Seemingly she did pretty well with those and was doing well until March. That time she had a fall. Afterwards she has had pain in the right lower lumbar paraspinal area. It is 7/10 in severity and manageable if she has to live with it. Unfortunately has not gotten a whole lot better. It is worse with sitting, standing, walking, bending, twisting, extending her spine. Lying down or resting tends to help. The pain is typically nonradiating and she offers no neurologic issues in lower extremities. She has had no recent spine imaging. She has had no recent physical therapy or spine exercises. She takes Tylenol as well as Voltaren gel. She has a pacemaker/defibrillator for CHF and cardiac related issues. It sounds like it might be him are compatible. It has not been felt that she is a good surgical candidate and there be concerns over preprocedural anesthesia, even to have concern over colonoscopies. Was evaluated by Iris Muñoz PA-C in January of this year and at that time was having buttock pain gravitating 1 leg. Lumbar spine films revealed mild compression of L1.  CT imaging of lumbar spine revealed age-indeterminant compression deformity at L1, possibly 15%. Chronic T12 and significant neuroforaminal narrowing at the L5-S1 level with moderate central stenosis at L4-L5 level. The particular symptoms that time were felt to be more related to neurogenic claudication from her spinal stenosis and not necessarily that she developed a new fracture.     Past Medical History:   Diagnosis Date    Abnormal mammogram     Acute chest syndrome ([address]) 7/20/2016    Asbestosis ([address]) 7/20/2016    Asthma     Carotid artery disease ([address])     Carotid artery

## 2023-07-19 ENCOUNTER — OFFICE VISIT (OUTPATIENT)
Dept: ORTHOPEDIC SURGERY | Age: 78
End: 2023-07-19

## 2023-07-19 DIAGNOSIS — M47.816 LUMBAR SPONDYLOSIS: Primary | ICD-10-CM

## 2023-07-19 RX ORDER — TRIAMCINOLONE ACETONIDE 40 MG/ML
160 INJECTION, SUSPENSION INTRA-ARTICULAR; INTRAMUSCULAR ONCE
Status: COMPLETED | OUTPATIENT
Start: 2023-07-19 | End: 2023-07-19

## 2023-07-19 RX ADMIN — TRIAMCINOLONE ACETONIDE 160 MG: 40 INJECTION, SUSPENSION INTRA-ARTICULAR; INTRAMUSCULAR at 11:23

## 2023-07-19 NOTE — PROGRESS NOTES
Date: 07/19/23   Name: Terri Msos    Pre-Procedural Diagnosis:    Diagnosis Orders   1. Lumbar spondylosis  FL INJ LUMB/SAC FACET SINGLE LEVEL    XR INJ FACET LUMB SACRAL 2ND LVL    triamcinolone acetonide (KENALOG-40) injection 160 mg          Procedure: Facet Joint Injections (2 levels)    Precautions: [facility] Precautions spine injections: None. Patient denies any prior sensitivity to steroid, local anesthetic, contrast dye, iodine or shellfish. The procedure was discussed at length with the patient and informed consent was signed. The patient was placed in a prone position on the fluoroscopy table and the skin was prepped and draped in a routine sterile fashion. The areas to be injected were each anesthetized with approximately 2-3 cc of 1% Lidocaine. Initially a 22-gauge 3.5 inch spinal needle was carefully advanced under fluoroscopic guidance to the right L4-L5 and L5-S1 facet joint spaces. 1 cc of 0.25% Marcaine and 80 mg of Kenalog were injected through the spinal needle at each site. Fluoroscopic guidance was used intermittently over a 10-minute period to insure proper needle placement and patient safety. A hard copy of the fluoroscopic  images has been placed in the patient's chart. The patient was monitored after the procedure and discharged home without complication. A total of 4 cc of Kenalog were administered during this procedure.     Resume Meds:  Pt remains on asa 81 mg.    Javier Ryan MD  07/19/23

## 2023-08-02 DIAGNOSIS — Z95.810 PRESENCE OF AUTOMATIC (IMPLANTABLE) CARDIAC DEFIBRILLATOR: ICD-10-CM

## 2023-08-09 PROCEDURE — 93296 REM INTERROG EVL PM/IDS: CPT | Performed by: INTERNAL MEDICINE

## 2023-08-09 PROCEDURE — 93295 DEV INTERROG REMOTE 1/2/MLT: CPT | Performed by: INTERNAL MEDICINE

## 2023-08-15 ENCOUNTER — OFFICE VISIT (OUTPATIENT)
Age: 78
End: 2023-08-15
Payer: MEDICARE

## 2023-08-15 VITALS
DIASTOLIC BLOOD PRESSURE: 78 MMHG | WEIGHT: 157.2 LBS | BODY MASS INDEX: 27.85 KG/M2 | SYSTOLIC BLOOD PRESSURE: 110 MMHG | OXYGEN SATURATION: 99 % | HEIGHT: 63 IN | HEART RATE: 96 BPM

## 2023-08-15 DIAGNOSIS — I25.119 CORONARY ARTERY DISEASE INVOLVING NATIVE CORONARY ARTERY OF NATIVE HEART WITH ANGINA PECTORIS (HCC): ICD-10-CM

## 2023-08-15 DIAGNOSIS — I42.8 NICM (NONISCHEMIC CARDIOMYOPATHY) (HCC): Primary | ICD-10-CM

## 2023-08-15 DIAGNOSIS — I50.22 SYSTOLIC CHF, CHRONIC (HCC): ICD-10-CM

## 2023-08-15 DIAGNOSIS — Z95.810 PRESENCE OF AUTOMATIC (IMPLANTABLE) CARDIAC DEFIBRILLATOR: ICD-10-CM

## 2023-08-15 DIAGNOSIS — I47.29 NSVT (NONSUSTAINED VENTRICULAR TACHYCARDIA) (HCC): ICD-10-CM

## 2023-08-15 DIAGNOSIS — N18.31 STAGE 3A CHRONIC KIDNEY DISEASE (HCC): ICD-10-CM

## 2023-08-15 PROBLEM — N18.30 CHRONIC RENAL DISEASE, STAGE III (HCC): Status: ACTIVE | Noted: 2023-08-15

## 2023-08-15 PROCEDURE — G8428 CUR MEDS NOT DOCUMENT: HCPCS | Performed by: INTERNAL MEDICINE

## 2023-08-15 PROCEDURE — G8399 PT W/DXA RESULTS DOCUMENT: HCPCS | Performed by: INTERNAL MEDICINE

## 2023-08-15 PROCEDURE — 99214 OFFICE O/P EST MOD 30 MIN: CPT | Performed by: INTERNAL MEDICINE

## 2023-08-15 PROCEDURE — 1090F PRES/ABSN URINE INCON ASSESS: CPT | Performed by: INTERNAL MEDICINE

## 2023-08-15 PROCEDURE — G8417 CALC BMI ABV UP PARAM F/U: HCPCS | Performed by: INTERNAL MEDICINE

## 2023-08-15 PROCEDURE — 1036F TOBACCO NON-USER: CPT | Performed by: INTERNAL MEDICINE

## 2023-08-15 PROCEDURE — 1123F ACP DISCUSS/DSCN MKR DOCD: CPT | Performed by: INTERNAL MEDICINE

## 2023-08-15 NOTE — PROGRESS NOTES
[address] [facility], [facility], [address]  PHONE: [phone]     08/15/23    NAME:  Lia Arellano  : 1945  MRN: [id number]       SUBJECTIVE:   Lia Arellano is a 78 y.o. female seen for a follow up visit regarding the following:     Chief Complaint   Patient presents with    Congestive Heart Failure       HPI: Here for vascular dz follow up.   2017 TIA sx  She has crohn's dz. Echo  showing EF 30-35%, mod MR   2017 and 2018 and 10/2022 Carotid with mod DEIDRE and LICA stenosis   9104: ICD implant   Echo 3/2022: EF 40-45%     LHC/RHC  2022:     Mild nonobstructive coronary artery disease    Low normal LV systolic function    Mild pulmonary hypertension      Some CHUA, not as active. On trilogy. On couch most days. No CP, pressure. Using walker/cane now, seeing ortho for knee injections. No edema, no lasix needed. She has NYHA Class II sx now. Energy poor now. Stamina poor. Patient denies recent history of orthopnea, PND, excessive dizziness and/or syncope. Past Medical History, Past Surgical History, Family history, Social History, and Medications were all reviewed with the patient today and updated as necessary.      Current Outpatient Medications   Medication Sig Dispense Refill    famotidine (PEPCID) 40 MG tablet       atorvastatin (LIPITOR) 40 MG tablet TAKE 1 TABLET DAILY 90 tablet 3    albuterol (PROVENTIL) (2.5 MG/3ML) 0.083% nebulizer solution Take 3 mLs by nebulization every 6 hours as needed for Wheezing or Shortness of Breath 120 each 5    albuterol sulfate HFA (VENTOLIN HFA) 108 (90 Base) MCG/ACT inhaler Inhale 2 puffs into the lungs 4 times daily as needed for Wheezing 18 g 5    magnesium oxide (MAG-OX) 400 (240 Mg) MG tablet Take 1 tablet by mouth 3 times daily 90 tablet 3    metoprolol succinate (TOPROL XL) 50 MG extended release tablet Take 1 tablet by mouth daily 90 tablet 3    sacubitril-valsartan (ENTRESTO) 49-51 MG

## 2023-08-21 ENCOUNTER — HOSPITAL ENCOUNTER (EMERGENCY)
Age: 78
Discharge: HOME | End: 2023-08-21
Attending: EMERGENCY MEDICINE
Payer: MEDICARE

## 2023-08-21 ENCOUNTER — APPOINTMENT (OUTPATIENT)
Dept: GENERAL RADIOLOGY | Age: 78
End: 2023-08-21
Payer: MEDICARE

## 2023-08-21 ENCOUNTER — TELEPHONE (OUTPATIENT)
Dept: PULMONOLOGY | Age: 78
End: 2023-08-21

## 2023-08-21 VITALS
BODY MASS INDEX: 27.82 KG/M2 | HEIGHT: 63 IN | WEIGHT: 157 LBS | SYSTOLIC BLOOD PRESSURE: 92 MMHG | DIASTOLIC BLOOD PRESSURE: 50 MMHG | OXYGEN SATURATION: 94 % | HEART RATE: 99 BPM | TEMPERATURE: 98.6 F | RESPIRATION RATE: 22 BRPM

## 2023-08-21 DIAGNOSIS — J44.1 COPD EXACERBATION (HCC): Primary | ICD-10-CM

## 2023-08-21 DIAGNOSIS — R06.02 SHORTNESS OF BREATH: ICD-10-CM

## 2023-08-21 LAB
ALBUMIN SERPL-MCNC: 2.9 G/DL (ref 3.2–4.6)
ALBUMIN/GLOB SERPL: 0.7 (ref 0.4–1.6)
ALP SERPL-CCNC: 49 U/L (ref 50–136)
ALT SERPL-CCNC: 43 U/L (ref 12–65)
ANION GAP SERPL CALC-SCNC: 5 MMOL/L (ref 2–11)
APPEARANCE UR: ABNORMAL
AST SERPL-CCNC: 37 U/L (ref 15–37)
BACTERIA URNS QL MICRO: ABNORMAL /HPF
BASOPHILS # BLD: 0 K/UL (ref 0–0.2)
BASOPHILS NFR BLD: 1 % (ref 0–2)
BILIRUB SERPL-MCNC: 0.5 MG/DL (ref 0.2–1.1)
BILIRUB UR QL: ABNORMAL
BUN SERPL-MCNC: 16 MG/DL (ref 8–23)
CALCIUM SERPL-MCNC: 9.7 MG/DL (ref 8.3–10.4)
CASTS URNS QL MICRO: ABNORMAL /LPF
CHLORIDE SERPL-SCNC: 103 MMOL/L (ref 101–110)
CO2 SERPL-SCNC: 28 MMOL/L (ref 21–32)
COLOR UR: ABNORMAL
CREAT SERPL-MCNC: 1.4 MG/DL (ref 0.6–1)
DIFFERENTIAL METHOD BLD: ABNORMAL
EOSINOPHIL # BLD: 0.2 K/UL (ref 0–0.8)
EOSINOPHIL NFR BLD: 3 % (ref 0.5–7.8)
EPI CELLS #/AREA URNS HPF: ABNORMAL /HPF
ERYTHROCYTE [DISTWIDTH] IN BLOOD BY AUTOMATED COUNT: 14.6 % (ref 11.9–14.6)
FLUAV RNA SPEC QL NAA+PROBE: NOT DETECTED
FLUBV RNA SPEC QL NAA+PROBE: NOT DETECTED
GLOBULIN SER CALC-MCNC: 4.4 G/DL (ref 2.8–4.5)
GLUCOSE SERPL-MCNC: 95 MG/DL (ref 65–100)
GLUCOSE UR STRIP.AUTO-MCNC: NEGATIVE MG/DL
HCT VFR BLD AUTO: 35.6 % (ref 35.8–46.3)
HGB BLD-MCNC: 10.9 G/DL (ref 11.7–15.4)
HGB UR QL STRIP: NEGATIVE
IMM GRANULOCYTES # BLD AUTO: 0 K/UL (ref 0–0.5)
IMM GRANULOCYTES NFR BLD AUTO: 0 % (ref 0–5)
KETONES UR QL STRIP.AUTO: ABNORMAL MG/DL
LACTATE SERPL-SCNC: 1.3 MMOL/L (ref 0.4–2)
LEUKOCYTE ESTERASE UR QL STRIP.AUTO: ABNORMAL
LYMPHOCYTES # BLD: 0.9 K/UL (ref 0.5–4.6)
LYMPHOCYTES NFR BLD: 13 % (ref 13–44)
MAGNESIUM SERPL-MCNC: 1.9 MG/DL (ref 1.8–2.4)
MCH RBC QN AUTO: 28.2 PG (ref 26.1–32.9)
MCHC RBC AUTO-ENTMCNC: 30.6 G/DL (ref 31.4–35)
MCV RBC AUTO: 92.2 FL (ref 82–102)
MONOCYTES # BLD: 1.3 K/UL (ref 0.1–1.3)
MONOCYTES NFR BLD: 20 % (ref 4–12)
MUCOUS THREADS URNS QL MICRO: ABNORMAL /LPF
NEUTS SEG # BLD: 4.3 K/UL (ref 1.7–8.2)
NEUTS SEG NFR BLD: 63 % (ref 43–78)
NITRITE UR QL STRIP.AUTO: NEGATIVE
NRBC # BLD: 0 K/UL (ref 0–0.2)
NT PRO BNP: 1119 PG/ML
OTHER OBSERVATIONS: ABNORMAL
PH UR STRIP: 5 (ref 5–9)
PLATELET # BLD AUTO: 233 K/UL (ref 150–450)
PMV BLD AUTO: 10.2 FL (ref 9.4–12.3)
POTASSIUM SERPL-SCNC: 4.1 MMOL/L (ref 3.5–5.1)
PROCALCITONIN SERPL-MCNC: 0.05 NG/ML (ref 0–0.49)
PROT SERPL-MCNC: 7.3 G/DL (ref 6.3–8.2)
PROT UR STRIP-MCNC: 30 MG/DL
RBC # BLD AUTO: 3.86 M/UL (ref 4.05–5.2)
RBC #/AREA URNS HPF: ABNORMAL /HPF
SARS-COV-2 RDRP RESP QL NAA+PROBE: NOT DETECTED
SODIUM SERPL-SCNC: 136 MMOL/L (ref 133–143)
SOURCE: NORMAL
SP GR UR REFRACTOMETRY: 1.02 (ref 1–1.02)
TROPONIN I SERPL HS-MCNC: 10.4 PG/ML (ref 0–14)
UROBILINOGEN UR QL STRIP.AUTO: 1 EU/DL (ref 0.2–1)
WBC # BLD AUTO: 6.8 K/UL (ref 4.3–11.1)
WBC URNS QL MICRO: ABNORMAL /HPF

## 2023-08-21 PROCEDURE — 6360000002 HC RX W HCPCS: Performed by: EMERGENCY MEDICINE

## 2023-08-21 PROCEDURE — 83735 ASSAY OF MAGNESIUM: CPT

## 2023-08-21 PROCEDURE — 84145 PROCALCITONIN (PCT): CPT

## 2023-08-21 PROCEDURE — 2500000003 HC RX 250 WO HCPCS: Performed by: EMERGENCY MEDICINE

## 2023-08-21 PROCEDURE — 96375 TX/PRO/DX INJ NEW DRUG ADDON: CPT

## 2023-08-21 PROCEDURE — 83605 ASSAY OF LACTIC ACID: CPT

## 2023-08-21 PROCEDURE — 84484 ASSAY OF TROPONIN QUANT: CPT

## 2023-08-21 PROCEDURE — 80053 COMPREHEN METABOLIC PANEL: CPT

## 2023-08-21 PROCEDURE — 87635 SARS-COV-2 COVID-19 AMP PRB: CPT

## 2023-08-21 PROCEDURE — 96365 THER/PROPH/DIAG IV INF INIT: CPT

## 2023-08-21 PROCEDURE — 96361 HYDRATE IV INFUSION ADD-ON: CPT

## 2023-08-21 PROCEDURE — 6370000000 HC RX 637 (ALT 250 FOR IP): Performed by: EMERGENCY MEDICINE

## 2023-08-21 PROCEDURE — 71045 X-RAY EXAM CHEST 1 VIEW: CPT

## 2023-08-21 PROCEDURE — 81001 URINALYSIS AUTO W/SCOPE: CPT

## 2023-08-21 PROCEDURE — 93005 ELECTROCARDIOGRAM TRACING: CPT | Performed by: EMERGENCY MEDICINE

## 2023-08-21 PROCEDURE — 2580000003 HC RX 258: Performed by: EMERGENCY MEDICINE

## 2023-08-21 PROCEDURE — 99285 EMERGENCY DEPT VISIT HI MDM: CPT

## 2023-08-21 PROCEDURE — 87502 INFLUENZA DNA AMP PROBE: CPT

## 2023-08-21 PROCEDURE — 87040 BLOOD CULTURE FOR BACTERIA: CPT

## 2023-08-21 PROCEDURE — 85025 COMPLETE CBC W/AUTO DIFF WBC: CPT

## 2023-08-21 PROCEDURE — 94644 CONT INHLJ TX 1ST HOUR: CPT

## 2023-08-21 PROCEDURE — 83880 ASSAY OF NATRIURETIC PEPTIDE: CPT

## 2023-08-21 RX ORDER — DOXYCYCLINE HYCLATE 100 MG
100 TABLET ORAL 2 TIMES DAILY
Qty: 14 TABLET | Refills: 0 | Status: SHIPPED | OUTPATIENT
Start: 2023-08-21 | End: 2023-08-28

## 2023-08-21 RX ORDER — CEFPODOXIME PROXETIL 200 MG/1
200 TABLET, FILM COATED ORAL 2 TIMES DAILY
Qty: 14 TABLET | Refills: 0 | Status: SHIPPED | OUTPATIENT
Start: 2023-08-21 | End: 2023-08-28

## 2023-08-21 RX ORDER — ACETAMINOPHEN 325 MG/1
650 TABLET ORAL ONCE
Status: COMPLETED | OUTPATIENT
Start: 2023-08-21 | End: 2023-08-21

## 2023-08-21 RX ORDER — ALBUTEROL SULFATE 2.5 MG/3ML
7.5 SOLUTION RESPIRATORY (INHALATION)
Status: COMPLETED | OUTPATIENT
Start: 2023-08-21 | End: 2023-08-21

## 2023-08-21 RX ORDER — 0.9 % SODIUM CHLORIDE 0.9 %
500 INTRAVENOUS SOLUTION INTRAVENOUS
Status: COMPLETED | OUTPATIENT
Start: 2023-08-21 | End: 2023-08-21

## 2023-08-21 RX ORDER — PREDNISONE 20 MG/1
40 TABLET ORAL DAILY
Qty: 8 TABLET | Refills: 0 | Status: SHIPPED | OUTPATIENT
Start: 2023-08-22 | End: 2023-08-26

## 2023-08-21 RX ADMIN — ALBUTEROL SULFATE 7.5 MG: 2.5 SOLUTION RESPIRATORY (INHALATION) at 19:08

## 2023-08-21 RX ADMIN — DOXYCYCLINE 100 MG: 100 INJECTION, POWDER, LYOPHILIZED, FOR SOLUTION INTRAVENOUS at 19:19

## 2023-08-21 RX ADMIN — ACETAMINOPHEN 650 MG: 325 TABLET ORAL at 19:31

## 2023-08-21 RX ADMIN — METHYLPREDNISOLONE SODIUM SUCCINATE 62.5 MG: 125 INJECTION, POWDER, FOR SOLUTION INTRAMUSCULAR; INTRAVENOUS at 19:20

## 2023-08-21 RX ADMIN — SODIUM CHLORIDE 500 ML: 9 INJECTION, SOLUTION INTRAVENOUS at 19:23

## 2023-08-21 RX ADMIN — IPRATROPIUM BROMIDE 1.5 MG: 0.5 SOLUTION RESPIRATORY (INHALATION) at 19:09

## 2023-08-21 ASSESSMENT — PAIN SCALES - GENERAL
PAINLEVEL_OUTOF10: 0
PAINLEVEL_OUTOF10: 0

## 2023-08-21 ASSESSMENT — LIFESTYLE VARIABLES
HOW MANY STANDARD DRINKS CONTAINING ALCOHOL DO YOU HAVE ON A TYPICAL DAY: PATIENT DOES NOT DRINK
HOW OFTEN DO YOU HAVE A DRINK CONTAINING ALCOHOL: NEVER

## 2023-08-21 NOTE — ED TRIAGE NOTES
Pt reports dyspnea progressive but worse over last few days (-)chest pain, swelling, fever (+)cough today and with breathing treatments   Wears oxygen at night 2L/min NC   Hx COPD and CHF  A&Ox4

## 2023-08-21 NOTE — TELEPHONE ENCOUNTER
Patients daughter Vish Beltre says that her mother's breathing has gotten worse in the past couple wks. She says today has been the worse . She normally only uses oxygen at night . She has been using it continuous .

## 2023-08-21 NOTE — ED PROVIDER NOTES
Emergency Department Provider Note                   PCP:                Nancy Miguel MD               Age: 78 y.o. Sex: female   Final diagnosis/impression:  1. COPD exacerbation ([address])    2. Shortness of breath       Disposition: Discharged    MDM/Clinical Course:  Patient seen by myself at the [facility] emergency department. Patient had signs symptoms and clinical history most consistent with increased dyspnea symptoms, vital signs noted to show tachycardia, borderline hypotension on initial evaluation. Overall suspect COPD exacerbation +/- sepsis, potential UTI related versus viral syndrome. My independent analysis/interpretation of laboratory work-up here shows CBC is unremarkable for acute/emergent abnormality, CMP shows mildly elevated creatinine at 1.4 which is near patient baseline, BUN is normal, electrolytes normal, magnesium unremarkable, procalcitonin negative, NT proBNP only minimally elevated at 1119, troponin testing negative, initial lactic acid 1.3/nonelevated, blood cultures were drawn and are pending. COVID and flu testing are negative/unremarkable urinalysis potentially consistent with UTI but patient sample contaminated. Radiology shows chest x-ray is unremarkable for acute/emergent. While under my care, patient received albuterol and Atrovent hour-long treatments, IV Solu-Medrol, IV doxycycline, 500 cc bolus IV fluid. On reassessment, patient states he is feels much better, ambulatory O2 testing showed 96% while walking. Discussed various management options for disposition but intermittent decision making we will trial outpatient management for now. Patient prescriptions for Vantin, doxycycline, prednisone written. Recommended scheduled albuterol inhaler use, close monitoring of symptoms, primary care provider, low threshold to return as needed.   Patient/family given instructions to return as needed for any questions, concerns or worsening symptoms, particularly

## 2023-08-22 ENCOUNTER — CARE COORDINATION (OUTPATIENT)
Dept: CARE COORDINATION | Facility: CLINIC | Age: 78
End: 2023-08-22

## 2023-08-22 LAB
EKG ATRIAL RATE: 104 BPM
EKG DIAGNOSIS: NORMAL
EKG P AXIS: 25 DEGREES
EKG P-R INTERVAL: 136 MS
EKG Q-T INTERVAL: 350 MS
EKG QRS DURATION: 104 MS
EKG QTC CALCULATION (BAZETT): 460 MS
EKG R AXIS: -59 DEGREES
EKG T AXIS: 57 DEGREES
EKG VENTRICULAR RATE: 104 BPM

## 2023-08-22 PROCEDURE — 93010 ELECTROCARDIOGRAM REPORT: CPT | Performed by: INTERNAL MEDICINE

## 2023-08-22 NOTE — DISCHARGE INSTRUCTIONS
Please return for any questions, concerns or worsening symptoms, particularly increasing shortness of breath, persistent wheezing, increasing chest pain, passing out episodes,, recurrent fever, lethargy, ill appearance, recurrent vomiting/diarrhea, inability keep fluids down by mouth.

## 2023-08-22 NOTE — ED NOTES
Ambulated to bathroom using walker, steady gait. Dyspnea on exertion, O2 96% ambulating.      Dav Myers RN  08/21/23 [id number]

## 2023-08-22 NOTE — TELEPHONE ENCOUNTER
Last seen: 4/14/23  Hx: COPD, nocturnal hypoxia, lymphoma, colon cancer, CHF,     Family calling to report worsening problem w/ breathing, having to wear O2 in day. Seen in ED after and d/c w/ 2 Rx for COPD exacerbation and possible UTI; AVS indicates f/u w/ PCP. Contacted daughter who patient lives with definitely seeing some changes that could be dementia, will f/u w/ PCP, noting UTI as well as COPD exacerbation. Has f/u appt 9/6. No further needs at this time.

## 2023-08-26 LAB
BACTERIA SPEC CULT: NORMAL
SERVICE CMNT-IMP: NORMAL

## 2023-08-29 ENCOUNTER — OFFICE VISIT (OUTPATIENT)
Dept: FAMILY MEDICINE CLINIC | Facility: CLINIC | Age: 78
End: 2023-08-29
Payer: MEDICARE

## 2023-08-29 ENCOUNTER — TELEPHONE (OUTPATIENT)
Dept: PULMONOLOGY | Age: 78
End: 2023-08-29

## 2023-08-29 ENCOUNTER — CARE COORDINATION (OUTPATIENT)
Dept: CARE COORDINATION | Facility: CLINIC | Age: 78
End: 2023-08-29

## 2023-08-29 VITALS
SYSTOLIC BLOOD PRESSURE: 106 MMHG | HEART RATE: 108 BPM | BODY MASS INDEX: 27.71 KG/M2 | DIASTOLIC BLOOD PRESSURE: 58 MMHG | OXYGEN SATURATION: 99 % | WEIGHT: 156.4 LBS

## 2023-08-29 DIAGNOSIS — R06.02 SOB (SHORTNESS OF BREATH): Primary | ICD-10-CM

## 2023-08-29 DIAGNOSIS — J44.1 COPD EXACERBATION (HCC): ICD-10-CM

## 2023-08-29 DIAGNOSIS — Z09 HOSPITAL DISCHARGE FOLLOW-UP: Primary | ICD-10-CM

## 2023-08-29 DIAGNOSIS — R09.89 CHEST CONGESTION: ICD-10-CM

## 2023-08-29 DIAGNOSIS — N39.0 URINARY TRACT INFECTION WITHOUT HEMATURIA, SITE UNSPECIFIED: ICD-10-CM

## 2023-08-29 DIAGNOSIS — I10 PRIMARY HYPERTENSION: ICD-10-CM

## 2023-08-29 PROCEDURE — G8399 PT W/DXA RESULTS DOCUMENT: HCPCS | Performed by: FAMILY MEDICINE

## 2023-08-29 PROCEDURE — 3023F SPIROM DOC REV: CPT | Performed by: FAMILY MEDICINE

## 2023-08-29 PROCEDURE — G8427 DOCREV CUR MEDS BY ELIG CLIN: HCPCS | Performed by: FAMILY MEDICINE

## 2023-08-29 PROCEDURE — G8417 CALC BMI ABV UP PARAM F/U: HCPCS | Performed by: FAMILY MEDICINE

## 2023-08-29 PROCEDURE — 1090F PRES/ABSN URINE INCON ASSESS: CPT | Performed by: FAMILY MEDICINE

## 2023-08-29 PROCEDURE — 1036F TOBACCO NON-USER: CPT | Performed by: FAMILY MEDICINE

## 2023-08-29 PROCEDURE — 3078F DIAST BP <80 MM HG: CPT | Performed by: FAMILY MEDICINE

## 2023-08-29 PROCEDURE — 3074F SYST BP LT 130 MM HG: CPT | Performed by: FAMILY MEDICINE

## 2023-08-29 PROCEDURE — 99214 OFFICE O/P EST MOD 30 MIN: CPT | Performed by: FAMILY MEDICINE

## 2023-08-29 PROCEDURE — 81003 URINALYSIS AUTO W/O SCOPE: CPT | Performed by: FAMILY MEDICINE

## 2023-08-29 PROCEDURE — 1123F ACP DISCUSS/DSCN MKR DOCD: CPT | Performed by: FAMILY MEDICINE

## 2023-08-29 SDOH — ECONOMIC STABILITY: INCOME INSECURITY: HOW HARD IS IT FOR YOU TO PAY FOR THE VERY BASICS LIKE FOOD, HOUSING, MEDICAL CARE, AND HEATING?: NOT HARD AT ALL

## 2023-08-29 SDOH — ECONOMIC STABILITY: FOOD INSECURITY: WITHIN THE PAST 12 MONTHS, THE FOOD YOU BOUGHT JUST DIDN'T LAST AND YOU DIDN'T HAVE MONEY TO GET MORE.: NEVER TRUE

## 2023-08-29 SDOH — ECONOMIC STABILITY: FOOD INSECURITY: WITHIN THE PAST 12 MONTHS, YOU WORRIED THAT YOUR FOOD WOULD RUN OUT BEFORE YOU GOT MONEY TO BUY MORE.: NEVER TRUE

## 2023-08-29 ASSESSMENT — PATIENT HEALTH QUESTIONNAIRE - PHQ9
1. LITTLE INTEREST OR PLEASURE IN DOING THINGS: 0
2. FEELING DOWN, DEPRESSED OR HOPELESS: 0
SUM OF ALL RESPONSES TO PHQ QUESTIONS 1-9: 0
SUM OF ALL RESPONSES TO PHQ9 QUESTIONS 1 & 2: 0

## 2023-08-29 NOTE — PROGRESS NOTES
LFTs    NICM (nonischemic cardiomyopathy) (HCC)    Osteoporosis    Systolic CHF, chronic (HCC)    Chronic hoarseness    Colon cancer (HCC)    Carotid artery disease (HCC)    Coronary artery disease involving native coronary artery of native heart with angina pectoris ([address])    History of compression fracture of spine    Chronic renal disease, stage III ([address]) [458629]     Past Medical History:   Diagnosis Date    Abnormal mammogram     Acute chest syndrome ([address]) 7/20/2016    Asbestosis ([address]) 7/20/2016    Asthma     Carotid artery disease (HCC)     Carotid artery stenosis without cerebral infarction 7/20/2016    CHF (congestive heart failure) (HCC)     Chronic hoarseness     Chronic obstructive pulmonary disease (HCC)     Chronic renal disease, stage III [facility]) [[id number]] 8/15/2023    Colitis, ulcerative (HCC)     Colitis, ulcerative (HCC)     Colon cancer ([address])     x2    Compression fx, lumbar spine ([address])     she had 4, 1 from bending over and the other 2 from falling    Coronary artery disease involving native coronary artery of native heart with angina pectoris ([address]) 11/3/2022    Depression 7/9/2015    Dyslipidemia 7/20/2016    Elevated LFTs     GERD (gastroesophageal reflux disease) 7/9/2015    GERD & CROHNS & hx of colon cancer    Heart failure ([address])     High cholesterol 7/9/2015    Hyperlipidemia 7/20/2016    Hypertriglyceridemia     Hypokalemia     Hypomagnesemia     Lymphoma (HCC)     Menopause     Osteoarthrosis, unspecified whether generalized or localized, unspecified site 8/27/2015    Osteoporosis 7/9/2015    Stroke ([address])     TIA    Tobacco use disorder 7/20/2016     Past Surgical History:   Procedure Laterality Date    APPENDECTOMY      BREAST BIOPSY      Benign    CARDIAC PROCEDURE N/A 11/14/2022    LEFT AND RIGHT HEART CATH / CORONARY ANGIOGRAPHY performed by Opal Kirk MD at [facility] CATH LAB    CHOLECYSTECTOMY      OTHER SURGICAL HISTORY  11/2018    back surgery, cement put in, Dr Eileen Sin

## 2023-08-29 NOTE — TELEPHONE ENCOUNTER
----- Message from Desmond Hamman, MD sent at 8/29/2023  3:22 PM EDT -----  Regarding: FW: pt appt    ----- Message -----  From: Laurie Hernández RN  Sent: 8/29/2023  10:16 AM EDT  To: Desmond Hamman, MD  Subject: pt appt                                          Good morning I am a Ambulatory  and this pt is currently in Dr. Susan Mayorga office. She is being told that she needs to be seen by your office sooner then Sept 6th. The dtr has called and left a message. Pt Dx w/ COPD, currently on 2 ABX and not improving.

## 2023-08-29 NOTE — CARE COORDINATION
Vjr/Jennifer is OOT and her sister is currently with the pt at Dr. Antony Shahid office. Lis Hwang is calling me for guidance about how to move up \"mom's\" Pulmonary appt. She is scheduled to see DR. Rashmi Matthew on Sept 6th. Per the office, pt needs to be seen sooner. I have sent a message directly to Dr. Rashmi Matthew and dtr is calling PCP office back and ask if they are able to reach out as well. Lis Hwang has already called and left a message. Lis Hwang to call back if anything further needs addressed.

## 2023-08-30 ENCOUNTER — TELEPHONE (OUTPATIENT)
Dept: PULMONOLOGY | Age: 78
End: 2023-08-30

## 2023-08-30 ENCOUNTER — CARE COORDINATION (OUTPATIENT)
Dept: CARE COORDINATION | Facility: CLINIC | Age: 78
End: 2023-08-30

## 2023-08-30 NOTE — CARE COORDINATION
Spoke with danishar/Jennifer again today. Im f/u to see if they had been able to obtain a Pulm appt. A message was sent to DR. Galindo yesterday and he responded and said that this info was passed on to there Triage office. The pt/family is waiting for a appt time. Esther Moreno states that she/pt are interested in ACM services at this point. I will continue to f/u with pt, there current and long term goals at this time are to manage COPD. Pt currently on 2 ABX & Prednisone. Per family she is not \"getting better\". This is the reason for the ED visit on 8/25., then appropriate f/u was done with PCP  on 8/29. Danishar/Jennifer to call back this afternoon if she has not heard form Pulm office. Otherwise a scheduled f/u call will be set for 9/5.

## 2023-08-30 NOTE — TELEPHONE ENCOUNTER
Daughter called Lis Hwang says that patients PCP says she needs to be seen before 9/6/23 . The antibiotics are not helping .

## 2023-08-30 NOTE — TELEPHONE ENCOUNTER
Spoke with daughter--Mayra-- who states that pt has sick for about a week. Took her to the ER last week and was diagnosed with COPD exacerbation and UTI. Seen PCP yesterday for f/up of UTI. PCP wanted pt seen sooner than next week. Pt is COVID/FLU/RSV neg. Will bring in tomorrow for appt and CXR.

## 2023-08-31 ENCOUNTER — OFFICE VISIT (OUTPATIENT)
Dept: PULMONOLOGY | Age: 78
End: 2023-08-31
Payer: MEDICARE

## 2023-08-31 ENCOUNTER — HOSPITAL ENCOUNTER (OUTPATIENT)
Dept: GENERAL RADIOLOGY | Age: 78
Discharge: HOME | End: 2023-08-31
Payer: MEDICARE

## 2023-08-31 VITALS
WEIGHT: 157 LBS | BODY MASS INDEX: 28.89 KG/M2 | HEART RATE: 103 BPM | OXYGEN SATURATION: 97 % | HEIGHT: 62 IN | SYSTOLIC BLOOD PRESSURE: 114 MMHG | DIASTOLIC BLOOD PRESSURE: 68 MMHG | RESPIRATION RATE: 18 BRPM | TEMPERATURE: 94.8 F

## 2023-08-31 DIAGNOSIS — G47.34 NOCTURNAL HYPOXIA: ICD-10-CM

## 2023-08-31 DIAGNOSIS — I42.8 NICM (NONISCHEMIC CARDIOMYOPATHY) (HCC): ICD-10-CM

## 2023-08-31 DIAGNOSIS — J44.9 STAGE 3 SEVERE COPD BY GOLD CLASSIFICATION (HCC): Primary | ICD-10-CM

## 2023-08-31 DIAGNOSIS — J92.0 PLEURAL PLAQUE WITH PRESENCE OF ASBESTOS: ICD-10-CM

## 2023-08-31 DIAGNOSIS — R06.02 SOB (SHORTNESS OF BREATH): ICD-10-CM

## 2023-08-31 DIAGNOSIS — J44.1 COPD EXACERBATION (HCC): ICD-10-CM

## 2023-08-31 LAB
BILIRUBIN, URINE, POC: NORMAL
BLOOD URINE, POC: NEGATIVE
GLUCOSE URINE, POC: NEGATIVE
KETONES, URINE, POC: NORMAL
LEUKOCYTE ESTERASE, URINE, POC: NEGATIVE
NITRITE, URINE, POC: NEGATIVE
PH, URINE, POC: NORMAL (ref 4.6–8)
PROTEIN,URINE, POC: NEGATIVE
SPECIFIC GRAVITY, URINE, POC: 1.02 (ref 1–1.03)
URINALYSIS CLARITY, POC: NORMAL
URINALYSIS COLOR, POC: NORMAL
UROBILINOGEN, POC: NORMAL

## 2023-08-31 PROCEDURE — 71046 X-RAY EXAM CHEST 2 VIEWS: CPT

## 2023-08-31 PROCEDURE — 1036F TOBACCO NON-USER: CPT | Performed by: NURSE PRACTITIONER

## 2023-08-31 PROCEDURE — 1090F PRES/ABSN URINE INCON ASSESS: CPT | Performed by: NURSE PRACTITIONER

## 2023-08-31 PROCEDURE — G8427 DOCREV CUR MEDS BY ELIG CLIN: HCPCS | Performed by: NURSE PRACTITIONER

## 2023-08-31 PROCEDURE — 3023F SPIROM DOC REV: CPT | Performed by: NURSE PRACTITIONER

## 2023-08-31 PROCEDURE — 1123F ACP DISCUSS/DSCN MKR DOCD: CPT | Performed by: NURSE PRACTITIONER

## 2023-08-31 PROCEDURE — G8399 PT W/DXA RESULTS DOCUMENT: HCPCS | Performed by: NURSE PRACTITIONER

## 2023-08-31 PROCEDURE — 99214 OFFICE O/P EST MOD 30 MIN: CPT | Performed by: NURSE PRACTITIONER

## 2023-08-31 PROCEDURE — G8417 CALC BMI ABV UP PARAM F/U: HCPCS | Performed by: NURSE PRACTITIONER

## 2023-08-31 RX ORDER — IPRATROPIUM BROMIDE AND ALBUTEROL SULFATE 2.5; .5 MG/3ML; MG/3ML
SOLUTION RESPIRATORY (INHALATION)
Qty: 120 EACH | Refills: 1 | Status: SHIPPED | OUTPATIENT
Start: 2023-08-31

## 2023-08-31 RX ORDER — BUDESONIDE 0.5 MG/2ML
1 INHALANT ORAL 2 TIMES DAILY
Qty: 60 EACH | Refills: 1 | Status: SHIPPED | OUTPATIENT
Start: 2023-08-31

## 2023-08-31 RX ORDER — PREDNISONE 20 MG/1
TABLET ORAL
Qty: 15 TABLET | Refills: 0 | Status: SHIPPED | OUTPATIENT
Start: 2023-08-31

## 2023-08-31 ASSESSMENT — ENCOUNTER SYMPTOMS
SPUTUM PRODUCTION: 1
COUGH: 1
SHORTNESS OF BREATH: 1
WHEEZING: 1
HEMOPTYSIS: 0

## 2023-08-31 NOTE — PROGRESS NOTES
[address] Margarita Galdamez. [address] [facility], [address] [place] [address]  [phone]    Patient Name:  Joao Espinoza    YOB: 1945    Office Visit 2023      CHIEF COMPLAINT:      Chief Complaint   Patient presents with    Other     Work in    [facility]     Work in for COPD         ASSESSMENT:   (Medical Decision Making)                                                                                                                                          Encounter Diagnoses   Name Primary? Stage 3 severe COPD by GOLD classification ([address]) Yes    COPD exacerbation (HCC)     Nocturnal hypoxia     Pleural plaque with presence of asbestos     NICM (nonischemic cardiomyopathy) ([address])      Recent exacerbation requiring ER visits. She just completed course of antibiotics and steroids. Symptoms remain above baseline and she has very faint wheezing on exam today. She has not tolerated Trelegy inhaler. We discussed possible alternatives and since she already has supply on hand of DuoNeb and budesonide, she is willing to resume that. She is unsure if she has additional refills or if any of her supply has . Ambulatory oximetry today demonstrated adequate saturation on room air. Chest x-ray revealed no acute pulm process but her known calcified pleural plaques consistent with asbestos exposure. She is compliant with oxygen with sleep. PLAN:     Albuterol/ipratropium via nebulizer 4 times daily. Budesonide via nebulizer twice daily, rinse mouth after use. May use albuterol inhaler, 2 puffs 4 times daily when away from home/nebulizer. I have provided limited prescriptions for nebulizer Rx in the event her current supply has . We will defer additional refills to her usual providers here.     I have also provided course of prednisone if she has progressive worsening in shortness of breath or wheezing before her follow-up appointment next

## 2023-08-31 NOTE — PATIENT INSTRUCTIONS
Albuterol/ipratropium via nebulizer 4 times daily. Budesonide via nebulizer twice daily, rinse mouth after use. May use albuterol inhaler, 2 puffs 4 times daily when away from home/nebulizer. I have provided limited prescriptions for nebulizer Rx in the event her current supply has . We will defer additional refills to her usual providers here. I have also provided course of prednisone if she has progressive worsening in shortness of breath or wheezing before her follow-up appointment next week. Take prednisone following morning meal.    Continue oxygen with sleep as prescribed.

## 2023-08-31 NOTE — PROGRESS NOTES
She is an 27-year-old female previously seen by Dr. Marilyn Higuera with history of COPD, nocturnal hypoxia, former tobacco use who was seen as a work in. Records are reviewed. DIAGNOSTICS:    Chest CT 4/2023-there is a pleural thickening and calcification suggestive of prior asbestos exposure. Lungs are clear bilaterally. No new or enlarging nodules. CPFT's 3/23-moderately severe obstructive defect, moderately severe gas trapping, decreased diffusion capacity, findings consistent with Gold stage III COPD.

## 2023-09-01 ENCOUNTER — CARE COORDINATION (OUTPATIENT)
Dept: CARE COORDINATION | Facility: CLINIC | Age: 78
End: 2023-09-01

## 2023-09-03 LAB
BACTERIA SPEC CULT: NORMAL
SERVICE CMNT-IMP: NORMAL

## 2023-09-05 ENCOUNTER — CARE COORDINATION (OUTPATIENT)
Dept: CARE COORDINATION | Facility: CLINIC | Age: 78
End: 2023-09-05

## 2023-09-05 NOTE — CARE COORDINATION
appointment or reschedule if I have to cancel. I will notify my provider of any barriers to my plan of care. I will follow my Zone Management tool to seek urgent or emergent care. I will notify my provider of any symptoms that indicate a worsening of my condition. Barriers: none  Plan for overcoming my barriers: N/A  Confidence: 9/10  Anticipated Goal Completion Date: 11/30          manage COPD (pt-stated)   On track      Per pt, managing COPD is most important.  Plans to take medication as directed, avoid environmental triggers, keep f/u appointments              Future Appointments   Date Time Provider [address]   9/6/2023  3:40 PM Nomi Fernández APRN - CNP PPS [facility]   9/20/2023 11:20 AM Marni Ennis MD [id number] [facility]   9/20/2023 12:00 PM Jim Peñaloza danay. [facility]   10/18/2023 11:20 AM Laura Brown MD [facility]   11/7/2023  9:30 AM PST LAB PST [facility]   11/14/2023 10:50 AM Jake Carroll MD PST [facility]   ,   COPD Assessment       No patient-reported symptoms         Symptoms:          ,   General Assessment         , and No linked episodes

## 2023-09-06 ENCOUNTER — OFFICE VISIT (OUTPATIENT)
Dept: PULMONOLOGY | Age: 78
End: 2023-09-06
Payer: MEDICARE

## 2023-09-06 VITALS
DIASTOLIC BLOOD PRESSURE: 72 MMHG | WEIGHT: 157 LBS | BODY MASS INDEX: 28.89 KG/M2 | TEMPERATURE: 96.9 F | SYSTOLIC BLOOD PRESSURE: 118 MMHG | OXYGEN SATURATION: 96 % | HEART RATE: 62 BPM | HEIGHT: 62 IN | RESPIRATION RATE: 18 BRPM

## 2023-09-06 DIAGNOSIS — Z85.72 HISTORY OF LYMPHOMA: ICD-10-CM

## 2023-09-06 DIAGNOSIS — G47.34 NOCTURNAL HYPOXIA: ICD-10-CM

## 2023-09-06 DIAGNOSIS — J92.0 PLEURAL PLAQUE WITH PRESENCE OF ASBESTOS: ICD-10-CM

## 2023-09-06 DIAGNOSIS — Z87.891 PERSONAL HISTORY OF NICOTINE DEPENDENCE: ICD-10-CM

## 2023-09-06 DIAGNOSIS — J44.9 STAGE 3 SEVERE COPD BY GOLD CLASSIFICATION (HCC): Primary | ICD-10-CM

## 2023-09-06 DIAGNOSIS — Z85.038 HISTORY OF COLON CANCER: ICD-10-CM

## 2023-09-06 PROCEDURE — G8417 CALC BMI ABV UP PARAM F/U: HCPCS | Performed by: NURSE PRACTITIONER

## 2023-09-06 PROCEDURE — 1036F TOBACCO NON-USER: CPT | Performed by: NURSE PRACTITIONER

## 2023-09-06 PROCEDURE — 1123F ACP DISCUSS/DSCN MKR DOCD: CPT | Performed by: NURSE PRACTITIONER

## 2023-09-06 PROCEDURE — 1090F PRES/ABSN URINE INCON ASSESS: CPT | Performed by: NURSE PRACTITIONER

## 2023-09-06 PROCEDURE — G8427 DOCREV CUR MEDS BY ELIG CLIN: HCPCS | Performed by: NURSE PRACTITIONER

## 2023-09-06 PROCEDURE — G8399 PT W/DXA RESULTS DOCUMENT: HCPCS | Performed by: NURSE PRACTITIONER

## 2023-09-06 PROCEDURE — 99214 OFFICE O/P EST MOD 30 MIN: CPT | Performed by: NURSE PRACTITIONER

## 2023-09-06 PROCEDURE — 3023F SPIROM DOC REV: CPT | Performed by: NURSE PRACTITIONER

## 2023-09-07 ENCOUNTER — TELEPHONE (OUTPATIENT)
Age: 78
End: 2023-09-07

## 2023-09-07 RX ORDER — METOPROLOL SUCCINATE 25 MG/1
25 TABLET, EXTENDED RELEASE ORAL DAILY
Qty: 90 TABLET | Refills: 3 | Status: SHIPPED | OUTPATIENT
Start: 2023-09-07

## 2023-09-07 NOTE — TELEPHONE ENCOUNTER
Daughter Ana Barber reports week before last was in ER for SOB was told most likely COPD. Followed up w/pulmonary was told fluid on lungs. BP has been running low. Today before meds /59 hr=87 and after meds was 95/46 hr=91. Pt.feels ok. BNP 8/21 was 1,119. She has no edema. Pulonary concerned she may be retaining fluid in abdomen or chest area. Daughter concerned BP too low. Please advise. .      She is on Toprol xl 50mg qday,Entresto 49/51 bid and Lasix 20mg qday prn(pulmonary told to take every other day yesterday at her fu).

## 2023-09-07 NOTE — TELEPHONE ENCOUNTER
Royal Jiménez notified of 's response. Appt.made for 9/18 fu visit. Med escribed as below  Requested Prescriptions     Signed Prescriptions Disp Refills    metoprolol succinate (TOPROL XL) 25 MG extended release tablet 90 tablet 3     Sig: Take 1 tablet by mouth daily     Authorizing Provider: Lolly Homans     Ordering User: Keith Bansal

## 2023-09-08 DIAGNOSIS — M81.0 POSTMENOPAUSAL OSTEOPOROSIS: Primary | ICD-10-CM

## 2023-09-18 ENCOUNTER — OFFICE VISIT (OUTPATIENT)
Age: 78
End: 2023-09-18
Payer: MEDICARE

## 2023-09-18 VITALS
SYSTOLIC BLOOD PRESSURE: 118 MMHG | DIASTOLIC BLOOD PRESSURE: 64 MMHG | WEIGHT: 154 LBS | HEART RATE: 104 BPM | BODY MASS INDEX: 28.34 KG/M2 | HEIGHT: 62 IN

## 2023-09-18 DIAGNOSIS — I25.119 CORONARY ARTERY DISEASE INVOLVING NATIVE CORONARY ARTERY OF NATIVE HEART WITH ANGINA PECTORIS (HCC): ICD-10-CM

## 2023-09-18 DIAGNOSIS — I65.23 BILATERAL CAROTID ARTERY STENOSIS: ICD-10-CM

## 2023-09-18 DIAGNOSIS — I47.29 NSVT (NONSUSTAINED VENTRICULAR TACHYCARDIA) (HCC): Primary | ICD-10-CM

## 2023-09-18 DIAGNOSIS — I50.22 SYSTOLIC CHF, CHRONIC (HCC): ICD-10-CM

## 2023-09-18 DIAGNOSIS — I42.8 NICM (NONISCHEMIC CARDIOMYOPATHY) (HCC): ICD-10-CM

## 2023-09-18 DIAGNOSIS — I42.0 DILATED CARDIOMYOPATHY (HCC): ICD-10-CM

## 2023-09-18 PROCEDURE — 1036F TOBACCO NON-USER: CPT | Performed by: INTERNAL MEDICINE

## 2023-09-18 PROCEDURE — 1090F PRES/ABSN URINE INCON ASSESS: CPT | Performed by: INTERNAL MEDICINE

## 2023-09-18 PROCEDURE — G8417 CALC BMI ABV UP PARAM F/U: HCPCS | Performed by: INTERNAL MEDICINE

## 2023-09-18 PROCEDURE — 93000 ELECTROCARDIOGRAM COMPLETE: CPT | Performed by: INTERNAL MEDICINE

## 2023-09-18 PROCEDURE — G8399 PT W/DXA RESULTS DOCUMENT: HCPCS | Performed by: INTERNAL MEDICINE

## 2023-09-18 PROCEDURE — 99214 OFFICE O/P EST MOD 30 MIN: CPT | Performed by: INTERNAL MEDICINE

## 2023-09-18 PROCEDURE — G8428 CUR MEDS NOT DOCUMENT: HCPCS | Performed by: INTERNAL MEDICINE

## 2023-09-18 PROCEDURE — 1123F ACP DISCUSS/DSCN MKR DOCD: CPT | Performed by: INTERNAL MEDICINE

## 2023-09-18 NOTE — PROGRESS NOTES
[address] [facility], [facility], [address]  PHONE: [phone]     23    NAME:  Ansley Andino  : 1945  MRN: [id number]       SUBJECTIVE:   Ansley Andino is a 78 y.o. female seen for a follow up visit regarding the following:     Chief Complaint   Patient presents with    Congestive Heart Failure    Hypertension       HPI: Here for vascular dz follow up.   2017 TIA sx  She has crohn's dz. Echo  showing EF 30-35%, mod MR   2017 and 2018 and 10/2022 Carotid with mod DEIDRE and LICA stenosis   6330: ICD implant   Echo 3/2022: EF 40-45%     LHC/RHC  2022:     Mild nonobstructive coronary artery disease    Low normal LV systolic function    Mild pulmonary hypertension      Some CHUA, not as active. Some edema, not on lasix now. On couch most days. No CP, pressure. Using walker/cane now, seeing ortho for knee injections. She has NYHA Class II sx now. Energy poor now. Stamina poor. Patient denies recent history of orthopnea, PND, excessive dizziness and/or syncope. Past Medical History, Past Surgical History, Family history, Social History, and Medications were all reviewed with the patient today and updated as necessary.      Current Outpatient Medications   Medication Sig Dispense Refill    metoprolol succinate (TOPROL XL) 25 MG extended release tablet Take 1 tablet by mouth daily 90 tablet 3    ipratropium 0.5 mg-albuterol 2.5 mg (DUONEB) 0.5-2.5 (3) MG/3ML SOLN nebulizer solution 1 vial via nebulizer qid, dx COPD, J44.9 120 each 1    budesonide (PULMICORT) 0.5 MG/2ML nebulizer suspension Take 2 mLs by nebulization 2 times daily (Patient taking differently: Take 2 mLs by nebulization in the morning, at noon, and at bedtime) 60 each 1    famotidine (PEPCID) 40 MG tablet Take 1 tablet by mouth      atorvastatin (LIPITOR) 40 MG tablet TAKE 1 TABLET DAILY 90 tablet 3    albuterol sulfate HFA (VENTOLIN HFA) 108 (90 Base) MCG/ACT inhaler Inhale 2

## 2023-09-20 ENCOUNTER — CARE COORDINATION (OUTPATIENT)
Dept: CARE COORDINATION | Facility: CLINIC | Age: 78
End: 2023-09-20

## 2023-09-20 ASSESSMENT — PATIENT HEALTH QUESTIONNAIRE - PHQ9
SUM OF ALL RESPONSES TO PHQ QUESTIONS 1-9: 0
SUM OF ALL RESPONSES TO PHQ QUESTIONS 1-9: 0
DEPRESSION UNABLE TO ASSESS: PT REFUSES
2. FEELING DOWN, DEPRESSED OR HOPELESS: 0
SUM OF ALL RESPONSES TO PHQ QUESTIONS 1-9: 0
SUM OF ALL RESPONSES TO PHQ QUESTIONS 1-9: 0

## 2023-09-20 NOTE — CARE COORDINATION
Ambulatory Care Coordination Note  2023    Patient Current Location:  [facility]    ACM contacted the family by telephone. Verified name and  with family as identifiers. Provided introduction to self, and explanation of the ACM role. ACM: Nuris Trammell RN    Challenges to be reviewed by the provider   Additional needs identified to be addressed with provider: No  none               Method of communication with provider: phone. F/u call with pt and family. We reviewed medications, f/u appt's. They have a good understanding of who hey are going to and how to help the pt. Denied any further need st tis time, will reach out on     Offered patient enrollment in the Remote Patient Monitoring (RPM) program for in-home monitoring: Patient declined. Ambulatory Care Coordination Assessment    Care Coordination Protocol  Week 1 - Initial Assessment     Do you have all of your prescriptions and are they filled?: No  Barriers to medication adherence: None  Are you able to afford your medications?: Yes  How often do you have trouble taking your medications the way you have been told to take them?: I always take them as prescribed. Do you have Home O2 Therapy?: No      Ability to seek help/take action for Emergent Urgent situations i.e. fire, crime, inclement weather or health crisis. : Independent  Ability to ambulate to restroom: Independent  Ability handle personal hygeine needs (bathing/dressing/grooming):  Independent  Ability to manage Medications: Needs Assistance  Ability to prepare Food Preparation: Needs Assistance  Ability to maintain home (clean home, laundry): Needs Assistance  Ability to drive and/or has transportation: Needs Assistance  Ability to do shopping: Needs Assistance  Ability to manage finances: Needs Assistance  Is patient able to live independently?: Yes     Current Housing: Private Residence  Who do you live with?: Child  Are you an active caregiver in your home?: No

## 2023-09-27 ENCOUNTER — CARE COORDINATION (OUTPATIENT)
Dept: CARE COORDINATION | Facility: CLINIC | Age: 78
End: 2023-09-27

## 2023-09-27 DIAGNOSIS — N18.31 STAGE 3A CHRONIC KIDNEY DISEASE (HCC): ICD-10-CM

## 2023-09-27 DIAGNOSIS — J44.9 STAGE 3 SEVERE COPD BY GOLD CLASSIFICATION (HCC): ICD-10-CM

## 2023-09-27 DIAGNOSIS — I50.22 SYSTOLIC CHF, CHRONIC (HCC): Primary | ICD-10-CM

## 2023-09-27 NOTE — PROGRESS NOTES
Remote Patient Monitoring Treatment Plan    Received request from ACM/CTJULY Lacy RN  to order remote patient monitoring for in home monitoring of Kidney Disease , CHF, and COPD and order completed. Patient will be monitoring blood pressure   pulse ox   weight  survey questions. Patient will engage in Remote Patient Monitoring each day to develop the skills necessary for self management. RPM Care Team Responsibilities:   Alerts will be reviewed daily and addressed within 2-4 hours during operational hours (Monday -Friday 9 am-4 pm)  Alert response and intervention documented in patient medical record  Alert response escalated to PCP per protocol and documented in patient medical record  Patient monitored over approximately  days  Discharge from program based on self-management readiness    See care coordination encounters for additional details.

## 2023-09-27 NOTE — CARE COORDINATION
Ambulatory Care Coordination Note  2023    Patient Current Location:  [facility]     ACM contacted the patient by telephone. Verified name and  with patient as identifiers. Provided introduction to self, and explanation of the ACM role. Challenges to be reviewed by the provider   Additional needs identified to be addressed with provider: No     Pt interested in RPM, sending appropriate referral to start this process. Method of communication with provider: phone. ACM: Serafin Vazquez, RN    Spoke with pt's dtr/Jennifer and pt has been fluctuating with her weight and BP. She is still f/u with Pulm and now Nephrology. Ww discussed RPM and they are very interested at this time. Offered patient enrollment in the Remote Patient Monitoring (RPM) program for in-home monitoring: Yes, patient enrolled:     Remote Patient Monitoring Enrollment Note      Date/Time: 2023 3:24 PM    Offered patient enrollment in the [address] [place] [address] Remote Patient Monitoring (RPM) program for in home monitoring for Kidney Disease, CHF, and COPD. Patient accepted RPM services. Patient will be monitoring the following daily:  blood pressure reading, blood pressure heart rate, pulse ox heart rate, and weight    ACM reviewed the information below with patient:    Emergency Contact (name and contact number): Alicja Webb (Child)   [phone] (Mobile)    [x] A member from the care coordination team will reach out to notify the patient once the RPM kit is ordered. [x] Once the kit is delivered, the [facility] team will contact the patient after UPS deliver to assist with set up. [x] Determined BP cuff size: regular (9.05\"-15.74\")      [x] Determined weight scale: regular (<330lbs)                                                [x] Hours of ACM monitoring - Monday-Friday [id number]                     All questions about RPM program answered at this time.   .    Lab Results       None                 Goals

## 2023-09-28 ENCOUNTER — CARE COORDINATION (OUTPATIENT)
Dept: CARE COORDINATION | Facility: CLINIC | Age: 78
End: 2023-09-28

## 2023-09-28 ASSESSMENT — PATIENT HEALTH QUESTIONNAIRE - PHQ9
2. FEELING DOWN, DEPRESSED OR HOPELESS: 0
SUM OF ALL RESPONSES TO PHQ QUESTIONS 1-9: 0
SUM OF ALL RESPONSES TO PHQ9 QUESTIONS 1 & 2: 0
SUM OF ALL RESPONSES TO PHQ QUESTIONS 1-9: 0
1. LITTLE INTEREST OR PLEASURE IN DOING THINGS: 0

## 2023-09-28 NOTE — CARE COORDINATION
Remote Patient Kit Ordering Note      Date/Time:  9/28/2023 8:52 AM      [x] [facility] confirmed patient shipping address  [x] Patient will receive package over the next 1-3 business days. Someone 21 years or older must be present to sign for UPS delivery. [x] HRS will contact patient within 24 hours, an [address] [place] [address] will call the patient directly: If the patient does not answer, HRS will follow up with the clinical team notifying them about the unsuccessful attempt to contact the patient. HRS will make three call attempts to the patient. Provide patient with [facility] Virtual install number is: [phone]. [x] ACM will contact patient once equipment is active to welcome them to the program.                                                         [x] Hours of RPM monitoring - Monday-Friday [id number]; encourage patient to get vitals entered by [facility] each day to have the alert addressed same day. [x][facility] mailed RPM Patient flyer to patient. All questions answered at this time. ACM made aware the RPM kit has been ordered. [facility] of RPM equipment order.

## 2023-09-29 RX ORDER — BUDESONIDE 0.5 MG/2ML
1 INHALANT ORAL 2 TIMES DAILY
Qty: 360 ML | Refills: 3 | Status: ON HOLD | OUTPATIENT
Start: 2023-09-29

## 2023-09-29 RX ORDER — IPRATROPIUM BROMIDE AND ALBUTEROL SULFATE 2.5; .5 MG/3ML; MG/3ML
SOLUTION RESPIRATORY (INHALATION)
Qty: 1080 ML | Refills: 3 | Status: ON HOLD | OUTPATIENT
Start: 2023-09-29

## 2023-10-02 ENCOUNTER — CARE COORDINATION (OUTPATIENT)
Dept: CARE COORDINATION | Facility: CLINIC | Age: 78
End: 2023-10-02

## 2023-10-02 NOTE — CARE COORDINATION
Remote Alert Monitoring Note  Rpm alert to be reviewed by the provider   red alert and yellow alert   blood pressure heart rate (111) and survey response ((Kidney Disease) Have you had any problems thinking clearly in the past 24 hours? Yes)   Additional needs to be addressed by N/A: No                    Date/Time:  10/2/2023 12:16 PM  Patient Current Location: Home: [address] [place] [address]  LPN contacted patient by telephone. Spoke with patients cadena guardian / emergency contact. Crys King. Verified patients name and  as identifiers. Background: Pt enrolled in RPM r/t CHF, Kidney Disease, and COPD. Refer to 911 immediately if:  Patient unresponsive or unable to provide history  Change in cognition or sudden confusion  Patient unable to respond in complete sentences  Intense chest pain/tightness  Any concern for any clinical emergency  Red Alert: Provider response time of 1 hr required for any red alert requiring intervention  Yellow Alert: Provider response time of 3hr required for any escalated yellow alert    HR Triage  Are you having any Chest Pain? no   Are you having any Shortness of Breath? no   Are you having any dizziness? no   Are you feeling more fatigued or tired than normal? no     Clinical Interventions: Reviewed and followed up on alerts and treatments-Per Crys King, pt denies CP, SOB, dizziness, and fatigue more than normal. Pulse ox HR of 105 noted in HRS. Patients daughter agreeable to assist pt with rechecking BP HR. Updated reading of 88 BPM noted. Discussed survey response with Crys King who stated, \"she's normal as far as I'm concerned\" and reports she was instructed to answer yes to the survey question by \"the man who helped me set it up\". Daughter stated that patient has not been \"officially diagnosed with dementia\" but family has been questioning potential dementia \"for years\". Pt has had no change in thinking clearly in the past 24 hours.  Patients

## 2023-10-02 NOTE — CARE COORDINATION
F/u call made, no answer and a message was left. Pt informed that if they have any questions, concerns or any needs that need to be addressed to please call back. Otherwise I will reach out again on 10/9.
28-Jan-2020 13:14

## 2023-10-03 ENCOUNTER — CARE COORDINATION (OUTPATIENT)
Dept: CARE COORDINATION | Facility: CLINIC | Age: 78
End: 2023-10-03

## 2023-10-03 NOTE — CARE COORDINATION
Remote Alert Monitoring Note  Rpm alert to be reviewed by the provider   red alert   weight (3.0# increase in last day and 5.0# increase in last 7 days)   Additional needs to be addressed by N/A: No                    Date/Time:  10/3/2023 9:43 AM  Patient Current Location: Home: [address] [place] [address]  LPN attempted to contact patient by telephone. Spoke with patients legal guardian / emergency contact, [facility]. Verified patients name and  as identifiers. Background: Pt enrolled in RPM r/t CHF, Kidney Disease, and COPD. Refer to 911 immediately if:  Patient unresponsive or unable to provide history  Change in cognition or sudden confusion  Patient unable to respond in complete sentences  Intense chest pain/tightness  Any concern for any clinical emergency  Red Alert: Provider response time of 1 hr required for any red alert requiring intervention  Yellow Alert: Provider response time of 3hr required for any escalated yellow alert      Clinical Interventions: Reviewed and followed up on alerts and treatments-Per [facility], second recorded weight of 135.0# from 10/2/23 was not patients weight. Patients daughter weighed self to confirm RPM scale accuracy. 2nd recorded weight from 10/2/23 removed from HRS. Green alert received. Patients daughter v/u of when to notify provider of changes / concerns and when to seek emergent medical care for pt. No further action necessary at this time. Plan/Follow Up: Will continue to review, monitor and address alerts with follow up based on severity of symptoms and risk factors.

## 2023-10-05 ENCOUNTER — HOSPITAL ENCOUNTER (OUTPATIENT)
Dept: MAMMOGRAPHY | Age: 78
Discharge: HOME | End: 2023-10-05
Attending: INTERNAL MEDICINE
Payer: MEDICARE

## 2023-10-05 DIAGNOSIS — M81.0 POSTMENOPAUSAL OSTEOPOROSIS: ICD-10-CM

## 2023-10-05 PROCEDURE — 77080 DXA BONE DENSITY AXIAL: CPT

## 2023-10-09 ENCOUNTER — TELEPHONE (OUTPATIENT)
Dept: FAMILY MEDICINE CLINIC | Facility: CLINIC | Age: 78
End: 2023-10-09

## 2023-10-09 ENCOUNTER — NURSE ONLY (OUTPATIENT)
Dept: RHEUMATOLOGY | Age: 78
End: 2023-10-09
Payer: MEDICARE

## 2023-10-09 ENCOUNTER — OFFICE VISIT (OUTPATIENT)
Dept: RHEUMATOLOGY | Age: 78
End: 2023-10-09
Payer: MEDICARE

## 2023-10-09 ENCOUNTER — CARE COORDINATION (OUTPATIENT)
Dept: CARE COORDINATION | Facility: CLINIC | Age: 78
End: 2023-10-09

## 2023-10-09 VITALS
HEIGHT: 62 IN | HEART RATE: 92 BPM | SYSTOLIC BLOOD PRESSURE: 108 MMHG | WEIGHT: 155 LBS | DIASTOLIC BLOOD PRESSURE: 64 MMHG | BODY MASS INDEX: 28.52 KG/M2 | RESPIRATION RATE: 14 BRPM

## 2023-10-09 VITALS — DIASTOLIC BLOOD PRESSURE: 64 MMHG | TEMPERATURE: 97.2 F | SYSTOLIC BLOOD PRESSURE: 108 MMHG | HEART RATE: 92 BPM

## 2023-10-09 DIAGNOSIS — Z51.81 ENCOUNTER FOR MONITORING DENOSUMAB THERAPY: ICD-10-CM

## 2023-10-09 DIAGNOSIS — Z79.899 ENCOUNTER FOR MONITORING DENOSUMAB THERAPY: ICD-10-CM

## 2023-10-09 DIAGNOSIS — M17.0 PRIMARY OSTEOARTHRITIS OF BOTH KNEES: ICD-10-CM

## 2023-10-09 DIAGNOSIS — M81.0 POSTMENOPAUSAL OSTEOPOROSIS: Primary | ICD-10-CM

## 2023-10-09 DIAGNOSIS — N18.30 STAGE 3 CHRONIC KIDNEY DISEASE, UNSPECIFIED WHETHER STAGE 3A OR 3B CKD (HCC): ICD-10-CM

## 2023-10-09 DIAGNOSIS — R25.2 MUSCLE CRAMPING: ICD-10-CM

## 2023-10-09 PROCEDURE — G8427 DOCREV CUR MEDS BY ELIG CLIN: HCPCS | Performed by: INTERNAL MEDICINE

## 2023-10-09 PROCEDURE — G8417 CALC BMI ABV UP PARAM F/U: HCPCS | Performed by: INTERNAL MEDICINE

## 2023-10-09 PROCEDURE — G8484 FLU IMMUNIZE NO ADMIN: HCPCS | Performed by: INTERNAL MEDICINE

## 2023-10-09 PROCEDURE — 1123F ACP DISCUSS/DSCN MKR DOCD: CPT | Performed by: INTERNAL MEDICINE

## 2023-10-09 PROCEDURE — G8399 PT W/DXA RESULTS DOCUMENT: HCPCS | Performed by: INTERNAL MEDICINE

## 2023-10-09 PROCEDURE — 1036F TOBACCO NON-USER: CPT | Performed by: INTERNAL MEDICINE

## 2023-10-09 PROCEDURE — 1090F PRES/ABSN URINE INCON ASSESS: CPT | Performed by: INTERNAL MEDICINE

## 2023-10-09 PROCEDURE — 99214 OFFICE O/P EST MOD 30 MIN: CPT | Performed by: INTERNAL MEDICINE

## 2023-10-09 PROCEDURE — 96372 THER/PROPH/DIAG INJ SC/IM: CPT | Performed by: INTERNAL MEDICINE

## 2023-10-09 ASSESSMENT — PATIENT HEALTH QUESTIONNAIRE - PHQ9
SUM OF ALL RESPONSES TO PHQ QUESTIONS 1-9: 0
SUM OF ALL RESPONSES TO PHQ9 QUESTIONS 1 & 2: 0
SUM OF ALL RESPONSES TO PHQ QUESTIONS 1-9: 0
2. FEELING DOWN, DEPRESSED OR HOPELESS: 0
SUM OF ALL RESPONSES TO PHQ QUESTIONS 1-9: 0
1. LITTLE INTEREST OR PLEASURE IN DOING THINGS: 0
SUM OF ALL RESPONSES TO PHQ QUESTIONS 1-9: 0

## 2023-10-09 NOTE — TELEPHONE ENCOUNTER
Patient needs to be referred to [facility] rheumatology due to [facility] no showing her appointment today. Can you put in a new referral for [facility]?

## 2023-10-09 NOTE — PROGRESS NOTES
[facility], [address] [place] [address], [address] [place] [address]  Office : [phone], Fax: [phone]       # 9 Prolia 60mg/ml injected SC today into left lower abdomen. Patient tolerated injection well. Advised patient to continue with calcium and vitamin D post injection. Advised patient to call with any problems post injection. Medication ordered by Dr. Becky Mcneill. Pre-infusion/injection questionairre for osteoporosis    1. Have you had or are you planning any dental work? NO    2. Are you taking your calcium and vitamin D? YES    3. When was your last osteoporosis treatment? 3/20/23    4. Have you had any recent fractures?  NO

## 2023-10-09 NOTE — PATIENT INSTRUCTIONS
Full dose calcium / day is 1200 mg     - calcium citrate 600 mg twice daily : usually 2 pills twice daily   - Vitamin D 2000U daily

## 2023-10-09 NOTE — CARE COORDINATION
Ambulatory Care Coordination Note  10/9/2023    Patient Current Location:  [place]     ACM contacted the patient by telephone. Verified name and  with patient as identifiers. Provided introduction to self, and explanation of the ACM role. Challenges to be reviewed by the provider   Additional needs identified to be addressed with provider: No  none               Method of communication with provider: phone. ACM: Eddie Donis RN    F/u call with pt and dtr/Jennifer. Pt has no c/o at the current moment. Her weight will fluctuate about every 3 days, per dtr she will take her Lasix and it will \"drop back down\". Pt denies any excess swelling, SOB or CP. She has a few appointments this week. Been very compliant with RPM and is doing well with using it. Denied any further questions or concerns at this time, will f/u next week on 10/16. Offered patient enrollment in the Remote Patient Monitoring (RPM) program for in-home monitoring: Yes, patient already enrolled.     Lab Results       None                 Goals Addressed                      This Visit's Progress      Care Coordination Self Management   On track      CC Self Management Goal  Patient Goal (What steps will patient take to achieve goal?):   Patient is able to discuss self-management of condition(s):  Pt demonstrates adherence to medications  Pt demonstrates understanding of self-monitoring  Patient is able to identify Red Flags:  Alert to potential adverse drug reactions(s) or side effects and actions to take should they arise  Discuss target symptoms and actions to take should they arise  Identify problems that require immediate PCP or specialist visit  Patient demonstrates understanding of access to PCP/Specialist:  Understands about scheduling routine Follow Up appointments   Understands about sick day appointment options for worsening of symptoms/progression (Same Day, E Visits)        Conditions and Symptoms   On track      I will

## 2023-10-09 NOTE — PROGRESS NOTES
respiratory effort. GI:  Abdomen soft, non tender, no hepatosplenomegaly  Skin:  No rashes, nodules, or other lesions. MUSCULOSKELETAL :  All joints including neck, back bilateral shoulders, elbows, wrists, MCP's, PIP's, DIP's, hips, knees, ankles, toes are within normal limits including normal gross examination, no synovitis, no tenderness, n'l ROM EXCEPT:   Quads atrophy bilat  Crepitus in bilateral knees      ASSESSMENT AND PLAN:  Ansley Andino is a 64 y.o. female that is here for evaluation of osteoporosis  her problems include:    1. Osteoporosis, post-menopausal    Review of bone density scan shows improvement in density so we will continue Prolia-give today and check BMP in 10 days.    -Reiterated to patient fall precautions  -Change to calcium citrate: they will verify what dose or type they are taking and reach goal 1200 Mg daily  -Continue vitamin D  - CMP vitamin D prior to next ov  -DEXA current from October 2023    2. Bilateral knee OA    - Last x-rays from December 2021. - Symptoms are minor so patient is not interested in pursuing anything invasive at this time. If increasing symptoms she will contact our office to complete x-rays bilateral knees and schedule steroid injections versus gel injections. Recommended OTC therapies for symptoms which she will pursue    3.   Muscle cramping: Encouraged hydration, stretching, correction of electrolyte imbalances    Follow-up in 6 months with CMP vitamin D prior on same day of [address] [facility] [address] [facility]  [phone]

## 2023-10-10 ENCOUNTER — OFFICE VISIT (OUTPATIENT)
Dept: FAMILY MEDICINE CLINIC | Facility: CLINIC | Age: 78
End: 2023-10-10
Payer: MEDICARE

## 2023-10-10 VITALS
DIASTOLIC BLOOD PRESSURE: 70 MMHG | HEIGHT: 62 IN | BODY MASS INDEX: 28.52 KG/M2 | HEART RATE: 91 BPM | OXYGEN SATURATION: 99 % | SYSTOLIC BLOOD PRESSURE: 100 MMHG | WEIGHT: 155 LBS

## 2023-10-10 DIAGNOSIS — D64.9 ANEMIA, UNSPECIFIED TYPE: ICD-10-CM

## 2023-10-10 DIAGNOSIS — I50.22 SYSTOLIC CHF, CHRONIC (HCC): ICD-10-CM

## 2023-10-10 DIAGNOSIS — E83.42 HYPOMAGNESEMIA: ICD-10-CM

## 2023-10-10 DIAGNOSIS — J44.9 STAGE 3 SEVERE COPD BY GOLD CLASSIFICATION (HCC): ICD-10-CM

## 2023-10-10 DIAGNOSIS — C81.90 HODGKIN LYMPHOMA, UNSPECIFIED HODGKIN LYMPHOMA TYPE, UNSPECIFIED BODY REGION (HCC): ICD-10-CM

## 2023-10-10 DIAGNOSIS — I95.9 HYPOTENSION, UNSPECIFIED HYPOTENSION TYPE: Primary | ICD-10-CM

## 2023-10-10 DIAGNOSIS — K51.919 ULCERATIVE COLITIS WITH COMPLICATION, UNSPECIFIED LOCATION (HCC): ICD-10-CM

## 2023-10-10 LAB
BASOPHILS # BLD: 0.1 K/UL (ref 0–0.2)
BASOPHILS NFR BLD: 1 % (ref 0–2)
DIFFERENTIAL METHOD BLD: ABNORMAL
EOSINOPHIL # BLD: 0.2 K/UL (ref 0–0.8)
EOSINOPHIL NFR BLD: 2 % (ref 0.5–7.8)
ERYTHROCYTE [DISTWIDTH] IN BLOOD BY AUTOMATED COUNT: 15.7 % (ref 11.9–14.6)
FERRITIN SERPL-MCNC: 74 NG/ML (ref 8–388)
HCT VFR BLD AUTO: 36.7 % (ref 35.8–46.3)
HGB BLD-MCNC: 11 G/DL (ref 11.7–15.4)
IMM GRANULOCYTES # BLD AUTO: 0 K/UL (ref 0–0.5)
IMM GRANULOCYTES NFR BLD AUTO: 0 % (ref 0–5)
IRON SERPL-MCNC: 51 UG/DL (ref 35–150)
LYMPHOCYTES # BLD: 1.7 K/UL (ref 0.5–4.6)
LYMPHOCYTES NFR BLD: 22 % (ref 13–44)
MCH RBC QN AUTO: 28.6 PG (ref 26.1–32.9)
MCHC RBC AUTO-ENTMCNC: 30 G/DL (ref 31.4–35)
MCV RBC AUTO: 95.6 FL (ref 82–102)
MONOCYTES # BLD: 1 K/UL (ref 0.1–1.3)
MONOCYTES NFR BLD: 13 % (ref 4–12)
NEUTS SEG # BLD: 4.7 K/UL (ref 1.7–8.2)
NEUTS SEG NFR BLD: 61 % (ref 43–78)
NRBC # BLD: 0 K/UL (ref 0–0.2)
PLATELET # BLD AUTO: 201 K/UL (ref 150–450)
PMV BLD AUTO: 11.6 FL (ref 9.4–12.3)
RBC # BLD AUTO: 3.84 M/UL (ref 4.05–5.2)
WBC # BLD AUTO: 7.7 K/UL (ref 4.3–11.1)

## 2023-10-10 PROCEDURE — 3023F SPIROM DOC REV: CPT | Performed by: FAMILY MEDICINE

## 2023-10-10 PROCEDURE — 1036F TOBACCO NON-USER: CPT | Performed by: FAMILY MEDICINE

## 2023-10-10 PROCEDURE — 1090F PRES/ABSN URINE INCON ASSESS: CPT | Performed by: FAMILY MEDICINE

## 2023-10-10 PROCEDURE — G8427 DOCREV CUR MEDS BY ELIG CLIN: HCPCS | Performed by: FAMILY MEDICINE

## 2023-10-10 PROCEDURE — 99214 OFFICE O/P EST MOD 30 MIN: CPT | Performed by: FAMILY MEDICINE

## 2023-10-10 PROCEDURE — G8484 FLU IMMUNIZE NO ADMIN: HCPCS | Performed by: FAMILY MEDICINE

## 2023-10-10 PROCEDURE — G8399 PT W/DXA RESULTS DOCUMENT: HCPCS | Performed by: FAMILY MEDICINE

## 2023-10-10 PROCEDURE — 1123F ACP DISCUSS/DSCN MKR DOCD: CPT | Performed by: FAMILY MEDICINE

## 2023-10-10 PROCEDURE — G8417 CALC BMI ABV UP PARAM F/U: HCPCS | Performed by: FAMILY MEDICINE

## 2023-10-10 ASSESSMENT — PATIENT HEALTH QUESTIONNAIRE - PHQ9
SUM OF ALL RESPONSES TO PHQ QUESTIONS 1-9: 0
2. FEELING DOWN, DEPRESSED OR HOPELESS: 0
SUM OF ALL RESPONSES TO PHQ9 QUESTIONS 1 & 2: 0
SUM OF ALL RESPONSES TO PHQ QUESTIONS 1-9: 0
1. LITTLE INTEREST OR PLEASURE IN DOING THINGS: 0

## 2023-10-10 NOTE — PROGRESS NOTES
SUBJECTIVE:   Huang Black is a 78 y.o. female who has a past medical history significant for hypertension, high cholesterol, chronic systolic heart failure, ulcerative colitis, lymphoma, TIA, COPD, osteoporosis, spinal compression fractures, recurrent depression and bilateral carotid artery disease. Patient presents today By her daughter. Patient apparently has been experiencing challenges with hypotension and reflex tachycardia. Daughter reports that her cardiologist has decreased her Toprol from 50 mg to 25 mg. She continues on HUNDSHAGEN. Lasix has been decreased to only as needed based upon a 2 to 3 pound weight gain. Patient reports that she is not having any dizziness or orthostatic symptoms with her low blood pressure. I reviewed the medical record and as of September 13 her creatinine was 1.1 and magnesium was 1.8. Apparently due to the continued issues with her magnesium being low and apparent renal loss of magnesium she has been referred to nephrology. Of note also is that she had a hemoglobin of 10.9 with some blood work done in August.  She reports no bright red blood but states that sometimes she has \"dark stools\". She states that she has always had this was associated with her Crohn's. Patient reports no active chest pain. Her O2 sats at home have been averaging 94 to 95%. However she has not checked her O2 sats with exertion only at rest.  She does wear her oxygen at night she states. HPI  See above    Past Medical History, Past Surgical History, Family history, Social History, and Medications were all reviewed with the patient today and updated as necessary.        Current Outpatient Medications   Medication Sig Dispense Refill    Calcium Citrate-Vitamin D (CITRACAL + D PO) Take 1 tablet by mouth daily      budesonide (PULMICORT) 0.5 MG/2ML nebulizer suspension Take 2 mLs by nebulization 2 times daily Rinse mouth after use,  Dx J44.9 please bill to medicare pt B 360 mL 3

## 2023-10-12 ENCOUNTER — TELEPHONE (OUTPATIENT)
Dept: FAMILY MEDICINE CLINIC | Facility: CLINIC | Age: 78
End: 2023-10-12

## 2023-10-12 NOTE — TELEPHONE ENCOUNTER
Merryreidisidra Soliz was called and informed that pt's hemoglobin is stable and iron levels are normal.  For now as long as her hemoglobin does not drop below 10.5 nothing else is needed to be done. Daughter expressed understanding with no further questions.

## 2023-10-12 NOTE — TELEPHONE ENCOUNTER
----- Message from Shannon Atkins MD sent at 10/11/2023  8:02 AM EDT -----  Please inform patient and/or daughter that hemoglobin is stable and iron levels are normal.  For now as long as her hemoglobin does not drop below 10.5 nothing else is needed to be done.  ----- Message -----  From: Allyn Fuentes Incoming [place] W[employer]  Sent: 10/10/2023   8:52 PM EDT  To: Shannon Atkins MD

## 2023-10-17 ENCOUNTER — CARE COORDINATION (OUTPATIENT)
Dept: CARE COORDINATION | Facility: CLINIC | Age: 78
End: 2023-10-17

## 2023-10-17 NOTE — CARE COORDINATION
Ambulatory Care Coordination Note  10/17/2023    Patient Current Location:  [facility] [place], [facility]     ACM contacted the patient by telephone. Verified name and  with patient as identifiers. Provided introduction to self, and explanation of the ACM role. Challenges to be reviewed by the provider   Additional needs identified to be addressed with provider: No  none               Method of communication with provider: phone. ACM: Deniz Mcnulty RN    F/u call with pt. Spoke with dtr/Mayra and pt BP is still running on the low side, saw PCP,  blood work was done, and pt is asymptomatic so no further work up was needed. Pt sees Pulm tomorrow and Cardiology on . They will address the Toprol dose with them at that time. Last visit Toprol was changed form 50 mg to 25mg, but it has not seem to make a difference in her BP. Pt has been compliant with RPM. Will check back in 2 weeks on 10/31. Offered patient enrollment in the Remote Patient Monitoring (RPM) program for in-home monitoring: Yes, patient already enrolled.     Lab Results       None                 Goals Addressed                      This Visit's Progress      Care Coordination Self Management   On track      CC Self Management Goal  Patient Goal (What steps will patient take to achieve goal?):   Patient is able to discuss self-management of condition(s):  Pt demonstrates adherence to medications  Pt demonstrates understanding of self-monitoring  Patient is able to identify Red Flags:  Alert to potential adverse drug reactions(s) or side effects and actions to take should they arise  Discuss target symptoms and actions to take should they arise  Identify problems that require immediate PCP or specialist visit  Patient demonstrates understanding of access to PCP/Specialist:  Understands about scheduling routine Follow Up appointments   Understands about sick day appointment options for worsening of symptoms/progression (Same Day, E Visits)

## 2023-10-18 ENCOUNTER — TELEPHONE (OUTPATIENT)
Dept: PULMONOLOGY | Age: 78
End: 2023-10-18

## 2023-10-18 NOTE — TELEPHONE ENCOUNTER
Patient had appointment with Dr. Marilyn Higuera today but daughter thought it was this afternoon. Calling now to say the she is having some SOB and 02 is dropping some to 92to 93%.   Asking if she should be seen before January

## 2023-10-19 NOTE — TELEPHONE ENCOUNTER
Last seen: 9/6/23  Hx: COPD, pleural plaque w/ asbestos, nocturnal hypoxia, h/o lymphoma & colon Ca    Family asking if planned f/u in January is soon enough with sob, O2 drops to low 90s. Confirmed DES for daughter Vish Beltre. Some chest congestion, saw PCP last week, negative viral testing, using nebulizers, continues lasix. Cough is productive of clear to yellow sputum; albuterol helps, using 2-3 times a day. Sob is worst w/ activity. Using pulmicort in nebulizer as ordered. Worked in for office evaluation 10/23.

## 2023-10-20 ENCOUNTER — CARE COORDINATION (OUTPATIENT)
Dept: CARE COORDINATION | Facility: CLINIC | Age: 78
End: 2023-10-20

## 2023-10-20 NOTE — CARE COORDINATION
Josh/Mayra called the RPM # asking for a clinical. I called back and she wanted to inform someone that she is going out of town. Her sister has \"mom\" at her house. They do have the equipment, but not the machine that they enter the information in. They will write everything down and enter it on Monday. I sent a message back to Snehal Zayas.

## 2023-10-23 ENCOUNTER — OFFICE VISIT (OUTPATIENT)
Dept: PULMONOLOGY | Age: 78
End: 2023-10-23
Payer: MEDICARE

## 2023-10-23 VITALS
HEIGHT: 62 IN | BODY MASS INDEX: 28.52 KG/M2 | HEART RATE: 69 BPM | RESPIRATION RATE: 17 BRPM | DIASTOLIC BLOOD PRESSURE: 72 MMHG | WEIGHT: 155 LBS | OXYGEN SATURATION: 95 % | SYSTOLIC BLOOD PRESSURE: 110 MMHG

## 2023-10-23 DIAGNOSIS — R54 FRAILTY SYNDROME IN GERIATRIC PATIENT: ICD-10-CM

## 2023-10-23 DIAGNOSIS — J44.9 STAGE 3 SEVERE COPD BY GOLD CLASSIFICATION (HCC): Primary | ICD-10-CM

## 2023-10-23 DIAGNOSIS — I42.8 NICM (NONISCHEMIC CARDIOMYOPATHY) (HCC): ICD-10-CM

## 2023-10-23 DIAGNOSIS — J92.0 PLEURAL PLAQUE WITH PRESENCE OF ASBESTOS: ICD-10-CM

## 2023-10-23 DIAGNOSIS — G47.34 NOCTURNAL HYPOXIA: ICD-10-CM

## 2023-10-23 DIAGNOSIS — R06.09 DYSPNEA ON EXERTION: ICD-10-CM

## 2023-10-23 PROCEDURE — 1123F ACP DISCUSS/DSCN MKR DOCD: CPT | Performed by: NURSE PRACTITIONER

## 2023-10-23 PROCEDURE — 1036F TOBACCO NON-USER: CPT | Performed by: NURSE PRACTITIONER

## 2023-10-23 PROCEDURE — 1090F PRES/ABSN URINE INCON ASSESS: CPT | Performed by: NURSE PRACTITIONER

## 2023-10-23 PROCEDURE — G8417 CALC BMI ABV UP PARAM F/U: HCPCS | Performed by: NURSE PRACTITIONER

## 2023-10-23 PROCEDURE — 99215 OFFICE O/P EST HI 40 MIN: CPT | Performed by: NURSE PRACTITIONER

## 2023-10-23 PROCEDURE — G8399 PT W/DXA RESULTS DOCUMENT: HCPCS | Performed by: NURSE PRACTITIONER

## 2023-10-23 PROCEDURE — G8484 FLU IMMUNIZE NO ADMIN: HCPCS | Performed by: NURSE PRACTITIONER

## 2023-10-23 PROCEDURE — G8427 DOCREV CUR MEDS BY ELIG CLIN: HCPCS | Performed by: NURSE PRACTITIONER

## 2023-10-23 PROCEDURE — 3023F SPIROM DOC REV: CPT | Performed by: NURSE PRACTITIONER

## 2023-10-23 RX ORDER — CITALOPRAM 20 MG/1
TABLET ORAL
Qty: 90 TABLET | Refills: 3 | Status: SHIPPED | OUTPATIENT
Start: 2023-10-23

## 2023-10-23 NOTE — PATIENT INSTRUCTIONS
Shortness of breath is related to the severity of your CHF and the severity of your lung disease. There is no cure for these, but can be managed symptomatically. Use Pulmicort (budesonide) twice daily. Use albuterol-ipratropium 3 times daily in nebulizer. Recommend pulmonary rehab to increase muscle strength. The stronger your muscles are, the less short of breath you will be.     I have referred you to pulmonary rehab    Phone:  [address]  [address] [facility], [address] [place] [address] / [place], [address] [place] [address]

## 2023-10-23 NOTE — PROGRESS NOTES
Name:  Huang Black  YOB: 1945   MRN: [id number]      Office Visit: 10/23/2023        ASSESSMENT AND PLAN:  (Medical Decision Making)    Impression: 78 y.o. female with severe COPD, NICM, and nocturnal hypoxemia, presents with CHUA. 1. Stage 3 severe COPD by GOLD classification ([address])  --Discussed with patient proper use of medications. She needs to be using her budesonide twice a day and her DuoNeb and minimal if 3 times a day. - Ambulatory referral to Pulmonary Rehab    2. Pleural plaque with presence of asbestos  --unchanged. 3. Nocturnal hypoxia  --Continue O2 at 2 L/min with sleep. She reports receiving benefit from this. She does not desaturate with exertion and does not need daytime oxygen use. 4. Dyspnea on exertion  --This is multifactorial related to the severity of her congestive heart failure and her COPD. Discussed with patient the natural disease trajectory use of both of these illnesses. She is nearing the point that she needs palliative care management of her dyspnea. For now although I want to see if getting her in pulmonary rehab will help strengthen her muscles so that she has less shortness of breath. 5. NICM (nonischemic cardiomyopathy) ([address])  --Per cardiology. 6. Frailty syndrome in geriatric patient  --Needs to increase her muscle strength-see above. Follow-up and Dispositions    Return for January--Bridgette--COPD, Arrive 15 minutes prior to appt time. Collaborating physician is Dr. Chele Mercedes. HEBERT Reza, APRN - CNP    Villafuerte/Coutu=pulmonary team    Total time for encounter on day of encounter was 40 minutes. This time includes chart prep, review of tests/procedures, review of other provider's notes, documentation and counseling patient regarding disease process and medications. _________________________________________________________________________    HISTORY OF PRESENT ILLNESS:    Ms. Manny Wise is a 78 y.o.

## 2023-10-26 ENCOUNTER — CARE COORDINATION (OUTPATIENT)
Dept: CARE COORDINATION | Facility: CLINIC | Age: 78
End: 2023-10-26

## 2023-10-26 NOTE — CARE COORDINATION
Remote Patient Monitoring Note      Date/Time:  10/26/2023 10:45 AM  Patient Current Location: [place]  LPN attempted to contact patient by telephone regarding red alert received for pulse ox reading (91%). Background: Pt enrolled in RPM r/t CHF, Kidney Disease, and COPD. Clinical Interventions:  Left HIPAA compliant message requesting a return call. Plan/Follow Up: Will continue to review, monitor and address alerts with follow up based on severity of symptoms and risk factors.

## 2023-10-26 NOTE — CARE COORDINATION
Remote Alert Monitoring Note  Rpm alert to be reviewed by the provider   red alert   pulse ox reading (91%)   Additional needs to be addressed by N/A: No                    Date/Time:  10/26/2023 10:49 AM  Patient Current Location: Home: [address] [place] [address]  LPN attempted to contact patient by telephone. Return call received from patients emergency contact / daughter, Yolis Haddad. Verified patients name and  as identifiers. Background: Pt enrolled in RPM r/t CHF, Kidney Disease, and COPD. Refer to 911 immediately if:  Patient unresponsive or unable to provide history  Change in cognition or sudden confusion  Patient unable to respond in complete sentences  Intense chest pain/tightness  Any concern for any clinical emergency  Red Alert: Provider response time of 1 hr required for any red alert requiring intervention  Yellow Alert: Provider response time of 3hr required for any escalated yellow alert    O2 Triage  Are you having any Chest Pain? no   Are you having any Shortness of Breath? no   Swelling in your hands or feet? no     Are you having any other health concerns or issues? no      Clinical Interventions: Reviewed and followed up on alerts and treatments-Per patients daughter, pt denies CP, SOB, and new or worsening edema at this time. Pt agreeable to recheck pulse ox. Update reading 93% and HR 98 noted in HRS. Patients daughter v/u of when to notify provider(s) of changes / concerns and when to seek emergent medical care for pt. No further action necessary at this time. Plan/Follow Up: Will continue to review, monitor and address alerts with follow up based on severity of symptoms and risk factors.

## 2023-10-31 ENCOUNTER — CARE COORDINATION (OUTPATIENT)
Dept: CARE COORDINATION | Facility: CLINIC | Age: 78
End: 2023-10-31

## 2023-10-31 NOTE — CARE COORDINATION
F/u call made, no answer and a message was left. Pt informed that if they have any questions, concerns or any needs that need to be addressed to please call back.  Otherwise I will reach out again on 11/7

## 2023-11-06 ENCOUNTER — NURSE ONLY (OUTPATIENT)
Dept: FAMILY MEDICINE CLINIC | Facility: CLINIC | Age: 78
End: 2023-11-06

## 2023-11-06 ENCOUNTER — OFFICE VISIT (OUTPATIENT)
Age: 78
End: 2023-11-06
Payer: MEDICARE

## 2023-11-06 VITALS
BODY MASS INDEX: 28.63 KG/M2 | HEART RATE: 82 BPM | DIASTOLIC BLOOD PRESSURE: 60 MMHG | SYSTOLIC BLOOD PRESSURE: 112 MMHG | HEIGHT: 62 IN | WEIGHT: 155.6 LBS

## 2023-11-06 DIAGNOSIS — I10 PRIMARY HYPERTENSION: ICD-10-CM

## 2023-11-06 DIAGNOSIS — E78.1 PURE HYPERGLYCERIDEMIA: ICD-10-CM

## 2023-11-06 DIAGNOSIS — I47.29 NSVT (NONSUSTAINED VENTRICULAR TACHYCARDIA) (HCC): ICD-10-CM

## 2023-11-06 DIAGNOSIS — E78.00 PURE HYPERCHOLESTEROLEMIA: ICD-10-CM

## 2023-11-06 DIAGNOSIS — I25.119 CORONARY ARTERY DISEASE INVOLVING NATIVE CORONARY ARTERY OF NATIVE HEART WITH ANGINA PECTORIS (HCC): ICD-10-CM

## 2023-11-06 DIAGNOSIS — Z95.810 PRESENCE OF AUTOMATIC (IMPLANTABLE) CARDIAC DEFIBRILLATOR: ICD-10-CM

## 2023-11-06 DIAGNOSIS — I42.8 NICM (NONISCHEMIC CARDIOMYOPATHY) (HCC): ICD-10-CM

## 2023-11-06 DIAGNOSIS — I50.22 SYSTOLIC CHF, CHRONIC (HCC): ICD-10-CM

## 2023-11-06 DIAGNOSIS — I42.0 DILATED CARDIOMYOPATHY (HCC): Primary | ICD-10-CM

## 2023-11-06 LAB
ALBUMIN SERPL-MCNC: 3.8 G/DL (ref 3.2–4.6)
ALBUMIN/GLOB SERPL: 1.2 (ref 0.4–1.6)
ALP SERPL-CCNC: 41 U/L (ref 50–136)
ALT SERPL-CCNC: 17 U/L (ref 12–65)
ANION GAP SERPL CALC-SCNC: 5 MMOL/L (ref 2–11)
AST SERPL-CCNC: 21 U/L (ref 15–37)
BILIRUB SERPL-MCNC: 0.4 MG/DL (ref 0.2–1.1)
BUN SERPL-MCNC: 23 MG/DL (ref 8–23)
CALCIUM SERPL-MCNC: 9.4 MG/DL (ref 8.3–10.4)
CHLORIDE SERPL-SCNC: 110 MMOL/L (ref 101–110)
CHOLEST SERPL-MCNC: 274 MG/DL
CO2 SERPL-SCNC: 27 MMOL/L (ref 21–32)
CREAT SERPL-MCNC: 1.5 MG/DL (ref 0.6–1)
GLOBULIN SER CALC-MCNC: 3.1 G/DL (ref 2.8–4.5)
GLUCOSE SERPL-MCNC: 105 MG/DL (ref 65–100)
HDLC SERPL-MCNC: 51 MG/DL (ref 40–60)
HDLC SERPL: 5.4
LDLC SERPL CALC-MCNC: 185.8 MG/DL
POTASSIUM SERPL-SCNC: 4.2 MMOL/L (ref 3.5–5.1)
PROT SERPL-MCNC: 6.9 G/DL (ref 6.3–8.2)
SODIUM SERPL-SCNC: 142 MMOL/L (ref 133–143)
TRIGL SERPL-MCNC: 186 MG/DL (ref 35–150)
VLDLC SERPL CALC-MCNC: 37.2 MG/DL (ref 6–23)

## 2023-11-06 PROCEDURE — G8417 CALC BMI ABV UP PARAM F/U: HCPCS | Performed by: INTERNAL MEDICINE

## 2023-11-06 PROCEDURE — 1036F TOBACCO NON-USER: CPT | Performed by: INTERNAL MEDICINE

## 2023-11-06 PROCEDURE — 99214 OFFICE O/P EST MOD 30 MIN: CPT | Performed by: INTERNAL MEDICINE

## 2023-11-06 PROCEDURE — 1090F PRES/ABSN URINE INCON ASSESS: CPT | Performed by: INTERNAL MEDICINE

## 2023-11-06 PROCEDURE — G8484 FLU IMMUNIZE NO ADMIN: HCPCS | Performed by: INTERNAL MEDICINE

## 2023-11-06 PROCEDURE — G8428 CUR MEDS NOT DOCUMENT: HCPCS | Performed by: INTERNAL MEDICINE

## 2023-11-06 PROCEDURE — 1123F ACP DISCUSS/DSCN MKR DOCD: CPT | Performed by: INTERNAL MEDICINE

## 2023-11-06 PROCEDURE — G8399 PT W/DXA RESULTS DOCUMENT: HCPCS | Performed by: INTERNAL MEDICINE

## 2023-11-06 NOTE — PROGRESS NOTES
[address] [facility], [facility], [address]  PHONE: [phone]     23    NAME:  Joni Lowe  : 1945  MRN: [id number]       SUBJECTIVE:   Joni Lowe is a 78 y.o. female seen for a follow up visit regarding the following:     Chief Complaint   Patient presents with    Congestive Heart Failure       HPI:   Here for vascular dz follow up.   2017 TIA sx  She has crohn's dz. Echo  showing EF 30-35%, mod MR   2017 and 2018 and 10/2022 Carotid with mod DEIDRE and LICA stenosis   2649: ICD implant   Echo 3/2022: EF 40-45%     LHC/RHC  2022:     Mild nonobstructive coronary artery disease    Low normal LV systolic function    Mild pulmonary hypertension      Some CHUA, not as active. more lasix as needed. On couch most days. No CP, pressure. Using walker/cane now, seeing ortho for knee injections. No CP, pressure. Following weights with lasix plan. She has NYHA Class II sx now. Energy poor now. Stamina poor. Patient denies recent history of orthopnea, PND, excessive dizziness and/or syncope. Past Medical History, Past Surgical History, Family history, Social History, and Medications were all reviewed with the patient today and updated as necessary.      Current Outpatient Medications   Medication Sig Dispense Refill    citalopram (CELEXA) 20 MG tablet TAKE 1 TABLET DAILY 90 tablet 3    Magnesium Cl-Calcium Carbonate (SLOW-MAG PO) Take 2 tablets by mouth in the morning and at bedtime      Calcium Citrate-Vitamin D (CITRACAL + D PO) Take 1 tablet by mouth daily      budesonide (PULMICORT) 0.5 MG/2ML nebulizer suspension Take 2 mLs by nebulization 2 times daily Rinse mouth after use,  Dx J44.9 please bill to medicare pt B (Patient taking differently: Take 2 mLs by nebulization in the morning, at noon, and at bedtime Rinse mouth after use,  Dx J44.9 please bill to medicare pt B) 360 mL 3    ipratropium 0.5 mg-albuterol 2.5 mg (DUONEB)

## 2023-11-07 ENCOUNTER — CARE COORDINATION (OUTPATIENT)
Dept: CARE COORDINATION | Facility: CLINIC | Age: 78
End: 2023-11-07

## 2023-11-07 NOTE — CARE COORDINATION
F/u call made, no answer and a message was left. Pt informed that if they have any questions, concerns or any needs that need to be addressed to please call back. Otherwise I will reach out again on 11/14.

## 2023-11-08 PROCEDURE — 93296 REM INTERROG EVL PM/IDS: CPT | Performed by: INTERNAL MEDICINE

## 2023-11-08 PROCEDURE — 93295 DEV INTERROG REMOTE 1/2/MLT: CPT | Performed by: INTERNAL MEDICINE

## 2023-11-15 ENCOUNTER — CARE COORDINATION (OUTPATIENT)
Dept: CARE COORDINATION | Facility: CLINIC | Age: 78
End: 2023-11-15

## 2023-11-15 ASSESSMENT — PATIENT HEALTH QUESTIONNAIRE - PHQ9
SUM OF ALL RESPONSES TO PHQ QUESTIONS 1-9: 0
2. FEELING DOWN, DEPRESSED OR HOPELESS: 0
SUM OF ALL RESPONSES TO PHQ9 QUESTIONS 1 & 2: 0
SUM OF ALL RESPONSES TO PHQ QUESTIONS 1-9: 0
1. LITTLE INTEREST OR PLEASURE IN DOING THINGS: 0
SUM OF ALL RESPONSES TO PHQ QUESTIONS 1-9: 0
SUM OF ALL RESPONSES TO PHQ QUESTIONS 1-9: 0

## 2023-11-15 NOTE — CARE COORDINATION
Ambulatory Care Coordination Note  11/15/2023    Patient Current Location:  [place]     ACM contacted the family by telephone. Verified name and  with family as identifiers. Provided introduction to self, and explanation of the ACM role. Challenges to be reviewed by the provider   Additional needs identified to be addressed with provider: No  none               Method of communication with provider: phone. ACM: Marcos Berrios RN    F/u call ans spoke with danishar/Mayra. Pt is doing fairy well, she has good ans ok days. They did receive the HF paperwork and we went over that information. No other concerns or questions at this time and I will reach out in 2  weeks. Heart Failure Education outreach Date/Time: 11/15/2023 2:37 PM    Ambulatory Care Manager (ACM) contacted the family by telephone to perform Ambulatory Care Coordination. Verified name and  with family as identifiers. Provided introduction to self, and explanation of the Ambulatory Care Manager's role. ACM reviewed that a Health Healthy tips for the Holiday packet has been mailed to the them. ACM reviewed   , CHF zones, daily weights, fluid restriction, the importance of low sodium diet, and healthy tips packet with the family. Instructed family to call their PCP if they have a weight gain of 3 lbs in 2 days or 5 lbs in a week. Patient reminded that there is a physician on call 24 hours a day / 7 days a week should the patient have questions or concerns. The family verbalized understanding. Offered patient enrollment in the Remote Patient Monitoring (RPM) program for in-home monitoring: Yes, patient already enrolled.     Lab Results       None            Care Coordination Interventions    Referral from Primary Care Provider: No  Suggested Interventions and Community Resources  Other Services or Interventions: Current with RPM          Goals Addressed                      This Visit's Progress      Care Coordination Self

## 2023-11-19 ENCOUNTER — HOSPITAL ENCOUNTER (EMERGENCY)
Age: 78
Discharge: HOME | End: 2023-11-19
Payer: MEDICARE

## 2023-11-19 ENCOUNTER — APPOINTMENT (OUTPATIENT)
Dept: GENERAL RADIOLOGY | Age: 78
End: 2023-11-19
Payer: MEDICARE

## 2023-11-19 VITALS
DIASTOLIC BLOOD PRESSURE: 70 MMHG | OXYGEN SATURATION: 96 % | HEART RATE: 96 BPM | TEMPERATURE: 98.1 F | SYSTOLIC BLOOD PRESSURE: 110 MMHG | RESPIRATION RATE: 18 BRPM

## 2023-11-19 DIAGNOSIS — U07.1 COVID: Primary | ICD-10-CM

## 2023-11-19 LAB
ALBUMIN SERPL-MCNC: 3.7 G/DL (ref 3.2–4.6)
ALBUMIN/GLOB SERPL: 1 (ref 0.4–1.6)
ALP SERPL-CCNC: 43 U/L (ref 50–136)
ALT SERPL-CCNC: 26 U/L (ref 12–65)
ANION GAP SERPL CALC-SCNC: 2 MMOL/L (ref 2–11)
APPEARANCE UR: ABNORMAL
AST SERPL-CCNC: 35 U/L (ref 15–37)
BASOPHILS # BLD: 0 K/UL (ref 0–0.2)
BASOPHILS NFR BLD: 0 % (ref 0–2)
BILIRUB SERPL-MCNC: 0.5 MG/DL (ref 0.2–1.1)
BILIRUB UR QL: NEGATIVE
BUN SERPL-MCNC: 14 MG/DL (ref 8–23)
CALCIUM SERPL-MCNC: 8.8 MG/DL (ref 8.3–10.4)
CHLORIDE SERPL-SCNC: 106 MMOL/L (ref 101–110)
CO2 SERPL-SCNC: 25 MMOL/L (ref 21–32)
COLOR UR: ABNORMAL
CREAT SERPL-MCNC: 1.4 MG/DL (ref 0.6–1)
DIFFERENTIAL METHOD BLD: ABNORMAL
EKG ATRIAL RATE: 96 BPM
EKG DIAGNOSIS: NORMAL
EKG P AXIS: 42 DEGREES
EKG P-R INTERVAL: 150 MS
EKG Q-T INTERVAL: 370 MS
EKG QRS DURATION: 106 MS
EKG QTC CALCULATION (BAZETT): 467 MS
EKG R AXIS: -61 DEGREES
EKG T AXIS: 36 DEGREES
EKG VENTRICULAR RATE: 96 BPM
EOSINOPHIL # BLD: 0 K/UL (ref 0–0.8)
EOSINOPHIL NFR BLD: 0 % (ref 0.5–7.8)
ERYTHROCYTE [DISTWIDTH] IN BLOOD BY AUTOMATED COUNT: 14.6 % (ref 11.9–14.6)
FLUAV RNA SPEC QL NAA+PROBE: NOT DETECTED
FLUBV RNA SPEC QL NAA+PROBE: NOT DETECTED
GLOBULIN SER CALC-MCNC: 3.7 G/DL (ref 2.8–4.5)
GLUCOSE SERPL-MCNC: 111 MG/DL (ref 65–100)
GLUCOSE UR STRIP.AUTO-MCNC: NEGATIVE MG/DL
HCT VFR BLD AUTO: 35.6 % (ref 35.8–46.3)
HGB BLD-MCNC: 11.1 G/DL (ref 11.7–15.4)
HGB UR QL STRIP: NEGATIVE
IMM GRANULOCYTES # BLD AUTO: 0 K/UL (ref 0–0.5)
IMM GRANULOCYTES NFR BLD AUTO: 0 % (ref 0–5)
KETONES UR QL STRIP.AUTO: ABNORMAL MG/DL
LEUKOCYTE ESTERASE UR QL STRIP.AUTO: NEGATIVE
LYMPHOCYTES # BLD: 0.9 K/UL (ref 0.5–4.6)
LYMPHOCYTES NFR BLD: 16 % (ref 13–44)
MAGNESIUM SERPL-MCNC: 2 MG/DL (ref 1.8–2.4)
MCH RBC QN AUTO: 28.5 PG (ref 26.1–32.9)
MCHC RBC AUTO-ENTMCNC: 31.2 G/DL (ref 31.4–35)
MCV RBC AUTO: 91.3 FL (ref 82–102)
MONOCYTES # BLD: 1 K/UL (ref 0.1–1.3)
MONOCYTES NFR BLD: 19 % (ref 4–12)
NEUTS SEG # BLD: 3.6 K/UL (ref 1.7–8.2)
NEUTS SEG NFR BLD: 65 % (ref 43–78)
NITRITE UR QL STRIP.AUTO: NEGATIVE
NRBC # BLD: 0 K/UL (ref 0–0.2)
NT PRO BNP: 1617 PG/ML
PH UR STRIP: 5 (ref 5–9)
PLATELET # BLD AUTO: 177 K/UL (ref 150–450)
PMV BLD AUTO: 10.7 FL (ref 9.4–12.3)
POTASSIUM SERPL-SCNC: 4 MMOL/L (ref 3.5–5.1)
PROT SERPL-MCNC: 7.4 G/DL (ref 6.3–8.2)
PROT UR STRIP-MCNC: NEGATIVE MG/DL
RBC # BLD AUTO: 3.9 M/UL (ref 4.05–5.2)
SARS-COV-2 RDRP RESP QL NAA+PROBE: DETECTED
SODIUM SERPL-SCNC: 133 MMOL/L (ref 133–143)
SOURCE: ABNORMAL
SP GR UR REFRACTOMETRY: 1.02 (ref 1–1.02)
TROPONIN I SERPL HS-MCNC: 45.8 PG/ML (ref 0–14)
UROBILINOGEN UR QL STRIP.AUTO: 0.2 EU/DL (ref 0.2–1)
WBC # BLD AUTO: 5.6 K/UL (ref 4.3–11.1)

## 2023-11-19 PROCEDURE — 2580000003 HC RX 258: Performed by: PHYSICIAN ASSISTANT

## 2023-11-19 PROCEDURE — 87635 SARS-COV-2 COVID-19 AMP PRB: CPT

## 2023-11-19 PROCEDURE — 93010 ELECTROCARDIOGRAM REPORT: CPT | Performed by: INTERNAL MEDICINE

## 2023-11-19 PROCEDURE — 71045 X-RAY EXAM CHEST 1 VIEW: CPT

## 2023-11-19 PROCEDURE — 83735 ASSAY OF MAGNESIUM: CPT

## 2023-11-19 PROCEDURE — 93005 ELECTROCARDIOGRAM TRACING: CPT | Performed by: PHYSICIAN ASSISTANT

## 2023-11-19 PROCEDURE — 99285 EMERGENCY DEPT VISIT HI MDM: CPT

## 2023-11-19 PROCEDURE — 81003 URINALYSIS AUTO W/O SCOPE: CPT

## 2023-11-19 PROCEDURE — 84484 ASSAY OF TROPONIN QUANT: CPT

## 2023-11-19 PROCEDURE — 85025 COMPLETE CBC W/AUTO DIFF WBC: CPT

## 2023-11-19 PROCEDURE — 87502 INFLUENZA DNA AMP PROBE: CPT

## 2023-11-19 PROCEDURE — 80053 COMPREHEN METABOLIC PANEL: CPT

## 2023-11-19 PROCEDURE — 83880 ASSAY OF NATRIURETIC PEPTIDE: CPT

## 2023-11-19 RX ORDER — 0.9 % SODIUM CHLORIDE 0.9 %
500 INTRAVENOUS SOLUTION INTRAVENOUS
Status: COMPLETED | OUTPATIENT
Start: 2023-11-19 | End: 2023-11-19

## 2023-11-19 RX ADMIN — SODIUM CHLORIDE 500 ML: 9 INJECTION, SOLUTION INTRAVENOUS at 12:03

## 2023-11-19 NOTE — ED TRIAGE NOTES
Pt arrived via POV c/o flank pain, urinary retention, nausea and vomiting. Denies CP/SOB. Hx CHF, CAD.

## 2023-11-19 NOTE — DISCHARGE INSTRUCTIONS
Rest , push fluids continue  at home medications take daily vitamin C return to ER if severe chest pain shortness of breath

## 2023-11-19 NOTE — ED PROVIDER NOTES
Magnesium    Urinalysis w rflx microscopic    Troponin    Brain Natriuretic Peptide    EKG 12 Lead        Medications given during this emergency department visit:  Medications   sodium chloride 0.9 % bolus 500 mL (500 mLs IntraVENous New Bag 11/19/23 1203)       New Prescriptions    No medications on file        Past Medical History:   Diagnosis Date    Abnormal mammogram     Acute chest syndrome ([address]) 7/20/2016    Asbestosis ([address]) 7/20/2016    Asthma     C. difficile colitis     Carotid artery disease ([address])     Carotid artery stenosis without cerebral infarction 7/20/2016    CHF (congestive heart failure) (HCC)     Chronic hoarseness     Chronic obstructive pulmonary disease (HCC)     Chronic renal disease, stage III [facility]) [[id number]] 08/15/2023    Colitis, ulcerative (HCC)     Colitis, ulcerative (HCC)     Colon cancer ([address])     x2    Compression fx, lumbar spine ([address])     she had 4, 1 from bending over and the other 2 from falling    Coronary artery disease involving native coronary artery of native heart with angina pectoris ([address]) 11/03/2022    Depression 7/9/2015    Dyslipidemia 7/20/2016    Elevated LFTs     GERD (gastroesophageal reflux disease) 7/9/2015    GERD & CROHNS & hx of colon cancer    Heart failure ([address])     High cholesterol 7/9/2015    Hyperlipidemia 7/20/2016    Hypertriglyceridemia     Hypokalemia     Hypomagnesemia     Lymphoma (HCC)     Menopause     Osteoarthrosis, unspecified whether generalized or localized, unspecified site 8/27/2015    Osteoporosis 7/9/2015    Presence of combination internal cardiac defibrillator (ICD) and pacemaker     Stroke [facility])     TIA    Tobacco use disorder 7/20/2016        Past Surgical History:   Procedure Laterality Date    APPENDECTOMY      BREAST BIOPSY      Benign    CARDIAC PROCEDURE N/A 11/14/2022    LEFT AND RIGHT HEART CATH / CORONARY ANGIOGRAPHY performed by Richard Burris MD at [facility] CATH LAB    CHOLECYSTECTOMY      OTHER SURGICAL HISTORY

## 2023-11-19 NOTE — DISCHARGE INSTR - COC
hours) at 2023 1436  Last data filed at 2023 1410  Gross per 24 hour   Intake 500.97 ml   Output --   Net 500.97 ml     No intake/output data recorded.     Safety Concerns:     [address] [place] [address] Safety Concerns:827823006}    Impairments/Disabilities:      [address] [place] [address] Impairments/Disabilities:124322231}    Nutrition Therapy:  Current Nutrition Therapy:   [address] [place] [address] Diet List:650655837}    Routes of Feeding: {[facility] DME Other Feedings:919474379}  Liquids: {Slp liquid thickness:66857}  Daily Fluid Restriction: {CHP DME Yes amt example:451192669}  Last Modified Barium Swallow with Video (Video Swallowing Test): {Done Not Done PISU:136634755}    Treatments at the Time of Hospital Discharge:   Respiratory Treatments: ***  Oxygen Therapy:  {Therapy; copd oxygen:84295}  Ventilator:    { CC Vent HRAE:634524363}    Rehab Therapies: {THERAPEUTIC INTERVENTION:8555215538}  Weight Bearing Status/Restrictions: [address] [place] [address] Weight Bearin}  Other Medical Equipment (for information only, NOT a DME order):  {EQUIPMENT:214684343}  Other Treatments: ***    Patient's personal belongings (please select all that are sent with patient):  {[facility] DME Belongings:147614446}    RN SIGNATURE:  {Esignature:790021498}    CASE MANAGEMENT/SOCIAL WORK SECTION    Inpatient Status Date: ***    Readmission Risk Assessment Score:  Readmission Risk              Risk of Unplanned Readmission:  0           Discharging to Facility/ Agency   Name:   Address:  Phone:  Fax:    Dialysis Facility (if applicable)   Name:  Address:  Dialysis Schedule:  Phone:  Fax:    / signature: {Esignature:892100958}    PHYSICIAN SECTION    Prognosis: {Prognosis:7893723438}    Condition at Discharge: [address] [place] [address] Patient Condition:768645737}    Rehab Potential (if transferring to Rehab): {Prognosis:3031052949}    Recommended Labs or Other Treatments After Discharge: ***    Physician Certification: I certify the above information and transfer of [employer]

## 2023-11-20 ENCOUNTER — CARE COORDINATION (OUTPATIENT)
Dept: CARE COORDINATION | Facility: CLINIC | Age: 78
End: 2023-11-20

## 2023-11-20 NOTE — CARE COORDINATION
Remote Alert Monitoring Note  Rpm alert to be reviewed by the provider   red alert   pulse ox reading (91%)   Additional needs to be addressed by PCP: FYI update. Pt denies CP, SOB, and new or worsening edema at this time. SOB noted in 23 documentation from [address] Pt has an order for home O2 @ 2L qhs. Daughter states pt is currently wearing O2 due to being at rest and potentially falling back asleep. Pulse ox recheck of 98% and pulse ox HR of 96 reported and added to HRS per request. BP recheck of 94/54 and BP HR of 98 noted in HRS and are with RPM parameters. Per patients daughter, pt has had another BM since last phone encounter with no signs of blood noted. Patients daughter again verbalizes understanding of when to notify provider(s) of changes / concerns and when to seek emergent medical care. Date/Time:  2023 12:10 PM  Patient Current Location: Home: [facility] [address]  LPN  attempted to contact patient by telephone. Spoke with patients daughter / emergency contact, [facility]. Verified patients name and  as identifiers. Background: Pt enrolled in RPM r/t CHF, Kidney Disease, and COPD. Refer to 911 immediately if:  Patient unresponsive or unable to provide history  Change in cognition or sudden confusion  Patient unable to respond in complete sentences  Intense chest pain/tightness  Any concern for any clinical emergency  Red Alert: Provider response time of 1 hr required for any red alert requiring intervention  Yellow Alert: Provider response time of 3hr required for any escalated yellow alert    O2 Triage  Are you having any Chest Pain? no   Are you having any Shortness of Breath? no   Swelling in your hands or feet? no     Are you having any other health concerns or issues? no      Clinical Interventions: Reviewed and followed up on alerts and treatments-Pt denies CP, SOB, and new or worsening edema at this time.  SOB noted in
any Shortness of Breath? no   Are you having any dizziness? no   Are you feeling more fatigued or tired than normal? yes   Are you having any other health concerns or issues? yes      Clinical Interventions: Reviewed and followed up on alerts and treatments-Pt currently sleeping. Per patients daughter, pt is positive for COVID-19 as of 11/19/23. Pt presented to ED on 11/19/23 with c/o flank pain and suspected Crohn's flare up. Pt has not c/o CP, SOB, nor dizziness. Per patients daughter, pt is more fatigued than normal, ABD bloating noted, pt c/o bright red rectal bleeding after passing gas, and scant amount of bright red blood in stool x 1. Denies additional new / worsening edema. Daughter reports PCP is aware, he believes bleeding may be due to hemorrhoids, and recommended continued monitoring. Pt currently scheduled for gastro office visit on 11/30/23 and daughter is awaiting a return call for potential VV with PCP. Patients daughter is agreeable to assist pt with BP HR recheck when pt wakens. Daughter educated on S/Sx to continue to monitor for and when to seek emergent medical care; daughter verbalizes understanding. Pulse ox of 92%, pulse ox HR of 115, BP of 106/56, and weight of 152.8# are within RPM parameters. Escalated alert to RN-Update provided  Escalated alert to PCP-FYI update provided   Plan/Follow Up: Will continue to review, monitor and address alerts with follow up based on severity of symptoms and risk factors.

## 2023-11-20 NOTE — CARE COORDINATION
Patient contacted regarding COVID-19 risk and exposure. Discussed COVID-19 related testing which was available at this time. Test results were positive. Patient informed of results, if available? Yes. LPN Care Coordinator contacted the family by telephone to perform post discharge assessment. Call within 2 business days of discharge: Yes. Verified name and  with family as identifiers. Provided introduction to self, and explanation of the CTN/ACM role, and reason for call due to risk factors for infection and/or exposure to COVID-19. Symptoms reviewed with family who verbalized the following symptoms: fatigue  cough  shortness of breath. Due to worsening symptoms encounter was not routed to provider for escalation. Discussed follow-up appointments. If no appointment was previously scheduled, appointment scheduling offered: Yes. [facility] follow up appointment(s):   Future Appointments   Date Time Provider [address]   2024 11:20 AM Mirna Aguilera APRN - CNP [facility]   2024 11:30 AM Suzy Fowler DO [facility]   4/10/2024 11:30 AM Juli Arroyo MD [facility]. [facility]   4/10/2024 12:00 PM [address] [place] [address]. [facility]     Non-BS follow up appointment(s): none    Non-face-to-face services provided:  Obtained and reviewed discharge summary and/or continuity of care documents     Advance Care Planning:   Does patient have an Advance Directive:  reviewed and current. Spoke with patient 's daughter states patient has a cough and having some shortness of breath. Patient daughter states patient is on 2 liters of oxygen. Patient daughter states she has reported to the PCP that patient had so blood during a bowel movement. Patient daughter states PCP said to call the office back if blood seems to increase. Patient 's daughter states has reported patient 's vitals to the Remote Monitoring system and has spoken to the nurse.     Educated patient about risk for severe COVID-19 due to risk

## 2023-11-22 ENCOUNTER — CARE COORDINATION (OUTPATIENT)
Dept: CARE COORDINATION | Facility: CLINIC | Age: 78
End: 2023-11-22

## 2023-11-22 NOTE — CARE COORDINATION
Remote Patient Monitoring Note      Date/Time:  2023 3:37 PM  Patient Current Location: Home: [address] [place] [address]  LPN attempted to contact patient by telephone regarding PCP's response to FYI update provided. Spoke with patients daughter / emergency contact, Chary Laurent. Verified patients name and  as identifiers. Background: Pt enrolled in RPM r/t CHF, Kidney Disease, and COPD. Clinical Interventions: Daughter notified of PCP's recommendation that \"patient continued to be monitored with increased p.o. fluid intake emphasized. \" If pt is symptomatically hypotensive PCP or cardiologist needs to be informed. Patients daughter educated on S/Sx of hypotension, verbalizes understanding of PCP recommendations, when to notify provider(s) of changes / concerns, and when to seek emergent medical care for patient. Per PCP, \"Patient's blood pressure normally runs low and typically she is asymptomatic. \". FYI update routed to [facility]. Plan/Follow Up: Will continue to review, monitor and address alerts with follow up based on severity of symptoms and risk factors.

## 2023-11-22 NOTE — CARE COORDINATION
Remote Alert Monitoring Note  Rpm alert to be reviewed by the provider   red alert   blood pressure reading (88/54)   Additional needs to be addressed by PCP: FYI Per patients daughter, pt denies CP, SOB, headache, vision changes, numbness and tingling at this time. Denies acute distress. Pt continues to experience COVID related symptoms and is currently resting in bed with feet elevated. Daughter reports pt is maintaining adequate hydration and taking medications as prescribed. Patients daughter is agreeable to assist pt with rechecking BP. Updated reading 100/54 BP HR 84. Patients daughter verbalizes understanding of when to notify provider(s) of changes / concerns and when to seek emergent medical care for patient. Pulse ox of 98%, pulse ox HR of 79, and weight of 153.0# are within RPM parameters. Date/Time:  2023 1:04 PM  Patient Current Location: Home: [place] [address]  LPN attempted to contact patient by telephone. Spoke with patients daughter / emergency contact, Romero Bruner. Verified patients name and  as identifiers. Background: Pt enrolled in RPM r/t CHF, Kidney Disease, and COPD. Refer to 911 immediately if:  Patient unresponsive or unable to provide history  Change in cognition or sudden confusion  Patient unable to respond in complete sentences  Intense chest pain/tightness  Any concern for any clinical emergency  Red Alert: Provider response time of 1 hr required for any red alert requiring intervention  Yellow Alert: Provider response time of 3hr required for any escalated yellow alert    BP Triage  Are you having any Chest Pain? no   Are you having any Shortness of Breath? no   Do you have a headache or have any vision changes? no   Are you having any numbness or tingling? no   Are you having any other health concerns or issues?  Yes, COVID +        Clinical Interventions: Reviewed and followed up on alerts and treatments-Per patients

## 2023-11-24 ENCOUNTER — CARE COORDINATION (OUTPATIENT)
Dept: CARE COORDINATION | Facility: CLINIC | Age: 78
End: 2023-11-24

## 2023-11-24 NOTE — CARE COORDINATION
Remote Alert Monitoring Note     Date/Time:  2023 10:04 AM  Patient Current Location: Home: [address] [place] [address]  Voicemail received from patients daughter / emergency contact, Keyonna Raymundo. LPN contacted family by telephone. Verified patients name and  as identifiers. Background: Pt enrolled in RPM r/t CHF, Kidney Disease, and COPD. Refer to 911 immediately if:  Patient unresponsive or unable to provide history  Change in cognition or sudden confusion  Patient unable to respond in complete sentences  Intense chest pain/tightness  Any concern for any clinical emergency  Red Alert: Provider response time of 1 hr required for any red alert requiring intervention  Yellow Alert: Provider response time of 3hr required for any escalated yellow alert      Clinical Interventions: Escalated alert to RN-Update provided  Daughter concerned pt may be developing a secondary infection. Daughter states pt removed home O2 during the night, was difficult to arouse this AM, and initial pulse ox was 84% on room air. Daughter placed pt on O2 @ 2L and pulse ox increased to 96%. O2 ordered to be used @ 2lpm qhs. Daughter was receiving incoming call from another nurse. Unable to complete triage questions / assessment. Daughter states she will call this LPN back if needed. Patients daughter verbalizes understanding of when to notify provider(s) of changes / concerns and when to seek emergent medical care for pt. Plan/Follow Up: Will continue to review, monitor and address alerts with follow up based on severity of symptoms and risk factors.

## 2023-11-24 NOTE — CARE COORDINATION
Remote Patient Monitoring Note      Date/Time:  11/24/2023 3:33 PM  Patient Current Location: [place]  No return call from patients daughter received as of this time. See other RPM noted from today, 11/24/23, for additional information. Per patient calls message received from LAURO MARLEY, LAURO MARLEY spoke with and advised patients daughter this AM. RN ACM updated this RPM LPN and Dinesh Irwin DO with following information: \"Good morning, I just spoke with dtr/Jennifer, pt was difficult to arouse this am, it took about 4 attempts to wake her. Pt had taken O2 off through the night. Initial Sat on RA was 84%, O2 placed on at 2L now Sat 97%. During our conversation sat was fluctuating b/w 88%-97%. /61, HR 94. Pt did get up and amb to the restroom with no assistance. Denies any pain, SOB,CP with rest. She tires very quickly with any activity. She is not coughing today, yesterday had productive cough. Dtr states pt face is \"puffy\", she pressed on her arms and feels she has +1 pitting edema in BUE. Advised to raise HOB, elevate hands/wrist and also keep BLE elevated. Continue to monitor BP. Dtr is going to give pt her pills with some breakfast. She is nervious about mom's sat and would like so advice. She will call EMS if pt vs worsen. \"     Current recorded metrics (/61, BP HR 94, pulse ox 97%, pulse ox HR 78, weight 152. 2#) are with RPM parameters; green alert received. No further outreach by this LPN indicated at this time. Background: Pt enrolled in RPM r/t CHF, Kidney Disease, and COPD. Plan/Follow Up: Will continue to review, monitor and address alerts with follow up based on severity of symptoms and risk factors.

## 2023-11-26 ENCOUNTER — HOSPITAL ENCOUNTER (EMERGENCY)
Age: 78
Discharge: HOME | DRG: 177 | End: 2023-11-26
Attending: EMERGENCY MEDICINE
Payer: MEDICARE

## 2023-11-26 ENCOUNTER — APPOINTMENT (OUTPATIENT)
Dept: GENERAL RADIOLOGY | Age: 78
DRG: 177 | End: 2023-11-26
Payer: MEDICARE

## 2023-11-26 ENCOUNTER — APPOINTMENT (OUTPATIENT)
Dept: CT IMAGING | Age: 78
DRG: 177 | End: 2023-11-26
Payer: MEDICARE

## 2023-11-26 VITALS
WEIGHT: 151 LBS | SYSTOLIC BLOOD PRESSURE: 112 MMHG | HEIGHT: 62 IN | RESPIRATION RATE: 16 BRPM | BODY MASS INDEX: 27.79 KG/M2 | DIASTOLIC BLOOD PRESSURE: 78 MMHG | TEMPERATURE: 98.4 F | OXYGEN SATURATION: 98 % | HEART RATE: 89 BPM

## 2023-11-26 DIAGNOSIS — E83.42 HYPOMAGNESEMIA: ICD-10-CM

## 2023-11-26 DIAGNOSIS — A09 DIARRHEA OF INFECTIOUS ORIGIN: Primary | ICD-10-CM

## 2023-11-26 DIAGNOSIS — R53.83 FATIGUE, UNSPECIFIED TYPE: ICD-10-CM

## 2023-11-26 LAB
ALBUMIN SERPL-MCNC: 3.3 G/DL (ref 3.2–4.6)
ALBUMIN/GLOB SERPL: 0.9 (ref 0.4–1.6)
ALP SERPL-CCNC: 41 U/L (ref 50–136)
ALT SERPL-CCNC: 37 U/L (ref 12–65)
ANION GAP SERPL CALC-SCNC: 5 MMOL/L (ref 2–11)
AST SERPL-CCNC: 48 U/L (ref 15–37)
BASOPHILS # BLD: 0 K/UL (ref 0–0.2)
BASOPHILS NFR BLD: 0 % (ref 0–2)
BILIRUB SERPL-MCNC: 0.5 MG/DL (ref 0.2–1.1)
BUN SERPL-MCNC: 11 MG/DL (ref 8–23)
CALCIUM SERPL-MCNC: 9.8 MG/DL (ref 8.3–10.4)
CHLORIDE SERPL-SCNC: 106 MMOL/L (ref 101–110)
CO2 SERPL-SCNC: 25 MMOL/L (ref 21–32)
CREAT SERPL-MCNC: 1.3 MG/DL (ref 0.6–1)
DIFFERENTIAL METHOD BLD: ABNORMAL
EKG ATRIAL RATE: 104 BPM
EKG DIAGNOSIS: NORMAL
EKG P AXIS: 90 DEGREES
EKG P-R INTERVAL: 128 MS
EKG Q-T INTERVAL: 372 MS
EKG QRS DURATION: 104 MS
EKG QTC CALCULATION (BAZETT): 489 MS
EKG R AXIS: -68 DEGREES
EKG T AXIS: 73 DEGREES
EKG VENTRICULAR RATE: 104 BPM
EOSINOPHIL # BLD: 0 K/UL (ref 0–0.8)
EOSINOPHIL NFR BLD: 0 % (ref 0.5–7.8)
ERYTHROCYTE [DISTWIDTH] IN BLOOD BY AUTOMATED COUNT: 15 % (ref 11.9–14.6)
GLOBULIN SER CALC-MCNC: 3.7 G/DL (ref 2.8–4.5)
GLUCOSE SERPL-MCNC: 113 MG/DL (ref 65–100)
HCT VFR BLD AUTO: 34.1 % (ref 35.8–46.3)
HGB BLD-MCNC: 10.5 G/DL (ref 11.7–15.4)
IMM GRANULOCYTES # BLD AUTO: 0 K/UL (ref 0–0.5)
IMM GRANULOCYTES NFR BLD AUTO: 0 % (ref 0–5)
LACTATE SERPL-SCNC: 0.9 MMOL/L (ref 0.4–2)
LIPASE SERPL-CCNC: 93 U/L (ref 73–393)
LYMPHOCYTES # BLD: 1 K/UL (ref 0.5–4.6)
LYMPHOCYTES NFR BLD: 17 % (ref 13–44)
MAGNESIUM SERPL-MCNC: 1.7 MG/DL (ref 1.8–2.4)
MCH RBC QN AUTO: 27.4 PG (ref 26.1–32.9)
MCHC RBC AUTO-ENTMCNC: 30.8 G/DL (ref 31.4–35)
MCV RBC AUTO: 89 FL (ref 82–102)
MONOCYTES # BLD: 0.9 K/UL (ref 0.1–1.3)
MONOCYTES NFR BLD: 17 % (ref 4–12)
NEUTS SEG # BLD: 3.7 K/UL (ref 1.7–8.2)
NEUTS SEG NFR BLD: 66 % (ref 43–78)
NRBC # BLD: 0 K/UL (ref 0–0.2)
PLATELET # BLD AUTO: 187 K/UL (ref 150–450)
PMV BLD AUTO: 10.8 FL (ref 9.4–12.3)
POTASSIUM SERPL-SCNC: 4.2 MMOL/L (ref 3.5–5.1)
PROT SERPL-MCNC: 7 G/DL (ref 6.3–8.2)
RBC # BLD AUTO: 3.83 M/UL (ref 4.05–5.2)
SODIUM SERPL-SCNC: 136 MMOL/L (ref 133–143)
WBC # BLD AUTO: 5.7 K/UL (ref 4.3–11.1)

## 2023-11-26 PROCEDURE — 83605 ASSAY OF LACTIC ACID: CPT

## 2023-11-26 PROCEDURE — 2580000003 HC RX 258: Performed by: EMERGENCY MEDICINE

## 2023-11-26 PROCEDURE — 83690 ASSAY OF LIPASE: CPT

## 2023-11-26 PROCEDURE — 93010 ELECTROCARDIOGRAM REPORT: CPT | Performed by: INTERNAL MEDICINE

## 2023-11-26 PROCEDURE — 85025 COMPLETE CBC W/AUTO DIFF WBC: CPT

## 2023-11-26 PROCEDURE — 6370000000 HC RX 637 (ALT 250 FOR IP): Performed by: EMERGENCY MEDICINE

## 2023-11-26 PROCEDURE — 6360000004 HC RX CONTRAST MEDICATION: Performed by: EMERGENCY MEDICINE

## 2023-11-26 PROCEDURE — 96365 THER/PROPH/DIAG IV INF INIT: CPT

## 2023-11-26 PROCEDURE — 71045 X-RAY EXAM CHEST 1 VIEW: CPT

## 2023-11-26 PROCEDURE — 74177 CT ABD & PELVIS W/CONTRAST: CPT

## 2023-11-26 PROCEDURE — 96366 THER/PROPH/DIAG IV INF ADDON: CPT

## 2023-11-26 PROCEDURE — 51701 INSERT BLADDER CATHETER: CPT

## 2023-11-26 PROCEDURE — 96375 TX/PRO/DX INJ NEW DRUG ADDON: CPT

## 2023-11-26 PROCEDURE — 6360000002 HC RX W HCPCS: Performed by: EMERGENCY MEDICINE

## 2023-11-26 PROCEDURE — 99285 EMERGENCY DEPT VISIT HI MDM: CPT

## 2023-11-26 PROCEDURE — 80053 COMPREHEN METABOLIC PANEL: CPT

## 2023-11-26 PROCEDURE — 94640 AIRWAY INHALATION TREATMENT: CPT

## 2023-11-26 PROCEDURE — 93005 ELECTROCARDIOGRAM TRACING: CPT | Performed by: EMERGENCY MEDICINE

## 2023-11-26 PROCEDURE — 83735 ASSAY OF MAGNESIUM: CPT

## 2023-11-26 RX ORDER — MAGNESIUM SULFATE IN WATER 40 MG/ML
2000 INJECTION, SOLUTION INTRAVENOUS ONCE
Status: COMPLETED | OUTPATIENT
Start: 2023-11-26 | End: 2023-11-26

## 2023-11-26 RX ORDER — LOPERAMIDE HYDROCHLORIDE 2 MG/1
4 CAPSULE ORAL
Status: COMPLETED | OUTPATIENT
Start: 2023-11-26 | End: 2023-11-26

## 2023-11-26 RX ORDER — ONDANSETRON 2 MG/ML
4 INJECTION INTRAMUSCULAR; INTRAVENOUS
Status: COMPLETED | OUTPATIENT
Start: 2023-11-26 | End: 2023-11-26

## 2023-11-26 RX ORDER — IPRATROPIUM BROMIDE AND ALBUTEROL SULFATE 2.5; .5 MG/3ML; MG/3ML
1 SOLUTION RESPIRATORY (INHALATION)
Status: COMPLETED | OUTPATIENT
Start: 2023-11-26 | End: 2023-11-26

## 2023-11-26 RX ORDER — SODIUM CHLORIDE, SODIUM LACTATE, POTASSIUM CHLORIDE, AND CALCIUM CHLORIDE .6; .31; .03; .02 G/100ML; G/100ML; G/100ML; G/100ML
500 INJECTION, SOLUTION INTRAVENOUS ONCE
Status: COMPLETED | OUTPATIENT
Start: 2023-11-26 | End: 2023-11-26

## 2023-11-26 RX ORDER — LIDOCAINE HYDROCHLORIDE 20 MG/ML
JELLY TOPICAL
Status: COMPLETED | OUTPATIENT
Start: 2023-11-26 | End: 2023-11-26

## 2023-11-26 RX ADMIN — LOPERAMIDE HYDROCHLORIDE 4 MG: 2 CAPSULE ORAL at 13:18

## 2023-11-26 RX ADMIN — LIDOCAINE HYDROCHLORIDE: 20 JELLY TOPICAL at 16:51

## 2023-11-26 RX ADMIN — MAGNESIUM SULFATE HEPTAHYDRATE 2000 MG: 40 INJECTION, SOLUTION INTRAVENOUS at 13:26

## 2023-11-26 RX ADMIN — IOPAMIDOL 100 ML: 755 INJECTION, SOLUTION INTRAVENOUS at 13:34

## 2023-11-26 RX ADMIN — IPRATROPIUM BROMIDE AND ALBUTEROL SULFATE 1 DOSE: .5; 3 SOLUTION RESPIRATORY (INHALATION) at 13:30

## 2023-11-26 RX ADMIN — SODIUM CHLORIDE, POTASSIUM CHLORIDE, SODIUM LACTATE AND CALCIUM CHLORIDE 500 ML: 600; 310; 30; 20 INJECTION, SOLUTION INTRAVENOUS at 13:20

## 2023-11-26 RX ADMIN — ONDANSETRON 4 MG: 2 INJECTION INTRAMUSCULAR; INTRAVENOUS at 13:18

## 2023-11-26 ASSESSMENT — PAIN SCALES - GENERAL
PAINLEVEL_OUTOF10: 0

## 2023-11-26 ASSESSMENT — ENCOUNTER SYMPTOMS
VOMITING: 0
SHORTNESS OF BREATH: 1
NAUSEA: 1
ABDOMINAL PAIN: 1
WHEEZING: 1
DIARRHEA: 1
FACIAL SWELLING: 0
COUGH: 1

## 2023-11-26 ASSESSMENT — PAIN - FUNCTIONAL ASSESSMENT
PAIN_FUNCTIONAL_ASSESSMENT: 0-10
PAIN_FUNCTIONAL_ASSESSMENT: NONE - DENIES PAIN

## 2023-11-26 NOTE — ED TRIAGE NOTES
Per patient -- last Sunday patient tested positive for covid. Since Sunday patient has elevated heart rate, diarrhea. Patient have a pacemaker and a defibrillator.

## 2023-11-26 NOTE — DISCHARGE INSTRUCTIONS
Push fluids. Rest.  Use oxygen at home as needed. Follow-up closely with your doctor. Return for worsening or concerning symptoms.

## 2023-11-26 NOTE — ED PROVIDER NOTES
Emergency Department Provider Note       PCP: Caren Nicole MD   Age: 78 y.o. Sex: female     DISPOSITION Discharge - Pending Orders Complete 11/26/2023 02:09:20 PM       ICD-10-CM    1. Diarrhea of infectious origin  A09       2. Fatigue, unspecified type  R53.83       3. Hypomagnesemia  E83.42           Medical Decision Making     Complexity of Problems Addressed:  1 or more acute illnesses that pose a threat to life or bodily function. Data Reviewed and Analyzed:   I independently ordered and reviewed each unique test.  I reviewed external records: ED visit note from an outside group. I reviewed external records: previous EKG including cardiologist interpretation. I reviewed external records: previous lab results from outside ED. I reviewed external records: previous imaging study including radiologist interpretation. The patients assessment required an independent historian: daughter. The reason they were needed is important historical information not provided by the patient. I independently ordered and interpreted the ED EKG in the absence of a Cardiologist.    Rate: 104  EKG Interpretation: EKG Interpretation: sinus rhythm, no evidence of arrhythmia, incomplete right bundle branch block, left anterior fascicular block  ST Segments: Nonspecific ST segments - NO STEMI      I interpreted the X-rays no acute cardiopulmonary process. I interpreted the CT Scan no acute intra-abdominal process. Discussion of management or test interpretation. Feeling better after fluids. Magnesium replaced. Workup otherwise unremarkable. Given Imodium. No current occasion for admission. Discussed close follow-up with primary care physician. Risk of Complications and/or Morbidity of Patient Management:  Patient was discharged risks and benefits of hospitalization were considered. Shared medical decision making was utilized in creating the patients health plan today.          Is this patient to be

## 2023-11-27 ENCOUNTER — CARE COORDINATION (OUTPATIENT)
Dept: CARE COORDINATION | Facility: CLINIC | Age: 78
End: 2023-11-27

## 2023-11-27 ENCOUNTER — TELEPHONE (OUTPATIENT)
Age: 78
End: 2023-11-27

## 2023-11-27 NOTE — TELEPHONE ENCOUNTER
I would see PCP here ASAP, or back to ER for worsening sx. See me as planned or earlier if needed. Talk with Angelica Dunham for appt if needed.   Thanks

## 2023-11-27 NOTE — CARE COORDINATION
Patient contacted regarding COVID-19 diagnosis. Discussed COVID-19 related testing which was available at this time. Test results were positive. Patient informed of results, if available? Yes    LPN Care Coordinator contacted the family by telephone to perform follow-up assessment. Verified name and  with family as identifiers. Patient has following risk factors of: heart failure  acute respiratory failure. Spoke with patient's daughter states patient is still having shortness of breath while  on 3 liter of oxygen and elevated heart rate. Patient's daughter states has contacted the Cardiologist.    Symptoms reviewed with family who verbalized the following symptoms: cough  shortness of breath. Due to worsening symptoms encounter was not routed to provider for escalation. Educated patient about risk for severe COVID-19 due to risk factors according to CDC guidelines. LPN CC reviewed discharge instructions, medical action plan and red flag symptoms with the family who verbalized understanding. Discussed COVID vaccination status: Yes. Education provided on COVID-19 vaccination as appropriate. Discussed exposure protocols and quarantine with CDC Guidelines. Family was given an opportunity to verbalize any questions and concerns and agrees to contact LPN CC or health care provider for questions related to their healthcare. Was patient discharged with a pulse oximeter? no    LPN CC provided contact information. Plan for follow-up call in 5-7 days based on severity of symptoms and risk factors.

## 2023-11-27 NOTE — TELEPHONE ENCOUNTER
Pt daughter wants to know if Dr. Ritesh Barrera can look at her records, states that she went to the ER yesterday. States that her  and oxygen 82.

## 2023-11-27 NOTE — TELEPHONE ENCOUNTER
Daughter, Ramsey Granado, [facility] called stating that patient was at [facility] ER yesterday for weakness, diarrhea ,s/p COVID 11-19-23. Today patient is still weak, SOB with exertion b/p 92/56 pulse 108 O2 on 3 liters 94% without O2 78% this morning. Daughter states that she increased her O2 from 2 liters to 3 liters. Struggles with breathing off oxygen. While in the hospital, patient had CXR,  EKG labs. Magnesium was low in the hospital, was given magnesium and fluids.     Daughter states that she would like for Dr Yash Nair to review ER records and advise.//jaskaran

## 2023-11-28 ENCOUNTER — CARE COORDINATION (OUTPATIENT)
Dept: CARE COORDINATION | Facility: CLINIC | Age: 78
End: 2023-11-28

## 2023-11-28 ENCOUNTER — TELEPHONE (OUTPATIENT)
Facility: CLINIC | Age: 78
End: 2023-11-28

## 2023-11-28 NOTE — CARE COORDINATION
Received call from pt calling back to review Cardiac MRI results. Reviewed results with pt; all questions answered.     Pt requested that copy of results be faxed to Oncologist Dr. Varma; results faxed to [phone].     Stacey Denis RN    
Remote Patient Monitoring Note      Date/Time:  11/28/2023 11:05 AM  No response to escalated alert received as of this.  Alert rerouted to Cliff Russo NP.
continuing to use Ventolin inhaler PRN, Pulmicort nebulizer BID, and Duoneb PRN. Patients daughter verbalizes understanding of when to notify provider(s) of changes / concerns and when to seek emergent medical care for pt. Escalated alert to RN-Update provided  Escalated alert to PCP-Awaiting response  Escalated alert to Specialist-Awaiting response    Plan/Follow Up: Will continue to review, monitor and address alerts with follow up based on severity of symptoms and risk factors.

## 2023-11-29 ENCOUNTER — APPOINTMENT (OUTPATIENT)
Dept: GENERAL RADIOLOGY | Age: 78
DRG: 177 | End: 2023-11-29
Payer: MEDICARE

## 2023-11-29 ENCOUNTER — TELEPHONE (OUTPATIENT)
Age: 78
End: 2023-11-29

## 2023-11-29 ENCOUNTER — CARE COORDINATION (OUTPATIENT)
Dept: CARE COORDINATION | Facility: CLINIC | Age: 78
End: 2023-11-29

## 2023-11-29 ENCOUNTER — HOSPITAL ENCOUNTER (INPATIENT)
Age: 78
LOS: 6 days | Discharge: HOME | DRG: 177 | End: 2023-12-05
Attending: STUDENT IN AN ORGANIZED HEALTH CARE EDUCATION/TRAINING PROGRAM | Admitting: FAMILY MEDICINE
Payer: MEDICARE

## 2023-11-29 ENCOUNTER — APPOINTMENT (OUTPATIENT)
Dept: CT IMAGING | Age: 78
DRG: 177 | End: 2023-11-29
Payer: MEDICARE

## 2023-11-29 DIAGNOSIS — I95.9 HYPOTENSION, UNSPECIFIED HYPOTENSION TYPE: ICD-10-CM

## 2023-11-29 DIAGNOSIS — R00.0 TACHYCARDIA: Primary | ICD-10-CM

## 2023-11-29 PROBLEM — Z86.73 H/O TIA (TRANSIENT ISCHEMIC ATTACK) AND STROKE: Status: ACTIVE | Noted: 2017-06-13

## 2023-11-29 PROBLEM — I42.8 NICM (NONISCHEMIC CARDIOMYOPATHY) (HCC): Status: ACTIVE | Noted: 2017-08-21

## 2023-11-29 PROBLEM — I50.22 SYSTOLIC CHF, CHRONIC (HCC): Status: ACTIVE | Noted: 2017-06-13

## 2023-11-29 PROBLEM — U07.1 COVID: Status: ACTIVE | Noted: 2023-11-29

## 2023-11-29 PROBLEM — J96.20 ACUTE ON CHRONIC RESPIRATORY FAILURE (HCC): Status: ACTIVE | Noted: 2023-11-29

## 2023-11-29 PROBLEM — Z95.810 PRESENCE OF AUTOMATIC (IMPLANTABLE) CARDIAC DEFIBRILLATOR: Status: ACTIVE | Noted: 2018-03-27

## 2023-11-29 LAB
ALBUMIN SERPL-MCNC: 3.2 G/DL (ref 3.2–4.6)
ALBUMIN/GLOB SERPL: 0.9 (ref 0.4–1.6)
ALP SERPL-CCNC: 39 U/L (ref 50–136)
ALT SERPL-CCNC: 31 U/L (ref 12–65)
ANION GAP SERPL CALC-SCNC: 9 MMOL/L (ref 2–11)
AST SERPL-CCNC: 37 U/L (ref 15–37)
BASOPHILS # BLD: 0 K/UL (ref 0–0.2)
BASOPHILS NFR BLD: 0 % (ref 0–2)
BILIRUB SERPL-MCNC: 0.5 MG/DL (ref 0.2–1.1)
BUN SERPL-MCNC: 20 MG/DL (ref 8–23)
CALCIUM SERPL-MCNC: 9.1 MG/DL (ref 8.3–10.4)
CHLORIDE SERPL-SCNC: 107 MMOL/L (ref 101–110)
CO2 SERPL-SCNC: 23 MMOL/L (ref 21–32)
CREAT SERPL-MCNC: 1.5 MG/DL (ref 0.6–1)
DIFFERENTIAL METHOD BLD: ABNORMAL
EKG ATRIAL RATE: 115 BPM
EKG DIAGNOSIS: NORMAL
EKG P AXIS: 44 DEGREES
EKG P-R INTERVAL: 135 MS
EKG Q-T INTERVAL: 335 MS
EKG QRS DURATION: 116 MS
EKG QTC CALCULATION (BAZETT): 464 MS
EKG R AXIS: -70 DEGREES
EKG T AXIS: 78 DEGREES
EKG VENTRICULAR RATE: 115 BPM
EOSINOPHIL # BLD: 0 K/UL (ref 0–0.8)
EOSINOPHIL NFR BLD: 0 % (ref 0.5–7.8)
ERYTHROCYTE [DISTWIDTH] IN BLOOD BY AUTOMATED COUNT: 15.3 % (ref 11.9–14.6)
GLOBULIN SER CALC-MCNC: 3.5 G/DL (ref 2.8–4.5)
GLUCOSE SERPL-MCNC: 118 MG/DL (ref 65–100)
HCT VFR BLD AUTO: 32.7 % (ref 35.8–46.3)
HGB BLD-MCNC: 10 G/DL (ref 11.7–15.4)
IMM GRANULOCYTES # BLD AUTO: 0 K/UL (ref 0–0.5)
IMM GRANULOCYTES NFR BLD AUTO: 0 % (ref 0–5)
LACTATE SERPL-SCNC: 1.2 MMOL/L (ref 0.4–2)
LYMPHOCYTES # BLD: 2.6 K/UL (ref 0.5–4.6)
LYMPHOCYTES NFR BLD: 30 % (ref 13–44)
MCH RBC QN AUTO: 27.8 PG (ref 26.1–32.9)
MCHC RBC AUTO-ENTMCNC: 30.6 G/DL (ref 31.4–35)
MCV RBC AUTO: 90.8 FL (ref 82–102)
MONOCYTES # BLD: 1.1 K/UL (ref 0.1–1.3)
MONOCYTES NFR BLD: 13 % (ref 4–12)
NEUTS SEG # BLD: 5 K/UL (ref 1.7–8.2)
NEUTS SEG NFR BLD: 57 % (ref 43–78)
NRBC # BLD: 0 K/UL (ref 0–0.2)
PLATELET # BLD AUTO: 191 K/UL (ref 150–450)
PMV BLD AUTO: 11 FL (ref 9.4–12.3)
POTASSIUM SERPL-SCNC: 4.2 MMOL/L (ref 3.5–5.1)
PROCALCITONIN SERPL-MCNC: <0.05 NG/ML (ref 0–0.49)
PROT SERPL-MCNC: 6.7 G/DL (ref 6.3–8.2)
RBC # BLD AUTO: 3.6 M/UL (ref 4.05–5.2)
SARS-COV-2 RDRP RESP QL NAA+PROBE: DETECTED
SODIUM SERPL-SCNC: 139 MMOL/L (ref 133–143)
SOURCE: ABNORMAL
WBC # BLD AUTO: 8.8 K/UL (ref 4.3–11.1)

## 2023-11-29 PROCEDURE — 6370000000 HC RX 637 (ALT 250 FOR IP): Performed by: FAMILY MEDICINE

## 2023-11-29 PROCEDURE — 99285 EMERGENCY DEPT VISIT HI MDM: CPT

## 2023-11-29 PROCEDURE — 6360000002 HC RX W HCPCS: Performed by: FAMILY MEDICINE

## 2023-11-29 PROCEDURE — 93005 ELECTROCARDIOGRAM TRACING: CPT | Performed by: STUDENT IN AN ORGANIZED HEALTH CARE EDUCATION/TRAINING PROGRAM

## 2023-11-29 PROCEDURE — 80053 COMPREHEN METABOLIC PANEL: CPT

## 2023-11-29 PROCEDURE — 84145 PROCALCITONIN (PCT): CPT

## 2023-11-29 PROCEDURE — 6360000004 HC RX CONTRAST MEDICATION: Performed by: STUDENT IN AN ORGANIZED HEALTH CARE EDUCATION/TRAINING PROGRAM

## 2023-11-29 PROCEDURE — 2580000003 HC RX 258: Performed by: FAMILY MEDICINE

## 2023-11-29 PROCEDURE — 87635 SARS-COV-2 COVID-19 AMP PRB: CPT

## 2023-11-29 PROCEDURE — 71260 CT THORAX DX C+: CPT

## 2023-11-29 PROCEDURE — 2700000000 HC OXYGEN THERAPY PER DAY

## 2023-11-29 PROCEDURE — 2000000000 HC ICU R&B

## 2023-11-29 PROCEDURE — 2580000003 HC RX 258: Performed by: STUDENT IN AN ORGANIZED HEALTH CARE EDUCATION/TRAINING PROGRAM

## 2023-11-29 PROCEDURE — 71045 X-RAY EXAM CHEST 1 VIEW: CPT

## 2023-11-29 PROCEDURE — 96361 HYDRATE IV INFUSION ADD-ON: CPT

## 2023-11-29 PROCEDURE — 96360 HYDRATION IV INFUSION INIT: CPT

## 2023-11-29 PROCEDURE — 87040 BLOOD CULTURE FOR BACTERIA: CPT

## 2023-11-29 PROCEDURE — 93010 ELECTROCARDIOGRAM REPORT: CPT | Performed by: INTERNAL MEDICINE

## 2023-11-29 PROCEDURE — 2500000003 HC RX 250 WO HCPCS: Performed by: FAMILY MEDICINE

## 2023-11-29 PROCEDURE — 85025 COMPLETE CBC W/AUTO DIFF WBC: CPT

## 2023-11-29 PROCEDURE — 94640 AIRWAY INHALATION TREATMENT: CPT

## 2023-11-29 PROCEDURE — 83605 ASSAY OF LACTIC ACID: CPT

## 2023-11-29 RX ORDER — BUDESONIDE 0.5 MG/2ML
500 INHALANT ORAL
Status: DISCONTINUED | OUTPATIENT
Start: 2023-11-30 | End: 2023-11-29

## 2023-11-29 RX ORDER — CITALOPRAM 20 MG/1
20 TABLET ORAL DAILY
Status: DISCONTINUED | OUTPATIENT
Start: 2023-11-30 | End: 2023-12-05 | Stop reason: HOSPADM

## 2023-11-29 RX ORDER — POLYETHYLENE GLYCOL 3350 17 G/17G
17 POWDER, FOR SOLUTION ORAL DAILY PRN
Status: DISCONTINUED | OUTPATIENT
Start: 2023-11-29 | End: 2023-12-05 | Stop reason: HOSPADM

## 2023-11-29 RX ORDER — NOREPINEPHRINE BITARTRATE 0.02 MG/ML
1-100 INJECTION, SOLUTION INTRAVENOUS CONTINUOUS
Status: DISCONTINUED | OUTPATIENT
Start: 2023-11-29 | End: 2023-12-01 | Stop reason: HOSPADM

## 2023-11-29 RX ORDER — LANOLIN ALCOHOL/MO/W.PET/CERES
1000 CREAM (GRAM) TOPICAL DAILY
Status: DISCONTINUED | OUTPATIENT
Start: 2023-11-30 | End: 2023-12-05 | Stop reason: HOSPADM

## 2023-11-29 RX ORDER — SODIUM CHLORIDE 0.9 % (FLUSH) 0.9 %
5-40 SYRINGE (ML) INJECTION PRN
Status: DISCONTINUED | OUTPATIENT
Start: 2023-11-29 | End: 2023-12-05 | Stop reason: HOSPADM

## 2023-11-29 RX ORDER — ACETAMINOPHEN 325 MG/1
650 TABLET ORAL EVERY 6 HOURS PRN
Status: DISCONTINUED | OUTPATIENT
Start: 2023-11-29 | End: 2023-12-05 | Stop reason: HOSPADM

## 2023-11-29 RX ORDER — BUDESONIDE 0.5 MG/2ML
500 INHALANT ORAL 3 TIMES DAILY
Status: DISCONTINUED | OUTPATIENT
Start: 2023-11-29 | End: 2023-11-29

## 2023-11-29 RX ORDER — 0.9 % SODIUM CHLORIDE 0.9 %
1000 INTRAVENOUS SOLUTION INTRAVENOUS
Status: COMPLETED | OUTPATIENT
Start: 2023-11-29 | End: 2023-11-29

## 2023-11-29 RX ORDER — DEXAMETHASONE SODIUM PHOSPHATE 10 MG/ML
6 INJECTION INTRAMUSCULAR; INTRAVENOUS EVERY 24 HOURS
Status: DISCONTINUED | OUTPATIENT
Start: 2023-11-29 | End: 2023-12-05 | Stop reason: HOSPADM

## 2023-11-29 RX ORDER — SODIUM CHLORIDE 9 MG/ML
INJECTION, SOLUTION INTRAVENOUS PRN
Status: DISCONTINUED | OUTPATIENT
Start: 2023-11-29 | End: 2023-12-05 | Stop reason: HOSPADM

## 2023-11-29 RX ORDER — ENOXAPARIN SODIUM 100 MG/ML
30 INJECTION SUBCUTANEOUS DAILY
Status: DISCONTINUED | OUTPATIENT
Start: 2023-11-29 | End: 2023-11-30

## 2023-11-29 RX ORDER — ATORVASTATIN CALCIUM 40 MG/1
40 TABLET, FILM COATED ORAL NIGHTLY
Status: DISCONTINUED | OUTPATIENT
Start: 2023-11-29 | End: 2023-12-05 | Stop reason: HOSPADM

## 2023-11-29 RX ORDER — ACETAMINOPHEN 650 MG/1
650 SUPPOSITORY RECTAL EVERY 6 HOURS PRN
Status: DISCONTINUED | OUTPATIENT
Start: 2023-11-29 | End: 2023-12-05 | Stop reason: HOSPADM

## 2023-11-29 RX ORDER — 0.9 % SODIUM CHLORIDE 0.9 %
500 INTRAVENOUS SOLUTION INTRAVENOUS ONCE
Status: COMPLETED | OUTPATIENT
Start: 2023-11-29 | End: 2023-11-29

## 2023-11-29 RX ORDER — GUAIFENESIN 600 MG/1
600 TABLET, EXTENDED RELEASE ORAL 2 TIMES DAILY
Status: DISCONTINUED | OUTPATIENT
Start: 2023-11-29 | End: 2023-11-29

## 2023-11-29 RX ORDER — FAMOTIDINE 20 MG/1
10 TABLET, FILM COATED ORAL DAILY
Status: DISCONTINUED | OUTPATIENT
Start: 2023-11-30 | End: 2023-12-05 | Stop reason: HOSPADM

## 2023-11-29 RX ORDER — MAGNESIUM HYDROXIDE/ALUMINUM HYDROXICE/SIMETHICONE 120; 1200; 1200 MG/30ML; MG/30ML; MG/30ML
30 SUSPENSION ORAL EVERY 6 HOURS PRN
Status: DISCONTINUED | OUTPATIENT
Start: 2023-11-29 | End: 2023-12-05 | Stop reason: HOSPADM

## 2023-11-29 RX ORDER — BUDESONIDE 0.5 MG/2ML
500 INHALANT ORAL EVERY 8 HOURS
Status: DISCONTINUED | OUTPATIENT
Start: 2023-11-29 | End: 2023-11-30

## 2023-11-29 RX ORDER — GUAIFENESIN/DEXTROMETHORPHAN 100-10MG/5
10 SYRUP ORAL EVERY 4 HOURS PRN
Status: DISCONTINUED | OUTPATIENT
Start: 2023-11-29 | End: 2023-12-05 | Stop reason: HOSPADM

## 2023-11-29 RX ORDER — BISACODYL 10 MG
10 SUPPOSITORY, RECTAL RECTAL DAILY PRN
Status: DISCONTINUED | OUTPATIENT
Start: 2023-11-29 | End: 2023-12-05 | Stop reason: HOSPADM

## 2023-11-29 RX ORDER — SODIUM CHLORIDE 0.9 % (FLUSH) 0.9 %
5-40 SYRINGE (ML) INJECTION EVERY 12 HOURS SCHEDULED
Status: DISCONTINUED | OUTPATIENT
Start: 2023-11-29 | End: 2023-12-05 | Stop reason: HOSPADM

## 2023-11-29 RX ORDER — ASPIRIN 81 MG/1
81 TABLET ORAL DAILY
Status: DISCONTINUED | OUTPATIENT
Start: 2023-11-30 | End: 2023-12-05 | Stop reason: HOSPADM

## 2023-11-29 RX ORDER — GUAIFENESIN 600 MG/1
1200 TABLET, EXTENDED RELEASE ORAL 2 TIMES DAILY
Status: DISCONTINUED | OUTPATIENT
Start: 2023-11-29 | End: 2023-12-05 | Stop reason: HOSPADM

## 2023-11-29 RX ORDER — MESALAMINE 500 MG/1
1000 CAPSULE, EXTENDED RELEASE ORAL 2 TIMES DAILY
Status: DISCONTINUED | OUTPATIENT
Start: 2023-11-29 | End: 2023-12-05 | Stop reason: HOSPADM

## 2023-11-29 RX ADMIN — SODIUM CHLORIDE 500 ML: 9 INJECTION, SOLUTION INTRAVENOUS at 16:05

## 2023-11-29 RX ADMIN — IOPAMIDOL 75 ML: 755 INJECTION, SOLUTION INTRAVENOUS at 14:05

## 2023-11-29 RX ADMIN — ENOXAPARIN SODIUM 30 MG: 100 INJECTION SUBCUTANEOUS at 16:17

## 2023-11-29 RX ADMIN — SODIUM CHLORIDE, PRESERVATIVE FREE 10 ML: 5 INJECTION INTRAVENOUS at 22:56

## 2023-11-29 RX ADMIN — SODIUM CHLORIDE 1000 ML: 9 INJECTION, SOLUTION INTRAVENOUS at 12:13

## 2023-11-29 RX ADMIN — SODIUM CHLORIDE 1000 ML: 9 INJECTION, SOLUTION INTRAVENOUS at 14:56

## 2023-11-29 RX ADMIN — NOREPINEPHRINE BITARTRATE 5 MCG/MIN: 1 INJECTION, SOLUTION, CONCENTRATE INTRAVENOUS at 16:16

## 2023-11-29 RX ADMIN — BUDESONIDE 500 MCG: 0.5 SUSPENSION RESPIRATORY (INHALATION) at 23:12

## 2023-11-29 RX ADMIN — ATORVASTATIN CALCIUM 40 MG: 40 TABLET, FILM COATED ORAL at 22:54

## 2023-11-29 RX ADMIN — DEXAMETHASONE SODIUM PHOSPHATE 6 MG: 10 INJECTION INTRAMUSCULAR; INTRAVENOUS at 16:18

## 2023-11-29 RX ADMIN — MESALAMINE 1000 MG: 500 CAPSULE ORAL at 23:22

## 2023-11-29 RX ADMIN — GUAIFENESIN 1200 MG: 600 TABLET ORAL at 22:54

## 2023-11-29 ASSESSMENT — PAIN SCALES - GENERAL: PAINLEVEL_OUTOF10: 0

## 2023-11-29 ASSESSMENT — PAIN - FUNCTIONAL ASSESSMENT: PAIN_FUNCTIONAL_ASSESSMENT: NONE - DENIES PAIN

## 2023-11-29 NOTE — ED PROVIDER NOTES
Emergency Department Provider Note       PCP: Stephane Luther MD   Age: 78 y.o. Sex: female     DISPOSITION Decision To Admit 11/29/2023 03:06:14 PM       ICD-10-CM    1. Tachycardia  R00.0       2. Hypotension, unspecified hypotension type  I95.9           Medical Decision Making     Complexity of Problems Addressed:  1 or more acute illnesses that pose a threat to life or bodily function. Data Reviewed and Analyzed:   I independently ordered and reviewed each unique test.  I reviewed external records: provider visit note from outside specialist.       I independently ordered and interpreted the ED EKG in the absence of a Cardiologist.    Rate: 115  EKG Interpretation: EKG Interpretation: sinus rhythm, no evidence of arrhythmia and right bundle branch block  ST Segments: Nonspecific ST segments - NO STEMI      I interpreted the X-rays no pneumothorax. Discussion of management or test interpretation. 59-year-old female history of COPD with 2 L supplemental oxygen 24/7. She presents emergency room with continued URI symptoms and feeling worsening shortness of breath, productive cough, and low-grade fever, diagnosed with COVID-19 10 days ago. Patient was brought in by EMS, given breathing treatment and steroids in route. Patient denies swelling lower extremities. Denies chest pain. Will obtain a broad-based workup with blood cultures and lab and x-ray. Labs showed a white count, stable H&H, normal electrolytes, baseline kidney dysfunction, normal LFTs, lactic, and procalcitonin. Patient given IV fluids as she began to become hypotensive. Did improve for a short time. Patient blood pressure did drop again. Has been having issues with her BP since being diagnosed with COVID-19. Patient remains tachycardic and hypotensive, given giving a second liter of IV fluid. CT chest shows no evidence of PE or pneumonia, large hiatal hernia.   Patient benefit from hospitalization given her tachycardia and Protein 6.7 6.3 - 8.2 g/dL    Albumin 3.2 3.2 - 4.6 g/dL    Globulin 3.5 2.8 - 4.5 g/dL    Albumin/Globulin Ratio 0.9 0.4 - 1.6     Lactate, Sepsis   Result Value Ref Range    Lactic Acid, Sepsis 1.2 0.4 - 2.0 MMOL/L   Procalcitonin   Result Value Ref Range    Procalcitonin <0.05 0.00 - 0.49 ng/mL   EKG 12 Lead   Result Value Ref Range    Ventricular Rate 115 BPM    Atrial Rate 115 BPM    P-R Interval 135 ms    QRS Duration 116 ms    Q-T Interval 335 ms    QTc Calculation (Bazett) 464 ms    P Axis 44 degrees    R Axis -70 degrees    T Axis 78 degrees    Diagnosis       Sinus tachycardia  RBBB and LAFB  Low voltage, precordial leads    Confirmed by ST MICHELLE VERA MD (), FELICITAS LAWSON ([id number]) on 11/29/2023 12:39:45 PM          CT CHEST PULMONARY EMBOLISM W CONTRAST   Final Result      1. No pulmonary embolism. 2. Mild emphysema with dependent subsegmental atelectasis. No findings of   pneumonia. 3. Large type III paraesophageal hernia. 4. Bilateral calcified pleural plaques suggesting asbestos-related pleural   disease. 5. Additional chronic findings as described. XR CHEST PORTABLE   Final Result   No acute cardiopulmonary abnormality. Voice dictation software was used during the making of this note. This software is not perfect and grammatical and other typographical errors may be present. This note has not been completely proofread for errors.      Kyara, [address], DO  11/29/23 [id number]

## 2023-11-29 NOTE — CARE COORDINATION
Remote Alert Monitoring Note  Rpm alert to be reviewed by the provider   red alert   blood pressure heart rate (133)   Additional needs to be addressed by  TBD :  TBD                    Date/Time:  2023 9:36 AM  Patient Current Location: Home: [address] [place] [address]  LPN contacted family by telephone. Spoke with patients daughter / caretaker, Romero Bruner. DES on file. Verified patients name and  as identifiers. Background: Pt enrolled in RPM r/t CHF, Kidney Disease, and COPD. Refer to 911 immediately if:  Patient unresponsive or unable to provide history  Change in cognition or sudden confusion  Patient unable to respond in complete sentences  Intense chest pain/tightness  Any concern for any clinical emergency  Red Alert: Provider response time of 1 hr required for any red alert requiring intervention  Yellow Alert: Provider response time of 3hr required for any escalated yellow alert      Clinical Interventions: Patients daughter currently preparing to take pt to ED. Patients daughter stated, \"I talked to the cardiologist and family doctor and a bunch of people. They told me to take mom back to the ER. They think she's in respiratory distress\". Patients daughter then states she is receiving a call from a family member and asked \"Can I call you back? \". Unable to complete triage questions at this time. Confirmed patients daughter has this [facility] contact information. Annemarie Ruiz RN has been communicating with Dr. Loren Mack, Dr. Dallas Bill, and patients daughter, Romero Bruner. See Telephone Encounter notes from today for additional information. Update routed to [facility]. Plan/Follow Up: Will continue to review, monitor and address alerts with follow up based on severity of symptoms and risk factors.

## 2023-11-29 NOTE — ACP (ADVANCE CARE PLANNING)
[facility]  At the heart of better care     Advance Care Planning   Admit Date:  2023 11:52 AM   Name:  Jackie Nephew   Age:  78 y.o. Sex:  female  :  1945   MRN:  [id number]   Room:  Tosin Gold has capacity to make her own decisions:   Yes    If pt unable to make decisions, POA/surrogate decision maker:  Daughter    Other people present:   Daughter    Patient / surrogate decision-maker directed code status:  Full Code    Patient or surrogate consented to discussion of the current conditions, workup, management plans, prognosis, and the risk for further deterioration. Time spent: 17 minutes in direct discussion.       Signed:  Jovanna aGldamez DO

## 2023-11-29 NOTE — CARE COORDINATION
Ambulatory Care Coordination Note  2023    Patient Current Location:  [facility]     ACM contacted the patient and caregiver by telephone. Verified name and  with caregiver as identifiers. Provided introduction to self, and explanation of the ACM role. Challenges to be reviewed by the provider   Additional needs identified to be addressed with provider: No  none               Method of communication with provider: phone. ACM: Bg Arreguin, RN    Spoke with Kita who was just about to wake the pt for  breakfast ans and pills. She had a restful evening. Did speak at length with Robe Lobo yesterday. Does not have any concerns at this moment, expresses gratitude for all the calls/ guidance and concern for her mother. I encouraged her that she is doing a great job & pt is grateful to have her. No further concerns at this moment and aware of any upcoming appt's. Will check back next week. Yaw Cobos has my contact info if she needs anything. Offered patient enrollment in the Remote Patient Monitoring (RPM) program for in-home monitoring: Yes, patient already enrolled.     Lab Results       None            Care Coordination Interventions    Referral from Primary Care Provider: No  Suggested Interventions and Community Resources  Other Services or Interventions: Current with RPM          Goals Addressed                      This Visit's Progress      Care Coordination Self Management   On track      CC Self Management Goal  Patient Goal (What steps will patient take to achieve goal?):   Patient is able to discuss self-management of condition(s):  Pt demonstrates adherence to medications  Pt demonstrates understanding of self-monitoring  Patient is able to identify Red Flags:  Alert to potential adverse drug reactions(s) or side effects and actions to take should they arise  Discuss target symptoms and actions to take should they arise  Identify problems that require immediate PCP or specialist

## 2023-11-29 NOTE — TELEPHONE ENCOUNTER
Pt daughter returning call back see previous note on 11/27/23 try to call with an unblocked number bc daughter cell phone sends block numbers straight to vm

## 2023-11-29 NOTE — CARE COORDINATION
Remote Patient Monitoring Note      Date/Time:  2023 11:47 AM  Patient Current Location: [facility]  No return call received as of this time. LPN contacted family by telephone regarding red alert received for blood pressure heart rate (133). Spoke with patients daughter / caretaker, Sven Jiang. DES on file. Verified patients name and  as identifiers. Background: Pt enrolled in RPM r/t CHF, Kidney Disease, and COPD. Clinical Interventions: Per Ms. Zaida Velazco / patients daughter, pt was transported to [facility] via EMS. Daughter is currently walking into ED to meet with pt. FYI update routed to RN ACM, LPN CC, and PCP. Plan/Follow Up: Will continue to review, monitor and address alerts with follow up based on severity of symptoms and risk factors.

## 2023-11-29 NOTE — ED TRIAGE NOTES
Pt arrives via EMS from home with complaint COPD exacerbation. Pt states had covid 10 days ago. Pt wears 2L NC at home . EMS gave albuterol treatment, O2 100% on 2L. Pt temp 100.4. Denies CP.

## 2023-11-29 NOTE — H&P
Hospitalist History and Physical   Admit Date:  2023 11:52 AM   Name:  Lawyer Beard   Age:  78 y.o. Sex:  female  :  1945   MRN:  [id number]   Room:  [place] [address]    Presenting/Chief Complaint: No chief complaint on file. Reason(s) for Admission: COVID [U07.1]     History of Present Illness:   Lawyer Beard is a 78 y.o. female who presented to the ED for cc persistent SOB since being diagnosed with COVID 23 along with dizziness and generalized weakness. Eating very little and now wearing 3L NC continuously from her baseline of only 2L NC at night. Has been to the ER three times since her diagnosis. Still taking her BP medications but has not taken her lasix in 5 days. Daughter has noticed her oxygen levels drop to the 80s on exertion. Hx of COPD wearing 2L NC at night but now wearing 3L NC continuously since diagnosis, sCHF EF 40%, CKD 3, depression, HTN, HLD, diffuse large B cell lymphoma in remission, colon adenocarcinoma s/p right colectomy in , B12 deficiency, Crohns, TIA, NSVT s/p defibrillator,     Low BP readings noted.     CT PE - 1. No pulmonary embolism. 2. Mild emphysema with dependent subsegmental atelectasis. No findings of  pneumonia. 3. Large type III paraesophageal hernia. 4. Bilateral calcified pleural plaques suggesting asbestos-related pleural  disease. 5. Additional chronic findings as described. Assessment & Plan: Active Problems:    COVID with COPD exacerbation - Albuterol q 4 hr. Pulmicort TID. Decadron. No obvious bacterial pneumonia on CT chest so will hold off on abx. Mucinex. Acute on chronic respiratory failure - Due to above. Tx as listed above. Hypotension - Dry on exam. Give a total of 2.5 L NS in ER. If BP persistently low, may need ICU for levophed. Tachycardia due to COVID. Remote tele. sCHF - Actually dry on exam. Encourage good oral intake. Holding her entresto, lasix, and BB.      TIA- ASA, mmol/L    CO2 23 21 - 32 mmol/L    Anion Gap 9 2 - 11 mmol/L    Glucose 118 (H) 65 - 100 mg/dL    BUN 20 8 - 23 MG/DL    Creatinine 1.50 (H) 0.6 - 1.0 MG/DL    Est, Glom Filt Rate 35 (L) >60 ml/min/1.73m2    Calcium 9.1 8.3 - 10.4 MG/DL    Total Bilirubin 0.5 0.2 - 1.1 MG/DL    ALT 31 12 - 65 U/L    AST 37 15 - 37 U/L    Alk Phosphatase 39 (L) 50 - 136 U/L    Total Protein 6.7 6.3 - 8.2 g/dL    Albumin 3.2 3.2 - 4.6 g/dL    Globulin 3.5 2.8 - 4.5 g/dL    Albumin/Globulin Ratio 0.9 0.4 - 1.6     Lactate, Sepsis    Collection Time: 11/29/23 12:16 PM   Result Value Ref Range    Lactic Acid, Sepsis 1.2 0.4 - 2.0 MMOL/L   Procalcitonin    Collection Time: 11/29/23 12:16 PM   Result Value Ref Range    Procalcitonin <0.05 0.00 - 0.49 ng/mL       I have personally reviewed imaging studies:  CT CHEST PULMONARY EMBOLISM W CONTRAST    Result Date: 11/29/2023  EXAM: CT Chest Pulmonary Embolism with contrast. INDICATION: Shortness of breath, continued tachycardia, recent COVID infection Comparison: Chest radiograph, 11/29/2022. CT chest, 4/11/2023 Multiple axial images were obtained through the chest during intravenous infusion of 100mL of Isovue 370. Coronal and sagittal reformats were performed. Coronal MIP reformats were also performed. Radiation dose reduction techniques were used for this study: All CT scans performed at this facility use one or all of the following: Automated exposure control, adjustment of the mA and/or kVp according to patient's size, iterative reconstruction. FINDINGS: Examination is diagnostic through the subsegmental level. No evidence of acute or chronic pulmonary embolism. The great vessels are normal in caliber with atherosclerotic calcifications in the aorta, branch vessels, and coronary arteries. LUNGS/PLEURA: Central airways are patent. Subsegmental atelectasis in the dependent lower lobes. Mild centrilobular emphysema. No pleural effusion or pneumothorax.  There are bilateral calcified pleural

## 2023-11-29 NOTE — TELEPHONE ENCOUNTER
,  Pt.spoke w/ and he has advised she call 911 and get pt.to the ER. Daughter said they are heading to the ER soon.
I spoke w/daughter told her MD response.
Jania FLORES, DO  You 25 minutes ago (2:01 PM)       Please call her, if feeling ill, hold the lasix for now, resume once better. Did we get her into see someone Thurs PM/Fri or Mon?
Please call her back, we need to see her, please see if they will drive  Per 
Warren Luna has agreed to see. I did offer appt. w/cardiology in [address] on Monday or Tuesday which was all I had but the daughter says [address] is too far of a drive.
daily Rinse mouth after use,  Dx J44.9 please bill to medicare pt B (Patient taking differently: Take 2 mLs by nebulization in the morning, at noon, and at bedtime Rinse mouth after use,  Dx J44.9 please bill to medicare pt B) 360 mL 3    ipratropium 0.5 mg-albuterol 2.5 mg (DUONEB) 0.5-2.5 (3) MG/3ML SOLN nebulizer solution Dx J44.9 please bill to medicare pt B (Patient taking differently: every 4 hours as needed Dx J44.9 please bill to medicare pt B) 1080 mL 3    metoprolol succinate (TOPROL XL) 25 MG extended release tablet Take 1 tablet by mouth daily 90 tablet 3    famotidine (PEPCID) 40 MG tablet Take 1 tablet by mouth      atorvastatin (LIPITOR) 40 MG tablet TAKE 1 TABLET DAILY 90 tablet 3    albuterol sulfate HFA (VENTOLIN HFA) 108 (90 Base) MCG/ACT inhaler Inhale 2 puffs into the lungs 4 times daily as needed for Wheezing 18 g 5    sacubitril-valsartan (ENTRESTO) 49-51 MG per tablet Take 1 tablet by mouth 2 times daily 180 tablet 3    STELARA 90 MG/ML SOSY prefilled syringe Every 8 weeks      cyanocobalamin 1000 MCG tablet Take 1 tablet by mouth daily      OXYGEN 2 lpm qhs      acetaminophen (TYLENOL) 500 MG tablet Take by mouth every 6 hours as needed      aspirin 81 MG EC tablet Take by mouth daily      denosumab (PROLIA) 60 MG/ML SOSY SC injection Inject 1 mL into the skin      diclofenac sodium (VOLTAREN) 1 % GEL Apply 4 g topically as needed      furosemide (LASIX) 40 MG tablet Take 0.5 tablets by mouth every other day      mesalamine (PENTASA) 500 MG extended release capsule 2 capsules in the morning and at bedtime The details of the medication are not available because there are pending changes by a home health clinician. ALLERGIES    Allergies   Allergen Reactions    Adhesive Tape Other (See Comments)     Other reaction(s):  Other- (not listed) - Allergy  Tears skin , skin thin   Tears skin , skin thin       Tramadol Other (See Comments)     Causes hallucinations    Hydrocodone

## 2023-11-30 ENCOUNTER — CARE COORDINATION (OUTPATIENT)
Dept: CARE COORDINATION | Facility: CLINIC | Age: 78
End: 2023-11-30

## 2023-11-30 VITALS
WEIGHT: 151.2 LBS | OXYGEN SATURATION: 92 % | SYSTOLIC BLOOD PRESSURE: 92 MMHG | DIASTOLIC BLOOD PRESSURE: 50 MMHG | HEART RATE: 111 BPM | BODY MASS INDEX: 27.65 KG/M2

## 2023-11-30 LAB
ALBUMIN SERPL-MCNC: 2.6 G/DL (ref 3.2–4.6)
ALBUMIN/GLOB SERPL: 0.7 (ref 0.4–1.6)
ALP SERPL-CCNC: 36 U/L (ref 50–136)
ALT SERPL-CCNC: 25 U/L (ref 12–65)
ANION GAP SERPL CALC-SCNC: 5 MMOL/L (ref 2–11)
AST SERPL-CCNC: 29 U/L (ref 15–37)
BASOPHILS # BLD: 0 K/UL (ref 0–0.2)
BASOPHILS NFR BLD: 0 % (ref 0–2)
BILIRUB SERPL-MCNC: 0.4 MG/DL (ref 0.2–1.1)
BUN SERPL-MCNC: 17 MG/DL (ref 8–23)
CALCIUM SERPL-MCNC: 8.7 MG/DL (ref 8.3–10.4)
CHLORIDE SERPL-SCNC: 115 MMOL/L (ref 101–110)
CO2 SERPL-SCNC: 21 MMOL/L (ref 21–32)
CREAT SERPL-MCNC: 1.2 MG/DL (ref 0.6–1)
CRP SERPL-MCNC: 2 MG/DL (ref 0–0.9)
DIFFERENTIAL METHOD BLD: ABNORMAL
EOSINOPHIL # BLD: 0 K/UL (ref 0–0.8)
EOSINOPHIL NFR BLD: 0 % (ref 0.5–7.8)
ERYTHROCYTE [DISTWIDTH] IN BLOOD BY AUTOMATED COUNT: 15.1 % (ref 11.9–14.6)
GLOBULIN SER CALC-MCNC: 3.5 G/DL (ref 2.8–4.5)
GLUCOSE SERPL-MCNC: 175 MG/DL (ref 65–100)
HCT VFR BLD AUTO: 30.1 % (ref 35.8–46.3)
HGB BLD-MCNC: 9.2 G/DL (ref 11.7–15.4)
IMM GRANULOCYTES # BLD AUTO: 0.1 K/UL (ref 0–0.5)
IMM GRANULOCYTES NFR BLD AUTO: 1 % (ref 0–5)
LYMPHOCYTES # BLD: 0.6 K/UL (ref 0.5–4.6)
LYMPHOCYTES NFR BLD: 8 % (ref 13–44)
MCH RBC QN AUTO: 27.5 PG (ref 26.1–32.9)
MCHC RBC AUTO-ENTMCNC: 30.6 G/DL (ref 31.4–35)
MCV RBC AUTO: 90.1 FL (ref 82–102)
MONOCYTES # BLD: 0.6 K/UL (ref 0.1–1.3)
MONOCYTES NFR BLD: 8 % (ref 4–12)
NEUTS SEG # BLD: 6 K/UL (ref 1.7–8.2)
NEUTS SEG NFR BLD: 83 % (ref 43–78)
NRBC # BLD: 0 K/UL (ref 0–0.2)
PLATELET # BLD AUTO: 190 K/UL (ref 150–450)
PLATELET COMMENT: ADEQUATE
PMV BLD AUTO: 11.6 FL (ref 9.4–12.3)
POTASSIUM SERPL-SCNC: 4.7 MMOL/L (ref 3.5–5.1)
PROT SERPL-MCNC: 6.1 G/DL (ref 6.3–8.2)
RBC # BLD AUTO: 3.34 M/UL (ref 4.05–5.2)
RBC MORPH BLD: ABNORMAL
SODIUM SERPL-SCNC: 141 MMOL/L (ref 133–143)
WBC # BLD AUTO: 7.3 K/UL (ref 4.3–11.1)
WBC MORPH BLD: ABNORMAL

## 2023-11-30 PROCEDURE — 97165 OT EVAL LOW COMPLEX 30 MIN: CPT

## 2023-11-30 PROCEDURE — 6370000000 HC RX 637 (ALT 250 FOR IP): Performed by: FAMILY MEDICINE

## 2023-11-30 PROCEDURE — 1100000000 HC RM PRIVATE

## 2023-11-30 PROCEDURE — 6360000002 HC RX W HCPCS: Performed by: FAMILY MEDICINE

## 2023-11-30 PROCEDURE — 94760 N-INVAS EAR/PLS OXIMETRY 1: CPT

## 2023-11-30 PROCEDURE — 2580000003 HC RX 258: Performed by: FAMILY MEDICINE

## 2023-11-30 PROCEDURE — 85025 COMPLETE CBC W/AUTO DIFF WBC: CPT

## 2023-11-30 PROCEDURE — 97161 PT EVAL LOW COMPLEX 20 MIN: CPT

## 2023-11-30 PROCEDURE — 1100000003 HC PRIVATE W/ TELEMETRY

## 2023-11-30 PROCEDURE — 86140 C-REACTIVE PROTEIN: CPT

## 2023-11-30 PROCEDURE — 2700000000 HC OXYGEN THERAPY PER DAY

## 2023-11-30 PROCEDURE — 36415 COLL VENOUS BLD VENIPUNCTURE: CPT

## 2023-11-30 PROCEDURE — 94640 AIRWAY INHALATION TREATMENT: CPT

## 2023-11-30 PROCEDURE — 97530 THERAPEUTIC ACTIVITIES: CPT

## 2023-11-30 PROCEDURE — 6360000002 HC RX W HCPCS: Performed by: STUDENT IN AN ORGANIZED HEALTH CARE EDUCATION/TRAINING PROGRAM

## 2023-11-30 PROCEDURE — 80053 COMPREHEN METABOLIC PANEL: CPT

## 2023-11-30 PROCEDURE — 97535 SELF CARE MNGMENT TRAINING: CPT

## 2023-11-30 RX ORDER — BUDESONIDE 0.5 MG/2ML
500 INHALANT ORAL
Status: DISCONTINUED | OUTPATIENT
Start: 2023-11-30 | End: 2023-12-05 | Stop reason: HOSPADM

## 2023-11-30 RX ORDER — ENOXAPARIN SODIUM 100 MG/ML
40 INJECTION SUBCUTANEOUS DAILY
Status: DISCONTINUED | OUTPATIENT
Start: 2023-12-01 | End: 2023-12-05 | Stop reason: HOSPADM

## 2023-11-30 RX ADMIN — ASPIRIN 81 MG: 81 TABLET ORAL at 09:03

## 2023-11-30 RX ADMIN — CITALOPRAM HYDROBROMIDE 20 MG: 20 TABLET ORAL at 09:14

## 2023-11-30 RX ADMIN — SODIUM CHLORIDE, PRESERVATIVE FREE 10 ML: 5 INJECTION INTRAVENOUS at 09:04

## 2023-11-30 RX ADMIN — SODIUM CHLORIDE, PRESERVATIVE FREE 10 ML: 5 INJECTION INTRAVENOUS at 20:51

## 2023-11-30 RX ADMIN — ATORVASTATIN CALCIUM 40 MG: 40 TABLET, FILM COATED ORAL at 20:51

## 2023-11-30 RX ADMIN — FAMOTIDINE 10 MG: 20 TABLET ORAL at 09:03

## 2023-11-30 RX ADMIN — CYANOCOBALAMIN TAB 1000 MCG 1000 MCG: 1000 TAB at 09:04

## 2023-11-30 RX ADMIN — DEXAMETHASONE SODIUM PHOSPHATE 6 MG: 10 INJECTION INTRAMUSCULAR; INTRAVENOUS at 15:56

## 2023-11-30 RX ADMIN — GUAIFENESIN 1200 MG: 600 TABLET ORAL at 20:51

## 2023-11-30 RX ADMIN — MESALAMINE 1000 MG: 500 CAPSULE ORAL at 20:51

## 2023-11-30 RX ADMIN — BUDESONIDE 500 MCG: 0.5 SUSPENSION RESPIRATORY (INHALATION) at 19:51

## 2023-11-30 RX ADMIN — GUAIFENESIN 1200 MG: 600 TABLET ORAL at 09:04

## 2023-11-30 RX ADMIN — ENOXAPARIN SODIUM 30 MG: 100 INJECTION SUBCUTANEOUS at 09:04

## 2023-11-30 RX ADMIN — MESALAMINE 1000 MG: 500 CAPSULE ORAL at 09:15

## 2023-11-30 RX ADMIN — GUAIFENESIN SYRUP AND DEXTROMETHORPHAN 10 ML: 100; 10 SYRUP ORAL at 15:55

## 2023-11-30 RX ADMIN — POLYETHYLENE GLYCOL 3350 17 G: 17 POWDER, FOR SOLUTION ORAL at 15:56

## 2023-11-30 ASSESSMENT — PAIN SCALES - GENERAL
PAINLEVEL_OUTOF10: 0

## 2023-11-30 NOTE — PROGRESS NOTES
Comprehensive Nutrition Assessment    Type and Reason for Visit: Initial, Positive Nutrition Screen  Malnutrition Screening Tool: Malnutrition Screen  Have you recently lost weight without trying?: 34 or more pounds (4 points)  Have you been eating poorly because of a decreased appetite?: Yes (1 point)  Malnutrition Screening Tool Score: 5    Nutrition Recommendations/Plan:   Meals and Snacks:  Diet: Continue current order  Nutrition Supplement Therapy:  Medical food supplement therapy:  Initiate Ensure Enlive three times per day (this provides 350 kcal and 20 grams protein per bottle)     Malnutrition Assessment:  Malnutrition Status: At risk for malnutrition (Comment) (decreased appetite for several days prior to admission, eating only a few bites of meals per family report)  no josh wasting of fat or muscle stores noted    Nutrition Assessment:  Nutrition History: Pt reports eating 2-3 meals/day at baseline. States she is usually a good eater but appetite has been poor for several days prior to admission. Family confirms that patient does usually eat well. Do You Have Any Cultural, Bahai, or Ethnic Food Preferences?: No   Weight History: EMR shows previous weights of 152 lbs on 12/9/22, 156 lbs on 8/29/23, and 155 lbs on 10/23/23. Current weight this admission is 150 lbs but is a stated weight on 11/29/23. Pt tells me she has lost ~3-4 lbs since getting sick. Nutrition Background:       PMH significant for COPD, sCHF, CKD3, depression, HTN, HLD, diffuse large B-cell lymphoma in remission, colon adenocarcinoma s/p right colectomy in 2010, B12 deficiency, Crohn's, and TIA. Pt presents this admission for persistent SOB following COVID-19 diagnosis, dizziness, and generalized weakness. Nutrition Interval:  RD met w/pt and family in room. Pt reports very poor appetite currently. Family states pt has been feeling \"bloated\" and cannot eat much food. Pt is agreeable to ensure supplements during admission.

## 2023-11-30 NOTE — CARE COORDINATION
Remote Patient Monitoring Note      Date/Time:  11/30/2023 3:50 PM  Patient Current Location: [place]  Pt admitted to [facility] on 11/29/23 - present. Update provided to RN ACYUNG, LPN CC, and RPM . No outreach by this LPN indicated at this time. Background: Pt enrolled in RPM r/t CHF, Kidney Disease, and COPD.

## 2023-11-30 NOTE — PROGRESS NOTES
ACUTE PHYSICAL THERAPY GOALS:   (Developed with and agreed upon by patient and/or caregiver. )  LTG:  (1.)Ms. Cheryl Yan will move from supine to sit and sit to supine , scoot up and down, and roll side to side in bed with INDEPENDENCE within 7 treatment day(s). (2.)Ms. Cheryl Yan will transfer from bed to chair and chair to bed with MODIFIED INDEPENDENCE using the least restrictive device within 7 treatment day(s). (3.)Ms. Cheryl Yan will ambulate with MODIFIED INDEPENDENCE for 100 feet with the least restrictive device within 7 treatment day(s). (4.)Ms. Cheryl Yan will participate in therapeutic activity/exercises x 25 minutes with SpO2 above 90% for increased activity tolerance within 7 treatment days. ________________________________________________________________________________________________        PHYSICAL THERAPY Initial Assessment and AM  (Link to Caseload Tracking: PT Visit Days : 1  Acknowledge Orders  Time In/Out  PT Charge Capture  Rehab Caseload Tracker    Jeannette Mcdonald is a 78 y.o. female   PRIMARY DIAGNOSIS: COVID  Tachycardia [R00.0]  Hypotension, unspecified hypotension type [I95.9]  COVID [U07.1]       Reason for Referral: Generalized Muscle Weakness (M62.81)  Difficulty in walking, Not elsewhere classified (R26.2)  Inpatient: Payor: MEDICARE / Plan: MEDICARE PART A AND B / Product Type: *No Product type* /     ASSESSMENT:     REHAB RECOMMENDATIONS:   Recommendation to date pending progress:  Setting:  Home Health Therapy    Equipment:    To Be Determined     ASSESSMENT:  Ms. Cheryl Yan was admitted to the hospital with c/o dizziness/weakness and COVID infection. Pt's daughter reported that she was desaturation and orthostatic hypotension. Pt presents to PT with [facility] and decreased strength in B LEs. Pt was able to come to sitting on EOB today with Heather and good-fair sitting balance. Pt performed several transfers today with CGA-Heather x 1-2/RW.   Pt demonstrated fair standing balance today no

## 2023-11-30 NOTE — INTERDISCIPLINARY ROUNDS
Multi-D Rounds/Checklist (leapfrog):  Lines: can any be removed?: None    External Urinary Catheter (Active)      DVT Prophylaxis: Ordered  Vent: N/A  Nutrition Ordered/appropriate: Ordered  Can antibiotics or other drugs be stopped? N/A Yes/No  Inpat Anti-Infectives (From admission, onward)      None          Consults needed: None  A: Is pain control adequate? (has PRNs? Stop drip?) Yes  B: Sedation break and SBT? N/A  C: Is sedation choice appropriate? N/A  D: Delirium/CAM-ICU? No  E: Mobility goals/appropriateness? Yes  F: Family update and plan? child is surrogate decision maker and is being updated daily by primary attending and nursing staff.     Aruna Gutiérrez, APRN - CNP

## 2023-11-30 NOTE — PROGRESS NOTES
TRANSFER - IN REPORT:    Verbal report received from [facility] on Aakash Calero  being received from ED for routine progression of patient care      Report consisted of patient's Situation, Background, Assessment and   Recommendations(SBAR). Information from the following report(s) Nurse Handoff Report, Index, ED Encounter Summary, ED SBAR, Intake/Output, MAR, and Cardiac Rhythm sr/st  was reviewed with the receiving nurse. Opportunity for questions and clarification was provided. Assessment completed upon patient's arrival to unit and care assumed. Pt arrived to 3107 via ED. Pt A&Ox4. On 2 LNC. Levo gtt infusing. Patient given CHG bath and placed on monitors. No skin issues or wounds noted. Allevyn applied to sacrum. Dual skin assessment completed with Amauri Pack RN .

## 2023-11-30 NOTE — PROGRESS NOTES
This was a visit to a covid patient. I had prayer for the patient outside of the patient's room. I received updated information from the patient's nurse concerning this patient's needs. I will continue to follow-up with this patient and offer assistance and prayer.     [address] [facility]

## 2023-11-30 NOTE — ED NOTES
Changed patient gown and linens. Placed patient on new purewick.       Delaney Painting RN  11/29/23 1939

## 2023-11-30 NOTE — ED NOTES
TRANSFER - OUT REPORT:    Verbal report given to RN on Dianne Dixon  being transferred to [facility] for routine progression of patient care       Report consisted of patient's Situation, Background, Assessment and   Recommendations(SBAR). Information from the following report(s) ED Encounter Summary was reviewed with the receiving nurse. [place] Assessment:    Presents to emergency department  because of falls (Syncope, seizure, or loss of consciousness): No  Age > 70: Yes  Altered Mental Status, Intoxication with alcohol or substance confusion (Disorientation, impaired judgment, poor safety awaremess, or inability to follow instructions): No  Impaired Mobility: Ambulates or transfers with assistive devices or assistance; Unable to ambulate or transer.: Yes  Nursing Judgement: Yes          Lines:   Peripheral IV 11/29/23 Right Antecubital (Active)       Peripheral IV 11/29/23 Left Hand (Active)        Opportunity for questions and clarification was provided.       Patient transported with:  Registered Nurse           Robert Arias RN  11/29/23 0365

## 2023-11-30 NOTE — PROGRESS NOTES
ACUTE OCCUPATIONAL THERAPY GOALS:   (Developed with and agreed upon by patient and/or caregiver.)  1. Patient will complete lower body bathing and dressing with SUPERVISION and adaptive equipment as needed. 2. Patient will complete toileting with SUPERVISION. 3. Patient will tolerate 30 minutes of OT treatment with 1-2 rest breaks to increase activity tolerance for ADLs. 4. Patient will complete functional transfers with SUPERVISION and adaptive equipment as needed. 5. Patient will complete functional mobility for household distances with SUPERVISION and adaptive equipment as needed. 6. Patient will complete self-grooming while standing edge of sink with SUPERVISION and adaptive equipment as needed. Timeframe: 7 visits       OCCUPATIONAL THERAPY Initial Assessment, Daily Note, and AM       OT Visit Days: 1   Acknowledge Orders  Time  OT Charge Capture  Rehab Caseload Tracker      Yue Torres is a 78 y.o. female   PRIMARY DIAGNOSIS: COVID  Tachycardia [R00.0]  Hypotension, unspecified hypotension type [I95.9]  COVID [U07.1]       Reason for Referral: Generalized Muscle Weakness (M62.81)  Inpatient: Payor: [facility] / Plan: MEDICARE PART A AND B / Product Type: *No Product type* /     ASSESSMENT:     REHAB RECOMMENDATIONS:   Recommendation to date pending progress:  Setting:  Home Health Therapy    Equipment:    To Be Determined     ASSESSMENT:  Ms. Lizzeth Johnson presents with deficits in overall strength, activity tolerance, activities of daily living performance, and functional mobility. Presents in ICU for Covid; diagnosed several weeks ago however ongoing SOB and cough; found to hypotensive in the ED. Resting on 2L 02 (baseline 2L 02 at night) however weaned to RA during session. Of note, pt lives with daughter in [facility] with chair lift to access living accommodations; uses rollator for mobility. Occasional assist from daughter for bathing tasks otherwise independent with ADLs.         Today, VITALS / O2:   Pre Treatment:      0/10    Post Treatment: 0 /10       Vitals          Oxygen     RA        GROSS EVALUATION: INTACT IMPAIRED   (See Comments)   UE AROM [] []   UE PROM [] []   Strength []  Generally decreased     Posture / Balance []    Fair + unsupported edge of bed sitting balance  Fair  static standing balance  Fair - dynamic standing balance   Sensation [x]     Coordination [x]       Tone [x]       Edema [x]    Activity Tolerance []  Generally decreased; mild SOB after activity while on RA     Hand Dominance R [] L []      COGNITION/  PERCEPTION: INTACT IMPAIRED   (See Comments)   Orientation [x]     Vision [x]     Hearing []  Mild [place]   Cognition  [x]     Perception [x]       MOBILITY: I Mod I S SBA CGA Min Mod Max Total  NT x2 Comments:   Bed Mobility    Rolling [] [] [] [] [x] [] [] [] [] [] [x]    Supine to Sit [] [] [] [] [x] [] [] [] [] [] [x]    Scooting [] [] [] [] [x] [] [] [] [] [] []    Sit to Supine [] [] [] [] [] [] [] [] [] [] []    Transfers    Sit to Stand [] [] [] [] [x] [] [] [] [] [] [x]    Bed to Chair [] [] [] [] [x] [] [] [] [] [] [x] X RW   Stand to Sit [] [] [] [] [x] [] [] [] [] [] [x]    Tub/Shower [] [] [] [] [] [] [] [] [] [] []     Toilet [] [] [] [] [] [] [] [] [] [] []      [] [] [] [] [] [] [] [] [] [] []    I=Independent, Mod I=Modified Independent, S=Supervision/Setup, SBA=Standby Assistance, CGA=Contact Guard Assistance, Min=Minimal Assistance, Mod=Moderate Assistance, Max=Maximal Assistance, Total=Total Assistance, NT=Not Tested    ACTIVITIES OF DAILY LIVING: I Mod I S SBA CGA Min Mod Max Total NT Comments   BASIC ADLs:              Upper Body Bathing  [] [] [] [] [] [] [] [] [] []    Lower Body Bathing [] [] [] [] [] [] [] [] [] []    Toileting [] [] [] [] [x] [] [] [] [] [] Unsupported sitting    Upper Body Dressing [] [] [] [] [] [x] [] [] [] [] Donning gown as robe   Lower Body Dressing [] [] [] [] [] [] [] [x] [] [] socks   Feeding [] [] [] [] [] [] [] []

## 2023-12-01 LAB
ALBUMIN SERPL-MCNC: 2.7 G/DL (ref 3.2–4.6)
ALBUMIN/GLOB SERPL: 0.8 (ref 0.4–1.6)
ALP SERPL-CCNC: 35 U/L (ref 50–136)
ALT SERPL-CCNC: 27 U/L (ref 12–65)
ANION GAP SERPL CALC-SCNC: 3 MMOL/L (ref 2–11)
AST SERPL-CCNC: 34 U/L (ref 15–37)
BASOPHILS # BLD: 0 K/UL (ref 0–0.2)
BASOPHILS NFR BLD: 0 % (ref 0–2)
BILIRUB SERPL-MCNC: 0.3 MG/DL (ref 0.2–1.1)
BUN SERPL-MCNC: 20 MG/DL (ref 8–23)
CALCIUM SERPL-MCNC: 8.5 MG/DL (ref 8.3–10.4)
CHLORIDE SERPL-SCNC: 113 MMOL/L (ref 101–110)
CO2 SERPL-SCNC: 26 MMOL/L (ref 21–32)
CREAT SERPL-MCNC: 1 MG/DL (ref 0.6–1)
CRP SERPL-MCNC: 0.8 MG/DL (ref 0–0.9)
DIFFERENTIAL METHOD BLD: ABNORMAL
EOSINOPHIL # BLD: 0 K/UL (ref 0–0.8)
EOSINOPHIL NFR BLD: 0 % (ref 0.5–7.8)
ERYTHROCYTE [DISTWIDTH] IN BLOOD BY AUTOMATED COUNT: 15.1 % (ref 11.9–14.6)
GLOBULIN SER CALC-MCNC: 3.2 G/DL (ref 2.8–4.5)
GLUCOSE SERPL-MCNC: 157 MG/DL (ref 65–100)
HCT VFR BLD AUTO: 30.8 % (ref 35.8–46.3)
HGB BLD-MCNC: 9.3 G/DL (ref 11.7–15.4)
IMM GRANULOCYTES # BLD AUTO: 0.1 K/UL (ref 0–0.5)
IMM GRANULOCYTES NFR BLD AUTO: 1 % (ref 0–5)
LYMPHOCYTES # BLD: 0.8 K/UL (ref 0.5–4.6)
LYMPHOCYTES NFR BLD: 6 % (ref 13–44)
MCH RBC QN AUTO: 27.2 PG (ref 26.1–32.9)
MCHC RBC AUTO-ENTMCNC: 30.2 G/DL (ref 31.4–35)
MCV RBC AUTO: 90.1 FL (ref 82–102)
MONOCYTES # BLD: 1 K/UL (ref 0.1–1.3)
MONOCYTES NFR BLD: 8 % (ref 4–12)
NEUTS SEG # BLD: 11.5 K/UL (ref 1.7–8.2)
NEUTS SEG NFR BLD: 85 % (ref 43–78)
NRBC # BLD: 0.02 K/UL (ref 0–0.2)
PLATELET # BLD AUTO: 240 K/UL (ref 150–450)
PMV BLD AUTO: 11.5 FL (ref 9.4–12.3)
POTASSIUM SERPL-SCNC: 5.1 MMOL/L (ref 3.5–5.1)
PROT SERPL-MCNC: 5.9 G/DL (ref 6.3–8.2)
RBC # BLD AUTO: 3.42 M/UL (ref 4.05–5.2)
SODIUM SERPL-SCNC: 142 MMOL/L (ref 133–143)
WBC # BLD AUTO: 13.4 K/UL (ref 4.3–11.1)

## 2023-12-01 PROCEDURE — 1100000000 HC RM PRIVATE

## 2023-12-01 PROCEDURE — 6360000002 HC RX W HCPCS: Performed by: STUDENT IN AN ORGANIZED HEALTH CARE EDUCATION/TRAINING PROGRAM

## 2023-12-01 PROCEDURE — 6370000000 HC RX 637 (ALT 250 FOR IP): Performed by: FAMILY MEDICINE

## 2023-12-01 PROCEDURE — 6360000002 HC RX W HCPCS: Performed by: FAMILY MEDICINE

## 2023-12-01 PROCEDURE — 86140 C-REACTIVE PROTEIN: CPT

## 2023-12-01 PROCEDURE — 94640 AIRWAY INHALATION TREATMENT: CPT

## 2023-12-01 PROCEDURE — 94760 N-INVAS EAR/PLS OXIMETRY 1: CPT

## 2023-12-01 PROCEDURE — 2580000003 HC RX 258: Performed by: FAMILY MEDICINE

## 2023-12-01 PROCEDURE — 85025 COMPLETE CBC W/AUTO DIFF WBC: CPT

## 2023-12-01 PROCEDURE — 1100000003 HC PRIVATE W/ TELEMETRY

## 2023-12-01 PROCEDURE — 2700000000 HC OXYGEN THERAPY PER DAY

## 2023-12-01 PROCEDURE — 80053 COMPREHEN METABOLIC PANEL: CPT

## 2023-12-01 PROCEDURE — 36415 COLL VENOUS BLD VENIPUNCTURE: CPT

## 2023-12-01 PROCEDURE — 97530 THERAPEUTIC ACTIVITIES: CPT

## 2023-12-01 PROCEDURE — 94761 N-INVAS EAR/PLS OXIMETRY MLT: CPT

## 2023-12-01 RX ADMIN — CYANOCOBALAMIN TAB 1000 MCG 1000 MCG: 1000 TAB at 09:03

## 2023-12-01 RX ADMIN — MESALAMINE 1000 MG: 500 CAPSULE ORAL at 20:28

## 2023-12-01 RX ADMIN — FAMOTIDINE 10 MG: 20 TABLET ORAL at 09:03

## 2023-12-01 RX ADMIN — ATORVASTATIN CALCIUM 40 MG: 40 TABLET, FILM COATED ORAL at 20:28

## 2023-12-01 RX ADMIN — ENOXAPARIN SODIUM 40 MG: 100 INJECTION SUBCUTANEOUS at 09:03

## 2023-12-01 RX ADMIN — SODIUM CHLORIDE, PRESERVATIVE FREE 10 ML: 5 INJECTION INTRAVENOUS at 09:03

## 2023-12-01 RX ADMIN — CITALOPRAM HYDROBROMIDE 20 MG: 20 TABLET ORAL at 09:03

## 2023-12-01 RX ADMIN — BUDESONIDE 500 MCG: 0.5 SUSPENSION RESPIRATORY (INHALATION) at 20:32

## 2023-12-01 RX ADMIN — SODIUM CHLORIDE, PRESERVATIVE FREE 10 ML: 5 INJECTION INTRAVENOUS at 20:28

## 2023-12-01 RX ADMIN — ASPIRIN 81 MG: 81 TABLET ORAL at 09:03

## 2023-12-01 RX ADMIN — GUAIFENESIN 1200 MG: 600 TABLET ORAL at 20:28

## 2023-12-01 RX ADMIN — GUAIFENESIN 1200 MG: 600 TABLET ORAL at 09:03

## 2023-12-01 RX ADMIN — DEXAMETHASONE SODIUM PHOSPHATE 6 MG: 10 INJECTION INTRAMUSCULAR; INTRAVENOUS at 15:14

## 2023-12-01 RX ADMIN — BUDESONIDE 500 MCG: 0.5 SUSPENSION RESPIRATORY (INHALATION) at 07:59

## 2023-12-01 RX ADMIN — MESALAMINE 1000 MG: 500 CAPSULE ORAL at 09:02

## 2023-12-01 ASSESSMENT — PAIN SCALES - GENERAL: PAINLEVEL_OUTOF10: 0

## 2023-12-01 NOTE — CARE COORDINATION
MSN, CM:  spoke with patient this AM about discharge planning. Patient lives with her daughter and ARLINE in their 2-story home with 1 step for entrance. Patient is independent with all ADL's and requires a rollator for ambulation. Patient denies any HH or rehab. Patient is current with [facility] for home oxygen needs at  only. Patient is retired and her daughter drives her to all appointments. Patient was admitted for fever, SOB, weakness and Fatigue. Patient tested COVID positive earlier in the week. PT/OT consulted for evaluation and recommendations. Case Management will continue to follow for any discharge needs. 12/01/23 0903   Service Assessment   Patient Orientation Alert and Oriented   Cognition Alert   History Provided By Patient   Primary [facility]   Patient's Healthcare Decision Maker is: Named in [facility] [place]    PCP Verified by CM Yes   Last Visit to PCP Within last 6 months   Prior Functional Level Independent in ADLs/IADLs   Current Functional Level Other (see comment)  (PT/OT consutled)   Can patient return to prior living arrangement Yes   Ability to make needs known: Good   Family able to assist with home care needs: Yes   Would you like for me to discuss the discharge plan with any other family members/significant others, and if so, who?  Yes  (Daughter)   Financial Resources Medicare;[place] ([facility])  ()   Community Resources None   Social/Functional History   Lives With Daughter;Family  (ARLINE)   Type of [address] [place] [address]  Two level   Home Access Stairs to enter without rails   Entrance Stairs - Number of Steps 1   Bathroom Shower/Tub Walk-in shower   Bathroom Toilet Standard   Bathroom Equipment Shower chair;Grab bars around toilet   [facility] Responsibilities Yes   Ambulation

## 2023-12-01 NOTE — PROGRESS NOTES
Hospitalist Progress Note   Admit Date:  2023 11:52 AM   Name:  Margarito Russell   Age:  78 y.o. Sex:  female  :  1945   MRN:  [id number]   Room:  [facility]/    Presenting/Chief Complaint: No chief complaint on file. Reason(s) for Admission: Tachycardia [R00.0]  Hypotension, unspecified hypotension type [I95.9]  COVID [U07.1]     Hospital Course:   Margarito Russell is a 78 y.o. female who presented to the ED for cc persistent SOB since being diagnosed with COVID 23 along with dizziness and generalized weakness. Eating very little and now wearing 3L NC continuously from her baseline of only 2L NC at night. Has been to the ER three times since her diagnosis. Still taking her BP medications but has not taken her lasix in 5 days. Daughter has noticed her oxygen levels drop to the 80s on exertion. Hx of COPD wearing 2L NC at night but now wearing 3L NC continuously since diagnosis, sCHF EF 40%, CKD 3, depression, HTN, HLD, diffuse large B cell lymphoma in remission, colon adenocarcinoma s/p right colectomy in , B12 deficiency, Crohns, TIA, NSVT s/p defibrillator,      Low BP readings noted.      CT PE - 1. No pulmonary embolism. 2. Mild emphysema with dependent subsegmental atelectasis. No findings of  pneumonia. 3. Large type III paraesophageal hernia. 4. Bilateral calcified pleural plaques suggesting asbestos-related pleural  disease. 5. Additional chronic findings as described. Subjective & 24hr Events: This morning still presenting with some shortness of breath and cough. Otherwise denies any nausea or vomiting. Assessment & Plan:   78 y.o. female who presented to the ED for cc persistent SOB    1.   Acute on chronic hypoxic respiratory failure secondary to COPD exacerbation in the setting of COVID-19 infection   Continue bronchodilators  Continue systemic steroids  No signs of bacterial infection, will hold off on antibiotics for now  Symptomatic

## 2023-12-01 NOTE — PROGRESS NOTES
Pt resting in bed awake. Alert and oriented times 3 at this time. 3L NC in place. No s/sx of distress noted. No complaints of pain. Encouraged to call for assistance as needed. Call light within reach.

## 2023-12-01 NOTE — PROGRESS NOTES
ACUTE PHYSICAL THERAPY GOALS:   (Developed with and agreed upon by patient and/or caregiver. )  LTG:  (1.)Ms. Dunia Gibbs will move from supine to sit and sit to supine , scoot up and down, and roll side to side in bed with INDEPENDENCE within 7 treatment day(s). (2.)Ms. Dunia Gibbs will transfer from bed to chair and chair to bed with MODIFIED INDEPENDENCE using the least restrictive device within 7 treatment day(s). (3.)Ms. Dunia Gibbs will ambulate with MODIFIED INDEPENDENCE for 100 feet with the least restrictive device within 7 treatment day(s). (4.)Ms. Dunia Gibbs will participate in therapeutic activity/exercises x 25 minutes with SpO2 above 90% for increased activity tolerance within 7 treatment days. PHYSICAL THERAPY: Daily Note AM   (Link to Caseload Tracking: PT Visit Days : 2  Time In/Out PT Charge Capture  Rehab Caseload Tracker  Orders    Cleveland Nix is a 78 y.o. female   PRIMARY DIAGNOSIS: COVID  Tachycardia [R00.0]  Hypotension, unspecified hypotension type [I95.9]  COVID [U07.1]       Inpatient: Payor: MEDICARE / Plan: MEDICARE PART A AND B / Product Type: *No Product type* /     ASSESSMENT:     REHAB RECOMMENDATIONS:   Recommendation to date pending progress:  Setting:  Home Health Therapy    Equipment:    To Be Determined     ASSESSMENT:  Ms. Dunia Gibbs was supine upon contact and agreeable to PT. Patient performed supine to sit with Sba and transfer to standing with CGA. Once standing patient ambulated 30' with rolling walker, CGA and cues for sequencing/walker manipulation. O2 sats maintained above 90% on 2L O2 during mobility. Patient returned to recliner chair where she participated in LE exercises with occasional cues for improved quality of exercise. Overall steady progress towards PT goals. Will continue PT efforts.      SUBJECTIVE:   Ms. Dunia Gibbs states, \"My back is cold\"     Social/Functional Lives With: Daughter, Family (ARLINE)  Type of Home: House  Home Layout: Two level  Home Access: Stairs to Quality Decreased karyn , Decreased step clearance, and Decreased step length    Weightbearing Status      Stairs      I=Independent, Mod I=Modified Independent, S=Supervision, SBA=Standby Assistance, CGA=Contact Guard Assistance,   Min=Minimal Assistance, Mod=Moderate Assistance, Max=Maximal Assistance, Total=Total Assistance, NT=Not Tested    PLAN:   FREQUENCY AND DURATION: 3 times/week for duration of hospital stay or until stated goals are met, whichever comes first.    TREATMENT:   TREATMENT:   Therapeutic Activity (24 Minutes): Therapeutic activity included Supine to Sit, Scooting, Transfer Training, Ambulation on level ground, Sitting balance , Standing balance, and LE exercises, cues for pursed lipped breathing and review of activity pacing, pursed lipped breathing to improve functional Activity tolerance, Balance, Mobility, and Strength. TREATMENT GRID:  N/A    AFTER TREATMENT PRECAUTIONS: Alarm Activated, Bed/Chair Locked, Call light within reach, Chair, Needs within reach, RN notified, and Visitors at bedside    INTERDISCIPLINARY COLLABORATION:  RN/ PCT and PT/ PTA    EDUCATION:      TIME IN/OUT:  Time In: 1131  Time Out: 1740 [facility]  Minutes: 611 Lyndon Abernathy, PTA

## 2023-12-02 LAB
ALBUMIN SERPL-MCNC: 2.6 G/DL (ref 3.2–4.6)
ALBUMIN/GLOB SERPL: 0.9 (ref 0.4–1.6)
ALP SERPL-CCNC: 37 U/L (ref 50–136)
ALT SERPL-CCNC: 25 U/L (ref 12–65)
ANION GAP SERPL CALC-SCNC: 2 MMOL/L (ref 2–11)
AST SERPL-CCNC: 38 U/L (ref 15–37)
BASOPHILS # BLD: 0 K/UL (ref 0–0.2)
BASOPHILS NFR BLD: 0 % (ref 0–2)
BILIRUB SERPL-MCNC: 0.5 MG/DL (ref 0.2–1.1)
BUN SERPL-MCNC: 29 MG/DL (ref 8–23)
CALCIUM SERPL-MCNC: 8.3 MG/DL (ref 8.3–10.4)
CHLORIDE SERPL-SCNC: 110 MMOL/L (ref 101–110)
CO2 SERPL-SCNC: 27 MMOL/L (ref 21–32)
CREAT SERPL-MCNC: 1 MG/DL (ref 0.6–1)
DIFFERENTIAL METHOD BLD: ABNORMAL
EOSINOPHIL # BLD: 0 K/UL (ref 0–0.8)
EOSINOPHIL NFR BLD: 0 % (ref 0.5–7.8)
ERYTHROCYTE [DISTWIDTH] IN BLOOD BY AUTOMATED COUNT: 14.9 % (ref 11.9–14.6)
GLOBULIN SER CALC-MCNC: 2.9 G/DL (ref 2.8–4.5)
GLUCOSE SERPL-MCNC: 140 MG/DL (ref 65–100)
HCT VFR BLD AUTO: 28.2 % (ref 35.8–46.3)
HGB BLD-MCNC: 8.5 G/DL (ref 11.7–15.4)
IMM GRANULOCYTES # BLD AUTO: 0.1 K/UL (ref 0–0.5)
IMM GRANULOCYTES NFR BLD AUTO: 1 % (ref 0–5)
LYMPHOCYTES # BLD: 0.9 K/UL (ref 0.5–4.6)
LYMPHOCYTES NFR BLD: 10 % (ref 13–44)
MCH RBC QN AUTO: 27.2 PG (ref 26.1–32.9)
MCHC RBC AUTO-ENTMCNC: 30.1 G/DL (ref 31.4–35)
MCV RBC AUTO: 90.4 FL (ref 82–102)
MONOCYTES # BLD: 0.7 K/UL (ref 0.1–1.3)
MONOCYTES NFR BLD: 7 % (ref 4–12)
NEUTS SEG # BLD: 8.1 K/UL (ref 1.7–8.2)
NEUTS SEG NFR BLD: 82 % (ref 43–78)
NRBC # BLD: 0 K/UL (ref 0–0.2)
PLATELET # BLD AUTO: 247 K/UL (ref 150–450)
PMV BLD AUTO: 11.7 FL (ref 9.4–12.3)
POTASSIUM SERPL-SCNC: 5.2 MMOL/L (ref 3.5–5.1)
PROT SERPL-MCNC: 5.5 G/DL (ref 6.3–8.2)
RBC # BLD AUTO: 3.12 M/UL (ref 4.05–5.2)
SODIUM SERPL-SCNC: 139 MMOL/L (ref 133–143)
WBC # BLD AUTO: 9.7 K/UL (ref 4.3–11.1)

## 2023-12-02 PROCEDURE — 6370000000 HC RX 637 (ALT 250 FOR IP): Performed by: FAMILY MEDICINE

## 2023-12-02 PROCEDURE — 6370000000 HC RX 637 (ALT 250 FOR IP): Performed by: INTERNAL MEDICINE

## 2023-12-02 PROCEDURE — 94760 N-INVAS EAR/PLS OXIMETRY 1: CPT

## 2023-12-02 PROCEDURE — 6360000002 HC RX W HCPCS: Performed by: FAMILY MEDICINE

## 2023-12-02 PROCEDURE — 1100000003 HC PRIVATE W/ TELEMETRY

## 2023-12-02 PROCEDURE — 2580000003 HC RX 258: Performed by: INTERNAL MEDICINE

## 2023-12-02 PROCEDURE — 94640 AIRWAY INHALATION TREATMENT: CPT

## 2023-12-02 PROCEDURE — 6360000002 HC RX W HCPCS: Performed by: STUDENT IN AN ORGANIZED HEALTH CARE EDUCATION/TRAINING PROGRAM

## 2023-12-02 PROCEDURE — 80053 COMPREHEN METABOLIC PANEL: CPT

## 2023-12-02 PROCEDURE — 6360000002 HC RX W HCPCS: Performed by: INTERNAL MEDICINE

## 2023-12-02 PROCEDURE — 2580000003 HC RX 258: Performed by: FAMILY MEDICINE

## 2023-12-02 PROCEDURE — 2700000000 HC OXYGEN THERAPY PER DAY

## 2023-12-02 PROCEDURE — 85025 COMPLETE CBC W/AUTO DIFF WBC: CPT

## 2023-12-02 PROCEDURE — 36415 COLL VENOUS BLD VENIPUNCTURE: CPT

## 2023-12-02 RX ORDER — AZITHROMYCIN 250 MG/1
500 TABLET, FILM COATED ORAL DAILY
Status: DISCONTINUED | OUTPATIENT
Start: 2023-12-02 | End: 2023-12-05 | Stop reason: HOSPADM

## 2023-12-02 RX ADMIN — GUAIFENESIN 1200 MG: 600 TABLET ORAL at 09:44

## 2023-12-02 RX ADMIN — BUDESONIDE 500 MCG: 0.5 SUSPENSION RESPIRATORY (INHALATION) at 20:09

## 2023-12-02 RX ADMIN — SODIUM CHLORIDE, PRESERVATIVE FREE 10 ML: 5 INJECTION INTRAVENOUS at 09:45

## 2023-12-02 RX ADMIN — ENOXAPARIN SODIUM 40 MG: 100 INJECTION SUBCUTANEOUS at 09:44

## 2023-12-02 RX ADMIN — GUAIFENESIN 1200 MG: 600 TABLET ORAL at 20:46

## 2023-12-02 RX ADMIN — FAMOTIDINE 10 MG: 20 TABLET ORAL at 09:44

## 2023-12-02 RX ADMIN — AZITHROMYCIN DIHYDRATE 500 MG: 250 TABLET, FILM COATED ORAL at 09:43

## 2023-12-02 RX ADMIN — CITALOPRAM HYDROBROMIDE 20 MG: 20 TABLET ORAL at 09:44

## 2023-12-02 RX ADMIN — CYANOCOBALAMIN TAB 1000 MCG 1000 MCG: 1000 TAB at 09:44

## 2023-12-02 RX ADMIN — MESALAMINE 1000 MG: 500 CAPSULE ORAL at 12:31

## 2023-12-02 RX ADMIN — BUDESONIDE 500 MCG: 0.5 SUSPENSION RESPIRATORY (INHALATION) at 08:07

## 2023-12-02 RX ADMIN — ATORVASTATIN CALCIUM 40 MG: 40 TABLET, FILM COATED ORAL at 20:45

## 2023-12-02 RX ADMIN — MESALAMINE 1000 MG: 500 CAPSULE ORAL at 20:46

## 2023-12-02 RX ADMIN — WATER 1000 MG: 1 INJECTION INTRAMUSCULAR; INTRAVENOUS; SUBCUTANEOUS at 09:43

## 2023-12-02 RX ADMIN — DEXAMETHASONE SODIUM PHOSPHATE 6 MG: 10 INJECTION INTRAMUSCULAR; INTRAVENOUS at 16:23

## 2023-12-02 RX ADMIN — SODIUM CHLORIDE, PRESERVATIVE FREE 10 ML: 5 INJECTION INTRAVENOUS at 20:47

## 2023-12-02 RX ADMIN — ASPIRIN 81 MG: 81 TABLET ORAL at 09:44

## 2023-12-02 ASSESSMENT — PAIN SCALES - GENERAL
PAINLEVEL_OUTOF10: 0

## 2023-12-02 NOTE — PROGRESS NOTES
Patient resting in bed with eyes closed. Alert and oriented. Respirations even and unlabored on 3L NC. Denies pain or needs at this time. External urinary catheter in place, draining urine with no issues. Continuous telemetry monitor on. Encouraged to call for assistance, if needed. Call light within reach.

## 2023-12-02 NOTE — PROGRESS NOTES
Hospitalist Progress Note   Admit Date:  2023 11:52 AM   Name:  Andry Monroe   Age:  78 y.o. Sex:  female  :  1945   MRN:  [id number]   Room:  [facility]/    Presenting/Chief Complaint: No chief complaint on file. Reason(s) for Admission: Tachycardia [R00.0]  Hypotension, unspecified hypotension type [I95.9]  COVID [U07.1]     Hospital Course:   Andry Monroe is a 78 y.o. female who presented to the ED for cc persistent SOB since being diagnosed with COVID 23 along with dizziness and generalized weakness. Eating very little and now wearing 3L NC continuously from her baseline of only 2L NC at night. Has been to the ER three times since her diagnosis. Still taking her BP medications but has not taken her lasix in 5 days. Daughter has noticed her oxygen levels drop to the 80s on exertion. Hx of COPD wearing 2L NC at night but now wearing 3L NC continuously since diagnosis, sCHF EF 40%, CKD 3, depression, HTN, HLD, diffuse large B cell lymphoma in remission, colon adenocarcinoma s/p right colectomy in , B12 deficiency, Crohns, TIA, NSVT s/p defibrillator,      Low BP readings noted.      CT PE - 1. No pulmonary embolism. 2. Mild emphysema with dependent subsegmental atelectasis. No findings of  pneumonia. 3. Large type III paraesophageal hernia. 4. Bilateral calcified pleural plaques suggesting asbestos-related pleural  disease. 5. Additional chronic findings as described. Subjective & 24hr Events: This morning still having some shortness of breath and productive cough. Denies any nausea or vomiting. Assessment & Plan:   78 y.o. female who presented to the ED for cc persistent SOB    1. Acute on chronic hypoxic respiratory failure secondary to COPD exacerbation in the setting of COVID-19 infection   Continue bronchodilators  Continue systemic steroids  Start ceftriaxone and azithromycin  Symptomatic management     2.   Sinus tachycardia  Continue 0.2 K/UL    Absolute Immature Granulocyte 0.1 0.0 - 0.5 K/UL   Comprehensive Metabolic Panel w/ Reflex to MG    Collection Time: 12/02/23  4:59 AM   Result Value Ref Range    Sodium 139 133 - 143 mmol/L    Potassium 5.2 (H) 3.5 - 5.1 mmol/L    Chloride 110 101 - 110 mmol/L    CO2 27 21 - 32 mmol/L    Anion Gap 2 2 - 11 mmol/L    Glucose 140 (H) 65 - 100 mg/dL    BUN 29 (H) 8 - 23 MG/DL    Creatinine 1.00 0.6 - 1.0 MG/DL    Est, Glom Filt Rate 57 (L) >60 ml/min/1.73m2    Calcium 8.3 8.3 - 10.4 MG/DL    Total Bilirubin 0.5 0.2 - 1.1 MG/DL    ALT 25 12 - 65 U/L    AST 38 (H) 15 - 37 U/L    Alk Phosphatase 37 (L) 50 - 136 U/L    Total Protein 5.5 (L) 6.3 - 8.2 g/dL    Albumin 2.6 (L) 3.2 - 4.6 g/dL    Globulin 2.9 2.8 - 4.5 g/dL    Albumin/Globulin Ratio 0.9 0.4 - 1.6         Current Meds:  Current Facility-Administered Medications   Medication Dose Route Frequency    azithromycin (ZITHROMAX) tablet 500 mg  500 mg Oral Daily    cefTRIAXone (ROCEPHIN) 1,000 mg in sterile water 10 mL IV syringe  1,000 mg IntraVENous Q24H    budesonide (PULMICORT) nebulizer suspension 500 mcg  500 mcg Nebulization BID RT    enoxaparin (LOVENOX) injection 40 mg  40 mg SubCUTAneous Daily    albuterol (PROVENTIL) nebulizer solution 2.5 mg  2.5 mg Nebulization Q4H PRN    aspirin EC tablet 81 mg  81 mg Oral Daily    atorvastatin (LIPITOR) tablet 40 mg  40 mg Oral Nightly    citalopram (CELEXA) tablet 20 mg  20 mg Oral Daily    vitamin B-12 (CYANOCOBALAMIN) tablet 1,000 mcg  1,000 mcg Oral Daily    famotidine (PEPCID) tablet 10 mg  10 mg Oral Daily    mesalamine (PENTASA) extended release capsule 1,000 mg  1,000 mg Oral BID    sodium chloride flush 0.9 % injection 5-40 mL  5-40 mL IntraVENous 2 times per day    sodium chloride flush 0.9 % injection 5-40 mL  5-40 mL IntraVENous PRN    0.9 % sodium chloride infusion   IntraVENous PRN    aluminum & magnesium hydroxide-simethicone (MAALOX) 200-200-20 MG/5ML suspension 30 mL  30 mL Oral Q6H PRN

## 2023-12-02 NOTE — PROGRESS NOTES
Patient voices no concerns at this time. Patient is resting in bed. Patient states still with productive cough. Patient denies any pain and appears comfortable. Call light within reach and patient instructed to call if assistance is needed. .  will continue to follow.

## 2023-12-02 NOTE — PROGRESS NOTES
Patient resting in bed with no complaints at this time. Patient is alert and orientated with no distress noted. IV intact and patent with no s/s of infection noted. Respirations even and unlabored with heart rate regular. Patient unable to ambulate independently without assistance; needs standby assist.  Bed in low locked position with call light within reach. Patient instructed to call if assistance is needed. Will continue to monitor.

## 2023-12-03 PROCEDURE — 94640 AIRWAY INHALATION TREATMENT: CPT

## 2023-12-03 PROCEDURE — 6360000002 HC RX W HCPCS: Performed by: STUDENT IN AN ORGANIZED HEALTH CARE EDUCATION/TRAINING PROGRAM

## 2023-12-03 PROCEDURE — 2580000003 HC RX 258: Performed by: FAMILY MEDICINE

## 2023-12-03 PROCEDURE — 6370000000 HC RX 637 (ALT 250 FOR IP): Performed by: FAMILY MEDICINE

## 2023-12-03 PROCEDURE — 6360000002 HC RX W HCPCS: Performed by: INTERNAL MEDICINE

## 2023-12-03 PROCEDURE — 6370000000 HC RX 637 (ALT 250 FOR IP): Performed by: INTERNAL MEDICINE

## 2023-12-03 PROCEDURE — 6360000002 HC RX W HCPCS: Performed by: FAMILY MEDICINE

## 2023-12-03 PROCEDURE — 2700000000 HC OXYGEN THERAPY PER DAY

## 2023-12-03 PROCEDURE — 94760 N-INVAS EAR/PLS OXIMETRY 1: CPT

## 2023-12-03 PROCEDURE — 1100000003 HC PRIVATE W/ TELEMETRY

## 2023-12-03 PROCEDURE — 2580000003 HC RX 258: Performed by: INTERNAL MEDICINE

## 2023-12-03 RX ADMIN — SODIUM CHLORIDE, PRESERVATIVE FREE 10 ML: 5 INJECTION INTRAVENOUS at 21:19

## 2023-12-03 RX ADMIN — CITALOPRAM HYDROBROMIDE 20 MG: 20 TABLET ORAL at 08:49

## 2023-12-03 RX ADMIN — ATORVASTATIN CALCIUM 40 MG: 40 TABLET, FILM COATED ORAL at 20:29

## 2023-12-03 RX ADMIN — DEXAMETHASONE SODIUM PHOSPHATE 6 MG: 10 INJECTION INTRAMUSCULAR; INTRAVENOUS at 15:17

## 2023-12-03 RX ADMIN — GUAIFENESIN 1200 MG: 600 TABLET ORAL at 20:29

## 2023-12-03 RX ADMIN — MESALAMINE 1000 MG: 500 CAPSULE ORAL at 08:53

## 2023-12-03 RX ADMIN — GUAIFENESIN 1200 MG: 600 TABLET ORAL at 08:48

## 2023-12-03 RX ADMIN — BUDESONIDE 500 MCG: 0.5 SUSPENSION RESPIRATORY (INHALATION) at 07:29

## 2023-12-03 RX ADMIN — FAMOTIDINE 10 MG: 20 TABLET ORAL at 08:48

## 2023-12-03 RX ADMIN — AZITHROMYCIN DIHYDRATE 500 MG: 250 TABLET, FILM COATED ORAL at 08:49

## 2023-12-03 RX ADMIN — SODIUM CHLORIDE, PRESERVATIVE FREE 10 ML: 5 INJECTION INTRAVENOUS at 08:49

## 2023-12-03 RX ADMIN — WATER 1000 MG: 1 INJECTION INTRAMUSCULAR; INTRAVENOUS; SUBCUTANEOUS at 08:49

## 2023-12-03 RX ADMIN — CYANOCOBALAMIN TAB 1000 MCG 1000 MCG: 1000 TAB at 08:49

## 2023-12-03 RX ADMIN — ENOXAPARIN SODIUM 40 MG: 100 INJECTION SUBCUTANEOUS at 08:48

## 2023-12-03 RX ADMIN — BUDESONIDE 500 MCG: 0.5 SUSPENSION RESPIRATORY (INHALATION) at 19:34

## 2023-12-03 RX ADMIN — MESALAMINE 1000 MG: 500 CAPSULE ORAL at 20:29

## 2023-12-03 RX ADMIN — ASPIRIN 81 MG: 81 TABLET ORAL at 08:48

## 2023-12-03 ASSESSMENT — PAIN SCALES - GENERAL
PAINLEVEL_OUTOF10: 0

## 2023-12-03 NOTE — PROGRESS NOTES
Hospitalist Progress Note   Admit Date:  2023 11:52 AM   Name:  Kristian Polanco   Age:  78 y.o. Sex:  female  :  1945   MRN:  [id number]   Room:  [facility]/    Presenting/Chief Complaint: No chief complaint on file. Reason(s) for Admission: Tachycardia [R00.0]  Hypotension, unspecified hypotension type [I95.9]  COVID [U07.1]     Hospital Course:   Kristian Polanco is a 78 y.o. female who presented to the ED for cc persistent SOB since being diagnosed with COVID 23 along with dizziness and generalized weakness. Eating very little and now wearing 3L NC continuously from her baseline of only 2L NC at night. Has been to the ER three times since her diagnosis. Still taking her BP medications but has not taken her lasix in 5 days. Daughter has noticed her oxygen levels drop to the 80s on exertion. Hx of COPD wearing 2L NC at night but now wearing 3L NC continuously since diagnosis, sCHF EF 40%, CKD 3, depression, HTN, HLD, diffuse large B cell lymphoma in remission, colon adenocarcinoma s/p right colectomy in , B12 deficiency, Crohns, TIA, NSVT s/p defibrillator,      Low BP readings noted.      CT PE - 1. No pulmonary embolism. 2. Mild emphysema with dependent subsegmental atelectasis. No findings of  pneumonia. 3. Large type III paraesophageal hernia. 4. Bilateral calcified pleural plaques suggesting asbestos-related pleural  disease. 5. Additional chronic findings as described. Subjective & 24hr Events: This morning still presented with shortness of breath and productive cough, improved from yesterday. Denies any chest pain, no abdominal pain, no nausea or vomiting. Assessment & Plan:   78 y.o. female who presented to the ED for cc persistent SOB    1.   Acute on chronic hypoxic respiratory failure secondary to COPD exacerbation in the setting of COVID-19 infection and concerns of superimposed bacterial infection  Continue bronchodilators  Continue systemic Lymphocytes % 10 (L) 13 - 44 %    Monocytes % 7 4.0 - 12.0 %    Eosinophils % 0 (L) 0.5 - 7.8 %    Basophils % 0 0.0 - 2.0 %    Immature Granulocytes 1 0.0 - 5.0 %    Neutrophils Absolute 8.1 1.7 - 8.2 K/UL    Lymphocytes Absolute 0.9 0.5 - 4.6 K/UL    Monocytes Absolute 0.7 0.1 - 1.3 K/UL    Eosinophils Absolute 0.0 0.0 - 0.8 K/UL    Basophils Absolute 0.0 0.0 - 0.2 K/UL    Absolute Immature Granulocyte 0.1 0.0 - 0.5 K/UL   Comprehensive Metabolic Panel w/ Reflex to MG    Collection Time: 12/02/23  4:59 AM   Result Value Ref Range    Sodium 139 133 - 143 mmol/L    Potassium 5.2 (H) 3.5 - 5.1 mmol/L    Chloride 110 101 - 110 mmol/L    CO2 27 21 - 32 mmol/L    Anion Gap 2 2 - 11 mmol/L    Glucose 140 (H) 65 - 100 mg/dL    BUN 29 (H) 8 - 23 MG/DL    Creatinine 1.00 0.6 - 1.0 MG/DL    Est, Glom Filt Rate 57 (L) >60 ml/min/1.73m2    Calcium 8.3 8.3 - 10.4 MG/DL    Total Bilirubin 0.5 0.2 - 1.1 MG/DL    ALT 25 12 - 65 U/L    AST 38 (H) 15 - 37 U/L    Alk Phosphatase 37 (L) 50 - 136 U/L    Total Protein 5.5 (L) 6.3 - 8.2 g/dL    Albumin 2.6 (L) 3.2 - 4.6 g/dL    Globulin 2.9 2.8 - 4.5 g/dL    Albumin/Globulin Ratio 0.9 0.4 - 1.6         Current Meds:  Current Facility-Administered Medications   Medication Dose Route Frequency    azithromycin (ZITHROMAX) tablet 500 mg  500 mg Oral Daily    cefTRIAXone (ROCEPHIN) 1,000 mg in sterile water 10 mL IV syringe  1,000 mg IntraVENous Q24H    budesonide (PULMICORT) nebulizer suspension 500 mcg  500 mcg Nebulization BID RT    enoxaparin (LOVENOX) injection 40 mg  40 mg SubCUTAneous Daily    albuterol (PROVENTIL) nebulizer solution 2.5 mg  2.5 mg Nebulization Q4H PRN    aspirin EC tablet 81 mg  81 mg Oral Daily    atorvastatin (LIPITOR) tablet 40 mg  40 mg Oral Nightly    citalopram (CELEXA) tablet 20 mg  20 mg Oral Daily    vitamin B-12 (CYANOCOBALAMIN) tablet 1,000 mcg  1,000 mcg Oral Daily    famotidine (PEPCID) tablet 10 mg  10 mg Oral Daily    mesalamine (PENTASA) extended

## 2023-12-03 NOTE — PROGRESS NOTES
Pt resting in bed comfortably at this time, alert and oriented times 4. No distress noted, respirations even and unlabored on 2 L NC. Expiratory wheezing noted. Pt denies pain at this time. Pt instructed to call for assistance if needed, call light in place, will continue to monitor.

## 2023-12-03 NOTE — PROGRESS NOTES
Family states concern that patient is not taking her metoprolol or entresto. MD aware of family concern. Will continue to monitor.

## 2023-12-04 LAB
ANION GAP SERPL CALC-SCNC: 1 MMOL/L (ref 2–11)
BACTERIA SPEC CULT: NORMAL
BUN SERPL-MCNC: 23 MG/DL (ref 8–23)
CALCIUM SERPL-MCNC: 8.8 MG/DL (ref 8.3–10.4)
CHLORIDE SERPL-SCNC: 108 MMOL/L (ref 101–110)
CO2 SERPL-SCNC: 28 MMOL/L (ref 21–32)
CREAT SERPL-MCNC: 1 MG/DL (ref 0.6–1)
ERYTHROCYTE [DISTWIDTH] IN BLOOD BY AUTOMATED COUNT: 14.6 % (ref 11.9–14.6)
GLUCOSE SERPL-MCNC: 120 MG/DL (ref 65–100)
HCT VFR BLD AUTO: 29.2 % (ref 35.8–46.3)
HGB BLD-MCNC: 9.1 G/DL (ref 11.7–15.4)
MCH RBC QN AUTO: 27.7 PG (ref 26.1–32.9)
MCHC RBC AUTO-ENTMCNC: 31.2 G/DL (ref 31.4–35)
MCV RBC AUTO: 89 FL (ref 82–102)
NRBC # BLD: 0 K/UL (ref 0–0.2)
PLATELET # BLD AUTO: 295 K/UL (ref 150–450)
PMV BLD AUTO: 11.7 FL (ref 9.4–12.3)
POTASSIUM SERPL-SCNC: 4.6 MMOL/L (ref 3.5–5.1)
RBC # BLD AUTO: 3.28 M/UL (ref 4.05–5.2)
SERVICE CMNT-IMP: NORMAL
SODIUM SERPL-SCNC: 137 MMOL/L (ref 133–143)
WBC # BLD AUTO: 8.2 K/UL (ref 4.3–11.1)

## 2023-12-04 PROCEDURE — 94761 N-INVAS EAR/PLS OXIMETRY MLT: CPT

## 2023-12-04 PROCEDURE — 6370000000 HC RX 637 (ALT 250 FOR IP): Performed by: NURSE PRACTITIONER

## 2023-12-04 PROCEDURE — 6360000002 HC RX W HCPCS: Performed by: INTERNAL MEDICINE

## 2023-12-04 PROCEDURE — 2580000003 HC RX 258: Performed by: INTERNAL MEDICINE

## 2023-12-04 PROCEDURE — 36415 COLL VENOUS BLD VENIPUNCTURE: CPT

## 2023-12-04 PROCEDURE — 1100000000 HC RM PRIVATE

## 2023-12-04 PROCEDURE — 97530 THERAPEUTIC ACTIVITIES: CPT

## 2023-12-04 PROCEDURE — 2700000000 HC OXYGEN THERAPY PER DAY

## 2023-12-04 PROCEDURE — 6360000002 HC RX W HCPCS: Performed by: STUDENT IN AN ORGANIZED HEALTH CARE EDUCATION/TRAINING PROGRAM

## 2023-12-04 PROCEDURE — 94640 AIRWAY INHALATION TREATMENT: CPT

## 2023-12-04 PROCEDURE — 6370000000 HC RX 637 (ALT 250 FOR IP): Performed by: FAMILY MEDICINE

## 2023-12-04 PROCEDURE — 6360000002 HC RX W HCPCS: Performed by: FAMILY MEDICINE

## 2023-12-04 PROCEDURE — 94760 N-INVAS EAR/PLS OXIMETRY 1: CPT

## 2023-12-04 PROCEDURE — 85027 COMPLETE CBC AUTOMATED: CPT

## 2023-12-04 PROCEDURE — 80048 BASIC METABOLIC PNL TOTAL CA: CPT

## 2023-12-04 PROCEDURE — 6370000000 HC RX 637 (ALT 250 FOR IP): Performed by: INTERNAL MEDICINE

## 2023-12-04 PROCEDURE — 2580000003 HC RX 258: Performed by: FAMILY MEDICINE

## 2023-12-04 RX ORDER — METOPROLOL SUCCINATE 50 MG/1
25 TABLET, EXTENDED RELEASE ORAL DAILY
Status: DISCONTINUED | OUTPATIENT
Start: 2023-12-04 | End: 2023-12-05 | Stop reason: HOSPADM

## 2023-12-04 RX ADMIN — METOPROLOL SUCCINATE 25 MG: 50 TABLET, EXTENDED RELEASE ORAL at 11:20

## 2023-12-04 RX ADMIN — MESALAMINE 1000 MG: 500 CAPSULE ORAL at 21:46

## 2023-12-04 RX ADMIN — ENOXAPARIN SODIUM 40 MG: 100 INJECTION SUBCUTANEOUS at 09:00

## 2023-12-04 RX ADMIN — FAMOTIDINE 10 MG: 20 TABLET ORAL at 09:01

## 2023-12-04 RX ADMIN — SACUBITRIL AND VALSARTAN 1 TABLET: 49; 51 TABLET, FILM COATED ORAL at 11:20

## 2023-12-04 RX ADMIN — ATORVASTATIN CALCIUM 40 MG: 40 TABLET, FILM COATED ORAL at 21:45

## 2023-12-04 RX ADMIN — BUDESONIDE 500 MCG: 0.5 SUSPENSION RESPIRATORY (INHALATION) at 07:11

## 2023-12-04 RX ADMIN — GUAIFENESIN 1200 MG: 600 TABLET ORAL at 21:45

## 2023-12-04 RX ADMIN — CITALOPRAM HYDROBROMIDE 20 MG: 20 TABLET ORAL at 09:01

## 2023-12-04 RX ADMIN — WATER 1000 MG: 1 INJECTION INTRAMUSCULAR; INTRAVENOUS; SUBCUTANEOUS at 09:00

## 2023-12-04 RX ADMIN — AZITHROMYCIN DIHYDRATE 500 MG: 250 TABLET, FILM COATED ORAL at 09:00

## 2023-12-04 RX ADMIN — DEXAMETHASONE SODIUM PHOSPHATE 6 MG: 10 INJECTION INTRAMUSCULAR; INTRAVENOUS at 15:38

## 2023-12-04 RX ADMIN — BUDESONIDE 500 MCG: 0.5 SUSPENSION RESPIRATORY (INHALATION) at 20:56

## 2023-12-04 RX ADMIN — SACUBITRIL AND VALSARTAN 1 TABLET: 49; 51 TABLET, FILM COATED ORAL at 21:45

## 2023-12-04 RX ADMIN — SODIUM CHLORIDE, PRESERVATIVE FREE 10 ML: 5 INJECTION INTRAVENOUS at 09:01

## 2023-12-04 RX ADMIN — ASPIRIN 81 MG: 81 TABLET ORAL at 09:00

## 2023-12-04 RX ADMIN — SODIUM CHLORIDE, PRESERVATIVE FREE 10 ML: 5 INJECTION INTRAVENOUS at 21:46

## 2023-12-04 RX ADMIN — GUAIFENESIN 1200 MG: 600 TABLET ORAL at 09:00

## 2023-12-04 RX ADMIN — MESALAMINE 1000 MG: 500 CAPSULE ORAL at 09:00

## 2023-12-04 RX ADMIN — CYANOCOBALAMIN TAB 1000 MCG 1000 MCG: 1000 TAB at 09:01

## 2023-12-04 ASSESSMENT — PAIN SCALES - GENERAL
PAINLEVEL_OUTOF10: 0

## 2023-12-04 NOTE — PROGRESS NOTES
Patient resting in bed with eyes closed. Alert and oriented. Respirations even and unlabored on 2L NC. Denies pain or needs at this time. External urinary catheter in place, draining urine with no issues. Encouraged to call for assistance, if needed. Call light within reach.

## 2023-12-04 NOTE — PROGRESS NOTES
Comprehensive Nutrition Assessment    Type and Reason for Visit: Reassess  Malnutrition Screening Tool: Malnutrition Screen  Have you recently lost weight without trying?: 34 or more pounds (4 points)  Have you been eating poorly because of a decreased appetite?: Yes (1 point)  Malnutrition Screening Tool Score: 5    Nutrition Recommendations/Plan:   Meals and Snacks:  Diet: Continue current order  Nutrition Supplement Therapy:  Medical food supplement therapy:  Continue Ensure Enlive three times per day (this provides 350 kcal and 20 grams protein per bottle)     Malnutrition Assessment:  Malnutrition Status: At risk for malnutrition (Comment) (decreased appetite for several days prior to admission, eating only a few bites of meals per family report)  no josh wasting of fat or muscle stores noted    Nutrition Assessment:  Nutrition History: Pt reports eating 2-3 meals/day at baseline. States she is usually a good eater but appetite has been poor for several days prior to admission. Family confirms that patient does usually eat well. Do You Have Any Cultural, Holiness, or Ethnic Food Preferences?: No   Weight History: EMR shows previous weights of 152 lbs on 12/9/22, 156 lbs on 8/29/23, and 155 lbs on 10/23/23. Current weight this admission is 150 lbs but is a stated weight on 11/29/23. Pt tells me she has lost ~3-4 lbs since getting sick. Current measured body weight of 158 lbs (12/4)- indicates little to no weight changes. Current weight possibly skewed 2/2 bed scale measure. Nutrition Background:       PMH significant for COPD, sCHF, CKD3, depression, HTN, HLD, diffuse large B-cell lymphoma in remission, colon adenocarcinoma s/p right colectomy in 2010, B12 deficiency, Crohn's, and TIA. Pt presents this admission for persistent SOB following COVID-19 diagnosis, dizziness, and generalized weakness. Nutrition Interval:  RD visited with patient while she was resting in bed eating lunch.  Reports she had

## 2023-12-04 NOTE — PROGRESS NOTES
ACUTE PHYSICAL THERAPY GOALS:   (Developed with and agreed upon by patient and/or caregiver. )  LTG:  (1.)Ms. Aruna Ross will move from supine to sit and sit to supine , scoot up and down, and roll side to side in bed with INDEPENDENCE within 7 treatment day(s). (2.)Ms. Aruna Ross will transfer from bed to chair and chair to bed with MODIFIED INDEPENDENCE using the least restrictive device within 7 treatment day(s). (3.)Ms. Aruna Ross will ambulate with MODIFIED INDEPENDENCE for 100 feet with the least restrictive device within 7 treatment day(s). (4.)Ms. Aruna Ross will participate in therapeutic activity/exercises x 25 minutes with SpO2 above 90% for increased activity tolerance within 7 treatment days. PHYSICAL THERAPY: Daily Note AM   (Link to Caseload Tracking: PT Visit Days : 3  Time In/Out PT Charge Capture  Rehab Caseload Tracker  Orders    Lui Molina is a 78 y.o. female   PRIMARY DIAGNOSIS: COVID  Tachycardia [R00.0]  Hypotension, unspecified hypotension type [I95.9]  COVID [U07.1]       Inpatient: Payor: MEDICARE / Plan: MEDICARE PART A AND B / Product Type: *No Product type* /     ASSESSMENT:     REHAB RECOMMENDATIONS:   Recommendation to date pending progress:  Setting:  Home Health Therapy    Equipment:    To Be Determined     ASSESSMENT:  Ms. Aruna Ross was supine upon contact and agreeable to PT. Patient performed supine to sit with SBA and transfer to standing with CGA. Once standing patient ambulated 48' with rolling walker, CGA and cues for sequencing/walker manipulation. O2 sats maintained above 90% on 2L O2 during mobility. Patient returned to recliner chair where she participated in LE exercises with occasional cues for improved quality of exercise. Overall steady progress towards PT goals. Will continue PT efforts.      SUBJECTIVE:   Ms. Aruna Ross states, \"Let's try for another lap\"     Social/Functional Lives With: Daughter, Family (ARLINE)  Type of Home: House  Home Layout: Two level  Home Access: Gait Quality Decreased karyn , Decreased step clearance, and Decreased step length    Weightbearing Status      Stairs      I=Independent, Mod I=Modified Independent, S=Supervision, SBA=Standby Assistance, CGA=Contact Guard Assistance,   Min=Minimal Assistance, Mod=Moderate Assistance, Max=Maximal Assistance, Total=Total Assistance, NT=Not Tested    PLAN:   FREQUENCY AND DURATION: 3 times/week for duration of hospital stay or until stated goals are met, whichever comes first.    TREATMENT:   TREATMENT:   Therapeutic Activity (24 Minutes): Therapeutic activity included Supine to Sit, Scooting, Transfer Training, Ambulation on level ground, Sitting balance , Standing balance, and LE exercises, cues for pursed lipped breathing and review of activity pacing, pursed lipped breathing to improve functional Activity tolerance, Balance, Mobility, and Strength. TREATMENT GRID:  N/A    AFTER TREATMENT PRECAUTIONS: Alarm Activated, Bed/Chair Locked, Call light within reach, Chair, Needs within reach, RN notified, and Visitors at bedside    INTERDISCIPLINARY COLLABORATION:  RN/ PCT and PT/ PTA    EDUCATION:      TIME IN/OUT:  Time In: 6466  Time Out: 1003  Minutes: 611 Lyndon Abernathy, PTA

## 2023-12-04 NOTE — PROGRESS NOTES
Oxygen Qualifier       Room air: SpO2 with O2 and liter flow   Resting SpO2  98%    Ambulating SpO2  96%        Completed by:    Lexus Valencia RCP  Oxygen Qualifier       Room air: SpO2 with O2 and liter flow   Resting SpO2     Ambulating SpO2

## 2023-12-04 NOTE — PROGRESS NOTES
mmol/L    Anion Gap 1 (L) 2 - 11 mmol/L    Glucose 120 (H) 65 - 100 mg/dL    BUN 23 8 - 23 MG/DL    Creatinine 1.00 0.6 - 1.0 MG/DL    Est, Glom Filt Rate 57 (L) >60 ml/min/1.73m2    Calcium 8.8 8.3 - 10.4 MG/DL       Current Meds:  Current Facility-Administered Medications   Medication Dose Route Frequency    sacubitril-valsartan (ENTRESTO) 49-51 MG per tablet 1 tablet  1 tablet Oral BID    metoprolol succinate (TOPROL XL) extended release tablet 25 mg  25 mg Oral Daily    azithromycin (ZITHROMAX) tablet 500 mg  500 mg Oral Daily    cefTRIAXone (ROCEPHIN) 1,000 mg in sterile water 10 mL IV syringe  1,000 mg IntraVENous Q24H    budesonide (PULMICORT) nebulizer suspension 500 mcg  500 mcg Nebulization BID RT    enoxaparin (LOVENOX) injection 40 mg  40 mg SubCUTAneous Daily    albuterol (PROVENTIL) nebulizer solution 2.5 mg  2.5 mg Nebulization Q4H PRN    aspirin EC tablet 81 mg  81 mg Oral Daily    atorvastatin (LIPITOR) tablet 40 mg  40 mg Oral Nightly    citalopram (CELEXA) tablet 20 mg  20 mg Oral Daily    vitamin B-12 (CYANOCOBALAMIN) tablet 1,000 mcg  1,000 mcg Oral Daily    famotidine (PEPCID) tablet 10 mg  10 mg Oral Daily    mesalamine (PENTASA) extended release capsule 1,000 mg  1,000 mg Oral BID    sodium chloride flush 0.9 % injection 5-40 mL  5-40 mL IntraVENous 2 times per day    sodium chloride flush 0.9 % injection 5-40 mL  5-40 mL IntraVENous PRN    0.9 % sodium chloride infusion   IntraVENous PRN    aluminum & magnesium hydroxide-simethicone (MAALOX) 200-200-20 MG/5ML suspension 30 mL  30 mL Oral Q6H PRN    polyethylene glycol (GLYCOLAX) packet 17 g  17 g Oral Daily PRN    bisacodyl (DULCOLAX) suppository 10 mg  10 mg Rectal Daily PRN    acetaminophen (TYLENOL) tablet 650 mg  650 mg Oral Q6H PRN    Or    acetaminophen (TYLENOL) suppository 650 mg  650 mg Rectal Q6H PRN    guaiFENesin-dextromethorphan (ROBITUSSIN DM) 100-10 MG/5ML syrup 10 mL  10 mL Oral Q4H PRN    dexamethasone (DECADRON) injection 6

## 2023-12-04 NOTE — CARE COORDINATION
MSN, CM:  spoke with patient about nebulizer tubing that was requested by her daughter. I informed the patient I could not get nebulizer tubing. Patient questioned if [employer] (patient's home oxygen company) could get it. I explained that I was not sure but it would be worth a try. Patient states she would speak with daughter about this. PT/OT [facility] [address] [facility] [address] which patient will be discharged with [facility] and Palliative care. Patient requires no oxygen. Cardiology started patient back on heart meds and would like to observe overnight. Patient to be discharged tomorrow if medically stable. Case Management will continue to follow.

## 2023-12-04 NOTE — PROGRESS NOTES
Pt resting in bed comfortably at this time, alert and oriented times 4. No distress noted, respirations even and unlabored on 2 L NC. Pt denies pain at this time. External catheter in place and working appropriately. Pt instructed to call for assistance if needed, call light in place, will continue to monitor.

## 2023-12-04 NOTE — PLAN OF CARE
Problem: Respiratory - Adult  Goal: Achieves optimal ventilation and oxygenation  Flowsheets (Taken 12/4/2023 0952)  Achieves optimal ventilation and oxygenation:   Assess for changes in respiratory status   Respiratory therapy support as indicated   Oxygen supplementation based on oxygen saturation or arterial blood gases   Encourage broncho-pulmonary hygiene including cough, deep breathe, incentive spirometry

## 2023-12-05 VITALS
HEIGHT: 62 IN | WEIGHT: 152.78 LBS | TEMPERATURE: 98.6 F | BODY MASS INDEX: 28.11 KG/M2 | DIASTOLIC BLOOD PRESSURE: 68 MMHG | RESPIRATION RATE: 20 BRPM | HEART RATE: 88 BPM | OXYGEN SATURATION: 93 % | SYSTOLIC BLOOD PRESSURE: 111 MMHG

## 2023-12-05 PROCEDURE — 2580000003 HC RX 258: Performed by: INTERNAL MEDICINE

## 2023-12-05 PROCEDURE — 6360000002 HC RX W HCPCS: Performed by: STUDENT IN AN ORGANIZED HEALTH CARE EDUCATION/TRAINING PROGRAM

## 2023-12-05 PROCEDURE — 2580000003 HC RX 258: Performed by: FAMILY MEDICINE

## 2023-12-05 PROCEDURE — 6370000000 HC RX 637 (ALT 250 FOR IP): Performed by: FAMILY MEDICINE

## 2023-12-05 PROCEDURE — 6370000000 HC RX 637 (ALT 250 FOR IP): Performed by: NURSE PRACTITIONER

## 2023-12-05 PROCEDURE — 94760 N-INVAS EAR/PLS OXIMETRY 1: CPT

## 2023-12-05 PROCEDURE — 94640 AIRWAY INHALATION TREATMENT: CPT

## 2023-12-05 PROCEDURE — 6370000000 HC RX 637 (ALT 250 FOR IP): Performed by: INTERNAL MEDICINE

## 2023-12-05 PROCEDURE — 6360000002 HC RX W HCPCS: Performed by: INTERNAL MEDICINE

## 2023-12-05 RX ORDER — GUAIFENESIN 600 MG/1
600 TABLET, EXTENDED RELEASE ORAL 2 TIMES DAILY
Qty: 6 TABLET | Refills: 0 | Status: SHIPPED | OUTPATIENT
Start: 2023-12-05 | End: 2023-12-08

## 2023-12-05 RX ORDER — AZITHROMYCIN 500 MG/1
500 TABLET, FILM COATED ORAL DAILY
Qty: 1 TABLET | Refills: 0 | Status: SHIPPED | OUTPATIENT
Start: 2023-12-06 | End: 2023-12-07

## 2023-12-05 RX ORDER — CEFPODOXIME PROXETIL 100 MG/1
100 TABLET, FILM COATED ORAL 2 TIMES DAILY
Qty: 6 TABLET | Refills: 0 | Status: SHIPPED | OUTPATIENT
Start: 2023-12-06 | End: 2023-12-05 | Stop reason: SDUPTHER

## 2023-12-05 RX ORDER — CEFPODOXIME PROXETIL 100 MG/1
100 TABLET, FILM COATED ORAL 2 TIMES DAILY
Qty: 2 TABLET | Refills: 0 | Status: SHIPPED | OUTPATIENT
Start: 2023-12-06 | End: 2023-12-07

## 2023-12-05 RX ADMIN — AZITHROMYCIN DIHYDRATE 500 MG: 250 TABLET, FILM COATED ORAL at 10:23

## 2023-12-05 RX ADMIN — SODIUM CHLORIDE, PRESERVATIVE FREE 5 ML: 5 INJECTION INTRAVENOUS at 10:44

## 2023-12-05 RX ADMIN — CITALOPRAM HYDROBROMIDE 20 MG: 20 TABLET ORAL at 10:24

## 2023-12-05 RX ADMIN — SACUBITRIL AND VALSARTAN 1 TABLET: 49; 51 TABLET, FILM COATED ORAL at 10:24

## 2023-12-05 RX ADMIN — WATER 1000 MG: 1 INJECTION INTRAMUSCULAR; INTRAVENOUS; SUBCUTANEOUS at 10:25

## 2023-12-05 RX ADMIN — METOPROLOL SUCCINATE 25 MG: 50 TABLET, EXTENDED RELEASE ORAL at 10:23

## 2023-12-05 RX ADMIN — CYANOCOBALAMIN TAB 1000 MCG 1000 MCG: 1000 TAB at 10:24

## 2023-12-05 RX ADMIN — ASPIRIN 81 MG: 81 TABLET ORAL at 10:24

## 2023-12-05 RX ADMIN — FAMOTIDINE 10 MG: 20 TABLET ORAL at 10:23

## 2023-12-05 RX ADMIN — BUDESONIDE 500 MCG: 0.5 SUSPENSION RESPIRATORY (INHALATION) at 07:12

## 2023-12-05 RX ADMIN — GUAIFENESIN 1200 MG: 600 TABLET ORAL at 10:24

## 2023-12-05 RX ADMIN — ENOXAPARIN SODIUM 40 MG: 100 INJECTION SUBCUTANEOUS at 10:26

## 2023-12-05 RX ADMIN — MESALAMINE 1000 MG: 500 CAPSULE ORAL at 10:44

## 2023-12-05 ASSESSMENT — PAIN SCALES - GENERAL
PAINLEVEL_OUTOF10: 0
PAINLEVEL_OUTOF10: 0

## 2023-12-05 NOTE — PLAN OF CARE
Problem: Respiratory - Adult  Goal: Achieves optimal ventilation and oxygenation  Outcome: Progressing  Flowsheets (Taken 12/5/2023 6615)  Achieves optimal ventilation and oxygenation:   Assess for changes in respiratory status   Respiratory therapy support as indicated   Assess and instruct to report shortness of breath or any respiratory difficulty   Encourage broncho-pulmonary hygiene including cough, deep breathe, incentive spirometry   Assess for changes in mentation and behavior

## 2023-12-05 NOTE — CARE COORDINATION
MSN, CM:  patient to be discharged home with [facility] and Palliative Care. Patient and family agree with this discharge plan. Patient has met all milestones for this admission. Family to transport patient home. 12/05/23 1209   Service Assessment   Patient Orientation Alert and [address] Louisa Wiggins At/After Discharge   Transition of Care Consult (CM Consult) Home Health; Other  ([facility] and Palliative care)   Internal Home Health No   Reason Outside Agency Chosen Script used patient chose alternate agency   Services At/After Discharge PT;Nursing services   Mode of Transport at Discharge Other (see comment)  (family to transport)   Confirm Follow Up Transport Family   Condition of Participation: Discharge Planning   The Plan for Transition of Care is related to the following treatment goals: Home Health and Palliative Care   The Patient and/or Patient Representative was provided with a Choice of Provider? Patient;Patient Representative   Name of the Patient Representative who was provided with the Choice of Provider and agrees with the Discharge Plan? Daughter   The Patient and/Or Patient Representative agree with the Discharge Plan? Yes   Freedom of Choice list was provided with basic dialogue that supports the patient's individualized plan of care/goals, treatment preferences, and shares the quality data associated with the providers?   Yes

## 2023-12-05 NOTE — DISCHARGE SUMMARY
Hospitalist Discharge Summary   Admit Date:  2023 11:52 AM   DC Note date: 2023  Name:  Frantz Fuentes   Age:  78 y.o. Sex:  female  :  1945   MRN:  [id number]   Room:  [facility]  PCP:  Lexus Raphael MD    Presenting Complaint: No chief complaint on file. Initial Admission Diagnosis: Tachycardia [R00.0]  Hypotension, unspecified hypotension type [I95.9]  COVID [U07.1]     Problem List for this Hospitalization (present on admission):    Principal Problem:    COVID  Active Problems:    Presence of automatic (implantable) cardiac defibrillator    Coronary artery disease involving native coronary artery of native heart with angina pectoris (HCC)    Depression    GERD (gastroesophageal reflux disease)    H/O TIA (transient ischemic attack) and stroke    Stage 3 severe COPD by GOLD classification ([address])    NICM (nonischemic cardiomyopathy) ([address])    Systolic CHF, chronic (HCC)    Chronic renal disease, stage III ([address]) [749607]    Hypotension    Acute on chronic respiratory failure ([address])  Resolved Problems:    * No resolved hospital problems. *      Hospital Course:  Frantz Fuentes is an 78 y.o. female w/ a PMH of COPD wearing 2L NC at night but now wearing 3L NC continuously since diagnosis, HFrEF, CKD 3, depression, HTN, HLD, diffuse large B cell lymphoma in remission, colon adenocarcinoma s/p right colectomy in , B-12 deficiency, Crohns dx, TIA, NSVT s/p defibrillator who presented to the ER due to persistent SOB since being diagnosed with COVID 23 along with dizziness and generalized weakness. Has been to the ER three times since her diagnosis. Daughter has noticed her oxygen levels drop to the 80s on exertion. In the ER, low BP readings noted. . She was admitted to the Hospitalist service due to acute respiratory failure. CT was negative for PE. Dexamethasone was started.   The patient was initially on supplemental oxygen but this has been weaned to room

## 2023-12-06 ENCOUNTER — CARE COORDINATION (OUTPATIENT)
Dept: CARE COORDINATION | Facility: CLINIC | Age: 78
End: 2023-12-06

## 2023-12-07 ENCOUNTER — CARE COORDINATION (OUTPATIENT)
Dept: CARE COORDINATION | Facility: CLINIC | Age: 78
End: 2023-12-07

## 2023-12-07 DIAGNOSIS — I50.22 SYSTOLIC CHF, CHRONIC (HCC): Primary | ICD-10-CM

## 2023-12-07 NOTE — CARE COORDINATION
Care Transitions Initial Follow Up Call    Call within 2 business days of discharge: Yes    Patient Current Location:  Home: [place] [address] [address] [place] [address]    Care Transition Nurse contacted the patient and caregiver by telephone to perform post hospital discharge assessment. Verified name and  with patient and caregiver as identifiers. Provided introduction to self, and explanation of the Care Transition Nurse role. Patient: Roni Dacosta Patient : 1945   MRN: [id number]  Reason for Admission: COVID  Discharge Date: 23 RARS: Readmission Risk Score: 20.4      Last Discharge Facility       Date Complaint Diagnosis Description Type Department Provider    23 Shortness of breath Tachycardia . .. ED to Hosp-Admission (Discharged) (ADMITTED) SFD8MS Lawerence Pay, DO; [facility],... Was this an external facility discharge? No Discharge Facility:     Challenges to be reviewed by the provider   Additional needs identified to be addressed with provider: No  none               Method of communication with provider: none. Spoke with Bertrand Boogie, patient's legal guardian and POA. Reports that patient is doing well although she continues to feel weak. RPM request to resume has been sent. Plans to attend follow up appointments. Care Transition Nurse reviewed discharge instructions with patient and caregiver who verbalized understanding. The patient and caregiver was given an opportunity to ask questions and does not have any further questions or concerns at this time. Were discharge instructions available to patient? Yes. Reviewed appropriate site of care based on symptoms and resources available to patient including: PCP  Specialist  Home health  When to call 911. The patient and caregiver agrees to contact the PCP office for questions related to their healthcare. Advance Care Planning:   Does patient have an Advance Directive: reviewed and current.     Medication

## 2023-12-07 NOTE — CARE COORDINATION
Care Transitions Outreach Attempt    Call within 2 business days of discharge: Yes   Attempted to reach patient for transitions of care follow up. Unable to reach patient. Patient: Roni Dacosta Patient : 1945 MRN: [id number]    Last Discharge Facility       Date Complaint Diagnosis Description Type Department Provider    23 Shortness of breath Tachycardia . .. ED to Hosp-Admission (Discharged) (ADMITTED) SFD8MS Lawerence Pay, DO; [facility] Main,. .. Was this an external facility discharge?  No Discharge Facility Name: [facility]    Noted following upcoming appointments from discharge chart review:   [facility] follow up appointment(s):   Future Appointments   Date Time Provider [address]   2024 11:20 AM Gsaper Aguilera APRN - CNP [facility]PS [facility] AM[facility]   2024 11:30 AM Naida Michelle DO [facility]   4/10/2024 11:30 AM Myesha Ruvalcaba MD [address] [facility] AM[facility]   4/10/2024 12:00 PM [facility]     Non-[facility]  follow up appointment(s): none noted

## 2023-12-11 ENCOUNTER — CARE COORDINATION (OUTPATIENT)
Dept: CARE COORDINATION | Facility: CLINIC | Age: 78
End: 2023-12-11

## 2023-12-11 NOTE — CARE COORDINATION
Remote Alert Monitoring Note  Rpm alert to be reviewed by the provider   red alert   pulse ox reading (89%)   Additional needs to be addressed by N/A: No                    Date/Time:  2023 9:46 AM  Patient Current Location: Home: [address] [place] [address]  LPN attempted to contacted patient by telephone. Spoke with patients emergency contact / daughter, Tom Lala. Verified patients name and  as identifiers. Background: Pt enrolled in RPM r/t CHF, Kidney Disease, and COPD. Refer to 911 immediately if:  Patient unresponsive or unable to provide history  Change in cognition or sudden confusion  Patient unable to respond in complete sentences  Intense chest pain/tightness  Any concern for any clinical emergency  Red Alert: Provider response time of 1 hr required for any red alert requiring intervention  Yellow Alert: Provider response time of 3hr required for any escalated yellow alert    O2 Triage  Are you having any Chest Pain? no   Are you having any Shortness of Breath? no   Swelling in your hands or feet? no     Are you having any other health concerns or issues? no      Clinical Interventions: Reviewed and followed up on alerts and treatments-Per patients daughter, pt denies CP, SOB, and new or worsening edema. Pt is asymptomatic, wearing home O2 @ 3lpm, and is agreeable to recheck pulse ox. Updated reading of 99% noted in HRS. Pt currently scheduled for pulmonary office visit on 24 and cardiology office visit on 24. Patients daughter verbalizes understanding of when to notify provider(s) of changes / concerns and when to seek emergent medical care for pt. Plan/Follow Up: Will continue to review, monitor and address alerts with follow up based on severity of symptoms and risk factors.

## 2023-12-13 ENCOUNTER — CARE COORDINATION (OUTPATIENT)
Dept: CARE COORDINATION | Facility: CLINIC | Age: 78
End: 2023-12-13

## 2023-12-13 NOTE — CARE COORDINATION
F/u call made, no answer and a message was left. Pt informed that if they have any questions, concerns or any needs that need to be addressed to please call back. Otherwise I will reach out again next week.

## 2023-12-14 NOTE — CARE COORDINATION
Care Transitions Follow Up Call    Patient Current Location:  Home: [place] [address] [address] [facility]    Care Transition Nurse contacted the caregiver by telephone to follow up after admission on 2023. Verified name and  with caregiver as identifiers. Patient: Huang Black  Patient : 1945   MRN: [id number]  Reason for Admission: COVID  Discharge Date: 23 RARS: Readmission Risk Score: 20.4      Needs to be reviewed by the provider   Additional needs identified to be addressed with provider: No  none             Method of communication with provider: none. History  includes: COPD, oxygen dependent 3L NC continuously since diagnosis, HFrEF, CKD 3, depression, HTN, HLD, diffuse large B cell lymphoma. Spoke with Lexie Mcintyre, patient's POA. She reports that patient is doing all right. Continues to take medications as prescribed. Addressed changes since last contact:  none  Discussed follow-up appointments. If no appointment was previously scheduled, appointment scheduling offered: Yes. Is follow up appointment scheduled within 7 days of discharge? Yes. Follow Up  Future Appointments   Date Time Provider [address] [place] [address] Ct   2024 11:20 AM Jayesh Aguilera, APRN - CNP [facility]PS [facility] AM[facility]   2024 11:30 AM Antionette Rodriguez DO [facility]   4/10/2024 11:30 AM Rebecca Cuevas MD [address] [facility]   4/10/2024 12:00 PM [facility]     External follow up appointment(s): none noted    Care Transition Nurse reviewed discharge instructions, medical action plan, and red flags with caregiver and discussed any barriers to care and/or understanding of plan of care after discharge. Discussed appropriate site of care based on symptoms and resources available to patient including: PCP  Specialist  Urgent care clinics  [facility]  When to call 911. The caregiver agrees to contact the PCP office for questions related to their healthcare.      Advance Care Planning:   reviewed

## 2023-12-22 RX ORDER — BUDESONIDE 0.5 MG/2ML
1 INHALANT ORAL 2 TIMES DAILY
Qty: 360 ML | Refills: 0 | Status: SHIPPED | OUTPATIENT
Start: 2023-12-22 | End: 2024-01-09 | Stop reason: SDUPTHER

## 2023-12-22 NOTE — TELEPHONE ENCOUNTER
Aristeo, patient called refill line and states that Budesonide 0.5 mg need  a [facility].LOV 10/23/23 she was seen by Awa  but states she has seen you. I pend a RX to her pharmacy.. hernandez bonner

## 2023-12-27 RX ORDER — SACUBITRIL AND VALSARTAN 49; 51 MG/1; MG/1
1 TABLET, FILM COATED ORAL 2 TIMES DAILY
Qty: 180 TABLET | Refills: 3 | Status: SHIPPED | OUTPATIENT
Start: 2023-12-27

## 2023-12-29 ENCOUNTER — CARE COORDINATION (OUTPATIENT)
Dept: CARE COORDINATION | Facility: CLINIC | Age: 78
End: 2023-12-29

## 2023-12-29 NOTE — CARE COORDINATION
Care Transitions Outreach Attempt    Call within 2 business days of discharge: Yes   Attempted to reach patient for transitions of care follow up. Unable to reach patient. Patient: Marin Lin Patient : 1945 MRN: [id number]    Last Discharge Facility       Date Complaint Diagnosis Description Type Department Provider    23 Shortness of breath Tachycardia . .. ED to Hosp-Admission (Discharged) (ADMITTED) SFD8MS Marcel Valiente DO; Daphne Queen,. .. Was this an external facility discharge?  No Discharge Facility Name: [facility]    Noted following upcoming appointments from discharge chart review:   [facility] follow up appointment(s):   Future Appointments   Date Time Provider [address]   2024 11:20 AM José Miguel Aguilera APRN - CNP [facility]PS [facility] AM[facility]   2024 11:30 AM Pau Womack DO [facility]   4/10/2024 11:30 AM Asah Leahy MD [address] [facility]. [facility] AMB   4/10/2024 12:00 PM [facility]     Non-[facility]  follow up appointment(s): none noted

## 2024-01-03 ENCOUNTER — CARE COORDINATION (OUTPATIENT)
Dept: CARE COORDINATION | Facility: CLINIC | Age: 79
End: 2024-01-03

## 2024-01-03 NOTE — CARE COORDINATION
Ambulatory Care Coordination Note  1/3/2024    Patient Current Location:  [place]     ACM contacted the caregiver by telephone. Verified name and  with caregiver as identifiers. Provided introduction to self, and explanation of the ACM role.     Challenges to be reviewed by the provider   Additional needs identified to be addressed with provider: No  none               Method of communication with provider: phone.    ACM: Nahomy Sal, RN    Spoke with dtr /Jennifer for pt was resting. She had PT today, feels that it is helping, pt is tolerating well. Pt's BP is still running low at times, but pt has not been as symptomatic. Staying hydrated, changing position slowly ans elevating legs in bed. They are aware of upcoming appointments and deny any questions at this item. I will check back in 2 weeks    Offered patient enrollment in the Remote Patient Monitoring (RPM) program for in-home monitoring: Yes, patient already enrolled.    Lab Results       None            Care Coordination Interventions    Referral from Primary Care Provider: No  Suggested Interventions and Community Resources  Palliative Care: In Process (Comment: [facility])  Physical Therapy: In Process (Comment: [facility])  Other Services or Interventions: Current with RPM          Goals Addressed                      This Visit's Progress      Care Coordination Self Management   On track      CC Self Management Goal  Patient Goal (What steps will patient take to achieve goal?):   Patient is able to discuss self-management of condition(s):  Pt demonstrates adherence to medications  Pt demonstrates understanding of self-monitoring  Patient is able to identify Red Flags:  Alert to potential adverse drug reactions(s) or side effects and actions to take should they arise  Discuss target symptoms and actions to take should they arise  Identify problems that require immediate PCP or specialist visit  Patient demonstrates understanding of access to

## 2024-01-09 ENCOUNTER — OFFICE VISIT (OUTPATIENT)
Dept: PULMONOLOGY | Age: 79
End: 2024-01-09
Payer: MEDICARE

## 2024-01-09 VITALS
OXYGEN SATURATION: 97 % | BODY MASS INDEX: 27.79 KG/M2 | HEIGHT: 62 IN | SYSTOLIC BLOOD PRESSURE: 126 MMHG | DIASTOLIC BLOOD PRESSURE: 68 MMHG | HEART RATE: 102 BPM | RESPIRATION RATE: 19 BRPM | TEMPERATURE: 97.4 F | WEIGHT: 151 LBS

## 2024-01-09 DIAGNOSIS — G47.34 NOCTURNAL HYPOXIA: ICD-10-CM

## 2024-01-09 DIAGNOSIS — J92.0 PLEURAL PLAQUE WITH PRESENCE OF ASBESTOS: ICD-10-CM

## 2024-01-09 DIAGNOSIS — Z85.72 HISTORY OF LYMPHOMA: ICD-10-CM

## 2024-01-09 DIAGNOSIS — J44.1 COPD EXACERBATION (HCC): ICD-10-CM

## 2024-01-09 DIAGNOSIS — J44.9 STAGE 3 SEVERE COPD BY GOLD CLASSIFICATION (HCC): Primary | ICD-10-CM

## 2024-01-09 PROCEDURE — 99214 OFFICE O/P EST MOD 30 MIN: CPT | Performed by: NURSE PRACTITIONER

## 2024-01-09 PROCEDURE — G8399 PT W/DXA RESULTS DOCUMENT: HCPCS | Performed by: NURSE PRACTITIONER

## 2024-01-09 PROCEDURE — 1036F TOBACCO NON-USER: CPT | Performed by: NURSE PRACTITIONER

## 2024-01-09 PROCEDURE — 1123F ACP DISCUSS/DSCN MKR DOCD: CPT | Performed by: NURSE PRACTITIONER

## 2024-01-09 PROCEDURE — G8427 DOCREV CUR MEDS BY ELIG CLIN: HCPCS | Performed by: NURSE PRACTITIONER

## 2024-01-09 PROCEDURE — G8484 FLU IMMUNIZE NO ADMIN: HCPCS | Performed by: NURSE PRACTITIONER

## 2024-01-09 PROCEDURE — G8417 CALC BMI ABV UP PARAM F/U: HCPCS | Performed by: NURSE PRACTITIONER

## 2024-01-09 PROCEDURE — 3023F SPIROM DOC REV: CPT | Performed by: NURSE PRACTITIONER

## 2024-01-09 PROCEDURE — 1090F PRES/ABSN URINE INCON ASSESS: CPT | Performed by: NURSE PRACTITIONER

## 2024-01-09 RX ORDER — BUDESONIDE 0.5 MG/2ML
1 INHALANT ORAL 2 TIMES DAILY
Qty: 360 ML | Refills: 3 | Status: SHIPPED | OUTPATIENT
Start: 2024-01-09

## 2024-01-09 RX ORDER — IPRATROPIUM BROMIDE AND ALBUTEROL SULFATE 2.5; .5 MG/3ML; MG/3ML
SOLUTION RESPIRATORY (INHALATION)
Qty: 1080 ML | Refills: 3 | Status: SHIPPED | OUTPATIENT
Start: 2024-01-09

## 2024-01-09 RX ORDER — SODIUM CHLORIDE FOR INHALATION 3 %
4 VIAL, NEBULIZER (ML) INHALATION 2 TIMES DAILY
Qty: 240 ML | Refills: 2 | Status: SHIPPED | OUTPATIENT
Start: 2024-01-09

## 2024-01-09 RX ORDER — DOXYCYCLINE HYCLATE 100 MG
100 TABLET ORAL 2 TIMES DAILY
Qty: 20 TABLET | Refills: 0 | Status: SHIPPED | OUTPATIENT
Start: 2024-01-09 | End: 2024-01-19

## 2024-01-09 RX ORDER — PREDNISONE 10 MG/1
TABLET ORAL
Qty: 30 TABLET | Refills: 0 | Status: SHIPPED | OUTPATIENT
Start: 2024-01-09

## 2024-01-09 NOTE — PROGRESS NOTES
Name:  Rekha Palmer  YOB: 1945   MRN: [id number]      Office Visit: 1/9/2024        ASSESSMENT AND PLAN:  (Medical Decision Making)    Impression: 77-year-old female with stage III COPD, asbestos exposure with pleural plaques, congestive heart failure, former smoker and nocturnal hypoxia.    1. Stage 3 severe COPD by GOLD classification (HCC)  -- Using DuoNebs and budesonide appropriately.  Unfortunately, it sounds as though she is exacerbated again, so we will do prednisone and doxycycline.  --Will also add saline to the nebulizer and order her a flutter valve  --PFTs in March/2023 with ongoing obstruction, moderately severe consistent with stage III.  --seems to do much better with nebulizer than inhalers    2. Pleural plaque with presence of asbestos  --Noted on CT scan in the past.  Pleural plaques stable without concerns for asbestosis or mesothelioma  --CT 11/2023 continues to show stability    3. Personal history of nicotine dependence    4. Nocturnal hypoxia  --Wearing 2 L at night    5. History of lymphoma    No orders of the defined types were placed in this encounter.    No orders of the defined types were placed in this encounter.          Bridgette Aguilera, APRN - CNP    Collaborating physician is Maureen Kong MD   _________________________________________________________________________    HISTORY OF PRESENT ILLNESS:    Ms. Rekha Palmer is a 78 y.o. female who is seen at [facility] for  COPD and Follow-up      Ms. Palmer is a 76 y.o. female who presents with a history of previous tobacco and asbestos exposure with resultant COPD and pleural plaques with nocturnal hypoxia here for  follow up appointment. She has had several exacerbations recently.  Had issues with bronchitis after using trelegy, so is back on duoneb QID and pulmicort BID.   She denies significant lower extremity edema and uses her Lasix when she has a 3 pound weight gain and she does weigh

## 2024-01-10 ENCOUNTER — TELEPHONE (OUTPATIENT)
Dept: PULMONOLOGY | Age: 79
End: 2024-01-10

## 2024-01-10 NOTE — TELEPHONE ENCOUNTER
Bridgette this patient called and Patient needs to know when the reschedule appt should be for this patient wit Bridgette Aguilera?   It not in the wrap you when do you want her back... hernandez bonner

## 2024-01-12 ENCOUNTER — TELEPHONE (OUTPATIENT)
Dept: PULMONOLOGY | Age: 79
End: 2024-01-12

## 2024-01-12 NOTE — TELEPHONE ENCOUNTER
[facility] says that there is no doseage  are directions for this prescription . Please contact     [phone]    Ref# [id number]            Disp Refills Start End    ipratropium 0.5 mg-albuterol 2.5 mg (DUONEB) 0.5-2.5 (3) MG/3ML SOLN nebulizer solution

## 2024-01-15 RX ORDER — ATORVASTATIN CALCIUM 40 MG/1
TABLET, FILM COATED ORAL
Qty: 90 TABLET | Refills: 3 | Status: SHIPPED | OUTPATIENT
Start: 2024-01-15

## 2024-01-17 ENCOUNTER — CARE COORDINATION (OUTPATIENT)
Dept: CARE COORDINATION | Facility: CLINIC | Age: 79
End: 2024-01-17

## 2024-01-17 NOTE — CARE COORDINATION
F/u call made, no answer and a message was left. Pt informed that if they have any questions, concerns or any needs that need to be addressed to please call back. Otherwise I will reach out again in 2 weeks.

## 2024-01-18 ENCOUNTER — TELEPHONE (OUTPATIENT)
Dept: PULMONOLOGY | Age: 79
End: 2024-01-18

## 2024-01-18 DIAGNOSIS — J18.9 PNEUMONIA DUE TO INFECTIOUS ORGANISM, UNSPECIFIED LATERALITY, UNSPECIFIED PART OF LUNG: Primary | ICD-10-CM

## 2024-01-18 NOTE — TELEPHONE ENCOUNTER
Mary Sandhu NP with [facility]  called to say that the patient her lung she has wheezing bilateral. She has finished her steriods and antibiotics. Doing her nebulizer regularly. But needs an CXR to rule out  pneumonia     Mary  [phone]

## 2024-01-19 ENCOUNTER — HOSPITAL ENCOUNTER (OUTPATIENT)
Dept: GENERAL RADIOLOGY | Age: 79
End: 2024-01-19
Payer: MEDICARE

## 2024-01-19 ENCOUNTER — OFFICE VISIT (OUTPATIENT)
Dept: PULMONOLOGY | Age: 79
End: 2024-01-19
Payer: MEDICARE

## 2024-01-19 VITALS
HEART RATE: 91 BPM | BODY MASS INDEX: 27.23 KG/M2 | WEIGHT: 148 LBS | OXYGEN SATURATION: 98 % | RESPIRATION RATE: 18 BRPM | TEMPERATURE: 97.2 F | SYSTOLIC BLOOD PRESSURE: 110 MMHG | DIASTOLIC BLOOD PRESSURE: 66 MMHG | HEIGHT: 62 IN

## 2024-01-19 DIAGNOSIS — J44.1 COPD EXACERBATION (HCC): Primary | ICD-10-CM

## 2024-01-19 DIAGNOSIS — B37.0 THRUSH, ORAL: ICD-10-CM

## 2024-01-19 DIAGNOSIS — J18.9 PNEUMONIA DUE TO INFECTIOUS ORGANISM, UNSPECIFIED LATERALITY, UNSPECIFIED PART OF LUNG: ICD-10-CM

## 2024-01-19 DIAGNOSIS — R06.02 SHORTNESS OF BREATH: ICD-10-CM

## 2024-01-19 PROCEDURE — 1123F ACP DISCUSS/DSCN MKR DOCD: CPT | Performed by: STUDENT IN AN ORGANIZED HEALTH CARE EDUCATION/TRAINING PROGRAM

## 2024-01-19 PROCEDURE — 3023F SPIROM DOC REV: CPT | Performed by: STUDENT IN AN ORGANIZED HEALTH CARE EDUCATION/TRAINING PROGRAM

## 2024-01-19 PROCEDURE — G8484 FLU IMMUNIZE NO ADMIN: HCPCS | Performed by: STUDENT IN AN ORGANIZED HEALTH CARE EDUCATION/TRAINING PROGRAM

## 2024-01-19 PROCEDURE — 71046 X-RAY EXAM CHEST 2 VIEWS: CPT

## 2024-01-19 PROCEDURE — G8399 PT W/DXA RESULTS DOCUMENT: HCPCS | Performed by: STUDENT IN AN ORGANIZED HEALTH CARE EDUCATION/TRAINING PROGRAM

## 2024-01-19 PROCEDURE — 1090F PRES/ABSN URINE INCON ASSESS: CPT | Performed by: STUDENT IN AN ORGANIZED HEALTH CARE EDUCATION/TRAINING PROGRAM

## 2024-01-19 PROCEDURE — 1036F TOBACCO NON-USER: CPT | Performed by: STUDENT IN AN ORGANIZED HEALTH CARE EDUCATION/TRAINING PROGRAM

## 2024-01-19 PROCEDURE — G8427 DOCREV CUR MEDS BY ELIG CLIN: HCPCS | Performed by: STUDENT IN AN ORGANIZED HEALTH CARE EDUCATION/TRAINING PROGRAM

## 2024-01-19 PROCEDURE — 99214 OFFICE O/P EST MOD 30 MIN: CPT | Performed by: STUDENT IN AN ORGANIZED HEALTH CARE EDUCATION/TRAINING PROGRAM

## 2024-01-19 PROCEDURE — G8417 CALC BMI ABV UP PARAM F/U: HCPCS | Performed by: STUDENT IN AN ORGANIZED HEALTH CARE EDUCATION/TRAINING PROGRAM

## 2024-01-19 RX ORDER — PREDNISONE 20 MG/1
20 TABLET ORAL DAILY
Qty: 5 TABLET | Refills: 0 | Status: SHIPPED | OUTPATIENT
Start: 2024-01-19 | End: 2024-01-24

## 2024-01-19 RX ORDER — LEVOFLOXACIN 750 MG/1
750 TABLET, FILM COATED ORAL DAILY
Qty: 7 TABLET | Refills: 0 | Status: SHIPPED | OUTPATIENT
Start: 2024-01-19 | End: 2024-01-26

## 2024-01-19 NOTE — PROGRESS NOTES
11/29/2022. CT chest, 4/11/2023    Multiple axial images were obtained through the chest during intravenous  infusion of 100mL of Isovue 370. Coronal and sagittal reformats were performed.  Coronal MIP reformats were also performed. Radiation dose reduction techniques  were used for this study:  All CT scans performed at this facility use one or  all of the following: Automated exposure control, adjustment of the mA and/or  kVp according to patient's size, iterative reconstruction.    FINDINGS:  Examination is diagnostic through the subsegmental level. No evidence of acute  or chronic pulmonary embolism. The great vessels are normal in caliber with  atherosclerotic calcifications in the aorta, branch vessels, and coronary  arteries.    LUNGS/PLEURA: Central airways are patent. Subsegmental atelectasis in the  dependent lower lobes. Mild centrilobular emphysema. No pleural effusion or  pneumothorax. There are bilateral calcified pleural plaques.    MEDIASTINUM: The visualized thyroid is normal. There is a large type III  paraesophageal hernia. The heart is normal in size without pericardial effusion.  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 AICD lead terminates in the right ventricle. No mediastinal or hilar  lymphadenopathy.    BONES/SUBCUTANEOUS TISSUE: Regional soft tissues are within normal limits. No  axillary lymphadenopathy. Demonstration of multiple chronic compression  fractures in the lower thoracic spine status post kyphoplasty. No acute or  aggressive osseous abnormality.    UPPER ABDOMEN: Prior cholecystectomy.    Impression  1. No pulmonary embolism.  2. Mild emphysema with dependent subsegmental atelectasis. No findings of  pneumonia.  3. Large type III paraesophageal hernia.  4. Bilateral calcified pleural plaques suggesting asbestos-related pleural  disease.  5. Additional chronic

## 2024-01-19 NOTE — PATIENT INSTRUCTIONS
Take plain Mucinex (guaifenesin) 1200 mg  twice a day with a full glass of water to help break up secretions.     Use magic mouthwash 4 times a day for thrush.   Finish levaquin for 7 days and prednisone burst for 5 days.   Keep follow up with Bridgette in April.

## 2024-01-26 ENCOUNTER — CARE COORDINATION (OUTPATIENT)
Dept: CARE COORDINATION | Facility: CLINIC | Age: 79
End: 2024-01-26

## 2024-01-26 ENCOUNTER — TELEPHONE (OUTPATIENT)
Dept: PULMONOLOGY | Age: 79
End: 2024-01-26

## 2024-01-26 NOTE — TELEPHONE ENCOUNTER
LVM to return call.        Above from NP related to call routed from Care Coordination today. Attempted to call daughter- legal guardian-- no answer, LVM to return call.

## 2024-01-26 NOTE — CARE COORDINATION
Please hold Toprol.  She has now had 2 rounds of steroids and antibiotics, so if persistent issues with cough, concerns for PNA, needs to go to ED for inpatient evaluation.    
Verified patients name and  as identifiers.  Background: Pt enrolled in RPM r/t CHF, Kidney Disease, and COPD.  Refer to 911 immediately if:  Patient unresponsive or unable to provide history  Change in cognition or sudden confusion  Patient unable to respond in complete sentences  Intense chest pain/tightness  Any concern for any clinical emergency  Red Alert: Provider response time of 1 hr required for any red alert requiring intervention  Yellow Alert: Provider response time of 3hr required for any escalated yellow alert    BP Triage  Are you having any Chest Pain? no   Are you having any Shortness of Breath? no   Do you have a headache or have any vision changes? no   Are you having any numbness or tingling? no   Are you having any other health concerns or issues? yes        Clinical Interventions: Reviewed and followed up on alerts and treatments-Per patients daughter, pt \"seems to be fine\" but is concerned she may fall due to low BP. Pt denies CP, SOB, headache, vision changes, numbness, tingling, dizziness, and fatigue more than normal at this time. Per daughter, pt is staying hydrated, feet have been elevated, and pt continues to take Entresto and Toprol XL as prescribed. Daughter states pt has not had the need to take Lasix \"in quite a while\". Pt completed second recent round prednisone on 24 and will be completing second recent round of ABT today. Patients cough returned on 24. Denies wheezing, discolored sputum, and fever. Confirms compliance with Duoneb QID, Pulmicort BID, and O2 QHS. SpO2 96% on RA. Pt has Crohn's disease and daughter reports pt has had an increase in BM's today; denies diarrhea. BP recheck of 96/63 noted in HRS and is within RPM parameters. Daughter will be contacting [facility] Cardiology Triage line to discuss the use of Toprol XL and possible need to hold dose; pt takes at bedtime. [facility] Cardiology Triage number provided. Pulmonology OV completed on 24 and

## 2024-01-29 ENCOUNTER — CARE COORDINATION (OUTPATIENT)
Dept: CARE COORDINATION | Facility: CLINIC | Age: 79
End: 2024-01-29

## 2024-01-29 ENCOUNTER — TELEPHONE (OUTPATIENT)
Age: 79
End: 2024-01-29

## 2024-01-29 NOTE — TELEPHONE ENCOUNTER
Stop entresto, get back on BB.  Follow HR and BP.  Call if not better.   See me as planned.   Thanks

## 2024-01-29 NOTE — CARE COORDINATION
Remote Alert Monitoring Note  Rpm alert to be reviewed by the provider   red alert   blood pressure heart rate (130) and pulse ox heart rate (130)   Additional needs to be addressed by Cardiologist:   Patients daughter states she is going to contact palliative care NP to discuss patients BP, HR, and resumption of Toprol XL. LPN confirmed Jennifer Johnson has [facility] Cardiology Triage contact information for clinical concerns. Ms. Johnson states she prefers to call palliative care NP, \"because she's easier to get in touch with\" and declined assistance with notifying NP and Cardiology Triage.                     Date/Time:  2024 9:20 AM  Patient Current Location: [place]  LPN attempted to contacted patient by telephone. Spoke with patients emergency contact / daughter, Jennifer Johnson. DES on file. Verified patients name and  as identifiers.  Background: Pt enrolled in RPM r/t CHF, Kidney Disease, and COPD.  Refer to 911 immediately if:  Patient unresponsive or unable to provide history  Change in cognition or sudden confusion  Patient unable to respond in complete sentences  Intense chest pain/tightness  Any concern for any clinical emergency  Red Alert: Provider response time of 1 hr required for any red alert requiring intervention  Yellow Alert: Provider response time of 3hr required for any escalated yellow alert    HR Triage  Are you having any Chest Pain? no   Are you having any Shortness of Breath? no   Are you having any dizziness? no   Are you feeling more fatigued or tired than normal? no   Are you having any other health concerns or issues? no      Clinical Interventions: Reviewed and followed up on alerts and treatments-Per patients daughter, pt \"seems to be doing fine\" and pt denies CP, SOB, dizziness, and fatigue more than normal at this time. Pt no longer coughing and completed ABT on 24. Toprol XL has been on hold since 24. Pt last took Toprol XL 25 mg on 24 \"at

## 2024-01-29 NOTE — TELEPHONE ENCOUNTER
Vanna Sandhu w/[facility]  reports BP Friday 86/55 was instructed to hold Metoprolol.BP is coming up today and was 95/65 however she is tachycardic w/-130's.    What should pt.do about low BP?    Vanna will confirm meds and then call me back.She is taking Entrest 49/51 1/2 tablet BID and Toprol xl 25mg qHS.

## 2024-01-29 NOTE — CARE COORDINATION
Remote Patient Monitoring Note      Date/Time:  1/29/2024 9:45 AM  Patient Current Location: [place]  BP HR recheck of 120 and pulse ox HR recheck of 120 noted in HRS and are within RPM parameters. No further outreach by this LPN indicated at this time. See previous RPM / Care Coordination note from today, 01/29/24. Will route FYI update to [facility].     Background: Pt enrolled in RPM r/t CHF, Kidney Disease, and COPD.      Plan/Follow Up: Will continue to review, monitor and address alerts with follow up based on severity of symptoms and risk factors.

## 2024-01-31 NOTE — TELEPHONE ENCOUNTER
Telephone note to cardiology shows patient aware to hold Toprol, communicating w/ HH and has PT as noted in last visit.

## 2024-02-02 ENCOUNTER — CARE COORDINATION (OUTPATIENT)
Dept: CARE COORDINATION | Facility: CLINIC | Age: 79
End: 2024-02-02

## 2024-02-07 ENCOUNTER — OFFICE VISIT (OUTPATIENT)
Age: 79
End: 2024-02-07

## 2024-02-07 VITALS
WEIGHT: 153 LBS | DIASTOLIC BLOOD PRESSURE: 74 MMHG | SYSTOLIC BLOOD PRESSURE: 120 MMHG | BODY MASS INDEX: 28.16 KG/M2 | HEIGHT: 62 IN | HEART RATE: 80 BPM

## 2024-02-07 DIAGNOSIS — Z95.810 PRESENCE OF AUTOMATIC (IMPLANTABLE) CARDIAC DEFIBRILLATOR: ICD-10-CM

## 2024-02-07 DIAGNOSIS — Z86.73 H/O TIA (TRANSIENT ISCHEMIC ATTACK) AND STROKE: ICD-10-CM

## 2024-02-07 DIAGNOSIS — I50.22 SYSTOLIC CHF, CHRONIC (HCC): ICD-10-CM

## 2024-02-07 DIAGNOSIS — I47.29 NSVT (NONSUSTAINED VENTRICULAR TACHYCARDIA) (HCC): ICD-10-CM

## 2024-02-07 DIAGNOSIS — I25.119 CORONARY ARTERY DISEASE INVOLVING NATIVE CORONARY ARTERY OF NATIVE HEART WITH ANGINA PECTORIS (HCC): ICD-10-CM

## 2024-02-07 DIAGNOSIS — I42.0 DILATED CARDIOMYOPATHY (HCC): Primary | ICD-10-CM

## 2024-02-07 DIAGNOSIS — N18.31 STAGE 3A CHRONIC KIDNEY DISEASE (HCC): ICD-10-CM

## 2024-02-07 DIAGNOSIS — I42.8 NICM (NONISCHEMIC CARDIOMYOPATHY) (HCC): ICD-10-CM

## 2024-02-07 NOTE — PROGRESS NOTES
2 [facility], [address] [place] [address]  PHONE: [phone]     24    NAME:  Rekha Palmer  : 1945  MRN: [id number]       SUBJECTIVE:   Rekha Palmer is a 78 y.o. female seen for a follow up visit regarding the following:     Chief Complaint   Patient presents with    Cardiomyopathy       HPI:   Here for vascular dz follow up.   2017 TIA sx  She has crohn's dz.     Echo  showing EF 30-35%, mod MR   2017 and 2018 and 10/2022 Carotid with mod DEIDRE and LICA stenosis   3/2018: ICD implant   Echo 3/2022: EF 40-45%     LHC/RHC  2022:     Mild nonobstructive coronary artery disease    Low normal LV systolic function    Mild pulmonary hypertension      Off entresto, BP better, feeling better now.  On 1/2 dose toprol now, better now.  Some CHUA, not as active.   No CP, pressure.  Using walker/cane now, seeing ortho for knee injections.     No CP, pressure.      Following weights with lasix plan.      She has NYHA Class II sx now. Energy poor now.  Stamina poor.       Patient denies recent history of orthopnea, PND, excessive dizziness and/or syncope.      Past Medical History, Past Surgical History, Family history, Social History, and Medications were all reviewed with the patient today and updated as necessary.     Current Outpatient Medications   Medication Sig Dispense Refill    Magic Mouthwash (MIRACLE MOUTHWASH) Swish and swallow 5 mLs 4 times daily 1 part Maalox 1 part: Lidocaine 2% viscous 1 part Diphenhydramine oral solution 1 part Mycostatin 240 mL 0    atorvastatin (LIPITOR) 40 MG tablet TAKE 1 TABLET DAILY 90 tablet 3    sodium chloride, Inhalant, 3 % nebulizer solution Take 4 mLs by nebulization 2 times daily 240 mL 2    ipratropium 0.5 mg-albuterol 2.5 mg (DUONEB) 0.5-2.5 (3) MG/3ML SOLN nebulizer solution Dx J44.9 please bill to medicare pt B 1080 mL 3    budesonide (PULMICORT) 0.5 MG/2ML nebulizer suspension Take 2 mLs by nebulization 2 times daily

## 2024-02-09 ENCOUNTER — CARE COORDINATION (OUTPATIENT)
Dept: CARE COORDINATION | Facility: CLINIC | Age: 79
End: 2024-02-09

## 2024-02-09 NOTE — CARE COORDINATION
Ambulatory Care Coordination Note  2024    Patient Current Location:  [place]     ACM contacted the family by telephone. Verified name and  with family as identifiers. Provided introduction to self, and explanation of the ACM role.     Challenges to be reviewed by the provider   Additional needs identified to be addressed with provider: No  none               Method of communication with provider: phone.    ACM: Nahomy Sal, RN    F/u call to pt and dtr/ Jennifer, pt has contiued to feel better. She had a f/u recently w/ cardiology. Plan is to remain on Lasix, ASA and statin. Pt has been taken off Enstresto d/t low HR and to take 1/2 of a Toporol at HS. Lipitor has been added back and pt referred ot Hem/Onc for possible Iron infusion d/t Hx of Anemia. They have no questions for me today, just wanted otoshear the info form the recent visit. I will check back in a few weeks, otherwise my contact information has been shared with pt in the event there circumstances change. They are free to reach out at any time.       Offered patient enrollment in the Remote Patient Monitoring (RPM) program for in-home monitoring: Patient declined.    Lab Results       None            Care Coordination Interventions    Referral from Primary Care Provider: No  Suggested Interventions and Community Resources  Palliative Care: In Process (Comment: [facility])  Physical Therapy: In Process (Comment: [facility])  Other Services or Interventions: Current with RPM          Goals Addressed    None         Future Appointments   Date Time Provider Department Center   3/29/2024 10:00 AM Barry Tejada DO [facility]   3/29/2024 11:00 AM [facility] DEVICE 39 [facility] AM[facility]   2024 10:20 AM John Reece MD [facility] AM[facility]   4/10/2024 11:30 AM Abril Ren MD [facility]   4/10/2024 12:00 PM [facility] INFUSION [facility]   4/15/2024 10:40 AM Bridgette Aguilera, APRN - CNP [facility]   ,   Congestive Heart Failure  not examined

## 2024-03-06 ENCOUNTER — CARE COORDINATION (OUTPATIENT)
Dept: CARE COORDINATION | Facility: CLINIC | Age: 79
End: 2024-03-06

## 2024-03-15 ENCOUNTER — CARE COORDINATION (OUTPATIENT)
Dept: CARE COORDINATION | Facility: CLINIC | Age: 79
End: 2024-03-15

## 2024-03-15 ASSESSMENT — PATIENT HEALTH QUESTIONNAIRE - PHQ9
1. LITTLE INTEREST OR PLEASURE IN DOING THINGS: 0
SUM OF ALL RESPONSES TO PHQ QUESTIONS 1-9: 0
2. FEELING DOWN, DEPRESSED OR HOPELESS: 0
SUM OF ALL RESPONSES TO PHQ9 QUESTIONS 1 & 2: 0
SUM OF ALL RESPONSES TO PHQ QUESTIONS 1-9: 0

## 2024-03-15 NOTE — CARE COORDINATION
COPD Assessment    Does the patient understand envrionmental exposure?: No  Is the patient able to verbalize Rescue vs. Long Acting medications?: Yes  Does the patient have a nebulizer?: No  Does the patient use a space with inhaled medications?: No     No patient-reported symptoms         Symptoms:          ,   General Assessment         , and No linked episodes

## 2024-03-16 ENCOUNTER — TELEPHONE (OUTPATIENT)
Dept: CARDIOLOGY CLINIC | Age: 79
End: 2024-03-16

## 2024-03-16 NOTE — TELEPHONE ENCOUNTER
Patient's daughter called saying that she got stung by a bee or had an insect bite and asked whether she could take Benadryl/Claritin-okay from a cardiac perspective to use Claritin.    Iker Burkett MD

## 2024-03-29 ENCOUNTER — CARE COORDINATION (OUTPATIENT)
Dept: CARE COORDINATION | Facility: CLINIC | Age: 79
End: 2024-03-29

## 2024-03-29 ENCOUNTER — OFFICE VISIT (OUTPATIENT)
Age: 79
End: 2024-03-29
Payer: MEDICARE

## 2024-03-29 ENCOUNTER — NURSE ONLY (OUTPATIENT)
Age: 79
End: 2024-03-29

## 2024-03-29 VITALS
WEIGHT: 148 LBS | BODY MASS INDEX: 27.23 KG/M2 | DIASTOLIC BLOOD PRESSURE: 74 MMHG | HEIGHT: 62 IN | HEART RATE: 80 BPM | SYSTOLIC BLOOD PRESSURE: 118 MMHG

## 2024-03-29 DIAGNOSIS — I25.119 CORONARY ARTERY DISEASE INVOLVING NATIVE CORONARY ARTERY OF NATIVE HEART WITH ANGINA PECTORIS (HCC): Primary | ICD-10-CM

## 2024-03-29 DIAGNOSIS — I47.29 NSVT (NONSUSTAINED VENTRICULAR TACHYCARDIA) (HCC): ICD-10-CM

## 2024-03-29 DIAGNOSIS — I42.8 NICM (NONISCHEMIC CARDIOMYOPATHY) (HCC): ICD-10-CM

## 2024-03-29 DIAGNOSIS — Z95.810 PRESENCE OF AUTOMATIC (IMPLANTABLE) CARDIAC DEFIBRILLATOR: ICD-10-CM

## 2024-03-29 DIAGNOSIS — I42.0 DILATED CARDIOMYOPATHY (HCC): Primary | ICD-10-CM

## 2024-03-29 DIAGNOSIS — I50.22 SYSTOLIC CHF, CHRONIC (HCC): ICD-10-CM

## 2024-03-29 DIAGNOSIS — I25.119 CORONARY ARTERY DISEASE INVOLVING NATIVE CORONARY ARTERY OF NATIVE HEART WITH ANGINA PECTORIS (HCC): ICD-10-CM

## 2024-03-29 PROCEDURE — 1123F ACP DISCUSS/DSCN MKR DOCD: CPT | Performed by: INTERNAL MEDICINE

## 2024-03-29 PROCEDURE — 99214 OFFICE O/P EST MOD 30 MIN: CPT | Performed by: INTERNAL MEDICINE

## 2024-03-29 PROCEDURE — 1090F PRES/ABSN URINE INCON ASSESS: CPT | Performed by: INTERNAL MEDICINE

## 2024-03-29 PROCEDURE — 1036F TOBACCO NON-USER: CPT | Performed by: INTERNAL MEDICINE

## 2024-03-29 PROCEDURE — G8484 FLU IMMUNIZE NO ADMIN: HCPCS | Performed by: INTERNAL MEDICINE

## 2024-03-29 PROCEDURE — G8428 CUR MEDS NOT DOCUMENT: HCPCS | Performed by: INTERNAL MEDICINE

## 2024-03-29 PROCEDURE — G8399 PT W/DXA RESULTS DOCUMENT: HCPCS | Performed by: INTERNAL MEDICINE

## 2024-03-29 PROCEDURE — G8417 CALC BMI ABV UP PARAM F/U: HCPCS | Performed by: INTERNAL MEDICINE

## 2024-03-29 NOTE — PROGRESS NOTES
2 [facility], [address] [place] [address]  PHONE: [phone]     24    NAME:  Rekha Palmer  : 1945  MRN: [id number]       SUBJECTIVE:   Rekha Palmer is a 78 y.o. female seen for a follow up visit regarding the following:     Chief Complaint   Patient presents with    Coronary Artery Disease    Cardiomyopathy       HPI:   Here for vascular dz follow up.   2017 TIA sx  She has crohn's dz.     Echo  showing EF 30-35%, mod MR   2017 and 2018 and 10/2022 Carotid with mod DEIDRE and LICA stenosis   3/2018: ICD implant   Echo 3/2022: EF 40-45%     LHC/RHC  2022:     Mild nonobstructive coronary artery disease    Low normal LV systolic function    Mild pulmonary hypertension      Off entresto, BP better, feeling better now.  On 1/2 dose toprol now, better now.  Some CHUA, not as active.   No CP, pressure.  Using walker/cane now, seeing ortho for knee injections.     No CP, pressure.       Following weights with lasix plan.      She has NYHA Class II sx now. Energy poor now.  Stamina poor.       Patient denies recent history of orthopnea, PND, excessive dizziness and/or syncope.         Past Medical History, Past Surgical History, Family history, Social History, and Medications were all reviewed with the patient today and updated as necessary.     Current Outpatient Medications   Medication Sig Dispense Refill    Magic Mouthwash (MIRACLE MOUTHWASH) Swish and swallow 5 mLs 4 times daily 1 part Maalox 1 part: Lidocaine 2% viscous 1 part Diphenhydramine oral solution 1 part Mycostatin 240 mL 0    atorvastatin (LIPITOR) 40 MG tablet TAKE 1 TABLET DAILY 90 tablet 3    sodium chloride, Inhalant, 3 % nebulizer solution Take 4 mLs by nebulization 2 times daily 240 mL 2    ipratropium 0.5 mg-albuterol 2.5 mg (DUONEB) 0.5-2.5 (3) MG/3ML SOLN nebulizer solution Dx J44.9 please bill to medicare pt B 1080 mL 3    budesonide (PULMICORT) 0.5 MG/2ML nebulizer suspension Take 2 mLs by

## 2024-04-09 ENCOUNTER — CARE COORDINATION (OUTPATIENT)
Dept: CARE COORDINATION | Facility: CLINIC | Age: 79
End: 2024-04-09

## 2024-04-09 DIAGNOSIS — M81.0 POSTMENOPAUSAL OSTEOPOROSIS: ICD-10-CM

## 2024-04-09 DIAGNOSIS — Z51.81 ENCOUNTER FOR MONITORING DENOSUMAB THERAPY: ICD-10-CM

## 2024-04-09 DIAGNOSIS — Z79.899 ENCOUNTER FOR MONITORING DENOSUMAB THERAPY: ICD-10-CM

## 2024-04-09 LAB
25(OH)D3 SERPL-MCNC: 44.9 NG/ML (ref 30–100)
ALBUMIN SERPL-MCNC: 4.1 G/DL (ref 3.2–4.6)
ALBUMIN/GLOB SERPL: 1.3 (ref 0.4–1.6)
ALP SERPL-CCNC: 48 U/L (ref 50–136)
ALT SERPL-CCNC: 20 U/L (ref 12–65)
ANION GAP SERPL CALC-SCNC: 2 MMOL/L (ref 2–11)
AST SERPL-CCNC: 18 U/L (ref 15–37)
BILIRUB SERPL-MCNC: 0.6 MG/DL (ref 0.2–1.1)
BUN SERPL-MCNC: 18 MG/DL (ref 8–23)
CALCIUM SERPL-MCNC: 10.9 MG/DL (ref 8.3–10.4)
CHLORIDE SERPL-SCNC: 109 MMOL/L (ref 103–113)
CO2 SERPL-SCNC: 31 MMOL/L (ref 21–32)
CREAT SERPL-MCNC: 1.2 MG/DL (ref 0.6–1)
GLOBULIN SER CALC-MCNC: 3.1 G/DL (ref 2.8–4.5)
GLUCOSE SERPL-MCNC: 89 MG/DL (ref 65–100)
POTASSIUM SERPL-SCNC: 4 MMOL/L (ref 3.5–5.1)
PROT SERPL-MCNC: 7.2 G/DL (ref 6.3–8.2)
SODIUM SERPL-SCNC: 142 MMOL/L (ref 136–146)

## 2024-04-09 NOTE — CARE COORDINATION
Remote Patient Monitoring Note      Date/Time:  2024 12:34 PM  Patient Current Location: [place]  LPN attempted to contact patient and / or legal guardian, Jennifer Johnson, by telephone regarding yellow alert received for no BP or weight taken x 2 days. Spoke with patients legal guardian / emergency contact, Jennifer Johnson. Verified patients name and  as identifiers.    Background: Pt enrolled in RPM r/t CHF, Kidney Disease, and COPD.   Clinical Interventions: Discussed RPM adherence and need to update daily metrics with patients emergency contact / legal guardian, Jennifer Johnson. MsJaspreet Johnsno verbalizes understanding, reports metrics were completed yesterday, is agreeable to contact [facility] [employer] support for assistance with transmission issue, and assist pt with updating daily metrics when they return home. Ms. Johnson verbalizes understanding of RPM monitoring hours, when to notify provider(s) of changes / concerns, and when to seek emergent medical care for pt.     Plan/Follow Up: Will continue to review, monitor and address alerts with follow up based on severity of symptoms and risk factors.

## 2024-04-09 NOTE — CARE COORDINATION
Remote Patient Monitoring Note      Date/Time:  4/9/2024 3:57 PM  Patient Current Location: [place]  Pt has not updated daily metrics as of this time. No further outreach by this LPN indicated at this time. Will route to [facility].   Background: Pt enrolled in RPM r/t CHF, Kidney Disease, and COPD.     Plan/Follow Up: Will continue to review, monitor and address alerts with follow up based on severity of symptoms and risk factors.

## 2024-04-10 ENCOUNTER — OFFICE VISIT (OUTPATIENT)
Dept: RHEUMATOLOGY | Age: 79
End: 2024-04-10
Payer: MEDICARE

## 2024-04-10 ENCOUNTER — NURSE ONLY (OUTPATIENT)
Dept: RHEUMATOLOGY | Age: 79
End: 2024-04-10
Payer: MEDICARE

## 2024-04-10 VITALS
BODY MASS INDEX: 27.42 KG/M2 | WEIGHT: 149 LBS | HEIGHT: 62 IN | RESPIRATION RATE: 16 BRPM | HEART RATE: 88 BPM | SYSTOLIC BLOOD PRESSURE: 118 MMHG | DIASTOLIC BLOOD PRESSURE: 72 MMHG

## 2024-04-10 VITALS — HEART RATE: 88 BPM | SYSTOLIC BLOOD PRESSURE: 118 MMHG | TEMPERATURE: 97.7 F | DIASTOLIC BLOOD PRESSURE: 72 MMHG

## 2024-04-10 DIAGNOSIS — Z51.81 ENCOUNTER FOR MONITORING DENOSUMAB THERAPY: ICD-10-CM

## 2024-04-10 DIAGNOSIS — K50.10 CROHN'S DISEASE OF COLON WITHOUT COMPLICATION (HCC): ICD-10-CM

## 2024-04-10 DIAGNOSIS — M81.0 POSTMENOPAUSAL OSTEOPOROSIS: Primary | ICD-10-CM

## 2024-04-10 DIAGNOSIS — N18.30 STAGE 3 CHRONIC KIDNEY DISEASE, UNSPECIFIED WHETHER STAGE 3A OR 3B CKD (HCC): ICD-10-CM

## 2024-04-10 DIAGNOSIS — Z79.899 ENCOUNTER FOR MONITORING DENOSUMAB THERAPY: ICD-10-CM

## 2024-04-10 PROCEDURE — G8399 PT W/DXA RESULTS DOCUMENT: HCPCS | Performed by: INTERNAL MEDICINE

## 2024-04-10 PROCEDURE — 96372 THER/PROPH/DIAG INJ SC/IM: CPT | Performed by: INTERNAL MEDICINE

## 2024-04-10 PROCEDURE — G8417 CALC BMI ABV UP PARAM F/U: HCPCS | Performed by: INTERNAL MEDICINE

## 2024-04-10 PROCEDURE — 1090F PRES/ABSN URINE INCON ASSESS: CPT | Performed by: INTERNAL MEDICINE

## 2024-04-10 PROCEDURE — 1123F ACP DISCUSS/DSCN MKR DOCD: CPT | Performed by: INTERNAL MEDICINE

## 2024-04-10 PROCEDURE — G8427 DOCREV CUR MEDS BY ELIG CLIN: HCPCS | Performed by: INTERNAL MEDICINE

## 2024-04-10 PROCEDURE — 1036F TOBACCO NON-USER: CPT | Performed by: INTERNAL MEDICINE

## 2024-04-10 PROCEDURE — 99214 OFFICE O/P EST MOD 30 MIN: CPT | Performed by: INTERNAL MEDICINE

## 2024-04-10 NOTE — PROGRESS NOTES
Routed to GIBRAN Akbar  
Neck T-score Lowest  T-score    2019  0.988 -1.7 0.6 -3.2      2021 1.053 L2-3 -0.6 Left femoral 0.627 -3.0      10/2023 1.138 L2-3   Left femoral 0.676 -2.6          Reports muscle cramping in bilateral hands and feet.       REVIEW OF SYSTEMS:  A total of 10 systems (musculoskeletal system as stated in the HPI and the following 9 systems) were reviewed with patient today and were negative except as stated in the HPI and except for the following (depicted with an \"X\"):        \"X\" Constitutional  \"X\" HEENT /Mouth  \"X\" Cardiovascular and  Respiratory (2 systems)  \"X\" Gastrointestinal    Fever/chills  x Hair loss  x Shortness of breath   Upset stomach    Falls   Dry mouth   Coughing  x Diarrhea / constipation    Wt loss   Mouth sores   Wheezing   Heartburn    Wt gain   Ringing ears   Chest pain   Dark or bloody stools    Night sweats   Diff. swallowing   None of above   Nausea or vomiting   X None of above   None of above      None of above                \"X\" Integumentary  \"X\" Neurological  \"X\" Genitourinary  \"X\" Psychiatric   X Easy bruising   Numbness/ tingling   Female problems   Depression   x Rashes  x Weakness  X Problems with urination   Feeling anxious   x Sun sensitivity   Headaches   None of above   Problems sleeping    None of above   None of above     X None of above               The following portions of the patient's history were reviewed and updated as appropriate:   Current Outpatient Medications   Medication Sig Dispense Refill    atorvastatin (LIPITOR) 40 MG tablet TAKE 1 TABLET DAILY 90 tablet 3    sodium chloride, Inhalant, 3 % nebulizer solution Take 4 mLs by nebulization 2 times daily 240 mL 2    ipratropium 0.5 mg-albuterol 2.5 mg (DUONEB) 0.5-2.5 (3) MG/3ML SOLN nebulizer solution Dx J44.9 please bill to medicare pt B 1080 mL 3    budesonide (PULMICORT) 0.5 MG/2ML nebulizer suspension Take 2 mLs by nebulization 2 times daily Rinse mouth after use,  Dx J44.9 please bill to medicare pt B 360 mL

## 2024-04-11 NOTE — PROGRESS NOTES
[facility]  [address] [place] [address], [address]  [place] [address]  Office : [phone], Fax: [phone]       # 10 Prolia 60mg/ml injected SC today into left upper arm. Patient tolerated injection well. Advised patient to continue with calcium and vitamin D post injection. Advised patient to call with any problems post injection.   Medication ordered by Dr. Ren.     Pre-infusion/injection questionairre for osteoporosis    1. Have you had or are you planning any dental work?  NO    2. Are you taking your calcium and vitamin D? YES    3. When was your last osteoporosis treatment? 10/9/23    4. Have you had any recent fractures? NO    5. Are you currently in skilled nursing or in patient rehab? NO

## 2024-04-12 ENCOUNTER — CARE COORDINATION (OUTPATIENT)
Dept: CARE COORDINATION | Facility: CLINIC | Age: 79
End: 2024-04-12

## 2024-04-15 ENCOUNTER — OFFICE VISIT (OUTPATIENT)
Dept: PULMONOLOGY | Age: 79
End: 2024-04-15
Payer: MEDICARE

## 2024-04-15 VITALS
WEIGHT: 145 LBS | DIASTOLIC BLOOD PRESSURE: 80 MMHG | TEMPERATURE: 97.5 F | OXYGEN SATURATION: 96 % | HEART RATE: 84 BPM | RESPIRATION RATE: 18 BRPM | SYSTOLIC BLOOD PRESSURE: 124 MMHG | BODY MASS INDEX: 26.68 KG/M2 | HEIGHT: 62 IN

## 2024-04-15 DIAGNOSIS — J44.9 STAGE 3 SEVERE COPD BY GOLD CLASSIFICATION (HCC): Primary | ICD-10-CM

## 2024-04-15 DIAGNOSIS — Z01.811 PRE-OP CHEST EXAM: ICD-10-CM

## 2024-04-15 DIAGNOSIS — I50.22 SYSTOLIC CHF, CHRONIC (HCC): ICD-10-CM

## 2024-04-15 DIAGNOSIS — B37.0 THRUSH, ORAL: ICD-10-CM

## 2024-04-15 DIAGNOSIS — J30.89 ENVIRONMENTAL AND SEASONAL ALLERGIES: ICD-10-CM

## 2024-04-15 PROCEDURE — G8399 PT W/DXA RESULTS DOCUMENT: HCPCS | Performed by: NURSE PRACTITIONER

## 2024-04-15 PROCEDURE — 99214 OFFICE O/P EST MOD 30 MIN: CPT | Performed by: NURSE PRACTITIONER

## 2024-04-15 PROCEDURE — 3023F SPIROM DOC REV: CPT | Performed by: NURSE PRACTITIONER

## 2024-04-15 PROCEDURE — G8417 CALC BMI ABV UP PARAM F/U: HCPCS | Performed by: NURSE PRACTITIONER

## 2024-04-15 PROCEDURE — 1036F TOBACCO NON-USER: CPT | Performed by: NURSE PRACTITIONER

## 2024-04-15 PROCEDURE — 1123F ACP DISCUSS/DSCN MKR DOCD: CPT | Performed by: NURSE PRACTITIONER

## 2024-04-15 PROCEDURE — 1090F PRES/ABSN URINE INCON ASSESS: CPT | Performed by: NURSE PRACTITIONER

## 2024-04-15 PROCEDURE — G8427 DOCREV CUR MEDS BY ELIG CLIN: HCPCS | Performed by: NURSE PRACTITIONER

## 2024-04-15 RX ORDER — BUDESONIDE, GLYCOPYRROLATE, AND FORMOTEROL FUMARATE 160; 9; 4.8 UG/1; UG/1; UG/1
2 AEROSOL, METERED RESPIRATORY (INHALATION) 2 TIMES DAILY
Qty: 1 EACH | Refills: 11 | Status: SHIPPED | OUTPATIENT
Start: 2024-04-15

## 2024-04-15 NOTE — PROGRESS NOTES
Swallow, 4 TIMES DAILY, 1 part Maalox 1 part: Lidocaine 2% viscous 1 part Diphenhydramine oral solution 1 part Mycostatin    Magnesium Cl-Calcium Carbonate (SLOW-MAG PO) 2 tablets, Oral, 2 times daily    metoprolol succinate (TOPROL XL) 25 mg, Oral, DAILY    OXYGEN 3 lpm qhs    sodium chloride, Inhalant, 3 % nebulizer solution 4 mLs, Nebulization, 2 TIMES DAILY    STELARA 90 MG/ML SOSY prefilled syringe Every 8 weeks

## 2024-04-19 ENCOUNTER — CARE COORDINATION (OUTPATIENT)
Dept: CARE COORDINATION | Facility: CLINIC | Age: 79
End: 2024-04-19

## 2024-04-19 NOTE — CARE COORDINATION
Ambulatory Care Coordination Note  2024    Patient Current Location:  [place]     ACM contacted the family by telephone. Verified name and  with family as identifiers. Provided introduction to self, and explanation of the ACM role.     Challenges to be reviewed by the provider   Additional needs identified to be addressed with provider: No  none               Method of communication with provider: phone.    ACM: Nahomy Sal, RN  F/u call with pt's tabitha/Mayra. She is currently out of town and her sister is staying with the pt. Mrs. Palmer has been doing well. Family has brought in [facility] and Palliative care. She has been compliant with doctors appointments and medication. There are no questions/concerns at this time and I will reach out next month. My contact information has been shared with pt in the event there circumstances change. They are free to reach out at any time.   dn       Offered patient enrollment in the Remote Patient Monitoring (RPM) program for in-home monitoring: Yes, patient already enrolled.    Lab Results       None            Care Coordination Interventions    Referral from Primary Care Provider: No  Suggested Interventions and Community Resources  Palliative Care: In Process (Comment: [facility])  Physical Therapy: In Process (Comment: [facility])  Other Services or Interventions: Current with RPM          Goals Addressed    None         Future Appointments   Date Time Provider Department Center   2024  8:45 AM Hema Boyd MD [facility] AMB   2024 10:40 AM Bridgette Aguilera, APRN - CNP PPS [facility] AMB   2024 12:30 PM Abril Ren MD [facility] AMB   2024  3:30 PM Barry Tejada DO [facility]   ,   Congestive Heart Failure Assessment    Are you currently restricting fluids?: No Restriction  Do you understand a low sodium diet?: Yes  Do you understand how to read food labels?: Yes  How many restaurant meals do you eat per week?: 0  Do you salt

## 2024-04-26 ENCOUNTER — TELEPHONE (OUTPATIENT)
Dept: SLEEP MEDICINE | Age: 79
End: 2024-04-26

## 2024-04-26 NOTE — TELEPHONE ENCOUNTER
Called [facility] pharmacy to check patient's magic mouthwash, it is ready for .  Spoke with the patient's daughter to let her know . She voices understanding. Seymour MCINTYRE

## 2024-04-26 NOTE — TELEPHONE ENCOUNTER
Patient daughter call refill line and states that her mom was supposed to have a RX magic mouthwash. To be sent in to [facility] hernandez bonner   [facility]

## 2024-05-16 NOTE — PROGRESS NOTES
Discharge instructions reviewed with patient. Opportunity for questions allowed. Patient verbalized understanding of all new medications and discharge instructions. Previously Declined (within the last year)

## 2024-05-17 ENCOUNTER — CARE COORDINATION (OUTPATIENT)
Facility: CLINIC | Age: 79
End: 2024-05-17

## 2024-05-17 ENCOUNTER — CARE COORDINATION (OUTPATIENT)
Dept: CARE COORDINATION | Facility: CLINIC | Age: 79
End: 2024-05-17

## 2024-05-17 DIAGNOSIS — I50.22 SYSTOLIC CHF, CHRONIC (HCC): Primary | ICD-10-CM

## 2024-05-17 DIAGNOSIS — J44.9 STAGE 3 SEVERE COPD BY GOLD CLASSIFICATION (HCC): ICD-10-CM

## 2024-05-17 DIAGNOSIS — N18.31 STAGE 3A CHRONIC KIDNEY DISEASE (HCC): ICD-10-CM

## 2024-05-17 NOTE — CARE COORDINATION
Ambulatory Care Coordination Note  2024    Patient Current Location:  [place]     ACM contacted the caregiver by telephone. Verified name and  with caregiver as identifiers. Provided introduction to self, and explanation of the ACM role.     Challenges to be reviewed by the provider   Additional needs identified to be addressed with provider: No  none               Method of communication with provider: phone.    ACM: Nahomy Sal RN    F/u call with pt and dtr/Jennifer, pt had a colonoscopy with biopsies since our last conversation. She \"did well\" per Jennifer. Pt's weight has been fluctuating and Jennifer is monitoring this and aware of what to look for. We also reviewed upcoming apportionments, pt's appetite and activity level. All questions and concerns have been addressed during this lalitha dn we agreed that it would be a good time to graduate form the program. My contact information has been shared with pt in the event there circumstances change. They are free to reach out at any time, otherwise is will be signing off..       Offered patient enrollment in the Remote Patient Monitoring (RPM) program for in-home monitoring: Yes, patient enrolled; current status is activated and monitoring.    Remote Patient Monitoring Graduation      Date/Time:  2024 10:36 AM  Patient Current Location: [place]  Patient has graduated from the Remote Patient Monitoring program on 2024.   RPM goals have been met at this time.      Patient has been provided instruction on process to return RPM equipment and RPM has been deactivated.     Patient has ACM's contact information for any further questions, concerns, or needs.      Lab Results       None            Care Coordination Interventions    Referral from Primary Care Provider: No  Suggested Interventions and Community Resources  Palliative Care: In Process (Comment: [facility])  Physical Therapy: In Process (Comment: [facility])  Other Services or

## 2024-05-17 NOTE — CARE COORDINATION
Patient Rekha Palmer  05/17/24     Care Coordination  placed call to patient to arrange RPM kit  through [employer]. Left HIPAA Compliant Message     provided return and how to pack equipment in original packing via the patients voicemail if available and provided call back number should patient have questions.    Patient made aware [employer] will  equipment in 2-4 days.

## 2024-05-17 NOTE — PROGRESS NOTES
Remote Patient Order Discontinued    Received request from Jonelle Corea RN   to discontinue order for remote patient monitoring of Kidney Disease , CHF, and COPD and order completed.

## 2024-05-23 ENCOUNTER — APPOINTMENT (OUTPATIENT)
Dept: GENERAL RADIOLOGY | Age: 79
End: 2024-05-23
Payer: MEDICARE

## 2024-05-23 ENCOUNTER — APPOINTMENT (OUTPATIENT)
Dept: MRI IMAGING | Age: 79
End: 2024-05-23
Payer: MEDICARE

## 2024-05-23 ENCOUNTER — APPOINTMENT (OUTPATIENT)
Dept: ULTRASOUND IMAGING | Age: 79
End: 2024-05-23
Payer: MEDICARE

## 2024-05-23 ENCOUNTER — APPOINTMENT (OUTPATIENT)
Dept: CT IMAGING | Age: 79
End: 2024-05-23
Payer: MEDICARE

## 2024-05-23 ENCOUNTER — HOSPITAL ENCOUNTER (EMERGENCY)
Age: 79
Discharge: HOME | End: 2024-05-23
Attending: STUDENT IN AN ORGANIZED HEALTH CARE EDUCATION/TRAINING PROGRAM
Payer: MEDICARE

## 2024-05-23 VITALS
OXYGEN SATURATION: 94 % | HEIGHT: 62 IN | BODY MASS INDEX: 26.68 KG/M2 | SYSTOLIC BLOOD PRESSURE: 139 MMHG | HEART RATE: 90 BPM | RESPIRATION RATE: 18 BRPM | DIASTOLIC BLOOD PRESSURE: 75 MMHG | WEIGHT: 145 LBS | TEMPERATURE: 98.3 F

## 2024-05-23 DIAGNOSIS — I65.23 BILATERAL CAROTID ARTERY STENOSIS: ICD-10-CM

## 2024-05-23 DIAGNOSIS — G45.9 TIA (TRANSIENT ISCHEMIC ATTACK): Primary | ICD-10-CM

## 2024-05-23 DIAGNOSIS — E83.42 HYPOMAGNESEMIA: ICD-10-CM

## 2024-05-23 LAB
ALBUMIN SERPL-MCNC: 3.6 G/DL (ref 3.2–4.6)
ALBUMIN/GLOB SERPL: 1.2 (ref 1–1.9)
ALP SERPL-CCNC: 40 U/L (ref 35–104)
ALT SERPL-CCNC: 11 U/L (ref 12–65)
ANION GAP SERPL CALC-SCNC: 9 MMOL/L (ref 9–18)
AST SERPL-CCNC: 25 U/L (ref 15–37)
BASOPHILS # BLD: 0.1 K/UL (ref 0–0.2)
BASOPHILS NFR BLD: 1 % (ref 0–2)
BILIRUB SERPL-MCNC: 0.5 MG/DL (ref 0–1.2)
BUN SERPL-MCNC: 18 MG/DL (ref 8–23)
CALCIUM SERPL-MCNC: 10.2 MG/DL (ref 8.8–10.2)
CHLORIDE SERPL-SCNC: 102 MMOL/L (ref 98–107)
CO2 SERPL-SCNC: 27 MMOL/L (ref 20–28)
CREAT SERPL-MCNC: 1.07 MG/DL (ref 0.6–1.1)
DIFFERENTIAL METHOD BLD: ABNORMAL
EKG ATRIAL RATE: 79 BPM
EKG DIAGNOSIS: NORMAL
EKG P AXIS: 39 DEGREES
EKG P-R INTERVAL: 154 MS
EKG Q-T INTERVAL: 402 MS
EKG QRS DURATION: 123 MS
EKG QTC CALCULATION (BAZETT): 461 MS
EKG R AXIS: -58 DEGREES
EKG T AXIS: 30 DEGREES
EKG VENTRICULAR RATE: 79 BPM
EOSINOPHIL # BLD: 0.2 K/UL (ref 0–0.8)
EOSINOPHIL NFR BLD: 3 % (ref 0.5–7.8)
ERYTHROCYTE [DISTWIDTH] IN BLOOD BY AUTOMATED COUNT: 13.9 % (ref 11.9–14.6)
GLOBULIN SER CALC-MCNC: 3.1 G/DL (ref 2.3–3.5)
GLUCOSE SERPL-MCNC: 109 MG/DL (ref 70–99)
HCT VFR BLD AUTO: 38.6 % (ref 35.8–46.3)
HGB BLD-MCNC: 12.1 G/DL (ref 11.7–15.4)
IMM GRANULOCYTES # BLD AUTO: 0 K/UL (ref 0–0.5)
IMM GRANULOCYTES NFR BLD AUTO: 0 % (ref 0–5)
INR PPP: 0.9
LYMPHOCYTES # BLD: 1.9 K/UL (ref 0.5–4.6)
LYMPHOCYTES NFR BLD: 25 % (ref 13–44)
MAGNESIUM SERPL-MCNC: 1.7 MG/DL (ref 1.8–2.4)
MCH RBC QN AUTO: 28.7 PG (ref 26.1–32.9)
MCHC RBC AUTO-ENTMCNC: 31.3 G/DL (ref 31.4–35)
MCV RBC AUTO: 91.7 FL (ref 82–102)
MONOCYTES # BLD: 0.8 K/UL (ref 0.1–1.3)
MONOCYTES NFR BLD: 11 % (ref 4–12)
NEUTS SEG # BLD: 4.6 K/UL (ref 1.7–8.2)
NEUTS SEG NFR BLD: 60 % (ref 43–78)
NRBC # BLD: 0 K/UL (ref 0–0.2)
PLATELET # BLD AUTO: 166 K/UL (ref 150–450)
PMV BLD AUTO: 11 FL (ref 9.4–12.3)
POTASSIUM SERPL-SCNC: 4.2 MMOL/L (ref 3.5–5.1)
PROT SERPL-MCNC: 6.7 G/DL (ref 6.3–8.2)
PROTHROMBIN TIME: 13 SEC (ref 11.3–14.9)
RBC # BLD AUTO: 4.21 M/UL (ref 4.05–5.2)
SODIUM SERPL-SCNC: 141 MMOL/L (ref 136–145)
TROPONIN T SERPL HS-MCNC: 18 NG/L (ref 0–14)
WBC # BLD AUTO: 7.6 K/UL (ref 4.3–11.1)

## 2024-05-23 PROCEDURE — 85610 PROTHROMBIN TIME: CPT

## 2024-05-23 PROCEDURE — 6370000000 HC RX 637 (ALT 250 FOR IP): Performed by: STUDENT IN AN ORGANIZED HEALTH CARE EDUCATION/TRAINING PROGRAM

## 2024-05-23 PROCEDURE — 85025 COMPLETE CBC W/AUTO DIFF WBC: CPT

## 2024-05-23 PROCEDURE — 70450 CT HEAD/BRAIN W/O DYE: CPT

## 2024-05-23 PROCEDURE — 6360000004 HC RX CONTRAST MEDICATION: Performed by: STUDENT IN AN ORGANIZED HEALTH CARE EDUCATION/TRAINING PROGRAM

## 2024-05-23 PROCEDURE — 93880 EXTRACRANIAL BILAT STUDY: CPT | Performed by: RADIOLOGY

## 2024-05-23 PROCEDURE — 70498 CT ANGIOGRAPHY NECK: CPT

## 2024-05-23 PROCEDURE — 83735 ASSAY OF MAGNESIUM: CPT

## 2024-05-23 PROCEDURE — 71045 X-RAY EXAM CHEST 1 VIEW: CPT

## 2024-05-23 PROCEDURE — 93005 ELECTROCARDIOGRAM TRACING: CPT | Performed by: STUDENT IN AN ORGANIZED HEALTH CARE EDUCATION/TRAINING PROGRAM

## 2024-05-23 PROCEDURE — 80053 COMPREHEN METABOLIC PANEL: CPT

## 2024-05-23 PROCEDURE — 93010 ELECTROCARDIOGRAM REPORT: CPT | Performed by: INTERNAL MEDICINE

## 2024-05-23 PROCEDURE — 93880 EXTRACRANIAL BILAT STUDY: CPT

## 2024-05-23 PROCEDURE — 84484 ASSAY OF TROPONIN QUANT: CPT

## 2024-05-23 PROCEDURE — 99285 EMERGENCY DEPT VISIT HI MDM: CPT

## 2024-05-23 RX ORDER — ASPIRIN 81 MG/1
243 TABLET, CHEWABLE ORAL ONCE
Status: COMPLETED | OUTPATIENT
Start: 2024-05-23 | End: 2024-05-23

## 2024-05-23 RX ORDER — LANOLIN ALCOHOL/MO/W.PET/CERES
800 CREAM (GRAM) TOPICAL DAILY
Status: COMPLETED | OUTPATIENT
Start: 2024-05-23 | End: 2024-05-23

## 2024-05-23 RX ORDER — CLOPIDOGREL BISULFATE 75 MG/1
300 TABLET ORAL ONCE
Status: COMPLETED | OUTPATIENT
Start: 2024-05-23 | End: 2024-05-23

## 2024-05-23 RX ORDER — CLOPIDOGREL BISULFATE 75 MG/1
75 TABLET ORAL DAILY
Qty: 30 TABLET | Refills: 0 | Status: SHIPPED | OUTPATIENT
Start: 2024-05-23 | End: 2024-06-22

## 2024-05-23 RX ADMIN — ASPIRIN 81 MG 243 MG: 81 TABLET ORAL at 12:05

## 2024-05-23 RX ADMIN — MAGNESIUM GLUCONATE 500 MG ORAL TABLET 800 MG: 500 TABLET ORAL at 14:04

## 2024-05-23 RX ADMIN — CLOPIDOGREL BISULFATE 300 MG: 75 TABLET ORAL at 14:05

## 2024-05-23 RX ADMIN — IOPAMIDOL 50 ML: 755 INJECTION, SOLUTION INTRAVENOUS at 13:47

## 2024-05-23 ASSESSMENT — PAIN - FUNCTIONAL ASSESSMENT: PAIN_FUNCTIONAL_ASSESSMENT: NONE - DENIES PAIN

## 2024-05-23 NOTE — PROGRESS NOTES
Patient is unable to have mri due to defibrillator lead being unsafe per rep at [facility]   Mri cancelled

## 2024-05-23 NOTE — ED NOTES
Patient mobility status  with mild difficulty. Provider aware     I have reviewed discharge instructions with the patient.  The patient verbalized understanding.    Patient left ED via Discharge Method: ambulatory to Home with Child.    Opportunity for questions and clarification provided.     Patient given 1 scripts.              Ronnie Lopez, RN  05/23/24 3505

## 2024-05-23 NOTE — ED PROVIDER NOTES
Emergency Department Provider Note       PCP: John Reece MD   Age: 78 y.o.   Sex: female     DISPOSITION Decision To Discharge 05/23/2024 02:23:45 PM       ICD-10-CM    1. TIA (transient ischemic attack)  G45.9 Ambulatory referral to Neurology      2. Bilateral carotid artery stenosis  I65.23       3. Hypomagnesemia  E83.42           Medical Decision Making     78-year-old female here today for blurred vision, and numbness that has since resolved.  Never happened before, no medication changes, in her normal state of health prior to onset, denies any recent stressors, or other symptoms.  Per EMS, all of her symptoms have entirely resolved prior to their onset, point-of-care glucose was normal  Last known well: 10:30 AM  On arrival, a full NIH was performed, and her NIH score was 0.  This was witnessed by nursing staff, and EMS.  Given patient's normal NIH, with absence of symptoms, suspecting TIA, low suspicion for LVO, hemorrhage, therefore no code stroke was called, however stat imaging ordered, including MRI/MRA, carotid Doppler  Patient has already taken 81 mg of p.o. aspirin, I did top her off to a total of 324 mg.    ED Course as of 05/23/24 1425   Thu May 23, 2024   1201 EKG performed at 1153 showing a sinus rhythm with a rate of 79 bpm, left axis deviation, appears to have an incomplete right bundle branch, no ST elevations or T wave versions, QTc is 461 [EM]   1330 Chest x-ray showed: Possible areas of inflammation versus infectious process, however patient is not having any signs or symptoms suggestive of a pneumonia.  Also showed a large hiatal hernia, consistent with previous chest x-rays. [EM]   1331 CMP normal, CBC normal, coags normal, slightly elevated troponin of 18, no chest pain, no ischemia on EKG, will trend.  Slightly low magnesium of 1.7, will replete p.o. [EM]   1341 Discussed patient's case with neurology, he stated that as long as patient had a negative CT head, CTA, patient could

## 2024-05-23 NOTE — DISCHARGE INSTRUCTIONS
Continue taking 81 mg of aspirin daily, as well as the Plavix that I prescribed you.  You have plaque buildup and slight occlusion of your carotid arteries, however given that you are asymptomatic at this time, this can be followed up on an outpatient basis.  Return if your symptoms acutely worsen.  Please follow-up with neurology.

## 2024-05-23 NOTE — PROGRESS NOTES
attempted to visit patient after meeting family members that were in the hospital for their own medical needs.  Patient was not in room at time.   provided prayer and is available to follow up as needed.    Peace be with you,  Signed by  CLAUDIA VillarealDiv.   [phone]

## 2024-05-23 NOTE — ED TRIAGE NOTES
Pt arrived via EMS from home. Pt c/o slurred speech, rt sided facial numbness at 1030 this morning.  bgl 128, /80, sat 98. Race 0. Denies CP or SOB. Hx COPD

## 2024-05-24 ENCOUNTER — CARE COORDINATION (OUTPATIENT)
Dept: CARE COORDINATION | Facility: CLINIC | Age: 79
End: 2024-05-24

## 2024-05-24 NOTE — CARE COORDINATION
Ambulatory Care Management Outreach Attempt    This patient was received as a referral from  Daily assignment for case management of ed utilizer.    Attempted to reach patient for ED follow up. Pt has respectfully declined services at this time, my contact information has been shared with pt in the event there circumstances change. They are free to reach out at any time. ACM signing off.      Patient: Rekha Palmer Patient : 1945 MRN: [id number]    Last Discharge Facility       Date Complaint Diagnosis Description Type Department Provider    24 Numbness TIA (transient ischemic attack) ... ED (DISCHARGE) Doe Ny III, MD                Noted following upcoming appointments from discharge chart review:   [facility] follow up appointment(s):   Future Appointments   Date Time Provider Department Center   2024 11:00 AM Michaela Jarrell APRN [facility] AMB   2024 10:40 AM Bridgette Aguilera APRN - CNP [facility] AMB   2024  9:35 AM Hema Boyd MD [facility] AMB   2024 12:30 PM Abril Ren MD BS [facility] AMB   2024  3:30 PM Barry Tejada DO [facility] AMB     Non-BS follow up appointment(s):

## 2024-06-04 ENCOUNTER — OFFICE VISIT (OUTPATIENT)
Dept: NEUROLOGY | Age: 79
End: 2024-06-04
Payer: MEDICARE

## 2024-06-04 DIAGNOSIS — R41.3 MEMORY DIFFICULTIES: ICD-10-CM

## 2024-06-04 DIAGNOSIS — G45.9 TIA (TRANSIENT ISCHEMIC ATTACK): Primary | ICD-10-CM

## 2024-06-04 DIAGNOSIS — I65.23 CAROTID STENOSIS, BILATERAL: ICD-10-CM

## 2024-06-04 DIAGNOSIS — Z95.810 PRESENCE OF AUTOMATIC (IMPLANTABLE) CARDIAC DEFIBRILLATOR: ICD-10-CM

## 2024-06-04 DIAGNOSIS — Z87.891 FORMER TOBACCO USE: ICD-10-CM

## 2024-06-04 DIAGNOSIS — E78.5 HYPERLIPIDEMIA LDL GOAL <70: ICD-10-CM

## 2024-06-04 PROBLEM — E22.2 SIADH (SYNDROME OF INAPPROPRIATE ADH PRODUCTION) (HCC): Status: ACTIVE | Noted: 2018-11-28

## 2024-06-04 PROBLEM — I25.119 ATHEROSCLEROSIS OF NATIVE CORONARY ARTERY OF NATIVE HEART WITH ANGINA PECTORIS (HCC): Status: ACTIVE | Noted: 2020-11-25

## 2024-06-04 PROBLEM — I77.9 BILATERAL CAROTID ARTERY DISEASE (HCC): Status: ACTIVE | Noted: 2020-11-25

## 2024-06-04 PROBLEM — C85.99 GASTRIC LYMPHOMA (HCC): Status: ACTIVE | Noted: 2020-12-13

## 2024-06-04 PROBLEM — J44.9 COPD MIXED TYPE (HCC): Status: ACTIVE | Noted: 2018-11-28

## 2024-06-04 PROBLEM — N18.31 STAGE 3A CHRONIC KIDNEY DISEASE (HCC): Status: ACTIVE | Noted: 2023-08-15

## 2024-06-04 PROBLEM — I47.29 NSVT (NONSUSTAINED VENTRICULAR TACHYCARDIA) (HCC): Status: ACTIVE | Noted: 2018-08-14

## 2024-06-04 PROBLEM — K50.00 CROHN'S DISEASE OF SMALL INTESTINE WITHOUT COMPLICATION (HCC): Status: ACTIVE | Noted: 2021-06-24

## 2024-06-04 LAB
FOLATE SERPL-MCNC: 9.6 NG/ML (ref 3.1–17.5)
TSH W FREE THYROID IF ABNORMAL: 1.28 UIU/ML (ref 0.27–4.2)

## 2024-06-04 PROCEDURE — G8417 CALC BMI ABV UP PARAM F/U: HCPCS | Performed by: NURSE PRACTITIONER

## 2024-06-04 PROCEDURE — 99204 OFFICE O/P NEW MOD 45 MIN: CPT | Performed by: NURSE PRACTITIONER

## 2024-06-04 PROCEDURE — 1036F TOBACCO NON-USER: CPT | Performed by: NURSE PRACTITIONER

## 2024-06-04 PROCEDURE — G8399 PT W/DXA RESULTS DOCUMENT: HCPCS | Performed by: NURSE PRACTITIONER

## 2024-06-04 PROCEDURE — 1123F ACP DISCUSS/DSCN MKR DOCD: CPT | Performed by: NURSE PRACTITIONER

## 2024-06-04 PROCEDURE — 1090F PRES/ABSN URINE INCON ASSESS: CPT | Performed by: NURSE PRACTITIONER

## 2024-06-04 PROCEDURE — G8427 DOCREV CUR MEDS BY ELIG CLIN: HCPCS | Performed by: NURSE PRACTITIONER

## 2024-06-04 ASSESSMENT — PATIENT HEALTH QUESTIONNAIRE - PHQ9
SUM OF ALL RESPONSES TO PHQ QUESTIONS 1-9: 0
1. LITTLE INTEREST OR PLEASURE IN DOING THINGS: NOT AT ALL
SUM OF ALL RESPONSES TO PHQ9 QUESTIONS 1 & 2: 0
SUM OF ALL RESPONSES TO PHQ QUESTIONS 1-9: 0
SUM OF ALL RESPONSES TO PHQ QUESTIONS 1-9: 0
2. FEELING DOWN, DEPRESSED OR HOPELESS: NOT AT ALL
SUM OF ALL RESPONSES TO PHQ QUESTIONS 1-9: 0

## 2024-06-04 NOTE — PATIENT INSTRUCTIONS
Interventions that may decrease conversion from mild cognitive impairment to dementia or aid in the treatment of established dementia:   Reduce damage the brain by controlling vascular risk factors (SBP < 140 mmHg, normal cholesterol, BMI < 31 kg/m²).   2. Exercise 20 minutes three times per week reaching 80% maximum heart rate   3. Diet: adhere to a Mediterranean diet

## 2024-06-04 NOTE — PROGRESS NOTES
budesonide (PULMICORT) 0.5 MG/2ML nebulizer suspension, Take 2 mLs by nebulization 2 times daily Rinse mouth after use,  Dx J44.9 please bill to medicare pt B (Patient taking differently: Take 2 mLs by nebulization 2 times daily Provider trying another medication   Rinse mouth after use,  Dx J44.9 please bill to medicare pt B), Disp: 360 mL, Rfl: 3    citalopram (CELEXA) 20 MG tablet, TAKE 1 TABLET DAILY, Disp: 90 tablet, Rfl: 3    Magnesium Cl-Calcium Carbonate (SLOW-MAG PO), Take 2 tablets by mouth in the morning and at bedtime, Disp: , Rfl:     Calcium Citrate-Vitamin D (CITRACAL + D PO), Take 1 tablet by mouth daily, Disp: , Rfl:     metoprolol succinate (TOPROL XL) 25 MG extended release tablet, Take 1 tablet by mouth daily, Disp: 90 tablet, Rfl: 3    famotidine (PEPCID) 40 MG tablet, Take 1 tablet by mouth, Disp: , Rfl:     albuterol sulfate HFA (VENTOLIN HFA) 108 (90 Base) MCG/ACT inhaler, Inhale 2 puffs into the lungs 4 times daily as needed for Wheezing, Disp: 18 g, Rfl: 5    STELARA 90 MG/ML SOSY prefilled syringe, Every 8 weeks, Disp: , Rfl:     cyanocobalamin 1000 MCG tablet, Take 1 tablet by mouth daily, Disp: , Rfl:     OXYGEN, 3 lpm qhs, Disp: , Rfl:     acetaminophen (TYLENOL) 500 MG tablet, Take by mouth every 6 hours as needed, Disp: , Rfl:     aspirin 81 MG EC tablet, Take by mouth daily, Disp: , Rfl:     denosumab (PROLIA) 60 MG/ML SOSY SC injection, Inject 1 mL into the skin, Disp: , Rfl:     diclofenac sodium (VOLTAREN) 1 % GEL, Apply 4 g topically as needed, Disp: , Rfl:     furosemide (LASIX) 40 MG tablet, Take 0.5 tablets by mouth every other day Takes 20 mg as needed, Disp: , Rfl:     Allergies   Allergen Reactions    Adhesive Tape Other (See Comments)     Other reaction(s): Other- (not listed) - Allergy  Tears skin , skin thin   Tears skin , skin thin       Tramadol Other (See Comments)     Causes hallucinations    Hydrocodone     Hydrocodone Bit-Homatrop Mbr Hallucinations     Other

## 2024-06-05 VITALS
OXYGEN SATURATION: 97 % | BODY MASS INDEX: 27.02 KG/M2 | WEIGHT: 146.8 LBS | HEART RATE: 74 BPM | SYSTOLIC BLOOD PRESSURE: 118 MMHG | HEIGHT: 62 IN | DIASTOLIC BLOOD PRESSURE: 75 MMHG

## 2024-06-05 ASSESSMENT — ENCOUNTER SYMPTOMS
EYES NEGATIVE: 1
BACK PAIN: 1
GASTROINTESTINAL NEGATIVE: 1
ALLERGIC/IMMUNOLOGIC NEGATIVE: 1
RESPIRATORY NEGATIVE: 1

## 2024-06-05 NOTE — RESULT ENCOUNTER NOTE
Spoke with patients daughter relaying provider message below:    Michaela Jarrell, Amarilys Rodriguez MA  Cc: Agueda Burleson LPN; Jesika Menendez CMA  TSH and folate are normal.

## 2024-06-11 LAB
Lab: NORMAL
Lab: NORMAL
REFERENCE LAB: NORMAL

## 2024-06-12 RX ORDER — NITROGLYCERIN 0.4 MG/1
0.4 TABLET SUBLINGUAL EVERY 5 MIN PRN
Qty: 25 TABLET | Refills: 5 | Status: SHIPPED | OUTPATIENT
Start: 2024-06-12

## 2024-06-19 ENCOUNTER — TELEPHONE (OUTPATIENT)
Dept: NEUROLOGY | Age: 79
End: 2024-06-19

## 2024-06-19 DIAGNOSIS — G30.1 LATE ONSET ALZHEIMER'S DEMENTIA WITHOUT BEHAVIORAL DISTURBANCE, PSYCHOTIC DISTURBANCE, MOOD DISTURBANCE, OR ANXIETY, UNSPECIFIED DEMENTIA SEVERITY (HCC): Primary | ICD-10-CM

## 2024-06-19 DIAGNOSIS — F02.80 LATE ONSET ALZHEIMER'S DEMENTIA WITHOUT BEHAVIORAL DISTURBANCE, PSYCHOTIC DISTURBANCE, MOOD DISTURBANCE, OR ANXIETY, UNSPECIFIED DEMENTIA SEVERITY (HCC): Primary | ICD-10-CM

## 2024-06-19 NOTE — TELEPHONE ENCOUNTER
Phospho-Tau 217 is consistent with Alzheimer's dementia. Results discussed with daughter Jennifer Lizarraga. Discussed treatment options, however would like to defer until follow up appointment.

## 2024-06-20 ENCOUNTER — TELEPHONE (OUTPATIENT)
Dept: PULMONOLOGY | Age: 79
End: 2024-06-20

## 2024-06-20 NOTE — TELEPHONE ENCOUNTER
Spoke with the patient daughter Ms. Rodriguez regarding pt visit tomorrow with Ms. Angeles, appt change to virtual.Ms. Rodriguez is agreeable with this. Seymour MCINTYRE

## 2024-06-21 ENCOUNTER — TELEMEDICINE (OUTPATIENT)
Dept: PULMONOLOGY | Age: 79
End: 2024-06-21
Payer: MEDICARE

## 2024-06-21 DIAGNOSIS — I50.22 SYSTOLIC CHF, CHRONIC (HCC): ICD-10-CM

## 2024-06-21 DIAGNOSIS — J30.89 ENVIRONMENTAL AND SEASONAL ALLERGIES: ICD-10-CM

## 2024-06-21 DIAGNOSIS — J44.9 STAGE 3 SEVERE COPD BY GOLD CLASSIFICATION (HCC): Primary | ICD-10-CM

## 2024-06-21 DIAGNOSIS — B37.0 THRUSH, ORAL: ICD-10-CM

## 2024-06-21 PROCEDURE — 1090F PRES/ABSN URINE INCON ASSESS: CPT | Performed by: NURSE PRACTITIONER

## 2024-06-21 PROCEDURE — G8399 PT W/DXA RESULTS DOCUMENT: HCPCS | Performed by: NURSE PRACTITIONER

## 2024-06-21 PROCEDURE — 1123F ACP DISCUSS/DSCN MKR DOCD: CPT | Performed by: NURSE PRACTITIONER

## 2024-06-21 PROCEDURE — G8427 DOCREV CUR MEDS BY ELIG CLIN: HCPCS | Performed by: NURSE PRACTITIONER

## 2024-06-21 PROCEDURE — 99214 OFFICE O/P EST MOD 30 MIN: CPT | Performed by: NURSE PRACTITIONER

## 2024-06-21 NOTE — PROGRESS NOTES
quit.      REVIEW OF SYSTEMS: 10 point review of systems is negative except as reported in HPI.    PHYSICAL EXAM: Body mass index is 26.52 kg/m².  Vitals:    04/15/24 1030   BP: 124/80   Pulse: 84   Resp: 18   Temp: 97.5 °F (36.4 °C)   TempSrc: Infrared   SpO2: 96%   Weight: 65.8 kg (145 lb)   Height: 1.575 m (5' 2\")         General:   Alert, cooperative, no distress, appears stated age.        Eyes:   Conjunctivae/corneas clear. PERRL        Mouth/Throat:  Lips, mucosa, and tongue normal. Teeth and gums normal.        Lungs:   No tachypnea, dyspnea, completes full sentences without distress, on room air     Heart:       Abdomen:    Soft, non-tender.     Extremities:  Extremities normal, atraumatic, no cyanosis or edema.     Skin:  Skin color normal. No rashes or lesions     Neurologic:  A&Ox3     DIAGNOSTIC TESTS:                                                                                    LABS:   Lab Results   Component Value Date/Time    WBC 8.2 12/04/2023 04:43 AM    HGB 9.1 12/04/2023 04:43 AM    HCT 29.2 12/04/2023 04:43 AM     12/04/2023 04:43 AM    TSH 1.370 02/17/2022 02:55 AM    NTPROBNP 1,617 11/19/2023 10:51 AM    CRP 0.8 12/01/2023 03:59 AM     Imaging: I performed an independent interpretation of the patient's images.  CXR:       CT Chest:   No no new imaging since last visit.    Nuclear Medicine: No results found for this or any previous visit from the past 3650 days.      PFTs:   Date   3/28/2023      FVC    0.83- 52%      FEV1    1.43- 66%      FEV1/FVC    58%      FEF 25-75%          Bronchodilator Response          TLC          RV          DLCO/DLCO VA              FeNO: No results found for this or any previous visit.  FeNO and Likelihood of Eosinophilic Asthma   Unlikely Intermediate Likely   <25 ppb 25-50 ppb >50ppb     Exercise Oximetry:  Oxygen saturation 91% , pulse 99 after ambulation from resting oxygen saturation of 99% and pulse of  84. No oxygen therapy required before,

## 2024-06-26 ENCOUNTER — OFFICE VISIT (OUTPATIENT)
Dept: ORTHOPEDIC SURGERY | Age: 79
End: 2024-06-26
Payer: MEDICARE

## 2024-06-26 DIAGNOSIS — M17.11 PRIMARY OSTEOARTHRITIS OF RIGHT KNEE: ICD-10-CM

## 2024-06-26 DIAGNOSIS — M17.12 PRIMARY OSTEOARTHRITIS OF LEFT KNEE: ICD-10-CM

## 2024-06-26 DIAGNOSIS — M25.562 ACUTE PAIN OF LEFT KNEE: Primary | ICD-10-CM

## 2024-06-26 PROCEDURE — G8417 CALC BMI ABV UP PARAM F/U: HCPCS | Performed by: ORTHOPAEDIC SURGERY

## 2024-06-26 PROCEDURE — 1090F PRES/ABSN URINE INCON ASSESS: CPT | Performed by: ORTHOPAEDIC SURGERY

## 2024-06-26 PROCEDURE — 1123F ACP DISCUSS/DSCN MKR DOCD: CPT | Performed by: ORTHOPAEDIC SURGERY

## 2024-06-26 PROCEDURE — 99215 OFFICE O/P EST HI 40 MIN: CPT | Performed by: ORTHOPAEDIC SURGERY

## 2024-06-26 PROCEDURE — 1036F TOBACCO NON-USER: CPT | Performed by: ORTHOPAEDIC SURGERY

## 2024-06-26 PROCEDURE — 20610 DRAIN/INJ JOINT/BURSA W/O US: CPT | Performed by: ORTHOPAEDIC SURGERY

## 2024-06-26 PROCEDURE — G8428 CUR MEDS NOT DOCUMENT: HCPCS | Performed by: ORTHOPAEDIC SURGERY

## 2024-06-26 PROCEDURE — G8399 PT W/DXA RESULTS DOCUMENT: HCPCS | Performed by: ORTHOPAEDIC SURGERY

## 2024-06-26 RX ORDER — METHYLPREDNISOLONE ACETATE 40 MG/ML
40 INJECTION, SUSPENSION INTRA-ARTICULAR; INTRALESIONAL; INTRAMUSCULAR; SOFT TISSUE ONCE
Status: COMPLETED | OUTPATIENT
Start: 2024-06-26 | End: 2024-06-26

## 2024-06-26 RX ADMIN — METHYLPREDNISOLONE ACETATE 40 MG: 40 INJECTION, SUSPENSION INTRA-ARTICULAR; INTRALESIONAL; INTRAMUSCULAR; SOFT TISSUE at 10:24

## 2024-06-26 NOTE — PROGRESS NOTES
bedtime, Disp: , Rfl:     Calcium Citrate-Vitamin D (CITRACAL + D PO), Take 1 tablet by mouth daily, Disp: , Rfl:     metoprolol succinate (TOPROL XL) 25 MG extended release tablet, Take 1 tablet by mouth daily, Disp: 90 tablet, Rfl: 3    famotidine (PEPCID) 40 MG tablet, Take 1 tablet by mouth, Disp: , Rfl:     albuterol sulfate HFA (VENTOLIN HFA) 108 (90 Base) MCG/ACT inhaler, Inhale 2 puffs into the lungs 4 times daily as needed for Wheezing, Disp: 18 g, Rfl: 5    STELARA 90 MG/ML SOSY prefilled syringe, Every 8 weeks, Disp: , Rfl:     cyanocobalamin 1000 MCG tablet, Take 1 tablet by mouth daily, Disp: , Rfl:     OXYGEN, 3 lpm qhs, Disp: , Rfl:     acetaminophen (TYLENOL) 500 MG tablet, Take by mouth every 6 hours as needed, Disp: , Rfl:     aspirin 81 MG EC tablet, Take by mouth daily, Disp: , Rfl:     denosumab (PROLIA) 60 MG/ML SOSY SC injection, Inject 1 mL into the skin, Disp: , Rfl:     diclofenac sodium (VOLTAREN) 1 % GEL, Apply 4 g topically as needed, Disp: , Rfl:     furosemide (LASIX) 40 MG tablet, Take 0.5 tablets by mouth every other day Takes 20 mg as needed, Disp: , Rfl:   Allergies   Allergen Reactions    Adhesive Tape Other (See Comments)     Other reaction(s): Other- (not listed) - Allergy  Tears skin , skin thin   Tears skin , skin thin       Tramadol Other (See Comments)     Causes hallucinations    Hydrocodone     Hydrocodone Bit-Homatrop Mbr Hallucinations     Other reaction(s): Unknown (comments)    Hydromorphone Other (See Comments)     hallucinations    Morphine Other (See Comments)     hallucinations       CC:left knee pain    Impression: Osteoarthritis of the left knee    Procedure: Depomedrol injection     Procedure Note: The left knee was prepped with alcohol. Then the left knee was injected with 1 mL of 0.5% Marcaine and 40mg of depomedrol The patient tolerated the procedure without difficulty.      JULIAN CHING MD  Elements of this note have been dictated using speech

## 2024-07-09 ENCOUNTER — TELEPHONE (OUTPATIENT)
Dept: PULMONOLOGY | Age: 79
End: 2024-07-09

## 2024-07-09 DIAGNOSIS — B37.0 THRUSH, ORAL: ICD-10-CM

## 2024-07-09 NOTE — TELEPHONE ENCOUNTER
Patient called refill line requesting Magic Mouthwash (MIRACLE MOUTHWASH). She want it to be send in to Pharmacy: [facility]/pharmacy #[id number] - [place], [place] - [address] - P [phone] - F [phone]. Myriam bonner

## 2024-07-17 ENCOUNTER — OFFICE VISIT (OUTPATIENT)
Dept: RHEUMATOLOGY | Age: 79
End: 2024-07-17

## 2024-07-17 ENCOUNTER — NURSE ONLY (OUTPATIENT)
Dept: RHEUMATOLOGY | Age: 79
End: 2024-07-17
Payer: MEDICARE

## 2024-07-17 VITALS
RESPIRATION RATE: 16 BRPM | WEIGHT: 150 LBS | HEIGHT: 62 IN | DIASTOLIC BLOOD PRESSURE: 64 MMHG | BODY MASS INDEX: 27.6 KG/M2 | SYSTOLIC BLOOD PRESSURE: 118 MMHG | HEART RATE: 80 BPM

## 2024-07-17 VITALS — TEMPERATURE: 98.1 F | SYSTOLIC BLOOD PRESSURE: 118 MMHG | HEART RATE: 80 BPM | DIASTOLIC BLOOD PRESSURE: 64 MMHG

## 2024-07-17 DIAGNOSIS — M15.9 GENERALIZED OSTEOARTHRITIS OF MULTIPLE SITES: ICD-10-CM

## 2024-07-17 DIAGNOSIS — R45.89 ANXIETY ABOUT HEALTH: ICD-10-CM

## 2024-07-17 DIAGNOSIS — K50.10 CROHN'S DISEASE OF COLON WITHOUT COMPLICATION (HCC): ICD-10-CM

## 2024-07-17 DIAGNOSIS — Z79.899 ENCOUNTER FOR MONITORING DENOSUMAB THERAPY: ICD-10-CM

## 2024-07-17 DIAGNOSIS — Z51.81 ENCOUNTER FOR MONITORING DENOSUMAB THERAPY: ICD-10-CM

## 2024-07-17 DIAGNOSIS — M81.0 POSTMENOPAUSAL OSTEOPOROSIS: Primary | ICD-10-CM

## 2024-07-17 DIAGNOSIS — M81.0 POSTMENOPAUSAL OSTEOPOROSIS: ICD-10-CM

## 2024-07-17 DIAGNOSIS — M17.0 BILATERAL PRIMARY OSTEOARTHRITIS OF KNEE: Primary | ICD-10-CM

## 2024-07-17 PROCEDURE — 96372 THER/PROPH/DIAG INJ SC/IM: CPT | Performed by: INTERNAL MEDICINE

## 2024-07-17 RX ORDER — ACETAMINOPHEN 325 MG/1
650 TABLET ORAL
OUTPATIENT
Start: 2025-01-15

## 2024-07-17 RX ORDER — ACETAMINOPHEN 325 MG/1
650 TABLET ORAL
OUTPATIENT
Start: 2024-07-17

## 2024-07-17 RX ORDER — EPINEPHRINE 1 MG/ML
0.3 INJECTION, SOLUTION, CONCENTRATE INTRAVENOUS PRN
OUTPATIENT
Start: 2025-01-15

## 2024-07-17 RX ORDER — EPINEPHRINE 1 MG/ML
0.3 INJECTION, SOLUTION, CONCENTRATE INTRAVENOUS PRN
OUTPATIENT
Start: 2024-07-17

## 2024-07-17 RX ORDER — SODIUM CHLORIDE 9 MG/ML
INJECTION, SOLUTION INTRAVENOUS CONTINUOUS
OUTPATIENT
Start: 2025-01-15

## 2024-07-17 RX ORDER — ONDANSETRON 2 MG/ML
8 INJECTION INTRAMUSCULAR; INTRAVENOUS
OUTPATIENT
Start: 2025-01-15

## 2024-07-17 RX ORDER — ALBUTEROL SULFATE 90 UG/1
4 AEROSOL, METERED RESPIRATORY (INHALATION) PRN
OUTPATIENT
Start: 2025-01-15

## 2024-07-17 RX ORDER — ONDANSETRON 2 MG/ML
8 INJECTION INTRAMUSCULAR; INTRAVENOUS
OUTPATIENT
Start: 2024-07-17

## 2024-07-17 RX ORDER — DIPHENHYDRAMINE HYDROCHLORIDE 50 MG/ML
50 INJECTION INTRAMUSCULAR; INTRAVENOUS
OUTPATIENT
Start: 2025-01-15

## 2024-07-17 RX ORDER — ALBUTEROL SULFATE 90 UG/1
4 AEROSOL, METERED RESPIRATORY (INHALATION) PRN
OUTPATIENT
Start: 2024-07-17

## 2024-07-17 RX ORDER — SODIUM CHLORIDE 9 MG/ML
INJECTION, SOLUTION INTRAVENOUS CONTINUOUS
OUTPATIENT
Start: 2024-07-17

## 2024-07-17 RX ORDER — DIPHENHYDRAMINE HYDROCHLORIDE 50 MG/ML
50 INJECTION INTRAMUSCULAR; INTRAVENOUS
OUTPATIENT
Start: 2024-07-17

## 2024-07-17 NOTE — PROGRESS NOTES
patient was in NAD.  ENT:  The OPC, MMM, lips/teeth/gums without abnormalities.  NECK:  No masses or thyromegaly  LYMPH NODES:  No cervical or axillary lymphadenopathy.  CV:  RRR no r/m/g.  Normal peripheral pulses  RESP:  CTA bilaterally.  Normal respiratory effort.  GI:  Abdomen soft, non tender, no hepatosplenomegaly  Skin:  No rashes, nodules, or other lesions.    MUSCULOSKELETAL :  All joints including neck, back bilateral shoulders, elbows, wrists, MCP's, PIP's, DIP's, hips, knees, ankles, toes are within normal limits including normal gross examination, no synovitis, no tenderness, n'l ROM EXCEPT:   Quads atrophy bilat  Crepitus in bilateral knees  There is no synovitis      ASSESSMENT AND PLAN:  Rekha Palmer is a 79 y.o.   female that is here for evaluation of osteoporosis  her problems include:    1. Osteoporosis, post-menopausal    Previous review of bone density scan shows improvement in density so we will continue Prolia.  She is worried about frequent falls.  - Check DEXA October 2024-ordered today  - Plan for Prolia in 3 months when due with CMP vitamin D prior    -Reiterated to patient fall precautions  -continue calcium citrate 1200 Mg daily  -Continue vitamin D      2.  Bilateral knee OA, generalized osteoarthritis  3.  Anxiety about health    -Established with orthopedics and had benefit with steroid injection left knee.  Patient canceled Euflexxa injections as she has been very concerned about having the procedure done-reports anxiety about health.  Currently using Celexa 20 Mg  - Encourage patient to discuss adjusting Celexa with primary, reschedule Ortho appointment if she desires.  - Encourage PT  - low suspicion for enteropathic related arthritis but there has been no correlation with more joint pain with flare of her colitis.  last C-scope in May 2024 does not show evidence of Crohn's colitis activity.     3.  Crohn's colitis: Noted      Follow-up in 3 months  CMP vitamin D DEXA prior

## 2024-07-17 NOTE — PATIENT INSTRUCTIONS
To schedule bone scan:  [phone] 10.5.24    Return in 3 months for office visit-do labs in chart 1 week prior to visit.

## 2024-07-17 NOTE — PROGRESS NOTES
[facility]  [address] [place] [address], [address]  [place] [address]  Office : [phone], Fax: [phone]       # 11 Prolia 60mg/ml injected SC today into left arm. Patient tolerated injection well. Advised patient to continue with calcium and vitamin D post injection. Advised patient to call with any problems post injection.   Medication ordered by Dr. Ren    Pre-infusion/injection questionairre for osteoporosis    1. Have you had or are you planning any dental work? No    2. Are you taking your calcium and vitamin D? Yes    3. When was your last osteoporosis treatment? 4/10/24    4. Have you had any recent fractures? No    5. Are you currently in skilled nursing or in patient rehab? Yes

## 2024-07-18 ENCOUNTER — OFFICE VISIT (OUTPATIENT)
Age: 79
End: 2024-07-18
Payer: MEDICARE

## 2024-07-18 VITALS
BODY MASS INDEX: 27.6 KG/M2 | DIASTOLIC BLOOD PRESSURE: 76 MMHG | WEIGHT: 150 LBS | SYSTOLIC BLOOD PRESSURE: 134 MMHG | HEART RATE: 88 BPM | HEIGHT: 62 IN

## 2024-07-18 DIAGNOSIS — I50.22 CHRONIC SYSTOLIC CONGESTIVE HEART FAILURE (HCC): ICD-10-CM

## 2024-07-18 DIAGNOSIS — I42.8 NICM (NONISCHEMIC CARDIOMYOPATHY) (HCC): ICD-10-CM

## 2024-07-18 DIAGNOSIS — I42.0 DILATED CARDIOMYOPATHY (HCC): ICD-10-CM

## 2024-07-18 DIAGNOSIS — N18.31 STAGE 3A CHRONIC KIDNEY DISEASE (HCC): ICD-10-CM

## 2024-07-18 DIAGNOSIS — I65.23 BILATERAL CAROTID ARTERY STENOSIS: ICD-10-CM

## 2024-07-18 DIAGNOSIS — I47.29 NSVT (NONSUSTAINED VENTRICULAR TACHYCARDIA) (HCC): Primary | ICD-10-CM

## 2024-07-18 PROCEDURE — 99214 OFFICE O/P EST MOD 30 MIN: CPT | Performed by: INTERNAL MEDICINE

## 2024-07-18 PROCEDURE — G8428 CUR MEDS NOT DOCUMENT: HCPCS | Performed by: INTERNAL MEDICINE

## 2024-07-18 PROCEDURE — G8417 CALC BMI ABV UP PARAM F/U: HCPCS | Performed by: INTERNAL MEDICINE

## 2024-07-18 PROCEDURE — 1123F ACP DISCUSS/DSCN MKR DOCD: CPT | Performed by: INTERNAL MEDICINE

## 2024-07-18 PROCEDURE — G8399 PT W/DXA RESULTS DOCUMENT: HCPCS | Performed by: INTERNAL MEDICINE

## 2024-07-18 PROCEDURE — 1090F PRES/ABSN URINE INCON ASSESS: CPT | Performed by: INTERNAL MEDICINE

## 2024-07-18 PROCEDURE — 1036F TOBACCO NON-USER: CPT | Performed by: INTERNAL MEDICINE

## 2024-07-18 NOTE — PROGRESS NOTES
2 [facility], [address] [place] [address]  PHONE: [phone]     24    NAME:  Rekha Palmer  : 1945  MRN: [id number]       SUBJECTIVE:   Rekha Palmer is a 79 y.o. female seen for a follow up visit regarding the following:     Chief Complaint   Patient presents with    Congestive Heart Failure    Coronary Artery Disease       HPI:   Here for vascular dz follow up.   2017 TIA sx  She has crohn's dz.     Echo  showing EF 30-35%, mod MR   3/2018: ICD implant   Echo 3/2022: EF 40-45%     LHC/RHC  2022:     Mild nonobstructive coronary artery disease    Low normal LV systolic function    Mild pulmonary hypertension    Carotid 2024: DEIDRE: Ultrasound evidence of 50 to 69% stenosis.     Dx with dementia, struggling now.    On lasix PRN now, following weights.   Some CHUA, not as active.   No CP, pressure.  Using walker/cane now, seeing ortho for knee injections.     Following weights with lasix plan.      She has NYHA Class II sx now. Energy poor now.     Patient denies recent history of orthopnea, PND, excessive dizziness and/or syncope.       Past Medical History, Past Surgical History, Family history, Social History, and Medications were all reviewed with the patient today and updated as necessary.     Current Outpatient Medications   Medication Sig Dispense Refill    Magic Mouthwash (MIRACLE MOUTHWASH) Swish and swallow 5 mLs 4 times daily 1 part Maalox 1 part: Lidocaine 2% viscous 1 part Diphenhydramine oral solution 1 part Mycostatin 240 mL 0    nitroGLYCERIN (NITROSTAT) 0.4 MG SL tablet Place 1 tablet under the tongue every 5 minutes as needed for Chest pain up to max of 3 total doses. If no relief after 1 dose, call 911. 25 tablet 5    Budeson-Glycopyrrol-Formoterol (BREZTRI AEROSPHERE) 160-9-4.8 MCG/ACT AERO Inhale 2 each into the lungs in the morning and at bedtime 1 each 11    atorvastatin (LIPITOR) 40 MG tablet TAKE 1 TABLET DAILY 90 tablet 3    citalopram

## 2024-07-22 ENCOUNTER — APPOINTMENT (OUTPATIENT)
Dept: CT IMAGING | Age: 79
DRG: 064 | End: 2024-07-22
Payer: MEDICARE

## 2024-07-22 ENCOUNTER — APPOINTMENT (OUTPATIENT)
Dept: GENERAL RADIOLOGY | Age: 79
DRG: 064 | End: 2024-07-22
Payer: MEDICARE

## 2024-07-22 ENCOUNTER — HOSPITAL ENCOUNTER (INPATIENT)
Age: 79
LOS: 2 days | Discharge: HOME HEALTH | DRG: 064 | End: 2024-07-24
Attending: EMERGENCY MEDICINE | Admitting: INTERNAL MEDICINE
Payer: MEDICARE

## 2024-07-22 DIAGNOSIS — I63.9 CEREBROVASCULAR ACCIDENT (CVA), UNSPECIFIED MECHANISM (HCC): ICD-10-CM

## 2024-07-22 DIAGNOSIS — R20.0 RIGHT SIDED NUMBNESS: Primary | ICD-10-CM

## 2024-07-22 DIAGNOSIS — G45.9 TIA (TRANSIENT ISCHEMIC ATTACK): ICD-10-CM

## 2024-07-22 PROBLEM — I61.9 CVA (CEREBROVASCULAR ACCIDENT DUE TO INTRACEREBRAL HEMORRHAGE) (HCC): Status: ACTIVE | Noted: 2024-07-22

## 2024-07-22 LAB
ALBUMIN SERPL-MCNC: 3.7 G/DL (ref 3.2–4.6)
ALBUMIN/GLOB SERPL: 1 (ref 1–1.9)
ALP SERPL-CCNC: 45 U/L (ref 35–104)
ALT SERPL-CCNC: 16 U/L (ref 12–65)
ANION GAP SERPL CALC-SCNC: 10 MMOL/L (ref 9–18)
AST SERPL-CCNC: 26 U/L (ref 15–37)
BASOPHILS # BLD: 0.1 K/UL (ref 0–0.2)
BASOPHILS NFR BLD: 1 % (ref 0–2)
BILIRUB SERPL-MCNC: 0.6 MG/DL (ref 0–1.2)
BILIRUB UR QL: NEGATIVE
BUN SERPL-MCNC: 20 MG/DL (ref 8–23)
CALCIUM SERPL-MCNC: 10 MG/DL (ref 8.8–10.2)
CHLORIDE SERPL-SCNC: 103 MMOL/L (ref 98–107)
CO2 SERPL-SCNC: 28 MMOL/L (ref 20–28)
CREAT SERPL-MCNC: 1.21 MG/DL (ref 0.6–1.1)
DIFFERENTIAL METHOD BLD: ABNORMAL
EOSINOPHIL # BLD: 0.2 K/UL (ref 0–0.8)
EOSINOPHIL NFR BLD: 3 % (ref 0.5–7.8)
ERYTHROCYTE [DISTWIDTH] IN BLOOD BY AUTOMATED COUNT: 13.1 % (ref 11.9–14.6)
GLOBULIN SER CALC-MCNC: 3.5 G/DL (ref 2.3–3.5)
GLUCOSE SERPL-MCNC: 111 MG/DL (ref 70–99)
GLUCOSE UR QL STRIP.AUTO: NEGATIVE MG/DL
HCT VFR BLD AUTO: 38.4 % (ref 35.8–46.3)
HGB BLD-MCNC: 12.1 G/DL (ref 11.7–15.4)
IMM GRANULOCYTES # BLD AUTO: 0 K/UL (ref 0–0.5)
IMM GRANULOCYTES NFR BLD AUTO: 0 % (ref 0–5)
KETONES UR-MCNC: NEGATIVE MG/DL
LEUKOCYTE ESTERASE UR QL STRIP: NEGATIVE
LYMPHOCYTES # BLD: 1.9 K/UL (ref 0.5–4.6)
LYMPHOCYTES NFR BLD: 27 % (ref 13–44)
MCH RBC QN AUTO: 30.2 PG (ref 26.1–32.9)
MCHC RBC AUTO-ENTMCNC: 31.5 G/DL (ref 31.4–35)
MCV RBC AUTO: 95.8 FL (ref 82–102)
MONOCYTES # BLD: 0.8 K/UL (ref 0.1–1.3)
MONOCYTES NFR BLD: 11 % (ref 4–12)
NEUTS SEG # BLD: 4.2 K/UL (ref 1.7–8.2)
NEUTS SEG NFR BLD: 58 % (ref 43–78)
NITRITE UR QL: POSITIVE
NRBC # BLD: 0 K/UL (ref 0–0.2)
PH UR: 5.5 (ref 5–9)
PLATELET # BLD AUTO: 202 K/UL (ref 150–450)
PMV BLD AUTO: 11 FL (ref 9.4–12.3)
POTASSIUM SERPL-SCNC: 3.8 MMOL/L (ref 3.5–5.1)
PROT SERPL-MCNC: 7.2 G/DL (ref 6.3–8.2)
PROT UR QL: NEGATIVE MG/DL
RBC # BLD AUTO: 4.01 M/UL (ref 4.05–5.2)
RBC # UR STRIP: NEGATIVE
SERVICE CMNT-IMP: ABNORMAL
SODIUM SERPL-SCNC: 140 MMOL/L (ref 136–145)
SP GR UR: 1.02 (ref 1–1.02)
UROBILINOGEN UR QL: 0.2 EU/DL (ref 0.2–1)
WBC # BLD AUTO: 7.1 K/UL (ref 4.3–11.1)

## 2024-07-22 PROCEDURE — 6360000002 HC RX W HCPCS: Performed by: EMERGENCY MEDICINE

## 2024-07-22 PROCEDURE — 93005 ELECTROCARDIOGRAM TRACING: CPT | Performed by: EMERGENCY MEDICINE

## 2024-07-22 PROCEDURE — 70498 CT ANGIOGRAPHY NECK: CPT

## 2024-07-22 PROCEDURE — 70450 CT HEAD/BRAIN W/O DYE: CPT

## 2024-07-22 PROCEDURE — 6360000004 HC RX CONTRAST MEDICATION: Performed by: EMERGENCY MEDICINE

## 2024-07-22 PROCEDURE — 6370000000 HC RX 637 (ALT 250 FOR IP): Performed by: INTERNAL MEDICINE

## 2024-07-22 PROCEDURE — 71045 X-RAY EXAM CHEST 1 VIEW: CPT

## 2024-07-22 PROCEDURE — 81003 URINALYSIS AUTO W/O SCOPE: CPT

## 2024-07-22 PROCEDURE — 1100000003 HC PRIVATE W/ TELEMETRY

## 2024-07-22 PROCEDURE — 85025 COMPLETE CBC W/AUTO DIFF WBC: CPT

## 2024-07-22 PROCEDURE — 6360000002 HC RX W HCPCS: Performed by: INTERNAL MEDICINE

## 2024-07-22 PROCEDURE — 80053 COMPREHEN METABOLIC PANEL: CPT

## 2024-07-22 PROCEDURE — 2580000003 HC RX 258: Performed by: INTERNAL MEDICINE

## 2024-07-22 PROCEDURE — 2580000003 HC RX 258: Performed by: EMERGENCY MEDICINE

## 2024-07-22 PROCEDURE — 99285 EMERGENCY DEPT VISIT HI MDM: CPT

## 2024-07-22 RX ORDER — CITALOPRAM HYDROBROMIDE 20 MG/1
20 TABLET ORAL DAILY
Status: DISCONTINUED | OUTPATIENT
Start: 2024-07-23 | End: 2024-07-24 | Stop reason: HOSPADM

## 2024-07-22 RX ORDER — POLYETHYLENE GLYCOL 3350 17 G/17G
17 POWDER, FOR SOLUTION ORAL DAILY PRN
Status: DISCONTINUED | OUTPATIENT
Start: 2024-07-22 | End: 2024-07-24 | Stop reason: HOSPADM

## 2024-07-22 RX ORDER — BUDESONIDE AND FORMOTEROL FUMARATE DIHYDRATE 160; 4.5 UG/1; UG/1
2 AEROSOL RESPIRATORY (INHALATION)
Status: DISCONTINUED | OUTPATIENT
Start: 2024-07-22 | End: 2024-07-22 | Stop reason: ALTCHOICE

## 2024-07-22 RX ORDER — ACETAMINOPHEN 325 MG/1
650 TABLET ORAL EVERY 4 HOURS PRN
Status: DISCONTINUED | OUTPATIENT
Start: 2024-07-22 | End: 2024-07-24 | Stop reason: HOSPADM

## 2024-07-22 RX ORDER — FUROSEMIDE 20 MG/1
20 TABLET ORAL EVERY OTHER DAY
Status: DISCONTINUED | OUTPATIENT
Start: 2024-07-23 | End: 2024-07-22

## 2024-07-22 RX ORDER — ASPIRIN 81 MG/1
81 TABLET ORAL DAILY
Status: DISCONTINUED | OUTPATIENT
Start: 2024-07-23 | End: 2024-07-22

## 2024-07-22 RX ORDER — SODIUM CHLORIDE 0.9 % (FLUSH) 0.9 %
5-40 SYRINGE (ML) INJECTION EVERY 12 HOURS SCHEDULED
Status: DISCONTINUED | OUTPATIENT
Start: 2024-07-22 | End: 2024-07-24 | Stop reason: HOSPADM

## 2024-07-22 RX ORDER — METOPROLOL SUCCINATE 25 MG/1
25 TABLET, EXTENDED RELEASE ORAL DAILY
Status: DISCONTINUED | OUTPATIENT
Start: 2024-07-23 | End: 2024-07-22

## 2024-07-22 RX ORDER — ALBUTEROL SULFATE 90 UG/1
2 INHALANT RESPIRATORY (INHALATION) 4 TIMES DAILY PRN
Status: DISCONTINUED | OUTPATIENT
Start: 2024-07-22 | End: 2024-07-22 | Stop reason: ALTCHOICE

## 2024-07-22 RX ORDER — ONDANSETRON 2 MG/ML
4 INJECTION INTRAMUSCULAR; INTRAVENOUS EVERY 6 HOURS PRN
Status: DISCONTINUED | OUTPATIENT
Start: 2024-07-22 | End: 2024-07-24 | Stop reason: HOSPADM

## 2024-07-22 RX ORDER — ASPIRIN 325 MG
325 TABLET ORAL DAILY
Status: DISCONTINUED | OUTPATIENT
Start: 2024-07-22 | End: 2024-07-22

## 2024-07-22 RX ORDER — ACETAMINOPHEN 650 MG/1
650 SUPPOSITORY RECTAL EVERY 4 HOURS PRN
Status: DISCONTINUED | OUTPATIENT
Start: 2024-07-22 | End: 2024-07-24 | Stop reason: HOSPADM

## 2024-07-22 RX ORDER — ASPIRIN 300 MG/1
300 SUPPOSITORY RECTAL DAILY
Status: DISCONTINUED | OUTPATIENT
Start: 2024-07-22 | End: 2024-07-22

## 2024-07-22 RX ORDER — BUDESONIDE 0.5 MG/2ML
0.5 INHALANT ORAL
Status: DISCONTINUED | OUTPATIENT
Start: 2024-07-23 | End: 2024-07-24 | Stop reason: HOSPADM

## 2024-07-22 RX ORDER — IOPAMIDOL 755 MG/ML
100 INJECTION, SOLUTION INTRAVASCULAR
Status: COMPLETED | OUTPATIENT
Start: 2024-07-22 | End: 2024-07-22

## 2024-07-22 RX ORDER — LABETALOL HYDROCHLORIDE 5 MG/ML
10 INJECTION, SOLUTION INTRAVENOUS EVERY 10 MIN PRN
Status: DISCONTINUED | OUTPATIENT
Start: 2024-07-22 | End: 2024-07-24 | Stop reason: HOSPADM

## 2024-07-22 RX ORDER — ONDANSETRON 4 MG/1
4 TABLET, ORALLY DISINTEGRATING ORAL EVERY 8 HOURS PRN
Status: DISCONTINUED | OUTPATIENT
Start: 2024-07-22 | End: 2024-07-24 | Stop reason: HOSPADM

## 2024-07-22 RX ORDER — SODIUM CHLORIDE 9 MG/ML
INJECTION, SOLUTION INTRAVENOUS PRN
Status: DISCONTINUED | OUTPATIENT
Start: 2024-07-22 | End: 2024-07-24 | Stop reason: HOSPADM

## 2024-07-22 RX ORDER — ASPIRIN 81 MG/1
81 TABLET ORAL DAILY
Status: DISCONTINUED | OUTPATIENT
Start: 2024-07-22 | End: 2024-07-24 | Stop reason: HOSPADM

## 2024-07-22 RX ORDER — LANOLIN ALCOHOL/MO/W.PET/CERES
1000 CREAM (GRAM) TOPICAL DAILY
Status: DISCONTINUED | OUTPATIENT
Start: 2024-07-23 | End: 2024-07-24 | Stop reason: HOSPADM

## 2024-07-22 RX ORDER — ENOXAPARIN SODIUM 100 MG/ML
40 INJECTION SUBCUTANEOUS EVERY 24 HOURS
Status: DISCONTINUED | OUTPATIENT
Start: 2024-07-22 | End: 2024-07-24 | Stop reason: HOSPADM

## 2024-07-22 RX ORDER — ATORVASTATIN CALCIUM 80 MG/1
80 TABLET, FILM COATED ORAL NIGHTLY
Status: DISCONTINUED | OUTPATIENT
Start: 2024-07-22 | End: 2024-07-24 | Stop reason: HOSPADM

## 2024-07-22 RX ORDER — ARFORMOTEROL TARTRATE 15 UG/2ML
15 SOLUTION RESPIRATORY (INHALATION)
Status: DISCONTINUED | OUTPATIENT
Start: 2024-07-23 | End: 2024-07-24 | Stop reason: HOSPADM

## 2024-07-22 RX ORDER — FAMOTIDINE 20 MG/1
20 TABLET, FILM COATED ORAL DAILY
Status: DISCONTINUED | OUTPATIENT
Start: 2024-07-23 | End: 2024-07-24 | Stop reason: HOSPADM

## 2024-07-22 RX ORDER — ALBUTEROL SULFATE 0.83 MG/ML
2.5 SOLUTION RESPIRATORY (INHALATION) EVERY 6 HOURS PRN
Status: DISCONTINUED | OUTPATIENT
Start: 2024-07-22 | End: 2024-07-24 | Stop reason: HOSPADM

## 2024-07-22 RX ORDER — SODIUM CHLORIDE 0.9 % (FLUSH) 0.9 %
5-40 SYRINGE (ML) INJECTION PRN
Status: DISCONTINUED | OUTPATIENT
Start: 2024-07-22 | End: 2024-07-24 | Stop reason: HOSPADM

## 2024-07-22 RX ADMIN — ENOXAPARIN SODIUM 40 MG: 100 INJECTION SUBCUTANEOUS at 22:56

## 2024-07-22 RX ADMIN — SODIUM CHLORIDE, PRESERVATIVE FREE 5 ML: 5 INJECTION INTRAVENOUS at 22:56

## 2024-07-22 RX ADMIN — ATORVASTATIN CALCIUM 80 MG: 80 TABLET, FILM COATED ORAL at 20:29

## 2024-07-22 RX ADMIN — IOPAMIDOL 100 ML: 755 INJECTION, SOLUTION INTRAVENOUS at 17:20

## 2024-07-22 RX ADMIN — CEFTRIAXONE 1000 MG: 1 INJECTION, POWDER, FOR SOLUTION INTRAMUSCULAR; INTRAVENOUS at 20:33

## 2024-07-22 RX ADMIN — ASPIRIN 81 MG: 81 TABLET, COATED ORAL at 22:56

## 2024-07-22 ASSESSMENT — PAIN - FUNCTIONAL ASSESSMENT: PAIN_FUNCTIONAL_ASSESSMENT: NONE - DENIES PAIN

## 2024-07-22 ASSESSMENT — ENCOUNTER SYMPTOMS
SHORTNESS OF BREATH: 0
VOMITING: 0

## 2024-07-22 NOTE — PROGRESS NOTES
Please do MRI screening and signatures. Pacers/ICD's are done M-F 9am-5PM. Cardiac must be on site and rep must set prior to exam. Will interrogate device tomorrow morning. Technologists do not come in on call for STAT pacemakers.

## 2024-07-22 NOTE — ED PROVIDER NOTES
Emergency Department Provider Note       PCP: John Reece MD   Age: 79 y.o.   Sex: female     DISPOSITION Decision To Admit 07/22/2024 07:39:13 PM       ICD-10-CM    1. TIA (transient ischemic attack)  G45.9           Medical Decision Making     Patient is a 79-year-old female who presents with acute onsets right facial numbness right hand numbness and slurred speech onset 2 PM x 10 minutes.  Patient is symptoms are completely resolved.  Patient denies any difficulty ambulating or unsteady gait.  Patient did have a TIA 5/23/2024 in which she had right facial numbness that resolved prior to arrival.  CT at that time was unremarkable and CTA head and neck revealed \"Hemodynamically significant stenosis of the right internal carotid artery origin which can be further correlated with duplex. No evidence of intracranial large vessel occlusion.\"    Patient has a history of TIA, acute chest syndrome, asthma, C. difficile colitis, CAD, carotid artery stenosis, CHF, COPD, CKD, depression, HLD, GERD        The history is provided by the patient.   Neurologic Problem  Primary symptoms include loss of sensation, speech change. This is a recurrent problem. The current episode started 1 to 2 hours ago. The problem has not changed since onset.There was no focality noted. There has been no fever. Pertinent negatives include no shortness of breath, no vomiting, no altered mental status, no confusion and no headaches. Associated medical issues do not include trauma.     Differential diagnosis includes was not limited to TIA, CVA, intracranial hemorrhage    Patient's physical exam is unremarkable patient has an NIH of 0.    I did speak with Dr. Haro he wished for the patient to be admitted and obtain MRI.     Patient was not a stroke alert as patient's symptoms have completely resolved.    All questions answered.  ED Course as of 07/22/24 1944 Mon Jul 22, 2024 1734 Dr. Haro at bedside and recommends CT head CTA head and  disease without behavioral disturbance (HCC)     Elevated LFTs     GERD (gastroesophageal reflux disease) 2015    GERD & CROHNS & hx of colon cancer    Heart failure (HCC)     High cholesterol 2015    Hyperlipidemia 2016    Hypertriglyceridemia     Hypokalemia     Hypomagnesemia     Lymphoma (HCC)     Menopause     Osteoarthrosis, unspecified whether generalized or localized, unspecified site 2015    Osteoporosis 2015    Presence of combination internal cardiac defibrillator (ICD) and pacemaker     Stroke (HCC)     TIA    Tobacco use disorder 2016        Past Surgical History:   Procedure Laterality Date    APPENDECTOMY      BREAST BIOPSY      Benign    CARDIAC PROCEDURE N/A 2022    LEFT AND RIGHT HEART CATH / CORONARY ANGIOGRAPHY performed by Oscar Duarte MD at [facility] CARDIAC CATH LAB    CHOLECYSTECTOMY      OTHER SURGICAL HISTORY  2018    back surgery, cement put in, Dr Hargrove    TOTAL COLECTOMY      COLON CANCER x 2        Social History     Socioeconomic History    Marital status:    Tobacco Use    Smoking status: Former     Current packs/day: 0.00     Average packs/day: 1 pack/day for 50.0 years (50.0 ttl pk-yrs)     Types: Cigarettes     Start date: 1968     Quit date: 2018     Years since quittin.6     Passive exposure: Past    Smokeless tobacco: Never   Substance and Sexual Activity    Alcohol use: No    Drug use: No     Social Determinants of Health     Financial Resource Strain: Low Risk  (2023)    Overall Financial Resource Strain (CARDIA)     Difficulty of Paying Living Expenses: Not hard at all   Transportation Needs: Unknown (2023)    PRAPARE - Transportation     Lack of Transportation (Non-Medical): No   Physical Activity: Inactive (2023)    Exercise Vital Sign     Days of Exercise per Week: 0 days     Minutes of Exercise per Session: 0 min   Housing Stability: Unknown (2023)    Housing Stability Vital Sign     Unstable

## 2024-07-22 NOTE — ED TRIAGE NOTES
Pt arrives via EMS from home, c/o sudden onset of TIA symptoms at 2pm. Patient reported right arm weakness - daughter noticed slurred speech and patient with a heavy/numb feeling tongue.  Patient had TIA last month and was seen at the hospital - told to come back if symptoms occurred again.     Hx alzheimers, pacemaker/ICD (Is compatible with MRI machine)    18g left AC  VSS  No symptoms with EMS

## 2024-07-23 ENCOUNTER — APPOINTMENT (OUTPATIENT)
Dept: NON INVASIVE DIAGNOSTICS | Age: 79
DRG: 064 | End: 2024-07-23
Attending: INTERNAL MEDICINE
Payer: MEDICARE

## 2024-07-23 ENCOUNTER — APPOINTMENT (OUTPATIENT)
Dept: MRI IMAGING | Age: 79
DRG: 064 | End: 2024-07-23
Payer: MEDICARE

## 2024-07-23 PROBLEM — I63.9 CVA (CEREBROVASCULAR ACCIDENT) (HCC): Status: ACTIVE | Noted: 2024-07-23

## 2024-07-23 PROBLEM — I61.9 CVA (CEREBROVASCULAR ACCIDENT DUE TO INTRACEREBRAL HEMORRHAGE) (HCC): Status: RESOLVED | Noted: 2024-07-22 | Resolved: 2024-07-23

## 2024-07-23 LAB
ALBUMIN SERPL-MCNC: 3 G/DL (ref 3.2–4.6)
ALBUMIN/GLOB SERPL: 1 (ref 1–1.9)
ALP SERPL-CCNC: 34 U/L (ref 35–104)
ALT SERPL-CCNC: 6 U/L (ref 12–65)
ANION GAP SERPL CALC-SCNC: 8 MMOL/L (ref 9–18)
AST SERPL-CCNC: 19 U/L (ref 15–37)
BILIRUB SERPL-MCNC: 0.4 MG/DL (ref 0–1.2)
BUN SERPL-MCNC: 21 MG/DL (ref 8–23)
CALCIUM SERPL-MCNC: 9.2 MG/DL (ref 8.8–10.2)
CHLORIDE SERPL-SCNC: 108 MMOL/L (ref 98–107)
CHOLEST SERPL-MCNC: 130 MG/DL (ref 0–200)
CO2 SERPL-SCNC: 26 MMOL/L (ref 20–28)
CREAT SERPL-MCNC: 1.1 MG/DL (ref 0.6–1.1)
ECHO AO ROOT DIAM: 3.2 CM
ECHO AO ROOT INDEX: 1.89 CM/M2
ECHO AV AREA PEAK VELOCITY: 1.8 CM2
ECHO AV AREA VTI: 1.9 CM2
ECHO AV AREA/BSA PEAK VELOCITY: 1.1 CM2/M2
ECHO AV AREA/BSA VTI: 1.1 CM2/M2
ECHO AV MEAN GRADIENT: 6 MMHG
ECHO AV MEAN GRADIENT: 6 MMHG
ECHO AV MEAN VELOCITY: 1.2 M/S
ECHO AV PEAK GRADIENT: 12 MMHG
ECHO AV PEAK VELOCITY: 1.7 M/S
ECHO AV VELOCITY RATIO: 0.53
ECHO AV VTI: 31.6 CM
ECHO BSA: 1.73 M2
ECHO EST RA PRESSURE: 3 MMHG
ECHO IVC PROX: 1.8 CM
ECHO LA AREA 2C: 14.3 CM2
ECHO LA AREA 4C: 15.5 CM2
ECHO LA DIAMETER INDEX: 1.48 CM/M2
ECHO LA DIAMETER: 2.5 CM
ECHO LA MAJOR AXIS: 5.1 CM
ECHO LA MINOR AXIS: 4.9 CM
ECHO LA TO AORTIC ROOT RATIO: 0.78
ECHO LA VOL BP: 36 ML (ref 22–52)
ECHO LA VOL MOD A2C: 33 ML (ref 22–52)
ECHO LA VOL MOD A4C: 37 ML (ref 22–52)
ECHO LA VOL/BSA BIPLANE: 21 ML/M2 (ref 16–34)
ECHO LA VOLUME INDEX MOD A2C: 20 ML/M2 (ref 16–34)
ECHO LA VOLUME INDEX MOD A4C: 22 ML/M2 (ref 16–34)
ECHO LV E' LATERAL VELOCITY: 8 CM/S
ECHO LV E' SEPTAL VELOCITY: 5 CM/S
ECHO LV EDV A4C: 68 ML
ECHO LV EDV INDEX A4C: 40 ML/M2
ECHO LV EJECTION FRACTION A4C: 45 %
ECHO LV ESV A4C: 37 ML
ECHO LV ESV INDEX A4C: 22 ML/M2
ECHO LV FRACTIONAL SHORTENING: 21 % (ref 28–44)
ECHO LV INTERNAL DIMENSION DIASTOLE INDEX: 2.49 CM/M2
ECHO LV INTERNAL DIMENSION DIASTOLIC: 4.2 CM (ref 3.9–5.3)
ECHO LV INTERNAL DIMENSION SYSTOLIC INDEX: 1.95 CM/M2
ECHO LV INTERNAL DIMENSION SYSTOLIC: 3.3 CM
ECHO LV IVSD: 1.1 CM (ref 0.6–0.9)
ECHO LV MASS 2D: 137.2 G (ref 67–162)
ECHO LV MASS INDEX 2D: 81.2 G/M2 (ref 43–95)
ECHO LV POSTERIOR WALL DIASTOLIC: 0.9 CM (ref 0.6–0.9)
ECHO LV RELATIVE WALL THICKNESS RATIO: 0.43
ECHO LVOT AREA: 3.5 CM2
ECHO LVOT AV VTI INDEX: 0.55
ECHO LVOT DIAM: 2.1 CM
ECHO LVOT MEAN GRADIENT: 2 MMHG
ECHO LVOT PEAK GRADIENT: 3 MMHG
ECHO LVOT PEAK VELOCITY: 0.9 M/S
ECHO LVOT STROKE VOLUME INDEX: 35.8 ML/M2
ECHO LVOT SV: 60.6 ML
ECHO LVOT VTI: 17.5 CM
ECHO MV A VELOCITY: 0.88 M/S
ECHO MV E DECELERATION TIME (DT): 211 MS
ECHO MV E VELOCITY: 0.72 M/S
ECHO MV E/A RATIO: 0.82
ECHO MV E/E' LATERAL: 9
ECHO MV E/E' RATIO (AVERAGED): 11.7
ECHO MV E/E' SEPTAL: 14.4
ECHO PV MAX VELOCITY: 0.9 M/S
ECHO PV PEAK GRADIENT: 3 MMHG
ECHO RIGHT VENTRICULAR SYSTOLIC PRESSURE (RVSP): 24 MMHG
ECHO RV BASAL DIMENSION: 3.1 CM
ECHO RV FREE WALL PEAK S': 15 CM/S
ECHO TV REGURGITANT MAX VELOCITY: 2.28 M/S
ECHO TV REGURGITANT PEAK GRADIENT: 21 MMHG
EKG ATRIAL RATE: 80 BPM
EKG DIAGNOSIS: NORMAL
EKG P AXIS: 44 DEGREES
EKG P-R INTERVAL: 150 MS
EKG Q-T INTERVAL: 410 MS
EKG QRS DURATION: 124 MS
EKG QTC CALCULATION (BAZETT): 476 MS
EKG R AXIS: -69 DEGREES
EKG T AXIS: 53 DEGREES
EKG VENTRICULAR RATE: 81 BPM
ERYTHROCYTE [DISTWIDTH] IN BLOOD BY AUTOMATED COUNT: 13.1 % (ref 11.9–14.6)
EST. AVERAGE GLUCOSE BLD GHB EST-MCNC: 118 MG/DL
GLOBULIN SER CALC-MCNC: 3.1 G/DL (ref 2.3–3.5)
GLUCOSE SERPL-MCNC: 101 MG/DL (ref 70–99)
HBA1C MFR BLD: 5.7 % (ref 0–5.6)
HCT VFR BLD AUTO: 35.7 % (ref 35.8–46.3)
HDLC SERPL-MCNC: 50 MG/DL (ref 40–60)
HDLC SERPL: 2.6 (ref 0–5)
HGB BLD-MCNC: 10.9 G/DL (ref 11.7–15.4)
LDLC SERPL CALC-MCNC: 58 MG/DL (ref 0–100)
MCH RBC QN AUTO: 29.5 PG (ref 26.1–32.9)
MCHC RBC AUTO-ENTMCNC: 30.5 G/DL (ref 31.4–35)
MCV RBC AUTO: 96.7 FL (ref 82–102)
NRBC # BLD: 0 K/UL (ref 0–0.2)
PLATELET # BLD AUTO: 164 K/UL (ref 150–450)
PMV BLD AUTO: 10.4 FL (ref 9.4–12.3)
POTASSIUM SERPL-SCNC: 3.9 MMOL/L (ref 3.5–5.1)
PROT SERPL-MCNC: 6 G/DL (ref 6.3–8.2)
RBC # BLD AUTO: 3.69 M/UL (ref 4.05–5.2)
SODIUM SERPL-SCNC: 141 MMOL/L (ref 136–145)
TRIGL SERPL-MCNC: 107 MG/DL (ref 0–150)
VLDLC SERPL CALC-MCNC: 21 MG/DL (ref 6–23)
WBC # BLD AUTO: 7.2 K/UL (ref 4.3–11.1)

## 2024-07-23 PROCEDURE — 95816 EEG AWAKE AND DROWSY: CPT

## 2024-07-23 PROCEDURE — 97530 THERAPEUTIC ACTIVITIES: CPT

## 2024-07-23 PROCEDURE — 2700000000 HC OXYGEN THERAPY PER DAY

## 2024-07-23 PROCEDURE — 6370000000 HC RX 637 (ALT 250 FOR IP): Performed by: NURSE PRACTITIONER

## 2024-07-23 PROCEDURE — 99222 1ST HOSP IP/OBS MODERATE 55: CPT | Performed by: NURSE PRACTITIONER

## 2024-07-23 PROCEDURE — 83036 HEMOGLOBIN GLYCOSYLATED A1C: CPT

## 2024-07-23 PROCEDURE — 36415 COLL VENOUS BLD VENIPUNCTURE: CPT

## 2024-07-23 PROCEDURE — 6370000000 HC RX 637 (ALT 250 FOR IP): Performed by: INTERNAL MEDICINE

## 2024-07-23 PROCEDURE — 6360000002 HC RX W HCPCS: Performed by: INTERNAL MEDICINE

## 2024-07-23 PROCEDURE — 93306 TTE W/DOPPLER COMPLETE: CPT | Performed by: INTERNAL MEDICINE

## 2024-07-23 PROCEDURE — 92610 EVALUATE SWALLOWING FUNCTION: CPT

## 2024-07-23 PROCEDURE — 2580000003 HC RX 258: Performed by: INTERNAL MEDICINE

## 2024-07-23 PROCEDURE — 92523 SPEECH SOUND LANG COMPREHEN: CPT

## 2024-07-23 PROCEDURE — 97535 SELF CARE MNGMENT TRAINING: CPT

## 2024-07-23 PROCEDURE — 85027 COMPLETE CBC AUTOMATED: CPT

## 2024-07-23 PROCEDURE — 97165 OT EVAL LOW COMPLEX 30 MIN: CPT

## 2024-07-23 PROCEDURE — 6360000004 HC RX CONTRAST MEDICATION: Performed by: INTERNAL MEDICINE

## 2024-07-23 PROCEDURE — 94640 AIRWAY INHALATION TREATMENT: CPT

## 2024-07-23 PROCEDURE — 4A10X4Z MONITORING OF CENTRAL NERVOUS ELECTRICAL ACTIVITY, EXTERNAL APPROACH: ICD-10-PCS | Performed by: STUDENT IN AN ORGANIZED HEALTH CARE EDUCATION/TRAINING PROGRAM

## 2024-07-23 PROCEDURE — 80061 LIPID PANEL: CPT

## 2024-07-23 PROCEDURE — 97161 PT EVAL LOW COMPLEX 20 MIN: CPT

## 2024-07-23 PROCEDURE — 1100000003 HC PRIVATE W/ TELEMETRY

## 2024-07-23 PROCEDURE — 94760 N-INVAS EAR/PLS OXIMETRY 1: CPT

## 2024-07-23 PROCEDURE — 80053 COMPREHEN METABOLIC PANEL: CPT

## 2024-07-23 PROCEDURE — 93010 ELECTROCARDIOGRAM REPORT: CPT | Performed by: INTERNAL MEDICINE

## 2024-07-23 PROCEDURE — C8929 TTE W OR WO FOL WCON,DOPPLER: HCPCS

## 2024-07-23 RX ORDER — CLOPIDOGREL BISULFATE 75 MG/1
75 TABLET ORAL DAILY
Status: DISCONTINUED | OUTPATIENT
Start: 2024-07-23 | End: 2024-07-24 | Stop reason: HOSPADM

## 2024-07-23 RX ADMIN — CYANOCOBALAMIN TAB 1000 MCG 1000 MCG: 1000 TAB at 09:48

## 2024-07-23 RX ADMIN — CLOPIDOGREL BISULFATE 75 MG: 75 TABLET ORAL at 11:40

## 2024-07-23 RX ADMIN — BUDESONIDE INHALATION 500 MCG: 0.5 SUSPENSION RESPIRATORY (INHALATION) at 20:21

## 2024-07-23 RX ADMIN — SODIUM CHLORIDE, PRESERVATIVE FREE 10 ML: 5 INJECTION INTRAVENOUS at 09:48

## 2024-07-23 RX ADMIN — ATORVASTATIN CALCIUM 80 MG: 80 TABLET, FILM COATED ORAL at 20:38

## 2024-07-23 RX ADMIN — CITALOPRAM HYDROBROMIDE 20 MG: 20 TABLET ORAL at 09:47

## 2024-07-23 RX ADMIN — ENOXAPARIN SODIUM 40 MG: 100 INJECTION SUBCUTANEOUS at 20:38

## 2024-07-23 RX ADMIN — FAMOTIDINE 20 MG: 20 TABLET, FILM COATED ORAL at 09:47

## 2024-07-23 RX ADMIN — SODIUM CHLORIDE, PRESERVATIVE FREE 5 ML: 5 INJECTION INTRAVENOUS at 20:38

## 2024-07-23 RX ADMIN — ARFORMOTEROL TARTRATE 15 MCG: 15 SOLUTION RESPIRATORY (INHALATION) at 20:21

## 2024-07-23 RX ADMIN — ASPIRIN 81 MG: 81 TABLET, COATED ORAL at 09:47

## 2024-07-23 RX ADMIN — SODIUM CHLORIDE, PRESERVATIVE FREE 0.3 ML: 5 INJECTION INTRAVENOUS at 15:35

## 2024-07-23 NOTE — DISCHARGE INSTRUCTIONS
urine is light yellow or clear like water. If you have kidney, heart, or liver disease and have to limit fluids, talk with your doctor before you increase the amount of fluids you drink. Set up a regular time for using the toilet. If you continue to have constipation, your doctor may suggest using a bulking agent, such as Metamucil, or a stool softener, laxative, or enema.    Medicines  Take your medicines exactly as prescribed. Call your doctor if you think you are having a problem with your medicine. You may be taking several medicines. ACE (angiotensin-converting enzyme) inhibitors, angiotensin II receptor blockers (ARBs), beta-blockers, diuretics (water pills), and calcium channel blockers control your blood pressure. Statins help lower cholesterol. Your doctor may also prescribe medicines for depression, pain, sleep problems, anxiety, or agitation.  If your doctor has given you medicine that prevents blood clots, such as warfarin (Coumadin), aspirin combined with extended-release dipyridamole (Aggrenox), clopidogrel (Plavix), or aspirin to prevent another stroke, you should:  Tell your dentist, pharmacist, and other health professionals that you take these medicines.  Watch for unusual bruising or bleeding, such as blood in your urine, red or black stools, or bleeding from your nose or gums.  Get regular blood tests to check your clotting time if you are taking Coumadin.  Wear medical alert jewelry that says you take blood thinners. You can buy this at most drugstores.  Do not take any over-the-counter medicines or herbal products without talking to your doctor first.  If you take birth control pills or hormone replacement therapy, talk to your doctor about whether they are right for you.    For family members and caregivers  Make the home safe. Set up a room so that your loved one does not have to climb stairs. Be sure the bathroom is on the same floor. Move throw rugs and furniture that could cause falls, and  make sure that the lighting is good. Put grab bars and seats in tubs and showers.  Find out what your loved one can do and what he or she needs help with. Try not to do things for your loved one that your loved one can do on his or her own. Help him or her learn and practice new skills.  Visit and talk with your loved one often. Try doing activities together that you both enjoy, such as playing cards or board games. Keep in touch with your loved one's friends as much as you can, and encourage them to visit.  Take care of yourself. Do not try to do everything yourself. Ask other family members to help. Eat well, get enough rest, and take time to do things that you enjoy. Keep up with your own doctor visits, and make sure to take your medicines regularly. Get out of the house as much as you can. Join a local support group. Find out if you qualify for home health care visits to help with rehab or for adult day care.    When should you call for help?    Call 911 anytime you think you may need emergency care. For example, call if:  You have signs of another stroke. These may include:  Sudden numbness, paralysis, or weakness in your face, arm, or leg, especially on only one side of your body.  New problems with walking or balance.  Sudden vision changes.  Drooling or slurred speech.  New problems speaking or understanding simple statements, or you feel confused.  A sudden, severe headache that is different from past headaches.  Call 911 even if these symptoms go away in a few minutes.  You cough up blood.  You vomit blood or what looks like coffee grounds.  You pass maroon or very bloody stools.    Call your doctor now or seek immediate medical care if:  You have new bruises or blood spots under your skin.  You have a nosebleed.  Your gums bleed when you brush your teeth.  You have blood in your urine.  Your stools are black and tarlike or have streaks of blood.  You have vaginal bleeding when you are not having your

## 2024-07-23 NOTE — PROCEDURES
Routine EEG Report    Reason for EEG: Focal neurologic deficits    Technical Summary: This EEG was performed with a 32-channel digital EEG machine with electrodes placed according to the international 10-20 system of placement.            Background:   During the maximal awake state a posterior dominant rhythm of 9-11 Hz was seen.  It was well regulated and well sustained, symmetric, and reactive to eye opening.  Anterior background consisted of medium amplitude activity in the alpha range with occasional intermixed beta frequencies.    Hyperventilation: Not performed    Photic Stimulation: Photic stimulation was performed at various flash frequencies and no abnormalites were seen.      Impression: This EEG is normal in the awake and sleep states.  No interictal epileptiform discharges or lateralizing features were seen.

## 2024-07-23 NOTE — H&P
Granulocytes % 0 0.0 - 5.0 %    Neutrophils Absolute 4.2 1.7 - 8.2 K/UL    Lymphocytes Absolute 1.9 0.5 - 4.6 K/UL    Monocytes Absolute 0.8 0.1 - 1.3 K/UL    Eosinophils Absolute 0.2 0.0 - 0.8 K/UL    Basophils Absolute 0.1 0.0 - 0.2 K/UL    Immature Granulocytes Absolute 0.0 0.0 - 0.5 K/UL   CMP    Collection Time: 07/22/24  4:23 PM   Result Value Ref Range    Sodium 140 136 - 145 mmol/L    Potassium 3.8 3.5 - 5.1 mmol/L    Chloride 103 98 - 107 mmol/L    CO2 28 20 - 28 mmol/L    Anion Gap 10 9 - 18 mmol/L    Glucose 111 (H) 70 - 99 mg/dL    BUN 20 8 - 23 MG/DL    Creatinine 1.21 (H) 0.60 - 1.10 MG/DL    Est, Glom Filt Rate 45 (L) >60 ml/min/1.73m2    Calcium 10.0 8.8 - 10.2 MG/DL    Total Bilirubin 0.6 0.0 - 1.2 MG/DL    ALT 16 12 - 65 U/L    AST 26 15 - 37 U/L    Alk Phosphatase 45 35 - 104 U/L    Total Protein 7.2 6.3 - 8.2 g/dL    Albumin 3.7 3.2 - 4.6 g/dL    Globulin 3.5 2.3 - 3.5 g/dL    Albumin/Globulin Ratio 1.0 1.0 - 1.9     POCT Urinalysis no Micro    Collection Time: 07/22/24  6:25 PM   Result Value Ref Range    Specific Gravity, Urine, POC 1.025 (H) 1.001 - 1.023      pH, Urine, POC 5.5 5.0 - 9.0      Protein, Urine, POC Negative NEG mg/dL    Glucose, UA POC Negative NEG mg/dL    Ketones, Urine, POC Negative NEG mg/dL    Bilirubin, Urine, POC Negative NEG      Blood, UA POC Negative NEG      URINE UROBILINOGEN POC 0.2 0.2 - 1.0 EU/dL    Nitrite, Urine, POC Positive (A) NEG      Leukocyte Est, UA POC Negative NEG      Performed by: Elizabeth Jama        No results for input(s): \"COVID19\" in the last 72 hours.    CTA HEAD NECK W WO CONTRAST    Result Date: 7/22/2024  EXAMINATION: CTA HEAD NECK W WO CONTRAST 7/22/2024 5:31 PM ACCESSION NUMBER: [id number] COMPARISON: None available INDICATION: right facial numbness, right hand numbness, slurred speech resolved TECHNIQUE: Dedicated contrast enhanced CT of the arteries of the head and neck was performed with axial images following a timed vascular  written by using a voice dictation software.  The note has been proof read but may still contain some grammatical/other typographical errors.

## 2024-07-23 NOTE — CARE COORDINATION
CM consult received secondary to admission for CVA and code stroke protocol.  Patient's inpatient plan of care and transitions of care planning were reviewed in IDT rounds this morning.  Therapy evals/recs pending as patient has been off floor this morning for testing.  CM to room, however, patient off floor for testing.  Chart reviewed and services assessment below.      CM will continue to follow in order to assist with transitions of care planning.         07/23/24 1100   Service Assessment   Patient Orientation Alert and Oriented;Person   Cognition Dementia / Early Alzheimer's   History Provided By Medical Record   Primary Caregiver Self   Accompanied By/Relationship No one noted in room.   Support Systems Children   Patient's Healthcare Decision Maker is: Named in Scanned ACP Document   PCP Verified by CM Yes   Last Visit to PCP Within last 3 months   Prior Functional Level Other (see comment)  (Pending therapy evals/recs and patient/family availability)   Current Functional Level Other (see comment)  (Pending therapy evals/recs)   Can patient return to prior living arrangement Unknown at present   Ability to make needs known: Good   Family able to assist with home care needs: Yes   Would you like for me to discuss the discharge plan with any other family members/significant others, and if so, who? Yes  (History of dementia.  CM will speak with daughters as well)   Financial Resources Medicare;Other (Comment)  ()   Community Resources None   CM/SW Referral Other (see comment)  (Code Stroke protocol)   Social/Functional History   Lives With Daughter   Type of Home House   Receives Help From Family

## 2024-07-23 NOTE — PROGRESS NOTES
Hospitalist Progress Note   Admit Date:  2024  3:56 PM   Name:  Rekha Palmer   Age:  79 y.o.  Sex:  female  :  1945   MRN:  [id number]   Room:  1/    Presenting/Chief Complaint: TIA symptoms     Reason(s) for Admission: TIA (transient ischemic attack) [G45.9]  CVA (cerebrovascular accident due to intracerebral hemorrhage) (HCC) [I61.9]  Cerebrovascular accident (CVA), unspecified mechanism (HCC) [I63.9]     Hospital Course:   Rekha Palmer is a 79 y.o. female with medical history of TIA / CVA, dementia, CAD, HFrEF (40-45% EF), known carotid artery stenosis, NSVT s/p [employer] pacer / ICD placement who presents with c/o acute dysarthria with right sided weakness.  Pt admits to urinary symptoms past few days (frequency / mild burning sensation) but not treated for potential UTI.  Approximately 14:00 she was getting ready to take a shower when she noticed numbness to right side of her body and inability to speak.  Symptoms spontaneously resolved within about 10 minutes. Given hx of TIA, pt was brought to the ER for further evaluation.     In ER, VSS.  Labs were notable for serum creatinine mildly elevated above baseline at 1.21.  Urinalysis showed trace ketones.  CT of the head showed moderate supra and infratentorial atrophy as well as low-attenuation leukomalacia in the periventricular region, extending to the centrum semiovale bilaterally likely representing chronic microvascular angiopathic ischemic changes as noted on the previous evaluation.  CTA of the head and neck showed severe distal common/proximal internal carotid arterial ostial stenosis with heavy calcification, moderate atherosclerosis stenosis of the distal left carotid bifurcation extending into the internal carotid artery.  She was seen in consultation by Neurology who recommended admission and MRI.  Hospitalist service consulted for admission.    Subjective & 24hr Events:   \"I had those symptoms but it's all gone  Protein 6.0 (L) 6.3 - 8.2 g/dL    Albumin 3.0 (L) 3.2 - 4.6 g/dL    Globulin 3.1 2.3 - 3.5 g/dL    Albumin/Globulin Ratio 1.0 1.0 - 1.9         No results for input(s): \"COVID19\" in the last 72 hours.    Current Meds:  Current Facility-Administered Medications   Medication Dose Route Frequency    ipratropium (ATROVENT) 0.02 % nebulizer solution 0.5 mg  0.5 mg Nebulization BID RT    clopidogrel (PLAVIX) tablet 75 mg  75 mg Oral Daily    sodium chloride flush 0.9 % injection 5-40 mL  5-40 mL IntraVENous 2 times per day    sodium chloride flush 0.9 % injection 5-40 mL  5-40 mL IntraVENous PRN    0.9 % sodium chloride infusion   IntraVENous PRN    ondansetron (ZOFRAN-ODT) disintegrating tablet 4 mg  4 mg Oral Q8H PRN    Or    ondansetron (ZOFRAN) injection 4 mg  4 mg IntraVENous Q6H PRN    polyethylene glycol (GLYCOLAX) packet 17 g  17 g Oral Daily PRN    enoxaparin (LOVENOX) injection 40 mg  40 mg SubCUTAneous Q24H    acetaminophen (TYLENOL) tablet 650 mg  650 mg Oral Q4H PRN    Or    acetaminophen (TYLENOL) suppository 650 mg  650 mg Rectal Q4H PRN    atorvastatin (LIPITOR) tablet 80 mg  80 mg Oral Nightly    labetalol (NORMODYNE;TRANDATE) injection 10 mg  10 mg IntraVENous Q10 Min PRN    citalopram (CELEXA) tablet 20 mg  20 mg Oral Daily    vitamin B-12 (CYANOCOBALAMIN) tablet 1,000 mcg  1,000 mcg Oral Daily    famotidine (PEPCID) tablet 20 mg  20 mg Oral Daily    albuterol (PROVENTIL) (2.5 MG/3ML) 0.083% nebulizer solution 2.5 mg  2.5 mg Nebulization Q6H PRN    budesonide (PULMICORT) nebulizer suspension 500 mcg  0.5 mg Nebulization BID RT    arformoterol tartrate (BROVANA) nebulizer solution 15 mcg  15 mcg Nebulization BID RT    aspirin EC tablet 81 mg  81 mg Oral Daily       Signed:  CHAR Garcia

## 2024-07-23 NOTE — ED NOTES
TRANSFER - OUT REPORT:    Verbal report given to Whitney RESTREPO on Rekha Palmer  being transferred to [facility] for routine progression of patient care       Report consisted of patient's Situation, Background, Assessment and   Recommendations(SBAR).     Information from the following report(s) Nurse Handoff Report was reviewed with the receiving nurse.    Miladis Fall Assessment:    Presents to emergency department  because of falls (Syncope, seizure, or loss of consciousness): No  Age > 70: Yes  Altered Mental Status, Intoxication with alcohol or substance confusion (Disorientation, impaired judgment, poor safety awaremess, or inability to follow instructions): No  Impaired Mobility: Ambulates or transfers with assistive devices or assistance; Unable to ambulate or transer.: No  Nursing Judgement: Yes          Lines:   Peripheral IV Left;Proximal;Anterior Forearm (Active)   Site Assessment Clean, dry & intact 07/22/24 2143   Phlebitis Assessment No symptoms 07/22/24 2143   Infiltration Assessment 0 07/22/24 2143   Dressing Status New dressing applied 07/22/24 2143   Dressing Type Gauze 07/22/24 2143   Dressing Intervention New 07/22/24 2143        Opportunity for questions and clarification was provided.      Patient transported with:  Monitor and Registered Nurse           Heber Carlisle RN  07/22/24 2145

## 2024-07-23 NOTE — THERAPY EVALUATION
ACUTE OCCUPATIONAL THERAPY GOALS:   (Developed with and agreed upon by patient and/or caregiver.)  1. Patient will complete lower body bathing and dressing with MODIFIED INDEPENDENCE and adaptive equipment as needed.     2. Patient will complete toilet transfers and toileting with MODIFIED INDEPENDENCE.  3. Patient will complete self-grooming ADL tasks at standing level with MODIFIED INDEPENDENCE.  4. Patient will tolerate 25 minutes of OT treatment with 1-2 rest breaks to increase activity tolerance for ADLs.   5. Patient will complete functional transfers with MODIFIED INDEPENDENCE and adaptive equipment as needed.   6. Patient will tolerate 10 minutes BUE exercises to increase strength for safe, functional transfers.     Timeframe: 7 visits        OCCUPATIONAL THERAPY Initial Assessment, Daily Note, and AM       OT Visit Days: 1  Acknowledge Orders  Time  OT Charge Capture  Rehab Caseload Tracker      Rekha Palmer is a 79 y.o. female   PRIMARY DIAGNOSIS: CVA (cerebrovascular accident) (HCC)  TIA (transient ischemic attack) [G45.9]  CVA (cerebrovascular accident due to intracerebral hemorrhage) (HCC) [I61.9]  Cerebrovascular accident (CVA), unspecified mechanism (HCC) [I63.9]       Reason for Referral: Generalized Muscle Weakness (M62.81)  Other lack of cordination (R27.8)  Difficulty in walking, Not elsewhere classified (R26.2)  Other abnormalities of gait and mobility (R26.89)  Inpatient: Payor: MEDICARE / Plan: MEDICARE PART A AND B / Product Type: *No Product type* /     ASSESSMENT:     REHAB RECOMMENDATIONS:   Recommendation to date pending progress:  Setting:  Home Health Therapy    Equipment:    To Be Determined--pt has rollator, SPC, shower chair, and grab bars at home     ASSESSMENT:  Ms. Palmer is a 79 y.o. female who presents to the hospital with sudden onset of speech change and R-sided weakness. Admitted with CVA work up. PMHx of coronary artery disease, HFrEF, mild cognitive impairment,  RW   I=Independent, Mod I=Modified Independent, S=Supervision/Setup, SBA=Standby Assistance, CGA=Contact Guard Assistance, Min=Minimal Assistance, Mod=Moderate Assistance, Max=Maximal Assistance, Total=Total Assistance, NT=Not Tested    PLAN:   FREQUENCY/DURATION   OT Plan of Care: 3 times/week for duration of hospital stay or until stated goals are met, whichever comes first.    PROBLEM LIST:   (Skilled intervention is medically necessary to address:)  Decreased ADL/Functional Activities  Decreased Activity Tolerance  Decreased Balance  Decreased Safety Awareness  Decreased Strength  Decreased Transfer Abilities   INTERVENTIONS PLANNED:  (Benefits and precautions of occupational therapy have been discussed with the patient.)  Self Care Training  Therapeutic Activity  Therapeutic Exercise/HEP  Neuromuscular Re-education  Manual Therapy  Education         TREATMENT:     EVALUATION: LOW COMPLEXITY: (Untimed Charge)  The initial evaluation charge encompasses clinical chart review, objective assessment, interpretation of assessment, and skilled monitoring of the patient's response to treatment in order to develop a plan of care.     TREATMENT:   Self Care (10 minutes): Patient participated in toileting, lower body dressing, and grooming ADLs in unsupported sitting and standing with minimal visual, verbal, and tactile cueing to increase independence, decrease assistance required, increase activity tolerance, and increase safety awareness. Patient also participated in bed mobility, functional mobility, functional transfer, and energy conservation training to increase independence, decrease assistance required, increase activity tolerance, and increase safety awareness.     TREATMENT GRID:  N/A    AFTER TREATMENT PRECAUTIONS: Bed/Chair Locked, Call light within reach, Chair, Needs within reach, RN notified, and Visitors at bedside    INTERDISCIPLINARY COLLABORATION:  RN/ PCT and OT/ DAVIDSON    EDUCATION:  Education Given To:

## 2024-07-23 NOTE — PROGRESS NOTES
ACUTE PHYSICAL THERAPY GOALS:   (Developed with and agreed upon by patient and/or caregiver.)  Pt will perform bed mobility with Ronn in 7 therapy sessions.  Pt will perform sit-to-stand/ stand-to-sit transfers SBA in 7 therapy sessions.  Pt will ambulate 250 ft SBA with use of LRAD/no device and breaks as needed in 7 therapy sessions.    All PT goals met on 7/23/24.     PHYSICAL THERAPY Initial Assessment, Discharge, and AM  (Link to Caseload Tracking: PT Visit Days : 1  Acknowledge Orders  Time In/Out  PT Charge Capture  Rehab Caseload Tracker    Rekha Palmer is a 79 y.o. female   PRIMARY DIAGNOSIS: CVA (cerebrovascular accident) (HCC)  TIA (transient ischemic attack) [G45.9]  CVA (cerebrovascular accident due to intracerebral hemorrhage) (HCC) [I61.9]  Cerebrovascular accident (CVA), unspecified mechanism (HCC) [I63.9]       Reason for Referral: Other abnormalities of gait and mobility (R26.89)  Inpatient: Payor: MEDICARE / Plan: MEDICARE PART A AND B / Product Type: *No Product type* /     ASSESSMENT:     REHAB RECOMMENDATIONS:   Recommendation to date pending progress:  Setting:  Home Health Therapy    Equipment:    To Be Determined - pt owns and uses a SPC indoors and a RW for outdoor mobility     ASSESSMENT:  Ms. Palmer Is a 79 y.o. female presenting to PT following a hospitalization due to sudden onset of R arm weakness/slurred speech. Pt is currently undergoing a stroke work up given her recent TIA history. At time of initial evaluation, pt presents at approximate baseline LOF with no major deficits limiting overall functional mobility that warrant safety concerns. Today, pt performed all mobility and transfers with Ronn/SBA including ambulation of 250' with pt's SPC. During ambulation, pt presents with decreased karyn and trunk flexion but ultimately did well to avoid any LOB/ miss-steps. Pt performed all activity on RA and denied any dizziness, lightheadedness or SOB while ambulating. Pt is

## 2024-07-23 NOTE — PROGRESS NOTES
SPEECH LANGUAGE PATHOLOGY: COMBINED Dysphagia and Cog-Comm Initial Assessment and Discharge    Acknowledge Order  I  Therapy Time  I   Charges     I  Rehab Caseload Tracker    NAME: Rekha Palmer  : 1945  MRN: [id number]    ADMISSION DATE: 2024  PRIMARY DIAGNOSIS: CVA (cerebrovascular accident) ([facility])    ICD-10: Treatment Diagnosis: R13.11 Dysphagia, Oral Phase  R41.841 Cognitive-Communication Deficit    RECOMMENDATIONS   Diet:    Regular Consistency  Thin Liquids    Medication: as tolerated   Compensatory Swallowing Strategies:   Alternate solids and liquids  Remain upright for 30-45 minutes after meals   Therapeutic Intervention:   Patient/family education  Dysphagia treatment  Cognitive-linguistic treatment  No additional speech therapy intervention indicated at this time.    Patient continues to require skilled intervention:  Yes. Recommend ongoing speech therapy services during this hospitalization.     Anticipated Discharge Needs: Ongoing speech therapy is recommended at next level of care.      ASSESSMENT    Dysphagia: Patient presents with functional swallow characterized by adequate mastication, timely swallow initiation, appropriate oral clearance, and no overt clinical s/s of aspiration. No further speech therapy indicated for dysphagia, as swallow function is at baseline.     Voice: patient with baseline voice vibration for which she takes an inhaler. Patient states that she has COPD and this is a result. Reports no needs from speech therapy.     Cog: [facility] MENTAL STATUS EXAMINATION score 19/30 indicating cognitive impairment. Daughter states cog function is at baseline, as patient receiving assistance with all cooking, has a house keeper, does not manage medication or money. No further speech therapy indicated, as patient is at cognitive baseline per daughter.     Recommend regular solids, thin liquids, medications as tolerated. No further speech therapy indicated.      Education:   Patient educated on Results of evaluation, Speech therapy recommendations, Role of speech therapy, SLP plan, and Diet recommendations  Education provided to Patient, Family/Caregiver, and RN  Education response: Demonstrates understanding    Safety:   Call light within reach  In chair  RN notified   Family/visitors at bedside    Therapy Time:  Time In: 1210  Time Out: 1248  Minutes: 38    Radha Gooden M.S., CCC-SLP   7/23/2024 12:50 PM

## 2024-07-23 NOTE — CONSULTS
Consult    Patient: Rekha Palmer MRN: [id number]     YOB: 1945  Age: 79 y.o.  Sex: female      Subjective:      Rekha Palmer is a 79 y.o. female who is being seen for right sided numbness and speech disturbance. The patient has a history of dementia and is known to neurology. She presented to [facility] yesterday after an episode of right arm and facial numbness and speech disturbance, which lasted approximately 10 minutes. The episode occurred while she was getting out of the shower. She had a similar episode in May, which was treated as a TIA. She has known bilateral carotid stenosis, which is followed by cardiology. She also has a pacemaker that is not MRI compatible.     Past Medical History:   Diagnosis Date    Abnormal mammogram     Acute chest syndrome (HCC) 7/20/2016    Asbestosis ([facility]) 7/20/2016    Asthma     C. difficile colitis     Carotid artery disease ([facility])     Carotid artery stenosis without cerebral infarction 7/20/2016    CHF (congestive heart failure) ([facility])     Chronic hoarseness     Chronic obstructive pulmonary disease ([facility])     Chronic renal disease, stage III ([facility]) [404981] 08/15/2023    Colitis, ulcerative (HCC)     Colitis, ulcerative (HCC)     Colon cancer ([facility])     x2    Compression fx, lumbar spine ([facility])     she had 4, 1 from bending over and the other 2 from falling    Coronary artery disease involving native coronary artery of native heart with angina pectoris ([facility]) 11/03/2022    Depression 7/9/2015    Dyslipidemia 7/20/2016    Early onset Alzheimer's disease without behavioral disturbance ([facility])     Elevated LFTs     GERD (gastroesophageal reflux disease) 7/9/2015    GERD & CROHNS & hx of colon cancer    Heart failure ([facility])     High cholesterol 7/9/2015    Hyperlipidemia 7/20/2016    Hypertriglyceridemia     Hypokalemia     Hypomagnesemia     Lymphoma (HCC)     Menopause     Osteoarthrosis, unspecified whether generalized or localized, unspecified site 8/27/2015

## 2024-07-23 NOTE — PROGRESS NOTES
TRANSFER - IN REPORT:    Verbal report received from LAURO Mckeon on Rekha Palmer  being received from ED for routine progression of patient care      Report consisted of patient's Situation, Background, Assessment and   Recommendations(SBAR).     Information from the following report(s) ED SBAR was reviewed with the receiving nurse.    Opportunity for questions and clarification was provided.      Assessment completed upon patient's arrival to unit and care assumed.

## 2024-07-23 NOTE — PROGRESS NOTES
Occupational Therapy Note:    Occupational therapy orders received and patient's chart reviewed. Attempted to see patient this AM for occupational therapy evaluation session. Patient RYLIE for imaging. Will follow and re-attempt as schedule permits/patient available. Thank you,    FRANCESCA BANUELOS, OT    Rehab Caseload Tracker

## 2024-07-24 ENCOUNTER — APPOINTMENT (OUTPATIENT)
Dept: ULTRASOUND IMAGING | Age: 79
DRG: 064 | End: 2024-07-24
Payer: MEDICARE

## 2024-07-24 ENCOUNTER — TELEPHONE (OUTPATIENT)
Age: 79
End: 2024-07-24

## 2024-07-24 VITALS
DIASTOLIC BLOOD PRESSURE: 65 MMHG | RESPIRATION RATE: 17 BRPM | SYSTOLIC BLOOD PRESSURE: 131 MMHG | HEIGHT: 62 IN | WEIGHT: 150 LBS | BODY MASS INDEX: 27.6 KG/M2 | OXYGEN SATURATION: 99 % | HEART RATE: 100 BPM | TEMPERATURE: 98.9 F

## 2024-07-24 PROCEDURE — 2580000003 HC RX 258: Performed by: INTERNAL MEDICINE

## 2024-07-24 PROCEDURE — 94760 N-INVAS EAR/PLS OXIMETRY 1: CPT

## 2024-07-24 PROCEDURE — 6370000000 HC RX 637 (ALT 250 FOR IP): Performed by: NURSE PRACTITIONER

## 2024-07-24 PROCEDURE — 6360000002 HC RX W HCPCS: Performed by: INTERNAL MEDICINE

## 2024-07-24 PROCEDURE — 93880 EXTRACRANIAL BILAT STUDY: CPT | Performed by: RADIOLOGY

## 2024-07-24 PROCEDURE — 94640 AIRWAY INHALATION TREATMENT: CPT

## 2024-07-24 PROCEDURE — 6370000000 HC RX 637 (ALT 250 FOR IP): Performed by: INTERNAL MEDICINE

## 2024-07-24 PROCEDURE — 2700000000 HC OXYGEN THERAPY PER DAY

## 2024-07-24 PROCEDURE — 97535 SELF CARE MNGMENT TRAINING: CPT

## 2024-07-24 PROCEDURE — 99222 1ST HOSP IP/OBS MODERATE 55: CPT | Performed by: SURGERY

## 2024-07-24 PROCEDURE — 93880 EXTRACRANIAL BILAT STUDY: CPT

## 2024-07-24 RX ORDER — ATORVASTATIN CALCIUM 80 MG/1
80 TABLET, FILM COATED ORAL NIGHTLY
Qty: 30 TABLET | Refills: 0 | Status: SHIPPED | OUTPATIENT
Start: 2024-07-24

## 2024-07-24 RX ORDER — CLOPIDOGREL BISULFATE 75 MG/1
75 TABLET ORAL DAILY
Qty: 30 TABLET | Refills: 0 | Status: SHIPPED | OUTPATIENT
Start: 2024-07-25 | End: 2024-08-15 | Stop reason: SDUPTHER

## 2024-07-24 RX ADMIN — SODIUM CHLORIDE, PRESERVATIVE FREE 10 ML: 5 INJECTION INTRAVENOUS at 08:10

## 2024-07-24 RX ADMIN — FAMOTIDINE 20 MG: 20 TABLET, FILM COATED ORAL at 08:10

## 2024-07-24 RX ADMIN — ASPIRIN 81 MG: 81 TABLET, COATED ORAL at 08:10

## 2024-07-24 RX ADMIN — CITALOPRAM HYDROBROMIDE 20 MG: 20 TABLET ORAL at 08:10

## 2024-07-24 RX ADMIN — CYANOCOBALAMIN TAB 1000 MCG 1000 MCG: 1000 TAB at 08:10

## 2024-07-24 RX ADMIN — IPRATROPIUM BROMIDE 0.5 MG: 0.5 SOLUTION RESPIRATORY (INHALATION) at 08:04

## 2024-07-24 RX ADMIN — CLOPIDOGREL BISULFATE 75 MG: 75 TABLET ORAL at 08:10

## 2024-07-24 RX ADMIN — ARFORMOTEROL TARTRATE 15 MCG: 15 SOLUTION RESPIRATORY (INHALATION) at 08:04

## 2024-07-24 RX ADMIN — BUDESONIDE INHALATION 500 MCG: 0.5 SUSPENSION RESPIRATORY (INHALATION) at 08:04

## 2024-07-24 NOTE — CONSULTS
[address] [place] [id number]  [phone] FAX: [phone]    Rekha Palmer  1945    Chief Complaint   Patient presents with    TIA symptoms           HPI   Ms. Rekha Palmer is a 79 y.o. year old female who with multiple medical history presented with right hand numbness that went away.  She has multiple medical history including coronary artery disease and COPD    Current Facility-Administered Medications   Medication Dose Route Frequency Provider Last Rate Last Admin    ipratropium (ATROVENT) 0.02 % nebulizer solution 0.5 mg  0.5 mg Nebulization BID RT Royer Romero MD   0.5 mg at 07/24/24 0804    clopidogrel (PLAVIX) tablet 75 mg  75 mg Oral Daily Shira Ruvalcaba APRN - NP   75 mg at 07/24/24 0810    sodium chloride flush 0.9 % injection 5-40 mL  5-40 mL IntraVENous 2 times per day Jaziel Bello MD   10 mL at 07/24/24 0810    sodium chloride flush 0.9 % injection 5-40 mL  5-40 mL IntraVENous PRN Jaziel Bello MD        0.9 % sodium chloride infusion   IntraVENous PRN Jaziel Bello MD        ondansetron (ZOFRAN-ODT) disintegrating tablet 4 mg  4 mg Oral Q8H PRN Jaziel Bello MD        Or    ondansetron (ZOFRAN) injection 4 mg  4 mg IntraVENous Q6H PRN Jaziel Bello MD        polyethylene glycol (GLYCOLAX) packet 17 g  17 g Oral Daily PRN Jaziel Bello MD        enoxaparin (LOVENOX) injection 40 mg  40 mg SubCUTAneous Q24H Jaziel Bello MD   40 mg at 07/23/24 2038    acetaminophen (TYLENOL) tablet 650 mg  650 mg Oral Q4H PRN Jaziel Bello MD        Or    acetaminophen (TYLENOL) suppository 650 mg  650 mg Rectal Q4H PRN Jaziel Bello MD        atorvastatin (LIPITOR) tablet 80 mg  80 mg Oral Nightly Jaziel Bello MD   80 mg at 07/23/24 2038    labetalol (NORMODYNE;TRANDATE) injection 10 mg  10 mg IntraVENous Q10 Min PRN Jaziel Bello MD        citalopram (CELEXA) tablet  of native heart with angina pectoris (HCC) 2022    Depression 2015    Dyslipidemia 2016    Early onset Alzheimer's disease without behavioral disturbance (HCC)     Elevated LFTs     GERD (gastroesophageal reflux disease) 2015    GERD & CROHNS & hx of colon cancer    Heart failure (HCC)     High cholesterol 2015    Hyperlipidemia 2016    Hypertriglyceridemia     Hypokalemia     Hypomagnesemia     Lymphoma (HCC)     Menopause     Osteoarthrosis, unspecified whether generalized or localized, unspecified site 2015    Osteoporosis 2015    Presence of combination internal cardiac defibrillator (ICD) and pacemaker     Stroke (HCC)     TIA    Tobacco use disorder 2016     Family History   Problem Relation Age of Onset    Cancer Mother         Bone CA, brain tumor    Heart Failure Father     Breast Cancer Neg Hx      Past Surgical History:   Procedure Laterality Date    APPENDECTOMY      BREAST BIOPSY      Benign    CARDIAC PROCEDURE N/A 2022    LEFT AND RIGHT HEART CATH / CORONARY ANGIOGRAPHY performed by Oscar Duarte MD at [facility] CARDIAC CATH LAB    CHOLECYSTECTOMY      OTHER SURGICAL HISTORY  2018    back surgery, cement put in, Dr Hargrove    TOTAL COLECTOMY      COLON CANCER x 2     Social History     Tobacco Use    Smoking status: Former     Current packs/day: 0.00     Average packs/day: 1 pack/day for 50.0 years (50.0 ttl pk-yrs)     Types: Cigarettes     Start date: 1968     Quit date: 2018     Years since quittin.6     Passive exposure: Past    Smokeless tobacco: Never   Substance Use Topics    Alcohol use: No        Review of Systems  Constitutional: Negative for fever and chills.   HENT: Negative for congestion and sore throat.    Skin: Negative for rash and itching.  Eyes: Negative for blurred vision and double vision.   Respiratory: Negative for cough and shortness of breath.    Cardiovascular: Negative for chest pain, palpitations,

## 2024-07-24 NOTE — CARE COORDINATION
CM spoke with patient's daughter Jennifer regarding transitions of care planning.  Therapy recommendations reviewed for HH PT/OT and daughter is agreeable.  She confirms that patient was current with [facility].  HH order was placed, referral sent in [facility] and  notified.  Daughter has no other concerns/questions at his time r/t transitions of care planning.      CM will remain available for any additional needs.

## 2024-07-24 NOTE — DISCHARGE SUMMARY
Hospitalist Discharge Summary   Admit Date:  2024  3:56 PM   DC Note date: 2024  Name:  Rekha Palmer   Age:  79 y.o.  Sex:  female  :  1945   MRN:  [id number]   Room:  [facility]  PCP:  John Reece MD    Presenting Complaint: TIA symptoms     Initial Admission Diagnosis: TIA (transient ischemic attack) [G45.9]  CVA (cerebrovascular accident due to intracerebral hemorrhage) ([facility]) [I61.9]  Cerebrovascular accident (CVA), unspecified mechanism ([facility]) [I63.9]     Problem List for this Hospitalization (present on admission):    Principal Problem:    CVA (cerebrovascular accident) ([facility])  Resolved Problems:    CVA (cerebrovascular accident due to intracerebral hemorrhage) ([facility])      Hospital Course:  Rekha Palmer is a 79 y.o. female with medical history of TIA / CVA, dementia, CAD, HFrEF (40-45% EF), known carotid artery stenosis, NSVT s/p [employer] pacer / ICD placement who presents with c/o acute dysarthria with right sided weakness.  Pt admits to urinary symptoms past few days (frequency / mild burning sensation) but not treated for potential UTI.  Approximately 14:00 she was getting ready to take a shower when she noticed numbness to right side of her body and inability to speak.  Symptoms spontaneously resolved within about 10 minutes. Given hx of TIA, pt was brought to the ER for further evaluation.     In ER, VSS.  Labs were notable for serum creatinine mildly elevated above baseline at 1.21.  Urinalysis showed trace ketones.  CT of the head showed moderate supra and infratentorial atrophy as well as low-attenuation leukomalacia in the periventricular region, extending to the centrum semiovale bilaterally likely representing chronic microvascular angiopathic ischemic changes as noted on the previous evaluation.  CTA of the head and neck showed severe distal common/proximal internal carotid arterial ostial stenosis with heavy calcification, moderate atherosclerosis stenosis of the  distal left carotid bifurcation extending into the internal carotid artery.  She was seen in consultation by Neurology who recommended admission and MRI.  Hospitalist service consulted for admission.    Dr. Haro et al evaluated patient; EEG without seizure activities.  Pt unable to undergo MRI brain due to incompatible [employer] pacer lead.  I personally spoke with [employer] technician who evaluated / interrogated device without abnormal cardiac event found.  Echo 7/23/2024 with EF 40-45% (similar to last echo 3/14/2022) no documented shunt.  Neuro recommended DAPT and high dose statin tx and outpatient f/u in office but given known carotid artery disease, recommended vascular surgery evaluation.  Pt seen in consultation by Dr. Wise today; family continues to voice concerns about surgical intervention with medical comorbidities.  Recommendation per vascular surgery is to complete baseline carotid doppler, maximize medical therapy and outpatient f/u in office 3 mths to discuss carotid endarterectomy if warranted.      Permissive HTN on admission for CVA w/u; on discharge today pt was resumed on metoprolol but will defer resuming lasix until f/u with PCP one week post discharge.  Medically stable for dc home with home health services today.  Referral made by neurology for outpatient f/u in office.     Disposition: Home with Home Health  Diet: ADULT DIET; Regular; No Added Salt (3-4 gm)  Code Status: Full Code    Follow Ups:  Follow-up Information       Follow up With Specialties Details Why Contact Info    John Reece MD Family Medicine Follow up in 1 week(s)  [address] [facility] [address]  [place] [id number]  [phone]      John Reece MD Family Medicine   [address]   [facility] [address]  [place] [id number]  [phone]      Fabiano Wise MD Vascular Surgery Follow up in 3 month(s)  [address] [facility]  [address]  [place] [id number]  [phone]              Time spent in patient discharge and

## 2024-07-24 NOTE — PROGRESS NOTES
ACUTE OCCUPATIONAL THERAPY GOALS:   (Developed with and agreed upon by patient and/or caregiver.)  1. Patient will complete lower body bathing and dressing with MODIFIED INDEPENDENCE and adaptive equipment as needed.     2. Patient will complete toilet transfers and toileting with MODIFIED INDEPENDENCE.  3. Patient will complete self-grooming ADL tasks at standing level with MODIFIED INDEPENDENCE.  4. Patient will tolerate 25 minutes of OT treatment with 1-2 rest breaks to increase activity tolerance for ADLs.   5. Patient will complete functional transfers with MODIFIED INDEPENDENCE and adaptive equipment as needed.   6. Patient will tolerate 10 minutes BUE exercises to increase strength for safe, functional transfers.      Timeframe: 7 visits      OCCUPATIONAL THERAPY: Daily Note AM   OT Visit Days: 2   Time In/Out  OT Charge Capture  Rehab Caseload Tracker  OT Orders    Rekha Palmer is a 79 y.o. female   PRIMARY DIAGNOSIS: CVA (cerebrovascular accident) (HCC)  TIA (transient ischemic attack) [G45.9]  CVA (cerebrovascular accident due to intracerebral hemorrhage) (HCC) [I61.9]  Cerebrovascular accident (CVA), unspecified mechanism (HCC) [I63.9]       Inpatient: Payor: MEDICARE / Plan: MEDICARE PART A AND B / Product Type: *No Product type* /     ASSESSMENT:     REHAB RECOMMENDATIONS:   Recommendation to date pending progress:  Setting:  No further skilled occupational therapy after discharge from hospital    Equipment:    None--pt has rollator, SPC, shower chair, and grab bars at home      ASSESSMENT:  Ms. Palmer presents with decreased activity tolerance, balance, strength and mobility impacting ADLs. Pt overall SBA SPC/rolling walker for functional transfers and mobility of household distances in hallway, one standing rest break required, pt states this is baseline. Pt then SBA for toilet transfer, katya care and pull up brief change sitting/standing from toilet. Pt also participated in prolonged  alex, RN notified, and Visitors at bedside    INTERDISCIPLINARY COLLABORATION:  RN/ PCT and OT/ DAVIDSON    EDUCATION:       TOTAL TREATMENT DURATION AND TIME:  Time In: 1036  Time Out: 1053  Minutes: 17    JOSI Sánchez OT

## 2024-07-25 ENCOUNTER — CARE COORDINATION (OUTPATIENT)
Dept: CARE COORDINATION | Facility: CLINIC | Age: 79
End: 2024-07-25

## 2024-07-25 ENCOUNTER — TELEPHONE (OUTPATIENT)
Age: 79
End: 2024-07-25

## 2024-07-25 NOTE — CARE COORDINATION
Care Transitions Note    Initial Call - Call within 2 business days of discharge: Yes    Attempted to reach patient for transitions of care follow up. Unable to reach patient.    Outreach Attempts:   HIPAA compliant voicemail left for family, Legal guardian, daughter Mayra Johnson.     Patient: Rekha Palmer    Patient : 1945   MRN: [id number]    Reason for Admission: CVA  Discharge Date: 24  RURS: Readmission Risk Score: 14.5    Last Discharge Facility       Date Complaint Diagnosis Description Type Department Provider    24 TIA symptoms Right sided numbness ... ED to Hosp-Admission (Discharged) (ADMITTED) S[facility] Royer Romero MD; Akira Vicente...            Was this an external facility discharge? No    Follow Up Appointment:   Patient has hospital follow up appointment scheduled within 7 days of discharge.    Future Appointments         Provider Specialty Dept Phone    2024 10:40 AM John Reece MD Family Medicine [phone]    10/22/2024 8:00 AM [facility] Vascular Surgery [phone]    10/22/2024 9:00 AM Fabiano Wise MD Vascular Surgery [phone]    10/25/2024 3:30 PM Barry Tejada DO Cardiology [phone]    10/30/2024 1:30 PM (Arrive by 1:15 PM) Abril Ren MD Rheumatology [phone]            Plan for follow-up on next business day.      Shawna Calero RN

## 2024-07-25 NOTE — TELEPHONE ENCOUNTER
[facility] 7/24/24 lasix was stopped.I spoke w/daughter told her [place] summary says leaving to PCP to resume Lasix to allow for permissive HTN.This am /61.Wt.is stable and she has no edema.Can pt.take Lasix PRN for wt.gain?        They also changed Lipitor from 40mg to 80mg qday should she take this.I told daughter to stay on med until sees PCP unless she starts having muscle aches/pains then call here.

## 2024-07-25 NOTE — TELEPHONE ENCOUNTER
Pts daughter darlyn called in stating pt was asked to stop lasics while at hosp and she would like to know why and if that is okay because pt retains fluid due to the heart failure states the atorvastatin and Lipitor has alos been increased

## 2024-07-26 ENCOUNTER — CARE COORDINATION (OUTPATIENT)
Dept: CARE COORDINATION | Facility: CLINIC | Age: 79
End: 2024-07-26

## 2024-07-26 NOTE — CARE COORDINATION
Care Transitions Note    Initial Call - Call within 2 business days of discharge: Yes    Attempted to reach patient for transitions of care follow up. Unable to reach patient.    Outreach Attempts:   Multiple attempts to contact family, Legal guardian, daughter Mayra Johnson. at phone numbers on file.   HIPAA compliant voicemail left for Legal guardian, daughter Mayra Johnson.      Patient: Rekha Palmer    Patient : 1945   MRN: [id number]    Reason for Admission:  CVA   Discharge Date: 24  RURS: Readmission Risk Score: 14.5    Last Discharge Facility       Date Complaint Diagnosis Description Type Department Provider    24 TIA symptoms Right sided numbness ... ED to Hosp-Admission (Discharged) (ADMITTED) SFD7MS Royer Romero MD; Akira Vicente...            Was this an external facility discharge? No    Follow Up Appointment:   Patient has hospital follow up appointment scheduled within 7 days of discharge.    Future Appointments         Provider Specialty Dept Phone    2024 10:40 AM John Reece MD Family Medicine [phone]    10/22/2024 8:00 AM [facility] Vascular Surgery [phone]    10/22/2024 9:00 AM Fabiano Wise MD Vascular Surgery [phone]    10/25/2024 3:30 PM Barry Tejada DO Cardiology [phone]    10/30/2024 1:30 PM (Arrive by 1:15 PM) Abril Ren MD Rheumatology [phone]            Plan for follow-up on next business day.      Shawna Calero RN

## 2024-07-29 ENCOUNTER — CARE COORDINATION (OUTPATIENT)
Dept: CARE COORDINATION | Facility: CLINIC | Age: 79
End: 2024-07-29

## 2024-07-29 DIAGNOSIS — I63.9 CEREBROVASCULAR ACCIDENT (CVA), UNSPECIFIED MECHANISM (HCC): Primary | ICD-10-CM

## 2024-07-29 PROCEDURE — 1111F DSCHRG MED/CURRENT MED MERGE: CPT | Performed by: FAMILY MEDICINE

## 2024-07-29 NOTE — CARE COORDINATION
Care Transitions Note    Initial Call - Call within 2 business days of discharge: Yes    Patient Current Location:  [facility] [place] [address]    Care Transition Nurse contacted the family, Jennifer Johnson, (PHI form verified; on file) by telephone to perform post hospital discharge assessment, verified name and  as identifiers. Provided introduction to self, and explanation of the Care Transition Nurse role.     Patient: Rekha Palmer    Patient : 1945   MRN: [id number]    Reason for Admission: CVA  Discharge Date: 24  RURS: Readmission Risk Score: 14.5      Last Discharge Facility       Date Complaint Diagnosis Description Type Department Provider    24 TIA symptoms Right sided numbness ... ED to Hosp-Admission (Discharged) (ADMITTED) SFD7MS Royer Romero MD; Akira Vicente...            Was this an external facility discharge? No    Additional needs identified to be addressed with provider   No needs identified             Method of communication with provider: none.    Patients top risk factors for readmission: medical condition-CVA, CHF, COPD    Interventions to address risk factors:   Reviewed discharge instructions and offered opportunity to ask any questions regarding discharge instructions.  Confirmed medications obtained and taking as ordered  Reviewed upcoming follow up appointments:  PCP-2024 at 10:40 am  [facility]-2024 at 3:15 pm  [facility]-2024 at 1:30 pm  [facility]-10/1/2024 at 1:15 pm  [facility]-10/22/2024 at 8:00 am for US and with Dr. Wise at 9:00 am  [facility] Nurse visiting with patient and family today  Care Transition Nurse reviewed discharge instructions, medical action plan, and red flags with family. The family was given an opportunity to ask questions; all questions answered at this time.. The family verbalized understanding.   Were discharge instructions available to patient? Yes.   Reviewed appropriate

## 2024-07-30 ENCOUNTER — OFFICE VISIT (OUTPATIENT)
Dept: FAMILY MEDICINE CLINIC | Facility: CLINIC | Age: 79
End: 2024-07-30

## 2024-07-30 VITALS
SYSTOLIC BLOOD PRESSURE: 110 MMHG | HEIGHT: 62 IN | BODY MASS INDEX: 26.94 KG/M2 | OXYGEN SATURATION: 93 % | HEART RATE: 78 BPM | WEIGHT: 146.4 LBS | DIASTOLIC BLOOD PRESSURE: 56 MMHG

## 2024-07-30 DIAGNOSIS — I10 PRIMARY HYPERTENSION: ICD-10-CM

## 2024-07-30 DIAGNOSIS — F41.9 ANXIETY: ICD-10-CM

## 2024-07-30 DIAGNOSIS — N18.31 STAGE 3A CHRONIC KIDNEY DISEASE (HCC): ICD-10-CM

## 2024-07-30 DIAGNOSIS — Z86.73 HISTORY OF STROKE: ICD-10-CM

## 2024-07-30 DIAGNOSIS — I77.9 LEFT-SIDED CAROTID ARTERY DISEASE, UNSPECIFIED TYPE (HCC): ICD-10-CM

## 2024-07-30 DIAGNOSIS — I50.22 CHRONIC SYSTOLIC CONGESTIVE HEART FAILURE (HCC): ICD-10-CM

## 2024-07-30 DIAGNOSIS — Z09 HOSPITAL DISCHARGE FOLLOW-UP: Primary | ICD-10-CM

## 2024-07-30 SDOH — ECONOMIC STABILITY: FOOD INSECURITY: WITHIN THE PAST 12 MONTHS, YOU WORRIED THAT YOUR FOOD WOULD RUN OUT BEFORE YOU GOT MONEY TO BUY MORE.: NEVER TRUE

## 2024-07-30 SDOH — ECONOMIC STABILITY: FOOD INSECURITY: WITHIN THE PAST 12 MONTHS, THE FOOD YOU BOUGHT JUST DIDN'T LAST AND YOU DIDN'T HAVE MONEY TO GET MORE.: NEVER TRUE

## 2024-07-30 SDOH — ECONOMIC STABILITY: INCOME INSECURITY: HOW HARD IS IT FOR YOU TO PAY FOR THE VERY BASICS LIKE FOOD, HOUSING, MEDICAL CARE, AND HEATING?: NOT HARD AT ALL

## 2024-07-30 ASSESSMENT — PATIENT HEALTH QUESTIONNAIRE - PHQ9
2. FEELING DOWN, DEPRESSED OR HOPELESS: NOT AT ALL
SUM OF ALL RESPONSES TO PHQ QUESTIONS 1-9: 0
SUM OF ALL RESPONSES TO PHQ QUESTIONS 1-9: 0
1. LITTLE INTEREST OR PLEASURE IN DOING THINGS: NOT AT ALL
SUM OF ALL RESPONSES TO PHQ QUESTIONS 1-9: 0
SUM OF ALL RESPONSES TO PHQ QUESTIONS 1-9: 0
SUM OF ALL RESPONSES TO PHQ9 QUESTIONS 1 & 2: 0

## 2024-07-30 NOTE — PROGRESS NOTES
SUBJECTIVE:   Rekha Palmer is a 79 y.o. female who has a past medical history significant for hypertension, high cholesterol, chronic systolic heart failure, ulcerative colitis, lymphoma, TIA, COPD, osteoporosis, spinal compression fractures, recurrent depression and bilateral carotid artery disease.  Patient presents today for hospital follow-up.  Patient was admitted to the hospital July 22 July 24 with acute onset of dysarthria and right-sided weakness.    CT of the head showed moderate supra and infratentorial atrophy as well as low-attenuation leukomalacia in the periventricular region, extending to the centrum semiovale bilaterally likely representing chronic microvascular angiopathic ischemic changes as noted on the previous evaluation. CTA of the head and neck showed severe distal common/proximal internal carotid arterial ostial stenosis with heavy calcification, moderate atherosclerosis stenosis of the distal left carotid bifurcation extending into the internal carotid artery.  Neurology and vascular surgery were consulted.  It was decided to medically manage the patient and follow-up with vascular surgery in 3 months.  Her Lipitor was increased from 40 mg to 80 mg.  Lasix was discontinued and she was sent home on Plavix.  All the medicines have stayed the same.  Since discharge she is feeling much better and reports no lateralizing or focalizing neurological complaints.  She does report an increase in anxiety and irritability.  Daughter agrees.    HPI  See above    Past Medical History, Past Surgical History, Family history, Social History, and Medications were all reviewed with the patient today and updated as necessary.       Current Outpatient Medications   Medication Sig Dispense Refill    atorvastatin (LIPITOR) 80 MG tablet Take 1 tablet by mouth nightly (Patient taking differently: Take 0.5 tablets by mouth nightly) 30 tablet 0    clopidogrel (PLAVIX) 75 MG tablet Take 1 tablet by mouth daily

## 2024-07-31 ENCOUNTER — TELEPHONE (OUTPATIENT)
Dept: PULMONOLOGY | Age: 79
End: 2024-07-31

## 2024-07-31 NOTE — TELEPHONE ENCOUNTER
Patient's daughter Jennifer called refill line stating Breztri is over %600 and cannot afford it. Wants to know what other medication is recommended.

## 2024-07-31 NOTE — TELEPHONE ENCOUNTER
Returning the call from pt's daughter Ms. Jennifer Lizarraga regarding pt Breztri inhaler. I advice Ms. Lizarraga to call pt insurance and ask for the alternative inhaler for Breztri and she will give us a call. I give 2 sample of breztri for her to pick in the . She voices understanding. Seymour MCINTYRE

## 2024-08-01 NOTE — PROGRESS NOTES
Physician Progress Note      PATIENT:               JOANN OTOOLE  [facility] #:                  [id number]  :                       1945  ADMIT DATE:       2024 3:56 PM  DISCH DATE:        2024 3:32 PM  RESPONDING  PROVIDER #:        Rk PARDO          QUERY TEXT:    Patient admitted with right arm weakness and slurred speech.   Noted   documentation of treat as TIA by neurology consultant and CVA by IM. If   possible, please document in progress notes and discharge summary if you are   evaluating and /or treating any of the following:    The medical record reflects the following:    Risk Factors: HTN  Clinical Indicators: CT head: Moderate supra and infratentorial atrophy, CTA   head and neck: Severe distal common/proximal internal carotid arterial ostial   stenosis with heavy calcification, unable to do MRI due to ICD  Treatment: prescribed Plavix and Lipitor    Thank you!    Clint Lino, BSN,RN, CRCR  RN Clinical   Options provided:  -- TIA confirmed and CVA ruled out  -- CVA confirmed and TIA ruled out  -- Other - I will add my own diagnosis  -- Disagree - Not applicable / Not valid  -- Disagree - Clinically unable to determine / Unknown  -- Refer to Clinical Documentation Reviewer    PROVIDER RESPONSE TEXT:    Unable to confirm CVA; treating empirically for acute CVA    Query created by: Clint Lino on 2024 9:45 AM      Electronically signed by:  Rk PARDO 2024 2:30 PM

## 2024-08-05 ENCOUNTER — CARE COORDINATION (OUTPATIENT)
Dept: CARE COORDINATION | Facility: CLINIC | Age: 79
End: 2024-08-05

## 2024-08-05 NOTE — CARE COORDINATION
Care Transitions Note    Follow Up Call     Attempted to reach patient for transitions of care follow up.  Unable to reach patient.      Outreach Attempts:   HIPAA compliant voicemail left for Jennifer Johnson legal guardian.       Follow Up Appointment:   Future Appointments         Provider Specialty Dept Phone    9/24/2024 2:50 PM John Reece MD Family Medicine [phone]    10/22/2024 8:00 AM [facility] Vascular Surgery [phone]    10/22/2024 9:00 AM Fabiano Wise MD Vascular Surgery [phone]    10/25/2024 3:30 PM Barry Tejada DO Cardiology [phone]    10/30/2024 1:30 PM (Arrive by 1:15 PM) Abril Ren MD Rheumatology [phone]            Plan for follow-up call in 6-10 days based on severity of symptoms and risk factors. Plan for next call: symptom management    Glendy Lopez LPN

## 2024-08-12 ENCOUNTER — CARE COORDINATION (OUTPATIENT)
Dept: CARE COORDINATION | Facility: CLINIC | Age: 79
End: 2024-08-12

## 2024-08-12 NOTE — CARE COORDINATION
Care Transitions Note    Follow Up Call     Attempted to reach patient for transitions of care follow up.  Unable to reach patient.      Outreach Attempts:   HIPAA compliant voicemail left for Jennifer Johnson.       Follow Up Appointment:   Future Appointments         Provider Specialty Dept Phone    8/14/2024 10:00 AM Michaela Jarrell APRN Neurology [phone]    9/24/2024 2:50 PM John Reece MD Family Medicine [phone]    10/22/2024 8:00 AM [facility] Vascular Surgery [phone]    10/22/2024 9:00 AM Fabiano Wise MD Vascular Surgery [phone]    10/25/2024 3:30 PM Barry Tejada,  Cardiology [phone]    10/30/2024 1:30 PM (Arrive by 1:15 PM) Abril Ren MD Rheumatology [phone]            Plan for follow-up call in 6-10 days based on severity of symptoms and risk factors. Plan for next call: symptom management    Glendy Lopez LPN

## 2024-08-15 ENCOUNTER — OFFICE VISIT (OUTPATIENT)
Dept: NEUROLOGY | Age: 79
End: 2024-08-15

## 2024-08-15 VITALS
OXYGEN SATURATION: 95 % | SYSTOLIC BLOOD PRESSURE: 129 MMHG | HEART RATE: 86 BPM | RESPIRATION RATE: 16 BRPM | WEIGHT: 146 LBS | DIASTOLIC BLOOD PRESSURE: 75 MMHG | BODY MASS INDEX: 26.87 KG/M2 | HEIGHT: 62 IN

## 2024-08-15 DIAGNOSIS — M17.12 PRIMARY OSTEOARTHRITIS OF LEFT KNEE: Primary | ICD-10-CM

## 2024-08-15 DIAGNOSIS — I63.312 CEREBROVASCULAR ACCIDENT (CVA) DUE TO THROMBOSIS OF LEFT MIDDLE CEREBRAL ARTERY (HCC): Primary | ICD-10-CM

## 2024-08-15 DIAGNOSIS — I65.23 CAROTID STENOSIS, BILATERAL: ICD-10-CM

## 2024-08-15 DIAGNOSIS — Z87.891 FORMER TOBACCO USE: ICD-10-CM

## 2024-08-15 DIAGNOSIS — E78.5 HYPERLIPIDEMIA LDL GOAL <70: ICD-10-CM

## 2024-08-15 DIAGNOSIS — I63.312 CEREBROVASCULAR ACCIDENT (CVA) DUE TO THROMBOSIS OF LEFT MIDDLE CEREBRAL ARTERY (HCC): ICD-10-CM

## 2024-08-15 DIAGNOSIS — Z95.810 PRESENCE OF AUTOMATIC (IMPLANTABLE) CARDIAC DEFIBRILLATOR: ICD-10-CM

## 2024-08-15 DIAGNOSIS — Z09 HOSPITAL DISCHARGE FOLLOW-UP: ICD-10-CM

## 2024-08-15 LAB — P2Y12 PLT RESPONSE: 189 PRU

## 2024-08-15 RX ORDER — CLOPIDOGREL BISULFATE 75 MG/1
75 TABLET ORAL DAILY
Qty: 60 TABLET | Refills: 0 | Status: SHIPPED | OUTPATIENT
Start: 2024-08-15

## 2024-08-15 ASSESSMENT — PATIENT HEALTH QUESTIONNAIRE - PHQ9
SUM OF ALL RESPONSES TO PHQ QUESTIONS 1-9: 5
SUM OF ALL RESPONSES TO PHQ QUESTIONS 1-9: 5
6. FEELING BAD ABOUT YOURSELF - OR THAT YOU ARE A FAILURE OR HAVE LET YOURSELF OR YOUR FAMILY DOWN: NOT AT ALL
7. TROUBLE CONCENTRATING ON THINGS, SUCH AS READING THE NEWSPAPER OR WATCHING TELEVISION: SEVERAL DAYS
9. THOUGHTS THAT YOU WOULD BE BETTER OFF DEAD, OR OF HURTING YOURSELF: NOT AT ALL
SUM OF ALL RESPONSES TO PHQ QUESTIONS 1-9: 5
2. FEELING DOWN, DEPRESSED OR HOPELESS: SEVERAL DAYS
SUM OF ALL RESPONSES TO PHQ QUESTIONS 1-9: 5
1. LITTLE INTEREST OR PLEASURE IN DOING THINGS: SEVERAL DAYS
5. POOR APPETITE OR OVEREATING: NOT AT ALL
3. TROUBLE FALLING OR STAYING ASLEEP: NOT AT ALL
8. MOVING OR SPEAKING SO SLOWLY THAT OTHER PEOPLE COULD HAVE NOTICED. OR THE OPPOSITE, BEING SO FIGETY OR RESTLESS THAT YOU HAVE BEEN MOVING AROUND A LOT MORE THAN USUAL: SEVERAL DAYS
10. IF YOU CHECKED OFF ANY PROBLEMS, HOW DIFFICULT HAVE THESE PROBLEMS MADE IT FOR YOU TO DO YOUR WORK, TAKE CARE OF THINGS AT HOME, OR GET ALONG WITH OTHER PEOPLE: SOMEWHAT DIFFICULT
SUM OF ALL RESPONSES TO PHQ9 QUESTIONS 1 & 2: 2

## 2024-08-15 NOTE — PROGRESS NOTES
[facility] Neurology [address], [address]  [place] [address]  [phone]      Chief Complaint   Patient presents with    Follow-Up from Hospital       HPI    Rekha Palmer is an 79 y.o. female with a PMH significant for asthma, CHF, COPD, HLD, dementia, TIA and former tobacco use who presents for hospital follow-up.  She is accompanied by her daughter helps with history due to her underlying dementia.  She was seen in the ED and hospitalized on 07/22 due to right arm numbness/tingling, right-sided facial weakness, and dysarthria.  Onset was sudden, and initially began with right hand numbness/tingling.  Patient is unaware of how it progressed, but she does recall that eventually the right side of her face started feeling numb and her tongue felt \"swollen.\"  Daughter states that patient was speaking with slurred speech.  This is similar to the event she had back in June, so she took her to the hospital immediately.  Upon workup she was evaluated for possible TIA versus seizure.  EEG was performed, they were told this was normal.  She was discharged on dual antiplatelet therapy and followed up in the clinic with us.  Unfortunately she is not able to get an MRI secondary to pacemaker, so she was diagnosed with TIA versus CVA on discharge.  No events since discharge.  All symptoms have resolved.  The daughter does state this is about the fourth time this event has occurred, but the 2 times before June or many years ago.  During this event there was no noted twitching, spasms, or convulsions.  Patient was conscious during all these events and to the best of her ability has memory of the events, but her dementia complicates this.          Past Medical History:   Diagnosis Date    Abnormal mammogram     Acute chest syndrome (HCC) 7/20/2016    Asbestosis (HCC) 7/20/2016    Asthma     C. difficile colitis     Carotid artery disease (HCC)     Carotid artery stenosis without cerebral

## 2024-08-21 ENCOUNTER — TELEPHONE (OUTPATIENT)
Dept: PULMONOLOGY | Age: 79
End: 2024-08-21

## 2024-08-21 DIAGNOSIS — G47.34 NOCTURNAL HYPOXIA: ICD-10-CM

## 2024-08-21 DIAGNOSIS — J44.9 STAGE 3 SEVERE COPD BY GOLD CLASSIFICATION (HCC): Primary | ICD-10-CM

## 2024-08-21 DIAGNOSIS — J92.0 PLEURAL PLAQUE WITH PRESENCE OF ASBESTOS: ICD-10-CM

## 2024-08-21 DIAGNOSIS — J18.9 PNEUMONIA DUE TO INFECTIOUS ORGANISM, UNSPECIFIED LATERALITY, UNSPECIFIED PART OF LUNG: ICD-10-CM

## 2024-08-21 RX ORDER — BUDESONIDE, GLYCOPYRROLATE, AND FORMOTEROL FUMARATE 160; 9; 4.8 UG/1; UG/1; UG/1
AEROSOL, METERED RESPIRATORY (INHALATION)
Refills: 0 | OUTPATIENT
Start: 2024-08-21

## 2024-08-21 NOTE — TELEPHONE ENCOUNTER
Pt daughter ( Ms. Rodriguez )returning my call,she stated she wants refills for  Breztri Inhaler 3bxs each delivery send to [facility]. Rx pended to Ms. [place]. She voices undersatnding. Seymour Aden

## 2024-08-22 RX ORDER — BUDESONIDE, GLYCOPYRROLATE, AND FORMOTEROL FUMARATE 160; 9; 4.8 UG/1; UG/1; UG/1
2 AEROSOL, METERED RESPIRATORY (INHALATION) 2 TIMES DAILY
Qty: 3 EACH | Refills: 4 | Status: SHIPPED | OUTPATIENT
Start: 2024-08-22 | End: 2024-08-30 | Stop reason: SDUPTHER

## 2024-08-23 ENCOUNTER — CARE COORDINATION (OUTPATIENT)
Dept: CARE COORDINATION | Facility: CLINIC | Age: 79
End: 2024-08-23

## 2024-08-23 NOTE — CARE COORDINATION
Care Transitions Note    Follow Up Call     Attempted to reach patient for transitions of care follow up.  Unable to reach patient.      Outreach Attempts:   HIPAA compliant voicemail left for patient.    Noted follow up with Dr. Reece completed on 8/16/24.  Next f/u 9/24/24 in office (Dr. Reece)  Program scheduled to close on Saturday 8/24 (tomorrow).  Closed today due to unable to reach.    Will remain available for return call.      Follow Up Appointment:   Future Appointments         Provider Specialty Dept Phone    9/24/2024 2:50 PM John Reece MD Family Medicine [phone]    10/22/2024 8:00 AM [facility] Vascular Surgery [phone]    10/22/2024 9:00 AM Fabiano Wise MD Vascular Surgery [phone]    10/25/2024 3:30 PM Barry Tejada DO Cardiology [phone]    10/28/2024 9:00 AM [facility] ASSESSMENT  06 General Surgery [phone]    10/29/2024 11:20 AM Anatoliy Reece Jr., PA Neurology [phone]    10/30/2024 1:30 PM (Arrive by 1:15 PM) Abril Ren MD Rheumatology [phone]    12/30/2024 2:00 PM Hema Boyd MD Orthopedic Surgery [phone]    5/21/2025 10:10 AM Hema Boyd MD Orthopedic Surgery [phone]            No further follow-up call indicated based on severity of symptoms and risk factors.   Glendy Lopez LPN

## 2024-08-28 ENCOUNTER — TELEPHONE (OUTPATIENT)
Dept: PULMONOLOGY | Age: 79
End: 2024-08-28

## 2024-08-28 NOTE — TELEPHONE ENCOUNTER
Tried to call pt but no answer LVM to call us back, Ms. Bridgette Aguilera wants her to have a f/u appt. Seymour MCINTYRE

## 2024-08-30 ENCOUNTER — OFFICE VISIT (OUTPATIENT)
Dept: PULMONOLOGY | Age: 79
End: 2024-08-30
Payer: MEDICARE

## 2024-08-30 VITALS
HEIGHT: 62 IN | WEIGHT: 147 LBS | OXYGEN SATURATION: 96 % | BODY MASS INDEX: 27.05 KG/M2 | DIASTOLIC BLOOD PRESSURE: 78 MMHG | SYSTOLIC BLOOD PRESSURE: 148 MMHG | RESPIRATION RATE: 16 BRPM | HEART RATE: 84 BPM

## 2024-08-30 DIAGNOSIS — J44.9 STAGE 3 SEVERE COPD BY GOLD CLASSIFICATION (HCC): ICD-10-CM

## 2024-08-30 DIAGNOSIS — G47.34 NOCTURNAL HYPOXIA: ICD-10-CM

## 2024-08-30 DIAGNOSIS — J92.0 PLEURAL PLAQUE WITH PRESENCE OF ASBESTOS: ICD-10-CM

## 2024-08-30 DIAGNOSIS — J18.9 PNEUMONIA DUE TO INFECTIOUS ORGANISM, UNSPECIFIED LATERALITY, UNSPECIFIED PART OF LUNG: ICD-10-CM

## 2024-08-30 PROCEDURE — 3023F SPIROM DOC REV: CPT | Performed by: NURSE PRACTITIONER

## 2024-08-30 PROCEDURE — G8427 DOCREV CUR MEDS BY ELIG CLIN: HCPCS | Performed by: NURSE PRACTITIONER

## 2024-08-30 PROCEDURE — G8417 CALC BMI ABV UP PARAM F/U: HCPCS | Performed by: NURSE PRACTITIONER

## 2024-08-30 PROCEDURE — G8399 PT W/DXA RESULTS DOCUMENT: HCPCS | Performed by: NURSE PRACTITIONER

## 2024-08-30 PROCEDURE — 1090F PRES/ABSN URINE INCON ASSESS: CPT | Performed by: NURSE PRACTITIONER

## 2024-08-30 PROCEDURE — 1036F TOBACCO NON-USER: CPT | Performed by: NURSE PRACTITIONER

## 2024-08-30 PROCEDURE — 1123F ACP DISCUSS/DSCN MKR DOCD: CPT | Performed by: NURSE PRACTITIONER

## 2024-08-30 PROCEDURE — 99214 OFFICE O/P EST MOD 30 MIN: CPT | Performed by: NURSE PRACTITIONER

## 2024-08-30 RX ORDER — ALBUTEROL SULFATE 90 UG/1
2 AEROSOL, METERED RESPIRATORY (INHALATION) 4 TIMES DAILY PRN
Qty: 18 G | Refills: 5 | Status: SHIPPED | OUTPATIENT
Start: 2024-08-30

## 2024-08-30 RX ORDER — BUDESONIDE, GLYCOPYRROLATE, AND FORMOTEROL FUMARATE 160; 9; 4.8 UG/1; UG/1; UG/1
2 AEROSOL, METERED RESPIRATORY (INHALATION) 2 TIMES DAILY
Qty: 3 EACH | Refills: 4 | Status: CANCELLED | OUTPATIENT
Start: 2024-08-30

## 2024-08-30 RX ORDER — BUDESONIDE, GLYCOPYRROLATE, AND FORMOTEROL FUMARATE 160; 9; 4.8 UG/1; UG/1; UG/1
2 AEROSOL, METERED RESPIRATORY (INHALATION) 2 TIMES DAILY
Qty: 3 EACH | Refills: 3 | Status: SHIPPED | OUTPATIENT
Start: 2024-08-30

## 2024-08-30 NOTE — PROGRESS NOTES
Name:  Rekha Palmer  YOB: 1945   MRN: [id number]      Office Visit: 8/30/2024        ASSESSMENT AND PLAN:  (Medical Decision Making)    Impression: 79 y.o. female here for follow up on Stage 3 COPD. Hx COPD, pleural plaque with asbestos exposure, nocturnal hypoxia, GERD, CKD, osteoperosis, ROSANA, colon cancer.     1. Stage 3 severe COPD by GOLD classification (HCC)  - Feeling well controlled on Breztri, albuterol PRN. Has not needed to use the albuterol. Advised to rinse mouth with warm salt water after each use.   --did not need O2 during exertion today  - Budeson-Glycopyrrol-Formoterol (BREZTRI AEROSPHERE) 160-9-4.8 MCG/ACT AERO; Inhale 2 each into the lungs in the morning and at bedtime  Dispense: 3 each; Refill: 3  - Pulse oximetry, overnight    2. Nocturnal hypoxia  - Only using oxygen 3L at night. To have overnight oximetry study to see if still qualifies for oxygen.   - Budeson-Glycopyrrol-Formoterol (BREZTRI AEROSPHERE) 160-9-4.8 MCG/ACT AERO; Inhale 2 each into the lungs in the morning and at bedtime  Dispense: 3 each; Refill: 3  - Pulse oximetry, overnight    3. Pleural plaque with presence of asbestos  - Follow up CT for exposure to ensure no further changes.   - Budeson-Glycopyrrol-Formoterol (BREZTRI AEROSPHERE) 160-9-4.8 MCG/ACT AERO; Inhale 2 each into the lungs in the morning and at bedtime  Dispense: 3 each; Refill: 3  - CT CHEST WO CONTRAST; Future  - Pulse oximetry, overnight    Orders Placed This Encounter   Medications    albuterol sulfate HFA (VENTOLIN HFA) 108 (90 Base) MCG/ACT inhaler     Sig: Inhale 2 puffs into the lungs 4 times daily as needed for Wheezing     Dispense:  18 g     Refill:  5    Budeson-Glycopyrrol-Formoterol (BREZTRI AEROSPHERE) 160-9-4.8 MCG/ACT AERO     Sig: Inhale 2 each into the lungs in the morning and at bedtime     Dispense:  3 each     Refill:  3     No orders of the defined types were placed in this encounter.    Follow-up and Dispositions   (NITROSTAT) 0.4 mg, SubLINGual, EVERY 5 MIN PRN, up to max of 3 total doses. If no relief after 1 dose, call 911.    OXYGEN 3 lpm qhs    STELARA 90 MG/ML SOSY prefilled syringe Every 8 weeks

## 2024-09-04 ENCOUNTER — HOSPITAL ENCOUNTER (EMERGENCY)
Age: 79
Discharge: HOME | End: 2024-09-04
Attending: STUDENT IN AN ORGANIZED HEALTH CARE EDUCATION/TRAINING PROGRAM
Payer: MEDICARE

## 2024-09-04 ENCOUNTER — APPOINTMENT (OUTPATIENT)
Dept: GENERAL RADIOLOGY | Age: 79
End: 2024-09-04
Payer: MEDICARE

## 2024-09-04 VITALS
BODY MASS INDEX: 27.05 KG/M2 | TEMPERATURE: 98.1 F | DIASTOLIC BLOOD PRESSURE: 64 MMHG | OXYGEN SATURATION: 97 % | HEART RATE: 79 BPM | RESPIRATION RATE: 16 BRPM | HEIGHT: 62 IN | WEIGHT: 147 LBS | SYSTOLIC BLOOD PRESSURE: 139 MMHG

## 2024-09-04 DIAGNOSIS — M54.50 RIGHT-SIDED LOW BACK PAIN WITHOUT SCIATICA, UNSPECIFIED CHRONICITY: Primary | ICD-10-CM

## 2024-09-04 LAB
APPEARANCE UR: CLEAR
BACTERIA URNS QL MICRO: 0 /HPF
BACTERIA URNS QL MICRO: NEGATIVE /HPF
BILIRUB UR QL: NEGATIVE
CASTS URNS QL MICRO: 0 /LPF
COLOR UR: NORMAL
CRYSTALS URNS QL MICRO: 0 /LPF
EPI CELLS #/AREA URNS HPF: NORMAL /HPF
GLUCOSE UR STRIP.AUTO-MCNC: NEGATIVE MG/DL
HGB UR QL STRIP: NEGATIVE
HYALINE CASTS URNS QL MICRO: NORMAL /LPF
KETONES UR QL STRIP.AUTO: NEGATIVE MG/DL
LEUKOCYTE ESTERASE UR QL STRIP.AUTO: NEGATIVE
MUCOUS THREADS URNS QL MICRO: 0 /LPF
NITRITE UR QL STRIP.AUTO: NEGATIVE
PH UR STRIP: 5.5 (ref 5–9)
PROT UR STRIP-MCNC: NEGATIVE MG/DL
RBC #/AREA URNS HPF: NORMAL /HPF
SP GR UR REFRACTOMETRY: 1.01 (ref 1–1.02)
UROBILINOGEN UR QL STRIP.AUTO: 0.2 EU/DL (ref 0.2–1)
WBC URNS QL MICRO: NORMAL /HPF

## 2024-09-04 PROCEDURE — 6370000000 HC RX 637 (ALT 250 FOR IP): Performed by: STUDENT IN AN ORGANIZED HEALTH CARE EDUCATION/TRAINING PROGRAM

## 2024-09-04 PROCEDURE — 81001 URINALYSIS AUTO W/SCOPE: CPT

## 2024-09-04 PROCEDURE — 72110 X-RAY EXAM L-2 SPINE 4/>VWS: CPT

## 2024-09-04 PROCEDURE — 99284 EMERGENCY DEPT VISIT MOD MDM: CPT

## 2024-09-04 RX ORDER — PREDNISONE 20 MG/1
20 TABLET ORAL DAILY
Qty: 5 TABLET | Refills: 0 | Status: SHIPPED | OUTPATIENT
Start: 2024-09-04 | End: 2024-09-09

## 2024-09-04 RX ORDER — IBUPROFEN 600 MG/1
600 TABLET, FILM COATED ORAL ONCE
Status: COMPLETED | OUTPATIENT
Start: 2024-09-04 | End: 2024-09-04

## 2024-09-04 RX ORDER — LIDOCAINE 50 MG/G
1 PATCH TOPICAL DAILY
Qty: 10 PATCH | Refills: 0 | Status: SHIPPED | OUTPATIENT
Start: 2024-09-04 | End: 2024-09-14

## 2024-09-04 RX ORDER — LIDOCAINE 50 MG/G
1 PATCH TOPICAL DAILY
Qty: 10 PATCH | Refills: 0 | Status: SHIPPED | OUTPATIENT
Start: 2024-09-04 | End: 2024-09-04

## 2024-09-04 RX ORDER — IBUPROFEN 600 MG/1
600 TABLET, FILM COATED ORAL 3 TIMES DAILY PRN
Qty: 30 TABLET | Refills: 0 | Status: SHIPPED | OUTPATIENT
Start: 2024-09-04 | End: 2024-09-04

## 2024-09-04 RX ORDER — IBUPROFEN 600 MG/1
600 TABLET, FILM COATED ORAL 3 TIMES DAILY PRN
Qty: 30 TABLET | Refills: 0 | Status: SHIPPED | OUTPATIENT
Start: 2024-09-04

## 2024-09-04 RX ORDER — PREDNISONE 20 MG/1
20 TABLET ORAL DAILY
Qty: 5 TABLET | Refills: 0 | Status: SHIPPED | OUTPATIENT
Start: 2024-09-04 | End: 2024-09-04

## 2024-09-04 RX ADMIN — IBUPROFEN 600 MG: 600 TABLET, FILM COATED ORAL at 15:08

## 2024-09-04 ASSESSMENT — PAIN DESCRIPTION - LOCATION
LOCATION: BACK
LOCATION: BACK;ABDOMEN

## 2024-09-04 ASSESSMENT — PAIN DESCRIPTION - ORIENTATION
ORIENTATION: LOWER
ORIENTATION: LOWER

## 2024-09-04 ASSESSMENT — PAIN - FUNCTIONAL ASSESSMENT: PAIN_FUNCTIONAL_ASSESSMENT: 0-10

## 2024-09-04 ASSESSMENT — PAIN SCALES - GENERAL: PAINLEVEL_OUTOF10: 6

## 2024-09-04 NOTE — ED PROVIDER NOTES
Normocephalic and atraumatic.      Mouth/Throat:      Mouth: Mucous membranes are moist.      Pharynx: Oropharynx is clear.   Eyes:      Extraocular Movements: Extraocular movements intact.      Pupils: Pupils are equal, round, and reactive to light.   Cardiovascular:      Rate and Rhythm: Normal rate and regular rhythm.   Pulmonary:      Effort: Pulmonary effort is normal. No respiratory distress.      Breath sounds: Normal breath sounds.   Abdominal:      General: Abdomen is flat.      Palpations: Abdomen is soft.      Tenderness: There is no abdominal tenderness.   Musculoskeletal:         General: Normal range of motion.      Cervical back: Normal range of motion and neck supple.   Skin:     General: Skin is warm and dry.   Neurological:      General: No focal deficit present.      Mental Status: She is alert and oriented to person, place, and time. Mental status is at baseline.   Psychiatric:         Mood and Affect: Mood normal.         Thought Content: Thought content normal.        Procedures     Procedures    Orders Placed This Encounter   Procedures    XR LUMBAR SPINE (MIN 4 VIEWS)    Urinalysis, Micro    Urinalysis    [facility] (Spine Surgery)        Medications given during this emergency department visit:  Medications   ibuprofen (ADVIL;MOTRIN) tablet 600 mg (600 mg Oral Given 9/4/24 1508)       New Prescriptions    IBUPROFEN (ADVIL;MOTRIN) 600 MG TABLET    Take 1 tablet by mouth 3 times daily as needed for Pain    LIDOCAINE (LIDODERM) 5 %    Place 1 patch onto the skin daily for 10 days 12 hours on, 12 hours off.    PREDNISONE (DELTASONE) 20 MG TABLET    Take 1 tablet by mouth daily for 5 days        Past Medical History:   Diagnosis Date    Abnormal mammogram     Acute chest syndrome (HCC) 7/20/2016    Asbestosis (HCC) 7/20/2016    Asthma     C. difficile colitis     Carotid artery disease (HCC)     Carotid artery stenosis without cerebral infarction 7/20/2016    CHF

## 2024-09-04 NOTE — ED TRIAGE NOTES
C/o lumbar spine fractures she's had for a while. Her lower back pain has worsened, no reported falls recently. Has baseline incontinence and wears depends, this has not worsened. Can bear some weight and get around for a short amt of time with a walker. Takes eliquis

## 2024-09-04 NOTE — ED NOTES
I have reviewed discharge instructions with the patient.  The patient verbalized understanding.    Patient left ED via Discharge Method: wheelchair to Home with family.    Opportunity for questions and clarification provided.       Patient given 0 scripts.         To continue your aftercare when you leave the hospital, you may receive an automated call from our care team to check in on how you are doing.  This is a free service and part of our promise to provide the best care and service to meet your aftercare needs.” If you have questions, or wish to unsubscribe from this service please call [phone].  Thank you for Choosing our [facility] Emergency Department.        Fatou Crespo LPN  09/04/24 3545

## 2024-09-04 NOTE — DISCHARGE INSTRUCTIONS
You were given a prescription for prednisone, ibuprofen, and lidocaine patches, please take as directed.    Please follow-up with your primary care provider in the next 2 to 3 days for reevaluation.    A referral was placed for you to follow-up with spine surgery.  Call the included number to confirm an appointment.    Please return to the emergency department if you have any change in symptoms, worsening symptoms, or with any other concerns that you may have.

## 2024-09-10 ENCOUNTER — TELEPHONE (OUTPATIENT)
Dept: ORTHOPEDIC SURGERY | Age: 79
End: 2024-09-10

## 2024-09-16 ENCOUNTER — TELEPHONE (OUTPATIENT)
Dept: PULMONOLOGY | Age: 79
End: 2024-09-16

## 2024-09-23 ENCOUNTER — OFFICE VISIT (OUTPATIENT)
Dept: ORTHOPEDIC SURGERY | Age: 79
End: 2024-09-23
Payer: MEDICARE

## 2024-09-23 DIAGNOSIS — M47.816 LUMBAR SPONDYLOSIS: Primary | ICD-10-CM

## 2024-09-23 DIAGNOSIS — M54.17 LUMBOSACRAL RADICULOPATHY: ICD-10-CM

## 2024-09-23 PROCEDURE — G2211 COMPLEX E/M VISIT ADD ON: HCPCS | Performed by: PHYSICAL MEDICINE & REHABILITATION

## 2024-09-23 PROCEDURE — 99214 OFFICE O/P EST MOD 30 MIN: CPT | Performed by: PHYSICAL MEDICINE & REHABILITATION

## 2024-09-23 PROCEDURE — G8417 CALC BMI ABV UP PARAM F/U: HCPCS | Performed by: PHYSICAL MEDICINE & REHABILITATION

## 2024-09-23 PROCEDURE — G8428 CUR MEDS NOT DOCUMENT: HCPCS | Performed by: PHYSICAL MEDICINE & REHABILITATION

## 2024-09-23 PROCEDURE — 1036F TOBACCO NON-USER: CPT | Performed by: PHYSICAL MEDICINE & REHABILITATION

## 2024-09-23 PROCEDURE — 1090F PRES/ABSN URINE INCON ASSESS: CPT | Performed by: PHYSICAL MEDICINE & REHABILITATION

## 2024-09-23 PROCEDURE — 1123F ACP DISCUSS/DSCN MKR DOCD: CPT | Performed by: PHYSICAL MEDICINE & REHABILITATION

## 2024-09-23 PROCEDURE — G8399 PT W/DXA RESULTS DOCUMENT: HCPCS | Performed by: PHYSICAL MEDICINE & REHABILITATION

## 2024-09-24 ENCOUNTER — OFFICE VISIT (OUTPATIENT)
Dept: ORTHOPEDIC SURGERY | Age: 79
End: 2024-09-24
Payer: MEDICARE

## 2024-09-24 DIAGNOSIS — M54.17 LUMBOSACRAL RADICULOPATHY: ICD-10-CM

## 2024-09-24 DIAGNOSIS — M47.816 LUMBAR SPONDYLOSIS: Primary | ICD-10-CM

## 2024-09-24 PROCEDURE — 64493 INJ PARAVERT F JNT L/S 1 LEV: CPT | Performed by: PHYSICAL MEDICINE & REHABILITATION

## 2024-09-24 PROCEDURE — 64483 NJX AA&/STRD TFRM EPI L/S 1: CPT | Performed by: PHYSICAL MEDICINE & REHABILITATION

## 2024-09-24 PROCEDURE — 64494 INJ PARAVERT F JNT L/S 2 LEV: CPT | Performed by: PHYSICAL MEDICINE & REHABILITATION

## 2024-09-24 PROCEDURE — 64484 NJX AA&/STRD TFRM EPI L/S EA: CPT | Performed by: PHYSICAL MEDICINE & REHABILITATION

## 2024-09-24 RX ORDER — TRIAMCINOLONE ACETONIDE 40 MG/ML
280 INJECTION, SUSPENSION INTRA-ARTICULAR; INTRAMUSCULAR ONCE
Status: COMPLETED | OUTPATIENT
Start: 2024-09-24 | End: 2024-09-24

## 2024-09-24 RX ADMIN — TRIAMCINOLONE ACETONIDE 280 MG: 40 INJECTION, SUSPENSION INTRA-ARTICULAR; INTRAMUSCULAR at 09:13

## 2024-10-02 RX ORDER — CLOPIDOGREL BISULFATE 75 MG/1
75 TABLET ORAL DAILY
Qty: 60 TABLET | Refills: 0 | OUTPATIENT
Start: 2024-10-02

## 2024-10-14 ENCOUNTER — OFFICE VISIT (OUTPATIENT)
Dept: FAMILY MEDICINE CLINIC | Facility: CLINIC | Age: 79
End: 2024-10-14

## 2024-10-14 VITALS
WEIGHT: 146.4 LBS | HEART RATE: 76 BPM | OXYGEN SATURATION: 97 % | BODY MASS INDEX: 26.94 KG/M2 | DIASTOLIC BLOOD PRESSURE: 64 MMHG | SYSTOLIC BLOOD PRESSURE: 115 MMHG | HEIGHT: 62 IN

## 2024-10-14 DIAGNOSIS — Z86.73 HISTORY OF TIA (TRANSIENT ISCHEMIC ATTACK): ICD-10-CM

## 2024-10-14 DIAGNOSIS — J44.9 COPD MIXED TYPE (HCC): ICD-10-CM

## 2024-10-14 DIAGNOSIS — N18.31 STAGE 3A CHRONIC KIDNEY DISEASE (HCC): ICD-10-CM

## 2024-10-14 DIAGNOSIS — I50.22 CHRONIC SYSTOLIC CONGESTIVE HEART FAILURE (HCC): ICD-10-CM

## 2024-10-14 DIAGNOSIS — G30.9 MILD ALZHEIMER'S DEMENTIA, UNSPECIFIED TIMING OF DEMENTIA ONSET, UNSPECIFIED WHETHER BEHAVIORAL, PSYCHOTIC, OR MOOD DISTURBANCE OR ANXIETY (HCC): ICD-10-CM

## 2024-10-14 DIAGNOSIS — E78.00 PURE HYPERCHOLESTEROLEMIA: ICD-10-CM

## 2024-10-14 DIAGNOSIS — Z00.00 MEDICARE ANNUAL WELLNESS VISIT, SUBSEQUENT: Primary | ICD-10-CM

## 2024-10-14 DIAGNOSIS — F33.9 RECURRENT DEPRESSION (HCC): ICD-10-CM

## 2024-10-14 DIAGNOSIS — H69.93 ACUTE DYSFUNCTION OF EUSTACHIAN TUBE, BILATERAL: ICD-10-CM

## 2024-10-14 DIAGNOSIS — R41.3 MEMORY LOSS: ICD-10-CM

## 2024-10-14 DIAGNOSIS — C81.90 HODGKIN LYMPHOMA, UNSPECIFIED HODGKIN LYMPHOMA TYPE, UNSPECIFIED BODY REGION (HCC): ICD-10-CM

## 2024-10-14 DIAGNOSIS — I10 PRIMARY HYPERTENSION: ICD-10-CM

## 2024-10-14 DIAGNOSIS — H93.8X3 EAR FULLNESS, BILATERAL: ICD-10-CM

## 2024-10-14 DIAGNOSIS — F02.A0 MILD ALZHEIMER'S DEMENTIA, UNSPECIFIED TIMING OF DEMENTIA ONSET, UNSPECIFIED WHETHER BEHAVIORAL, PSYCHOTIC, OR MOOD DISTURBANCE OR ANXIETY (HCC): ICD-10-CM

## 2024-10-14 DIAGNOSIS — I65.23 BILATERAL CAROTID ARTERY STENOSIS: ICD-10-CM

## 2024-10-14 DIAGNOSIS — K50.00 CROHN'S DISEASE OF SMALL INTESTINE WITHOUT COMPLICATION (HCC): ICD-10-CM

## 2024-10-14 LAB
ALBUMIN SERPL-MCNC: 3.7 G/DL (ref 3.2–4.6)
ALBUMIN/GLOB SERPL: 1.1 (ref 1–1.9)
ALP SERPL-CCNC: 37 U/L (ref 35–104)
ALT SERPL-CCNC: 29 U/L (ref 8–45)
ANION GAP SERPL CALC-SCNC: 11 MMOL/L (ref 9–18)
AST SERPL-CCNC: 29 U/L (ref 15–37)
BASOPHILS # BLD: 0 K/UL (ref 0–0.2)
BASOPHILS NFR BLD: 0 % (ref 0–2)
BILIRUB SERPL-MCNC: 0.8 MG/DL (ref 0–1.2)
BUN SERPL-MCNC: 22 MG/DL (ref 8–23)
CALCIUM SERPL-MCNC: 9.4 MG/DL (ref 8.8–10.2)
CHLORIDE SERPL-SCNC: 101 MMOL/L (ref 98–107)
CHOLEST SERPL-MCNC: 190 MG/DL (ref 0–200)
CO2 SERPL-SCNC: 27 MMOL/L (ref 20–28)
CREAT SERPL-MCNC: 1.04 MG/DL (ref 0.6–1.1)
DIFFERENTIAL METHOD BLD: ABNORMAL
EOSINOPHIL # BLD: 0.1 K/UL (ref 0–0.8)
EOSINOPHIL NFR BLD: 1 % (ref 0.5–7.8)
ERYTHROCYTE [DISTWIDTH] IN BLOOD BY AUTOMATED COUNT: 14 % (ref 11.9–14.6)
GLOBULIN SER CALC-MCNC: 3.2 G/DL (ref 2.3–3.5)
GLUCOSE SERPL-MCNC: 103 MG/DL (ref 70–99)
HCT VFR BLD AUTO: 42.6 % (ref 35.8–46.3)
HDLC SERPL-MCNC: 79 MG/DL (ref 40–60)
HDLC SERPL: 2.4 (ref 0–5)
HGB BLD-MCNC: 13 G/DL (ref 11.7–15.4)
IMM GRANULOCYTES # BLD AUTO: 0 K/UL (ref 0–0.5)
IMM GRANULOCYTES NFR BLD AUTO: 0 % (ref 0–5)
LDLC SERPL CALC-MCNC: 86 MG/DL (ref 0–100)
LYMPHOCYTES # BLD: 1.8 K/UL (ref 0.5–4.6)
LYMPHOCYTES NFR BLD: 14 % (ref 13–44)
MCH RBC QN AUTO: 29 PG (ref 26.1–32.9)
MCHC RBC AUTO-ENTMCNC: 30.5 G/DL (ref 31.4–35)
MCV RBC AUTO: 95.1 FL (ref 82–102)
MONOCYTES # BLD: 1.3 K/UL (ref 0.1–1.3)
MONOCYTES NFR BLD: 10 % (ref 4–12)
NEUTS SEG # BLD: 9.5 K/UL (ref 1.7–8.2)
NEUTS SEG NFR BLD: 74 % (ref 43–78)
NRBC # BLD: 0 K/UL (ref 0–0.2)
PLATELET # BLD AUTO: 147 K/UL (ref 150–450)
PMV BLD AUTO: 10.3 FL (ref 9.4–12.3)
POTASSIUM SERPL-SCNC: 4.2 MMOL/L (ref 3.5–5.1)
PROT SERPL-MCNC: 6.9 G/DL (ref 6.3–8.2)
RBC # BLD AUTO: 4.48 M/UL (ref 4.05–5.2)
SODIUM SERPL-SCNC: 138 MMOL/L (ref 136–145)
TRIGL SERPL-MCNC: 126 MG/DL (ref 0–150)
TSH, 3RD GENERATION: 0.96 UIU/ML (ref 0.27–4.2)
VLDLC SERPL CALC-MCNC: 25 MG/DL (ref 6–23)
WBC # BLD AUTO: 12.8 K/UL (ref 4.3–11.1)

## 2024-10-14 RX ORDER — CITALOPRAM HYDROBROMIDE 40 MG/1
40 TABLET ORAL DAILY
Qty: 90 TABLET | Refills: 3 | Status: SHIPPED | OUTPATIENT
Start: 2024-10-14 | End: 2024-10-14 | Stop reason: SDUPTHER

## 2024-10-14 RX ORDER — CITALOPRAM HYDROBROMIDE 40 MG/1
40 TABLET ORAL DAILY
Qty: 90 TABLET | Refills: 3 | Status: SHIPPED | OUTPATIENT
Start: 2024-10-14

## 2024-10-14 ASSESSMENT — PATIENT HEALTH QUESTIONNAIRE - PHQ9
SUM OF ALL RESPONSES TO PHQ QUESTIONS 1-9: 1
SUM OF ALL RESPONSES TO PHQ QUESTIONS 1-9: 1
3. TROUBLE FALLING OR STAYING ASLEEP: NOT AT ALL
4. FEELING TIRED OR HAVING LITTLE ENERGY: NOT AT ALL
SUM OF ALL RESPONSES TO PHQ9 QUESTIONS 1 & 2: 0
5. POOR APPETITE OR OVEREATING: NOT AT ALL
7. TROUBLE CONCENTRATING ON THINGS, SUCH AS READING THE NEWSPAPER OR WATCHING TELEVISION: SEVERAL DAYS
2. FEELING DOWN, DEPRESSED OR HOPELESS: NOT AT ALL
9. THOUGHTS THAT YOU WOULD BE BETTER OFF DEAD, OR OF HURTING YOURSELF: NOT AT ALL
8. MOVING OR SPEAKING SO SLOWLY THAT OTHER PEOPLE COULD HAVE NOTICED. OR THE OPPOSITE, BEING SO FIGETY OR RESTLESS THAT YOU HAVE BEEN MOVING AROUND A LOT MORE THAN USUAL: NOT AT ALL
1. LITTLE INTEREST OR PLEASURE IN DOING THINGS: NOT AT ALL
10. IF YOU CHECKED OFF ANY PROBLEMS, HOW DIFFICULT HAVE THESE PROBLEMS MADE IT FOR YOU TO DO YOUR WORK, TAKE CARE OF THINGS AT HOME, OR GET ALONG WITH OTHER PEOPLE: NOT DIFFICULT AT ALL
6. FEELING BAD ABOUT YOURSELF - OR THAT YOU ARE A FAILURE OR HAVE LET YOURSELF OR YOUR FAMILY DOWN: NOT AT ALL
SUM OF ALL RESPONSES TO PHQ QUESTIONS 1-9: 1
SUM OF ALL RESPONSES TO PHQ QUESTIONS 1-9: 1

## 2024-10-14 NOTE — PATIENT INSTRUCTIONS
including lifestyle, illnesses that may run in your family, and various assessments and screenings as appropriate.    After reviewing your medical record and screening and assessments performed today your provider may have ordered immunizations, labs, imaging, and/or referrals for you.  A list of these orders (if applicable) as well as your Preventive Care list are included within your After Visit Summary for your review.    Other Preventive Recommendations:    A preventive eye exam performed by an eye specialist is recommended every 1-2 years to screen for glaucoma; cataracts, macular degeneration, and other eye disorders.  A preventive dental visit is recommended every 6 months.  Try to get at least 150 minutes of exercise per week or 10,000 steps per day on a pedometer .  Order or download the FREE \"Exercise & Physical Activity: Your Everyday Guide\" from [employer]. Call 1-241.967.6928 or search [employer] online.  You need 2192-3880 mg of calcium and 9220-3102 IU of vitamin D per day. It is possible to meet your calcium requirement with diet alone, but a vitamin D supplement is usually necessary to meet this goal.  When exposed to the sun, use a sunscreen that protects against both UVA and UVB radiation with an SPF of 30 or greater. Reapply every 2 to 3 hours or after sweating, drying off with a towel, or swimming.  Always wear a seat belt when traveling in a car. Always wear a helmet when riding a bicycle or motorcycle.

## 2024-10-14 NOTE — PROGRESS NOTES
SUBJECTIVE:   Rekha Palmer is a 79 y.o. female ho has a past medical history significant for hypertension, high cholesterol, chronic systolic heart failure, ulcerative colitis, lymphoma, TIA, COPD, osteoporosis, spinal compression fractures, recurrent depression and bilateral carotid artery disease.  Patient presents today accompanied by her daughter.  She is reporting some bilateral ear fullness.  Otherwise is doing overall well.  There is some reports of intermittent sundowning and anger issues related to her Alzheimer's diagnosis.  Daughter reports that they tried to discuss this at the previous neurology appointment but was told that they were there only for discussion about her TIA.  She has another appointment coming up later this month.  Daughter reports that they will discuss at that time treatment options.  Otherwise patient seems to be doing very well according to both patient and daughter.    HPI  See above    Past Medical History, Past Surgical History, Family history, Social History, and Medications were all reviewed with the patient today and updated as necessary.       Current Outpatient Medications   Medication Sig Dispense Refill    citalopram (CELEXA) 40 MG tablet Take 1 tablet by mouth daily TAKE 1 TABLET DAILY  Pt reports taking 40 mg 90 tablet 3    albuterol sulfate HFA (VENTOLIN HFA) 108 (90 Base) MCG/ACT inhaler Inhale 2 puffs into the lungs 4 times daily as needed for Wheezing 18 g 5    Budeson-Glycopyrrol-Formoterol (BREZTRI AEROSPHERE) 160-9-4.8 MCG/ACT AERO Inhale 2 each into the lungs in the morning and at bedtime 3 each 3    clopidogrel (PLAVIX) 75 MG tablet Take 1 tablet by mouth daily 60 tablet 0    atorvastatin (LIPITOR) 80 MG tablet Take 1 tablet by mouth nightly 30 tablet 0    nitroGLYCERIN (NITROSTAT) 0.4 MG SL tablet Place 1 tablet under the tongue every 5 minutes as needed for Chest pain up to max of 3 total doses. If no relief after 1 dose, call 911. 25 tablet 5    
\"Have you been to the ER, urgent care clinic since your last visit?  Hospitalized since your last visit?\"    YES - When: approximately 1 months ago.  Where and Why: [facility] ED Dept/back pain.    “Have you seen or consulted any other health care providers outside our system since your last visit?”    YES - When: approximately 1 months ago.  Where and Why: [facility] Nephrology/follow-up.            
well- nourished, in no acute distress  Skin: warm and dry, no rash or erythema  Head: normocephalic and atraumatic  Eyes: pupils equal, round, and reactive to light, extraocular eye movements intact, conjunctivae normal  ENT: tympanic membrane, external ear and ear canal normal bilaterally, nose without deformity, nasal mucosa and turbinates normal without polyps  Neck: supple and non-tender without mass, no thyromegaly or thyroid nodules, no cervical lymphadenopathy  Pulmonary/Chest: clear to auscultation bilaterally- no wheezes, rales or rhonchi, normal air movement, no respiratory distress  Cardiovascular: normal rate, regular rhythm, normal S1 and S2, no murmurs, rubs, clicks, or gallops, distal pulses intact, no carotid bruits  Abdomen: soft, non-tender, non-distended, normal bowel sounds, no masses or organomegaly  Extremities: no cyanosis, clubbing or edema  Musculoskeletal: normal range of motion, no joint swelling, deformity or tenderness  Neurologic: reflexes normal and symmetric, no cranial nerve deficit, gait, coordination and speech normal            Allergies   Allergen Reactions    Adhesive Tape Other (See Comments)     Other reaction(s): Other- (not listed) - Allergy  Tears skin , skin thin   Tears skin , skin thin       Tramadol Other (See Comments)     Causes hallucinations   8/30/24 - is tolerating.     Hydrocodone     Hydrocodone Bit-Homatrop Mbr Hallucinations     Other reaction(s): Unknown (comments)    Hydromorphone Other (See Comments)     hallucinations    Morphine Other (See Comments)     hallucinations     Prior to Visit Medications    Medication Sig Taking? Authorizing Provider   albuterol sulfate HFA (VENTOLIN HFA) 108 (90 Base) MCG/ACT inhaler Inhale 2 puffs into the lungs 4 times daily as needed for Wheezing Yes Bridgette Aguilera, APRN - CNP   Budeson-Glycopyrrol-Formoterol (BREZTRI AEROSPHERE) 160-9-4.8 MCG/ACT AERO Inhale 2 each into the lungs in the morning and at bedtime Yes Ale

## 2024-10-22 ENCOUNTER — OFFICE VISIT (OUTPATIENT)
Dept: VASCULAR SURGERY | Age: 79
End: 2024-10-22
Payer: MEDICARE

## 2024-10-22 VITALS
DIASTOLIC BLOOD PRESSURE: 81 MMHG | SYSTOLIC BLOOD PRESSURE: 127 MMHG | HEART RATE: 81 BPM | OXYGEN SATURATION: 98 % | BODY MASS INDEX: 27.42 KG/M2 | WEIGHT: 149 LBS | HEIGHT: 62 IN

## 2024-10-22 DIAGNOSIS — I73.9 PVD (PERIPHERAL VASCULAR DISEASE) WITH CLAUDICATION (HCC): Primary | ICD-10-CM

## 2024-10-22 PROCEDURE — G8484 FLU IMMUNIZE NO ADMIN: HCPCS | Performed by: SURGERY

## 2024-10-22 PROCEDURE — G8399 PT W/DXA RESULTS DOCUMENT: HCPCS | Performed by: SURGERY

## 2024-10-22 PROCEDURE — G8417 CALC BMI ABV UP PARAM F/U: HCPCS | Performed by: SURGERY

## 2024-10-22 PROCEDURE — 1090F PRES/ABSN URINE INCON ASSESS: CPT | Performed by: SURGERY

## 2024-10-22 PROCEDURE — 1123F ACP DISCUSS/DSCN MKR DOCD: CPT | Performed by: SURGERY

## 2024-10-22 PROCEDURE — G8427 DOCREV CUR MEDS BY ELIG CLIN: HCPCS | Performed by: SURGERY

## 2024-10-22 PROCEDURE — 1036F TOBACCO NON-USER: CPT | Performed by: SURGERY

## 2024-10-22 PROCEDURE — 99213 OFFICE O/P EST LOW 20 MIN: CPT | Performed by: SURGERY

## 2024-10-22 NOTE — PROGRESS NOTES
[address] [address]  [phone] FAX: [phone]    Rekha Palmer  : 1945    Chief Complaint:     History of Present Illness:   Patient follows up today for follow-up possible consultation.  She was initially seen to have right hand numbness but CT scan did not verify any significant plaque disease.  I have reviewed the scan I believe it was over read secondary to motion.  She denies any other neurological symptoms.    CURRENT MEDICATIONS:  Current Outpatient Medications   Medication Sig Dispense Refill    citalopram (CELEXA) 40 MG tablet Take 1 tablet by mouth daily 90 tablet 3    albuterol sulfate HFA (VENTOLIN HFA) 108 (90 Base) MCG/ACT inhaler Inhale 2 puffs into the lungs 4 times daily as needed for Wheezing 18 g 5    Budeson-Glycopyrrol-Formoterol (BREZTRI AEROSPHERE) 160-9-4.8 MCG/ACT AERO Inhale 2 each into the lungs in the morning and at bedtime 3 each 3    clopidogrel (PLAVIX) 75 MG tablet Take 1 tablet by mouth daily 60 tablet 0    atorvastatin (LIPITOR) 80 MG tablet Take 1 tablet by mouth nightly 30 tablet 0    nitroGLYCERIN (NITROSTAT) 0.4 MG SL tablet Place 1 tablet under the tongue every 5 minutes as needed for Chest pain up to max of 3 total doses. If no relief after 1 dose, call 911. 25 tablet 5    Magnesium Cl-Calcium Carbonate (SLOW-MAG PO) Take 2 tablets by mouth in the morning and at bedtime      Calcium Citrate-Vitamin D (CITRACAL + D PO) Take 1 tablet by mouth daily      metoprolol succinate (TOPROL XL) 25 MG extended release tablet Take 1 tablet by mouth daily 90 tablet 3    famotidine (PEPCID) 40 MG tablet Take 1 tablet by mouth      STELARA 90 MG/ML SOSY prefilled syringe Every 8 weeks      cyanocobalamin 1000 MCG tablet Take 1 tablet by mouth daily      acetaminophen (TYLENOL) 500 MG tablet Take by mouth every 6 hours as needed      aspirin 81 MG EC tablet Take by mouth daily      denosumab (PROLIA) 60 MG/ML SOSY SC injection

## 2024-10-25 ENCOUNTER — OFFICE VISIT (OUTPATIENT)
Age: 79
End: 2024-10-25

## 2024-10-25 VITALS
BODY MASS INDEX: 27.71 KG/M2 | HEIGHT: 62 IN | WEIGHT: 150.6 LBS | HEART RATE: 86 BPM | SYSTOLIC BLOOD PRESSURE: 122 MMHG | DIASTOLIC BLOOD PRESSURE: 68 MMHG

## 2024-10-25 DIAGNOSIS — I50.22 CHRONIC SYSTOLIC CONGESTIVE HEART FAILURE (HCC): ICD-10-CM

## 2024-10-25 DIAGNOSIS — I42.0 DILATED CARDIOMYOPATHY (HCC): ICD-10-CM

## 2024-10-25 DIAGNOSIS — I47.29 NSVT (NONSUSTAINED VENTRICULAR TACHYCARDIA) (HCC): Primary | ICD-10-CM

## 2024-10-25 DIAGNOSIS — I42.8 NICM (NONISCHEMIC CARDIOMYOPATHY) (HCC): ICD-10-CM

## 2024-10-25 DIAGNOSIS — N18.31 STAGE 3A CHRONIC KIDNEY DISEASE (HCC): ICD-10-CM

## 2024-10-25 DIAGNOSIS — Z95.810 PRESENCE OF AUTOMATIC (IMPLANTABLE) CARDIAC DEFIBRILLATOR: ICD-10-CM

## 2024-10-25 DIAGNOSIS — I65.23 BILATERAL CAROTID ARTERY STENOSIS: ICD-10-CM

## 2024-10-25 RX ORDER — METOPROLOL SUCCINATE 25 MG/1
25 TABLET, EXTENDED RELEASE ORAL DAILY
Qty: 90 TABLET | Refills: 3 | Status: SHIPPED | OUTPATIENT
Start: 2024-10-25

## 2024-10-25 NOTE — PROGRESS NOTES
[facility], [address] [place] [address]  PHONE: [phone]     10/25/24    NAME:  Rekha Palmer  : 1945  MRN: [id number]       SUBJECTIVE:   Rekha Palmer is a 79 y.o. female seen for a follow up visit regarding the following:     Chief Complaint   Patient presents with    Congestive Heart Failure    Cardiomyopathy       HPI: Here for vascular dz follow up.   2017 TIA sx  She has crohn's dz.     Echo  showing EF 30-35%, mod MR   3/2018: ICD implant   Echo 3/2022: EF 40-45%     LHC/RHC  2022:     Mild nonobstructive coronary artery disease    Low normal LV systolic function    Mild pulmonary hypertension    Carotid 2024: DEIDRE: Ultrasound evidence of 50 to 69% stenosis.      Dx with dementia, struggling now.    On lasix PRN now, following weights.   Some CHUA, not as active.   No CP, pressure.  Using walker/cane now, seeing ortho for knee injections.     Following weights with lasix plan.      She has NYHA Class II sx now. Energy poor now.     Patient denies recent history of orthopnea, PND, excessive dizziness and/or syncope.       Past Medical History, Past Surgical History, Family history, Social History, and Medications were all reviewed with the patient today and updated as necessary.     Current Outpatient Medications   Medication Sig Dispense Refill    metoprolol succinate (TOPROL XL) 25 MG extended release tablet Take 1 tablet by mouth daily 90 tablet 3    citalopram (CELEXA) 40 MG tablet Take 1 tablet by mouth daily 90 tablet 3    albuterol sulfate HFA (VENTOLIN HFA) 108 (90 Base) MCG/ACT inhaler Inhale 2 puffs into the lungs 4 times daily as needed for Wheezing 18 g 5    Budeson-Glycopyrrol-Formoterol (BREZTRI AEROSPHERE) 160-9-4.8 MCG/ACT AERO Inhale 2 each into the lungs in the morning and at bedtime 3 each 3    atorvastatin (LIPITOR) 80 MG tablet Take 1 tablet by mouth nightly 30 tablet 0    nitroGLYCERIN (NITROSTAT) 0.4 MG SL tablet Place 1 tablet under

## 2024-10-29 ENCOUNTER — OFFICE VISIT (OUTPATIENT)
Dept: NEUROLOGY | Age: 79
End: 2024-10-29
Payer: MEDICARE

## 2024-10-29 VITALS
BODY MASS INDEX: 27.23 KG/M2 | OXYGEN SATURATION: 96 % | DIASTOLIC BLOOD PRESSURE: 80 MMHG | HEIGHT: 62 IN | HEART RATE: 80 BPM | SYSTOLIC BLOOD PRESSURE: 121 MMHG | WEIGHT: 148 LBS

## 2024-10-29 DIAGNOSIS — G30.1 LATE ONSET ALZHEIMER'S DEMENTIA WITHOUT BEHAVIORAL DISTURBANCE, PSYCHOTIC DISTURBANCE, MOOD DISTURBANCE, OR ANXIETY, UNSPECIFIED DEMENTIA SEVERITY (HCC): ICD-10-CM

## 2024-10-29 DIAGNOSIS — Z87.891 FORMER TOBACCO USE: ICD-10-CM

## 2024-10-29 DIAGNOSIS — Z95.810 PRESENCE OF AUTOMATIC (IMPLANTABLE) CARDIAC DEFIBRILLATOR: ICD-10-CM

## 2024-10-29 DIAGNOSIS — F02.80 LATE ONSET ALZHEIMER'S DEMENTIA WITHOUT BEHAVIORAL DISTURBANCE, PSYCHOTIC DISTURBANCE, MOOD DISTURBANCE, OR ANXIETY, UNSPECIFIED DEMENTIA SEVERITY (HCC): ICD-10-CM

## 2024-10-29 DIAGNOSIS — Z86.73 HISTORY OF CVA (CEREBROVASCULAR ACCIDENT): Primary | ICD-10-CM

## 2024-10-29 DIAGNOSIS — I65.23 CAROTID STENOSIS, BILATERAL: ICD-10-CM

## 2024-10-29 PROCEDURE — G8484 FLU IMMUNIZE NO ADMIN: HCPCS | Performed by: PHYSICAL THERAPIST

## 2024-10-29 PROCEDURE — 1036F TOBACCO NON-USER: CPT | Performed by: PHYSICAL THERAPIST

## 2024-10-29 PROCEDURE — 1126F AMNT PAIN NOTED NONE PRSNT: CPT | Performed by: PHYSICAL THERAPIST

## 2024-10-29 PROCEDURE — 99214 OFFICE O/P EST MOD 30 MIN: CPT | Performed by: PHYSICAL THERAPIST

## 2024-10-29 PROCEDURE — 1123F ACP DISCUSS/DSCN MKR DOCD: CPT | Performed by: PHYSICAL THERAPIST

## 2024-10-29 PROCEDURE — 1159F MED LIST DOCD IN RCRD: CPT | Performed by: PHYSICAL THERAPIST

## 2024-10-29 PROCEDURE — 1090F PRES/ABSN URINE INCON ASSESS: CPT | Performed by: PHYSICAL THERAPIST

## 2024-10-29 PROCEDURE — G8399 PT W/DXA RESULTS DOCUMENT: HCPCS | Performed by: PHYSICAL THERAPIST

## 2024-10-29 PROCEDURE — G8417 CALC BMI ABV UP PARAM F/U: HCPCS | Performed by: PHYSICAL THERAPIST

## 2024-10-29 PROCEDURE — G8427 DOCREV CUR MEDS BY ELIG CLIN: HCPCS | Performed by: PHYSICAL THERAPIST

## 2024-10-29 RX ORDER — DONEPEZIL HYDROCHLORIDE 5 MG/1
5 TABLET, FILM COATED ORAL NIGHTLY
Qty: 90 TABLET | Refills: 3 | Status: SHIPPED | OUTPATIENT
Start: 2024-10-29

## 2024-10-29 ASSESSMENT — PATIENT HEALTH QUESTIONNAIRE - PHQ9
2. FEELING DOWN, DEPRESSED OR HOPELESS: NOT AT ALL
SUM OF ALL RESPONSES TO PHQ9 QUESTIONS 1 & 2: 0
SUM OF ALL RESPONSES TO PHQ QUESTIONS 1-9: 0
1. LITTLE INTEREST OR PLEASURE IN DOING THINGS: NOT AT ALL

## 2024-10-29 NOTE — PROGRESS NOTES
CORONARY ANGIOGRAPHY performed by Oscar Duarte MD at [facility] CARDIAC CATH LAB    CHOLECYSTECTOMY      OTHER SURGICAL HISTORY  2018    back surgery, cement put in, Dr Hargrove    TOTAL COLECTOMY      COLON CANCER x 2       Family History   Problem Relation Age of Onset    Cancer Mother         Bone CA, brain tumor    Heart Failure Father     Breast Cancer Neg Hx        Social History     Socioeconomic History    Marital status:      Spouse name: None    Number of children: None    Years of education: None    Highest education level: None   Tobacco Use    Smoking status: Former     Current packs/day: 0.00     Average packs/day: 1 pack/day for 50.0 years (50.0 ttl pk-yrs)     Types: Cigarettes     Start date: 1968     Quit date: 2018     Years since quittin.9     Passive exposure: Past    Smokeless tobacco: Never   Substance and Sexual Activity    Alcohol use: No    Drug use: No     Social Determinants of Health     Financial Resource Strain: Low Risk  (2024)    Overall Financial Resource Strain (CARDIA)     Difficulty of Paying Living Expenses: Not hard at all   Food Insecurity: No Food Insecurity (2024)    Hunger Vital Sign     Worried About Running Out of Food in the Last Year: Never true     Ran Out of Food in the Last Year: Never true   Transportation Needs: No Transportation Needs (2024)    PRAPARE - Transportation     Lack of Transportation (Medical): No     Lack of Transportation (Non-Medical): No   Physical Activity: Sufficiently Active (10/14/2024)    Exercise Vital Sign     Days of Exercise per Week: 3 days     Minutes of Exercise per Session: 60 min   Social Connections: Unknown (3/20/2021)    Received from [facility]    Social Connections     Frequency of Communication with Friends and Family: Not asked     Frequency of Social Gatherings with Friends and Family: Not asked   Intimate Partner Violence: Unknown (3/20/2021)    Received from [facility]

## 2024-11-05 ENCOUNTER — HOSPITAL ENCOUNTER (OUTPATIENT)
Dept: MAMMOGRAPHY | Age: 79
Discharge: HOME | End: 2024-11-08
Attending: INTERNAL MEDICINE
Payer: MEDICARE

## 2024-11-05 DIAGNOSIS — Z79.899 ENCOUNTER FOR MONITORING DENOSUMAB THERAPY: ICD-10-CM

## 2024-11-05 DIAGNOSIS — M81.0 POSTMENOPAUSAL OSTEOPOROSIS: ICD-10-CM

## 2024-11-05 DIAGNOSIS — Z51.81 ENCOUNTER FOR MONITORING DENOSUMAB THERAPY: ICD-10-CM

## 2024-11-05 PROCEDURE — 77080 DXA BONE DENSITY AXIAL: CPT

## 2024-11-11 ENCOUNTER — TELEPHONE (OUTPATIENT)
Dept: RHEUMATOLOGY | Age: 79
End: 2024-11-11

## 2024-11-11 NOTE — TELEPHONE ENCOUNTER
----- Message from Dr. Abril Ren MD sent at 11/11/2024  3:59 PM EST -----  Please call patient and let her know bone density scan shows still shows osteoporosis in hips so I would recommend continuing Prolia.  I do not think following her spinal scores will be reliable based on the arthritis in the future

## 2024-11-13 ENCOUNTER — OFFICE VISIT (OUTPATIENT)
Dept: FAMILY MEDICINE CLINIC | Facility: CLINIC | Age: 79
End: 2024-11-13

## 2024-11-13 VITALS
HEIGHT: 62 IN | WEIGHT: 148.8 LBS | HEART RATE: 83 BPM | SYSTOLIC BLOOD PRESSURE: 122 MMHG | TEMPERATURE: 98.5 F | OXYGEN SATURATION: 98 % | DIASTOLIC BLOOD PRESSURE: 62 MMHG | BODY MASS INDEX: 27.38 KG/M2

## 2024-11-13 DIAGNOSIS — J20.8 ACUTE BACTERIAL BRONCHITIS: ICD-10-CM

## 2024-11-13 DIAGNOSIS — J44.1 COPD EXACERBATION (HCC): ICD-10-CM

## 2024-11-13 DIAGNOSIS — R09.89 CHEST CONGESTION: ICD-10-CM

## 2024-11-13 DIAGNOSIS — R05.1 ACUTE COUGH: Primary | ICD-10-CM

## 2024-11-13 DIAGNOSIS — B96.89 ACUTE BACTERIAL BRONCHITIS: ICD-10-CM

## 2024-11-13 RX ORDER — TRIAMCINOLONE ACETONIDE 40 MG/ML
40 INJECTION, SUSPENSION INTRA-ARTICULAR; INTRAMUSCULAR ONCE
Status: COMPLETED | OUTPATIENT
Start: 2024-11-13 | End: 2024-11-13

## 2024-11-13 RX ORDER — AZITHROMYCIN 250 MG/1
TABLET, FILM COATED ORAL
Qty: 6 TABLET | Refills: 0 | Status: SHIPPED | OUTPATIENT
Start: 2024-11-13

## 2024-11-13 RX ADMIN — TRIAMCINOLONE ACETONIDE 40 MG: 40 INJECTION, SUSPENSION INTRA-ARTICULAR; INTRAMUSCULAR at 10:19

## 2024-11-13 ASSESSMENT — PATIENT HEALTH QUESTIONNAIRE - PHQ9
4. FEELING TIRED OR HAVING LITTLE ENERGY: NOT AT ALL
SUM OF ALL RESPONSES TO PHQ QUESTIONS 1-9: 0
6. FEELING BAD ABOUT YOURSELF - OR THAT YOU ARE A FAILURE OR HAVE LET YOURSELF OR YOUR FAMILY DOWN: NOT AT ALL
5. POOR APPETITE OR OVEREATING: NOT AT ALL
SUM OF ALL RESPONSES TO PHQ9 QUESTIONS 1 & 2: 0
3. TROUBLE FALLING OR STAYING ASLEEP: NOT AT ALL
1. LITTLE INTEREST OR PLEASURE IN DOING THINGS: NOT AT ALL
9. THOUGHTS THAT YOU WOULD BE BETTER OFF DEAD, OR OF HURTING YOURSELF: NOT AT ALL
7. TROUBLE CONCENTRATING ON THINGS, SUCH AS READING THE NEWSPAPER OR WATCHING TELEVISION: NOT AT ALL
10. IF YOU CHECKED OFF ANY PROBLEMS, HOW DIFFICULT HAVE THESE PROBLEMS MADE IT FOR YOU TO DO YOUR WORK, TAKE CARE OF THINGS AT HOME, OR GET ALONG WITH OTHER PEOPLE: NOT DIFFICULT AT ALL

## 2024-11-13 NOTE — PROGRESS NOTES
SUBJECTIVE:   Rekha Palmer is a 79 y.o. female who has a past medical history significant for hypertension, high cholesterol, chronic systolic heart failure, ulcerative colitis, lymphoma, TIA, COPD, osteoporosis, spinal compression fractures, recurrent depression and bilateral carotid artery disease.  Presents today accompanied by her daughter complaining of a 1 week history of worsening congestion in her chest and a mildly productive cough.  No fever or bodyaches.  No chest pain or shortness of breath.  Has noticed some nocturnal wheezing.  Has not been using her albuterol inhaler.    HPI  See above    Past Medical History, Past Surgical History, Family history, Social History, and Medications were all reviewed with the patient today and updated as necessary.       Current Outpatient Medications   Medication Sig Dispense Refill    donepezil (ARICEPT) 5 MG tablet Take 1 tablet by mouth nightly 90 tablet 3    metoprolol succinate (TOPROL XL) 25 MG extended release tablet Take 1 tablet by mouth daily 90 tablet 3    citalopram (CELEXA) 40 MG tablet Take 1 tablet by mouth daily 90 tablet 3    albuterol sulfate HFA (VENTOLIN HFA) 108 (90 Base) MCG/ACT inhaler Inhale 2 puffs into the lungs 4 times daily as needed for Wheezing 18 g 5    Budeson-Glycopyrrol-Formoterol (BREZTRI AEROSPHERE) 160-9-4.8 MCG/ACT AERO Inhale 2 each into the lungs in the morning and at bedtime 3 each 3    atorvastatin (LIPITOR) 80 MG tablet Take 1 tablet by mouth nightly 30 tablet 0    nitroGLYCERIN (NITROSTAT) 0.4 MG SL tablet Place 1 tablet under the tongue every 5 minutes as needed for Chest pain up to max of 3 total doses. If no relief after 1 dose, call 911. 25 tablet 5    Magnesium Cl-Calcium Carbonate (SLOW-MAG PO) Take 2 tablets by mouth in the morning and at bedtime      Calcium Citrate-Vitamin D (CITRACAL + D PO) Take 1 tablet by mouth daily      famotidine (PEPCID) 40 MG tablet Take 1 tablet by mouth      STELARA 90 MG/ML SOSY

## 2024-11-13 NOTE — PROGRESS NOTES
Case Management Discharge Planning Note    Patient name Dwaine Gould  Location ICU 07/ICU 07 MRN [id number]  : 1964 Date 3/28/2024       Current Admission Date: 3/19/2024  Current Admission Diagnosis:Tobacco abuse   Patient Active Problem List    Diagnosis Date Noted    Anemia 2024    Thrombocytopenia (HCC) 2024    Acute on chronic respiratory failure with hypoxia and hypercapnia (HCC) 2024    Stage 3a chronic kidney disease (HCC) 2024    Acute on chronic heart failure with preserved ejection fraction (HCC) 2024    COPD (chronic obstructive pulmonary disease) (HCC) 2024    Leukocytosis 2024    Tobacco abuse 2021    CAD (coronary artery disease) 2021    Essential hypertension 2019    Hyperlipidemia 2019      LOS (days): 9  Geometric Mean LOS (GMLOS) (days): 9.8  Days to GMLOS:1.2     OBJECTIVE:  Risk of Unplanned Readmission Score: 17.95         Current admission status: Inpatient   Preferred Pharmacy:   [facility]/pharmacy #[id number] - [place] - [address]  [place] [id number]  Phone: [phone] Fax: [phone]    Primary Care Provider: Jordan Ramirez MD    Primary Insurance: [employer]  Secondary Insurance:     DISCHARGE DETAILS:                                          Other Referral/Resources/Interventions Provided:  Referral Comments: Met w pt at bedside to discuss d/c plan;  pt for d/c today per MD.  Pt assigned to Level II;  gave pt provider choice list for SNFs and HH.  Pt reports she feels well enough to go home w home health;  happy w [facility].  Pt says she has all necessary medical equipment at home.  AVS completed;  message sent to [facility] via [facility].  Pt reports her family will be here at 1530 to transport to home.         Treatment Team Recommendation: Short Term Rehab, SNF, Home with Home Health Care  Discharge Destination Plan:: Home with Home Health Care  Transport at  [facility]  Pre Procedure History & Physical    Chief Complaint:   Anemia    Subjective     HPI:   63 y.o. male here for EGD/colonosocpy for evaluation of anemia.    Past Medical History:   Past Medical History:   Diagnosis Date   • Acute MI, inferoposterior wall    • Atrial fibrillation    • BPH (benign prostatic hyperplasia)    • DDD (degenerative disc disease), lumbar    • Diabetes    • Elevated cholesterol    • GERD (gastroesophageal reflux disease)    • Head trauma 2007   • Hemorrhoids    • History of shingles 2012   • History of snoring    • HTN (hypertension)    • Hyperlipidemia    • Hypogonadism in male    • Kidney stone    • Muscle spasm    • Ventricular fibrillation        Past Surgical History:  Past Surgical History:   Procedure Laterality Date   • APPENDECTOMY     • CARDIAC CATHETERIZATION N/A 08/18/2019    Procedure: Left Heart Cath;  Surgeon: Rod Gomes MD;  Location: [facility] CATH INVASIVE LOCATION;  Service: Cardiology   • CARDIAC CATHETERIZATION N/A 08/18/2019    Procedure: Stent ROBY coronary;  Surgeon: Rod Gomes MD;  Location: [facility] CATH INVASIVE LOCATION;  Service: Cardiology   • CARDIAC CATHETERIZATION N/A 08/18/2019    Procedure: Coronary angiography;  Surgeon: Rod Gomes MD;  Location: [facility] CATH INVASIVE LOCATION;  Service: Cardiology   • CARDIAC CATHETERIZATION N/A 08/18/2019    Procedure: Left ventriculography;  Surgeon: Rod Gomes MD;  Location: [facility] CATH INVASIVE LOCATION;  Service: Cardiology   • COLONOSCOPY  2010   • FEMUR FRACTURE SURGERY Right 1990   • HAND SURGERY Right    • ORIF FINGER / THUMB FRACTURE Right    • SPLENECTOMY  2007   • TONSILLECTOMY         Family History:  Family History   Problem Relation Age of Onset   • Hyperlipidemia Mother    • Hypertension Mother    • Heart disease Mother    • Diabetes Mother    • Heart failure Mother    • Heart attack Mother        Social History:   reports that he quit smoking about 23  \"Have you been to the ER, urgent care clinic since your last visit?  Hospitalized since your last visit?\"    NO    “Have you seen or consulted any other health care providers outside our system since your last visit?”    NO             "years ago. His smoking use included cigarettes. He smoked an average of 1.5 packs per day. He has never used smokeless tobacco. He reports current alcohol use of about 3.0 standard drinks per week. He reports that he does not use drugs.    Medications:   Medications Prior to Admission   Medication Sig Dispense Refill Last Dose   • acetaminophen (TYLENOL) 500 MG tablet Take 2 tablets by mouth.      • atorvastatin (LIPITOR) 40 MG tablet TAKE 1 TABLET BY MOUTH AT  NIGHT 90 tablet 3    • baclofen (LIORESAL) 10 MG tablet Take 1 tablet by mouth 3 (Three) Times a Day.      • clopidogrel (PLAVIX) 75 MG tablet TAKE 1 TABLET BY MOUTH  DAILY 90 tablet 3    • ferrous sulfate 325 (65 FE) MG tablet Take 1 tablet by mouth 2 (Two) Times a Day With Meals. 180 tablet 1    • fluticasone (FLONASE) 50 MCG/ACT nasal spray 2 sprays into the nostril(s) as directed by provider Daily As Needed for Rhinitis.      • gabapentin (NEURONTIN) 300 MG capsule       • hydroCHLOROthiazide (HYDRODIURIL) 25 MG tablet Take 1 tablet by mouth Daily.      • HYDROcodone-acetaminophen (NORCO)  MG per tablet Take 1 tablet by mouth Every 6 (Six) Hours As Needed for Moderate Pain.      • Krill Oil 1000 MG capsule Take  by mouth.      • lisinopril (PRINIVIL,ZESTRIL) 5 MG tablet Take 1 tablet by mouth Daily.      • magnesium oxide (MAG-OX) 400 MG tablet Take 2 tablets by mouth 2 (Two) Times a Day. 360 tablet 1    • metoprolol succinate XL (TOPROL-XL) 25 MG 24 hr tablet TAKE ONE AND ONE-HALF TABLETS DAILY 135 tablet 2    • pantoprazole (PROTONIX) 40 MG EC tablet Take 1 tablet by mouth Daily.      • tamsulosin (FLOMAX) 0.4 MG capsule 24 hr capsule Take 1 capsule by mouth Every Night.      • Testosterone 20.25 MG/1.25GM (1.62%) gel Place 20.25 mg on the skin as directed by provider. (Patient not taking: Reported on 4/10/2023)          Allergies:  Topiramate    ROS:    Pertinent items are noted in HPI     Objective     Height 177.8 cm (70\"), weight 98.9 kg (218 " Discharge : Family                                                              lb).    Physical Exam   Constitutional: Pt is oriented to person, place, and time and well-developed, well-nourished, and in no distress.   Mouth/Throat: Oropharynx is clear and moist.   Neck: Normal range of motion.   Cardiovascular: Normal rate, regular rhythm and normal heart sounds.    Pulmonary/Chest: Effort normal and breath sounds normal.   Abdominal: Soft. Nontender  Skin: Skin is warm and dry.   Psychiatric: Mood, memory, affect and judgment normal.     Assessment & Plan     Diagnosis:  Anemia    Anticipated Surgical Procedure:  EGD  Colonoscopy    The risks, benefits, and alternatives of this procedure have been discussed with the patient or the responsible party- the patient understands and agrees to proceed.

## 2024-11-25 ENCOUNTER — TELEMEDICINE (OUTPATIENT)
Dept: FAMILY MEDICINE CLINIC | Facility: CLINIC | Age: 79
End: 2024-11-25

## 2024-11-25 DIAGNOSIS — E78.00 PURE HYPERCHOLESTEROLEMIA: Primary | ICD-10-CM

## 2024-11-25 DIAGNOSIS — G30.9 ALZHEIMER'S DEMENTIA, UNSPECIFIED DEMENTIA SEVERITY, UNSPECIFIED TIMING OF DEMENTIA ONSET, UNSPECIFIED WHETHER BEHAVIORAL, PSYCHOTIC, OR MOOD DISTURBANCE OR ANXIETY (HCC): ICD-10-CM

## 2024-11-25 DIAGNOSIS — N18.31 STAGE 3A CHRONIC KIDNEY DISEASE (HCC): ICD-10-CM

## 2024-11-25 DIAGNOSIS — F02.80 ALZHEIMER'S DEMENTIA, UNSPECIFIED DEMENTIA SEVERITY, UNSPECIFIED TIMING OF DEMENTIA ONSET, UNSPECIFIED WHETHER BEHAVIORAL, PSYCHOTIC, OR MOOD DISTURBANCE OR ANXIETY (HCC): ICD-10-CM

## 2024-11-25 DIAGNOSIS — I10 PRIMARY HYPERTENSION: ICD-10-CM

## 2024-11-25 DIAGNOSIS — J44.9 COPD MIXED TYPE (HCC): ICD-10-CM

## 2024-11-25 DIAGNOSIS — F33.9 RECURRENT DEPRESSION (HCC): ICD-10-CM

## 2024-11-25 DIAGNOSIS — I50.22 CHRONIC SYSTOLIC CONGESTIVE HEART FAILURE (HCC): ICD-10-CM

## 2024-11-25 DIAGNOSIS — C83.30 DIFFUSE LARGE B-CELL LYMPHOMA, UNSPECIFIED BODY REGION (HCC): ICD-10-CM

## 2024-11-25 RX ORDER — HYDROXYZINE HYDROCHLORIDE 25 MG/1
TABLET, FILM COATED ORAL
COMMUNITY
Start: 2024-08-27

## 2024-11-25 RX ORDER — TRAMADOL HYDROCHLORIDE 25 MG/1
1 TABLET, COATED ORAL EVERY 8 HOURS PRN
COMMUNITY
Start: 2024-08-28

## 2024-11-25 NOTE — PROGRESS NOTES
SUBJECTIVE:   Rekha Palmer is a 79 y.o. female who has a past medical history significant for hypertension, high cholesterol, chronic systolic heart failure, ulcerative colitis, lymphoma, TIA, COPD, osteoporosis, spinal compression fractures, recurrent depression and bilateral carotid artery disease.  Here for a virtual visit through telephone encounter.  Review of reveals no complaints of chest pain, shortness of breath, orthopnea or PND.  Overall she states she is doing well and has no issues today.    Patient's vital signs were not performed as encounter was performed virtually.  Location of virtual visit was patient's home.      HPI  See above    Past Medical History, Past Surgical History, Family history, Social History, and Medications were all reviewed with the patient today and updated as necessary.       Current Outpatient Medications   Medication Sig Dispense Refill    hydrOXYzine HCl (ATARAX) 25 MG tablet TAKE 1 TABLET BY MOUTH THREE TIMES A DAY FOR ANXIETY/AGITATION      traMADol HCl 25 MG TABS Take 1 tablet by mouth every 8 hours as needed Max Daily Amount: 3 tablets      azithromycin (ZITHROMAX) 250 MG tablet Take 2 Tablets with food by mouth first day, then 1 tab with food by mouth daily for days 2 through 5. 6 tablet 0    donepezil (ARICEPT) 5 MG tablet Take 1 tablet by mouth nightly 90 tablet 3    metoprolol succinate (TOPROL XL) 25 MG extended release tablet Take 1 tablet by mouth daily 90 tablet 3    citalopram (CELEXA) 40 MG tablet Take 1 tablet by mouth daily 90 tablet 3    albuterol sulfate HFA (VENTOLIN HFA) 108 (90 Base) MCG/ACT inhaler Inhale 2 puffs into the lungs 4 times daily as needed for Wheezing 18 g 5    Budeson-Glycopyrrol-Formoterol (BREZTRI AEROSPHERE) 160-9-4.8 MCG/ACT AERO Inhale 2 each into the lungs in the morning and at bedtime 3 each 3    clopidogrel (PLAVIX) 75 MG tablet Take 1 tablet by mouth daily 60 tablet 0    atorvastatin (LIPITOR) 80 MG tablet Take 1 tablet by

## 2024-12-10 ENCOUNTER — OFFICE VISIT (OUTPATIENT)
Dept: FAMILY MEDICINE CLINIC | Facility: CLINIC | Age: 79
End: 2024-12-10

## 2024-12-10 VITALS
SYSTOLIC BLOOD PRESSURE: 120 MMHG | DIASTOLIC BLOOD PRESSURE: 74 MMHG | WEIGHT: 150 LBS | BODY MASS INDEX: 27.6 KG/M2 | HEIGHT: 62 IN

## 2024-12-10 DIAGNOSIS — F03.90 DEMENTIA, UNSPECIFIED DEMENTIA SEVERITY, UNSPECIFIED DEMENTIA TYPE, UNSPECIFIED WHETHER BEHAVIORAL, PSYCHOTIC, OR MOOD DISTURBANCE OR ANXIETY (HCC): ICD-10-CM

## 2024-12-10 DIAGNOSIS — R05.1 ACUTE COUGH: Primary | ICD-10-CM

## 2024-12-10 DIAGNOSIS — I10 PRIMARY HYPERTENSION: ICD-10-CM

## 2024-12-10 DIAGNOSIS — J44.1 COPD EXACERBATION (HCC): ICD-10-CM

## 2024-12-10 DIAGNOSIS — J45.21 MILD INTERMITTENT ASTHMATIC BRONCHITIS WITH ACUTE EXACERBATION: ICD-10-CM

## 2024-12-10 RX ORDER — PREDNISONE 5 MG/1
TABLET ORAL
Qty: 21 EACH | Refills: 0 | Status: SHIPPED | OUTPATIENT
Start: 2024-12-10

## 2024-12-10 RX ORDER — QUETIAPINE FUMARATE 25 MG/1
25 TABLET, FILM COATED ORAL NIGHTLY
Qty: 45 TABLET | Refills: 0 | Status: SHIPPED | OUTPATIENT
Start: 2024-12-10

## 2024-12-10 RX ORDER — CEFDINIR 300 MG/1
300 CAPSULE ORAL 2 TIMES DAILY
Qty: 20 CAPSULE | Refills: 0 | Status: SHIPPED | OUTPATIENT
Start: 2024-12-10 | End: 2024-12-20

## 2024-12-10 ASSESSMENT — PATIENT HEALTH QUESTIONNAIRE - PHQ9
1. LITTLE INTEREST OR PLEASURE IN DOING THINGS: NOT AT ALL
2. FEELING DOWN, DEPRESSED OR HOPELESS: NOT AT ALL
SUM OF ALL RESPONSES TO PHQ QUESTIONS 1-9: 0
SUM OF ALL RESPONSES TO PHQ QUESTIONS 1-9: 0
SUM OF ALL RESPONSES TO PHQ9 QUESTIONS 1 & 2: 0
SUM OF ALL RESPONSES TO PHQ QUESTIONS 1-9: 0
SUM OF ALL RESPONSES TO PHQ QUESTIONS 1-9: 0

## 2024-12-10 NOTE — PROGRESS NOTES
\"Have you been to the ER, urgent care clinic since your last visit?  Hospitalized since your last visit?\"    NO    “Have you seen or consulted any other health care providers outside our system since your last visit?”    NO            
Effort: Pulmonary effort is normal.      Breath sounds: Rhonchi present. No wheezing.   Musculoskeletal:         General: Normal range of motion.      Cervical back: Normal range of motion and neck supple.      Right lower leg: No edema.      Left lower leg: No edema.   Skin:     General: Skin is warm.      Findings: No rash.   Neurological:      Mental Status: She is alert.   Psychiatric:         Mood and Affect: Mood normal.         Behavior: Behavior normal.         Thought Content: Thought content normal.         Judgment: Judgment normal.         Medical problems and test results were reviewed with the patient today.         ASSESSMENT and PLAN    1.  Cough.  Underlying exacerbation COPD and asthmatic bronchitis.  Over-the-counter Delsym as needed.    2.  Asthmatic bronchitis.  Sterapred 5 mg 6-day Dosepak.  Omnicef 300 mg twice a day for 10 days.  Return if symptoms persist or worsen.    3.  COPD exacerbation.  As above.  If symptoms persist follow-up with pulmonary.    4.  Dementia.  Per neurology.  Instructed daughter to reach out to her neurologist to let them know that patient is starting to have some sundowning with auditory and visual hallucinations.  Adjustments in medication may need to be made.    5.  Hypertension.  /74.        Elements of this note have been dictated using speech recognition software. As a result, errors of speech recognition may have occurred.

## 2025-01-16 ENCOUNTER — TELEPHONE (OUTPATIENT)
Dept: NEUROLOGY | Age: 80
End: 2025-01-16

## 2025-01-16 RX ORDER — QUETIAPINE FUMARATE 25 MG/1
25 TABLET, FILM COATED ORAL
Qty: 90 TABLET | Refills: 1 | OUTPATIENT
Start: 2025-01-16

## 2025-01-16 NOTE — TELEPHONE ENCOUNTER
Patients daughter called stating patient has recently been diagnosed with Alzheimer's but was having bad hallucinations and stumbling and falling with the Seroquel that [facility] Re[facility] has prescribed.     She is sing [facility] for CVA and has an appointment with Dr. Barnes in April for Alzheimer's. Daughter does not think she can with to April to see a provider.    Daughter has called PCP, which told her he didn't handle these things.    Patient is on palliative care, I suggest she call the Palliative care nurse to have patient evaluated.     Please Advise

## 2025-01-17 NOTE — TELEPHONE ENCOUNTER
Called patient's daughter to discuss her recent concerns.  It appears the patient is suffering from low iron along with the mother electrolyte/metabolic abnormalities.  This certainly could be worsening cognition.  This is also affecting her balance.  I did recommend to stop the Seroquel for now.  She has iron infusions next week.  About a week after the iron infusions if her mother is back to her normal ambulatory baseline, then she can restart the Seroquel.  If not I did recommend her calling us back to discuss more options.    In [facility]  Neurology

## 2025-01-18 ENCOUNTER — HOSPITAL ENCOUNTER (EMERGENCY)
Age: 80
Discharge: HOME | End: 2025-01-19
Attending: STUDENT IN AN ORGANIZED HEALTH CARE EDUCATION/TRAINING PROGRAM
Payer: MEDICARE

## 2025-01-18 ENCOUNTER — APPOINTMENT (OUTPATIENT)
Dept: CT IMAGING | Age: 80
End: 2025-01-18
Payer: MEDICARE

## 2025-01-18 ENCOUNTER — APPOINTMENT (OUTPATIENT)
Dept: GENERAL RADIOLOGY | Age: 80
End: 2025-01-18
Payer: MEDICARE

## 2025-01-18 DIAGNOSIS — R10.13 ABDOMINAL PAIN, EPIGASTRIC: Primary | ICD-10-CM

## 2025-01-18 DIAGNOSIS — N39.0 URINARY TRACT INFECTION WITHOUT HEMATURIA, SITE UNSPECIFIED: ICD-10-CM

## 2025-01-18 LAB
ALBUMIN SERPL-MCNC: 2.8 G/DL (ref 3.2–4.6)
ALBUMIN/GLOB SERPL: 0.9 (ref 1–1.9)
ALP SERPL-CCNC: 46 U/L (ref 35–104)
ALT SERPL-CCNC: 23 U/L (ref 8–45)
ANION GAP SERPL CALC-SCNC: 9 MMOL/L (ref 7–16)
AST SERPL-CCNC: 67 U/L (ref 15–37)
BASOPHILS # BLD: 0.04 K/UL (ref 0–0.2)
BASOPHILS NFR BLD: 0.5 % (ref 0–2)
BILIRUB SERPL-MCNC: 0.4 MG/DL (ref 0–1.2)
BUN SERPL-MCNC: 17 MG/DL (ref 8–23)
CALCIUM SERPL-MCNC: 9 MG/DL (ref 8.8–10.2)
CHLORIDE SERPL-SCNC: 107 MMOL/L (ref 98–107)
CO2 SERPL-SCNC: 25 MMOL/L (ref 20–29)
CREAT SERPL-MCNC: 1.14 MG/DL (ref 0.6–1.1)
D DIMER PPP FEU-MCNC: 1.89 UG/ML(FEU)
DIFFERENTIAL METHOD BLD: ABNORMAL
EOSINOPHIL # BLD: 0.12 K/UL (ref 0–0.8)
EOSINOPHIL NFR BLD: 1.4 % (ref 0.5–7.8)
ERYTHROCYTE [DISTWIDTH] IN BLOOD BY AUTOMATED COUNT: 14.3 % (ref 11.9–14.6)
GLOBULIN SER CALC-MCNC: 3.2 G/DL (ref 2.3–3.5)
GLUCOSE SERPL-MCNC: 136 MG/DL (ref 70–99)
HCT VFR BLD AUTO: 32.1 % (ref 35.8–46.3)
HGB BLD-MCNC: 9.7 G/DL (ref 11.7–15.4)
IMM GRANULOCYTES # BLD AUTO: 0.02 K/UL (ref 0–0.5)
IMM GRANULOCYTES NFR BLD AUTO: 0.2 % (ref 0–5)
LIPASE SERPL-CCNC: 19 U/L (ref 13–60)
LYMPHOCYTES # BLD: 1.69 K/UL (ref 0.5–4.6)
LYMPHOCYTES NFR BLD: 20.4 % (ref 13–44)
MCH RBC QN AUTO: 28 PG (ref 26.1–32.9)
MCHC RBC AUTO-ENTMCNC: 30.2 G/DL (ref 31.4–35)
MCV RBC AUTO: 92.5 FL (ref 82–102)
MONOCYTES # BLD: 1.08 K/UL (ref 0.1–1.3)
MONOCYTES NFR BLD: 13 % (ref 4–12)
NEUTS SEG # BLD: 5.33 K/UL (ref 1.7–8.2)
NEUTS SEG NFR BLD: 64.5 % (ref 43–78)
NRBC # BLD: 0 K/UL (ref 0–0.2)
NT PRO BNP: 654 PG/ML (ref 0–450)
PLATELET # BLD AUTO: 206 K/UL (ref 150–450)
PMV BLD AUTO: 10.4 FL (ref 9.4–12.3)
POTASSIUM SERPL-SCNC: 4.3 MMOL/L (ref 3.5–5.1)
PROT SERPL-MCNC: 6 G/DL (ref 6.3–8.2)
RBC # BLD AUTO: 3.47 M/UL (ref 4.05–5.2)
SODIUM SERPL-SCNC: 141 MMOL/L (ref 136–145)
TROPONIN T SERPL HS-MCNC: 19 NG/L (ref 0–14)
WBC # BLD AUTO: 8.3 K/UL (ref 4.3–11.1)

## 2025-01-18 PROCEDURE — 80053 COMPREHEN METABOLIC PANEL: CPT

## 2025-01-18 PROCEDURE — 83880 ASSAY OF NATRIURETIC PEPTIDE: CPT

## 2025-01-18 PROCEDURE — 93005 ELECTROCARDIOGRAM TRACING: CPT | Performed by: STUDENT IN AN ORGANIZED HEALTH CARE EDUCATION/TRAINING PROGRAM

## 2025-01-18 PROCEDURE — 71260 CT THORAX DX C+: CPT

## 2025-01-18 PROCEDURE — 85379 FIBRIN DEGRADATION QUANT: CPT

## 2025-01-18 PROCEDURE — 85025 COMPLETE CBC W/AUTO DIFF WBC: CPT

## 2025-01-18 PROCEDURE — 6360000004 HC RX CONTRAST MEDICATION: Performed by: STUDENT IN AN ORGANIZED HEALTH CARE EDUCATION/TRAINING PROGRAM

## 2025-01-18 PROCEDURE — 83690 ASSAY OF LIPASE: CPT

## 2025-01-18 PROCEDURE — 84484 ASSAY OF TROPONIN QUANT: CPT

## 2025-01-18 PROCEDURE — 99285 EMERGENCY DEPT VISIT HI MDM: CPT

## 2025-01-18 PROCEDURE — 71046 X-RAY EXAM CHEST 2 VIEWS: CPT

## 2025-01-18 RX ORDER — IOPAMIDOL 755 MG/ML
100 INJECTION, SOLUTION INTRAVASCULAR
Status: COMPLETED | OUTPATIENT
Start: 2025-01-18 | End: 2025-01-18

## 2025-01-18 RX ADMIN — IOPAMIDOL 100 ML: 755 INJECTION, SOLUTION INTRAVENOUS at 23:35

## 2025-01-19 VITALS
HEIGHT: 62 IN | DIASTOLIC BLOOD PRESSURE: 53 MMHG | TEMPERATURE: 98.2 F | HEART RATE: 72 BPM | OXYGEN SATURATION: 95 % | RESPIRATION RATE: 13 BRPM | SYSTOLIC BLOOD PRESSURE: 124 MMHG | BODY MASS INDEX: 27.6 KG/M2 | WEIGHT: 150 LBS

## 2025-01-19 LAB
BACTERIA URNS QL MICRO: ABNORMAL /HPF
CASTS URNS QL MICRO: 0 /LPF
CRYSTALS URNS QL MICRO: 0 /LPF
EKG ATRIAL RATE: 82 BPM
EKG DIAGNOSIS: NORMAL
EKG P AXIS: 63 DEGREES
EKG P-R INTERVAL: 144 MS
EKG Q-T INTERVAL: 442 MS
EKG QRS DURATION: 125 MS
EKG QTC CALCULATION (BAZETT): 517 MS
EKG R AXIS: -65 DEGREES
EKG T AXIS: 41 DEGREES
EKG VENTRICULAR RATE: 82 BPM
EPI CELLS #/AREA URNS HPF: ABNORMAL /HPF
MUCOUS THREADS URNS QL MICRO: 0 /LPF
OTHER OBSERVATIONS: ABNORMAL
RBC #/AREA URNS HPF: ABNORMAL /HPF
TROPONIN T SERPL HS-MCNC: 14 NG/L (ref 0–14)
WBC URNS QL MICRO: ABNORMAL /HPF

## 2025-01-19 PROCEDURE — 6360000002 HC RX W HCPCS: Performed by: STUDENT IN AN ORGANIZED HEALTH CARE EDUCATION/TRAINING PROGRAM

## 2025-01-19 PROCEDURE — 84484 ASSAY OF TROPONIN QUANT: CPT

## 2025-01-19 PROCEDURE — 81001 URINALYSIS AUTO W/SCOPE: CPT

## 2025-01-19 PROCEDURE — 2580000003 HC RX 258: Performed by: STUDENT IN AN ORGANIZED HEALTH CARE EDUCATION/TRAINING PROGRAM

## 2025-01-19 PROCEDURE — 96374 THER/PROPH/DIAG INJ IV PUSH: CPT

## 2025-01-19 PROCEDURE — 93010 ELECTROCARDIOGRAM REPORT: CPT | Performed by: INTERNAL MEDICINE

## 2025-01-19 RX ORDER — SUCRALFATE 1 G/1
1 TABLET ORAL 4 TIMES DAILY
Qty: 120 TABLET | Refills: 3 | Status: SHIPPED | OUTPATIENT
Start: 2025-01-19

## 2025-01-19 RX ORDER — CEPHALEXIN 500 MG/1
500 CAPSULE ORAL 2 TIMES DAILY
Qty: 14 CAPSULE | Refills: 0 | Status: SHIPPED | OUTPATIENT
Start: 2025-01-19 | End: 2025-01-26

## 2025-01-19 RX ORDER — PANTOPRAZOLE SODIUM 40 MG/1
40 TABLET, DELAYED RELEASE ORAL
Qty: 90 TABLET | Refills: 1 | Status: SHIPPED | OUTPATIENT
Start: 2025-01-19

## 2025-01-19 RX ADMIN — SODIUM CHLORIDE 40 MG: 9 INJECTION INTRAMUSCULAR; INTRAVENOUS; SUBCUTANEOUS at 01:05

## 2025-01-19 NOTE — ED TRIAGE NOTES
Arrived via EMS from home. Complaining of epigastric and back pain since yesterday. Pain with ambulation.

## 2025-01-19 NOTE — ED PROVIDER NOTES
Emergency Department Provider Note       PCP: John Reece MD   Age: 79 y.o.   Sex: female     DISPOSITION Decision To Discharge 01/19/2025 01:53:58 AM    ICD-10-CM    1. Abdominal pain, epigastric  R10.13       2. Urinary tract infection without hematuria, site unspecified  N39.0           Medical Decision Making     Generally well-appearing 79-year-old female presenting with lower epigastric pain with radiation to her back.  Orders placed for labs to include cardiac markers, chest x-ray and EKG.  At the time of arrival, patient states she feels better and symptoms have resolved.  ED Course as of 01/20/25 0447   Sat Jan 18, 2025   2222 EKG interpretation: Sinus rhythm, rate 82, leftward axis, no obvious ischemia, some underlying artifact is present [BR]   2250 Patient's family is now bedside, discussed plan for laboratory testing, EKG result and chest x-ray.  Agreeable with this plan.  Patient denies active pain [BR]   Sun Jan 19, 2025   0050 Reassessed patient to discuss results of CT imaging which appear stable.  Patient continues to deny any pain, nausea or vomiting.  She is aware of the hiatal hernia and has a long history of GI issues.  She is not on blood thinners, hemoglobin is stable tonight at 9.7, denies any black bloody, tarry stool hemoptysis or hematemesis. [BR]   0050 Patient is scheduled for follow-up with her gastroenterologist.  She has been told she will likely need an iron infusion prior to any imaging.  I feel that patient's symptoms are most likely secondary to GI cause as her cardiac workup appears stable.  I will adjust patient's medications accordingly with the addition of Carafate to better control GI symptoms with plans to have her see her GI provider and follow-up [BR]      ED Course User Index  [BR] Cameron Gong R, DO     1 or more acute illnesses that pose a threat to life or bodily function.   1 or more chronic illnesses with a severe exacerbation or

## 2025-01-20 LAB
BILIRUB UR QL: NEGATIVE
GLUCOSE UR QL STRIP.AUTO: NEGATIVE MG/DL
KETONES UR-MCNC: NEGATIVE MG/DL
LEUKOCYTE ESTERASE UR QL STRIP: NEGATIVE
NITRITE UR QL: POSITIVE
PH UR: 5.5 (ref 5–9)
PROT UR QL: NEGATIVE MG/DL
RBC # UR STRIP: NEGATIVE
SP GR UR: 1.01 (ref 1–1.02)
UROBILINOGEN UR QL: 0.2 EU/DL (ref 0.2–1)

## 2025-02-03 RX ORDER — DONEPEZIL HYDROCHLORIDE 5 MG/1
5 TABLET, FILM COATED ORAL NIGHTLY
Qty: 90 TABLET | Refills: 3 | Status: CANCELLED | OUTPATIENT
Start: 2025-02-03

## 2025-02-03 NOTE — TELEPHONE ENCOUNTER
Patient requesting refill on Aricept 5Mg. Rx Pended. She is currently out of this medication will transition to NSI in April.

## 2025-02-19 ENCOUNTER — TELEMEDICINE (OUTPATIENT)
Dept: PULMONOLOGY | Age: 80
End: 2025-02-19
Payer: MEDICARE

## 2025-02-19 DIAGNOSIS — J30.89 ENVIRONMENTAL AND SEASONAL ALLERGIES: Primary | ICD-10-CM

## 2025-02-19 DIAGNOSIS — J44.9 STAGE 3 SEVERE COPD BY GOLD CLASSIFICATION (HCC): ICD-10-CM

## 2025-02-19 DIAGNOSIS — J92.0 PLEURAL PLAQUE WITH PRESENCE OF ASBESTOS: ICD-10-CM

## 2025-02-19 DIAGNOSIS — G47.34 NOCTURNAL HYPOXIA: ICD-10-CM

## 2025-02-19 PROCEDURE — G8427 DOCREV CUR MEDS BY ELIG CLIN: HCPCS | Performed by: NURSE PRACTITIONER

## 2025-02-19 PROCEDURE — 1159F MED LIST DOCD IN RCRD: CPT | Performed by: NURSE PRACTITIONER

## 2025-02-19 PROCEDURE — 99215 OFFICE O/P EST HI 40 MIN: CPT | Performed by: NURSE PRACTITIONER

## 2025-02-19 PROCEDURE — G8399 PT W/DXA RESULTS DOCUMENT: HCPCS | Performed by: NURSE PRACTITIONER

## 2025-02-19 PROCEDURE — 3023F SPIROM DOC REV: CPT | Performed by: NURSE PRACTITIONER

## 2025-02-19 PROCEDURE — G8417 CALC BMI ABV UP PARAM F/U: HCPCS | Performed by: NURSE PRACTITIONER

## 2025-02-19 PROCEDURE — 1123F ACP DISCUSS/DSCN MKR DOCD: CPT | Performed by: NURSE PRACTITIONER

## 2025-02-19 PROCEDURE — 1090F PRES/ABSN URINE INCON ASSESS: CPT | Performed by: NURSE PRACTITIONER

## 2025-02-19 PROCEDURE — 1036F TOBACCO NON-USER: CPT | Performed by: NURSE PRACTITIONER

## 2025-02-19 NOTE — PROGRESS NOTES
Name:  Rekha Palmer  YOB: 1945   MRN: [id number]      Office Visit: 2/19/2025      The patient is seen by synchronous (real-time) audio-video technology on Epic.     Consent:  The patient/healthcare decision maker is aware that this patient-initiated Telehealth encounter is a billable service, with coverage as determined by his insurance carrier. The patient is aware that he/she may receive a bill and has provided verbal consent to proceed: Yes     I was in [place], [place] while conducting this visit.    We discussed the expected course, resolution and complications of the diagnosis(es) in detail.  Medication risks, benefits, costs, interactions, and alternatives were discussed as indicated.  I advised him to contact the office if his condition worsens, changes or fails to improve as anticipated. He expressed understanding with the diagnosis(es) and plan.     Pursuant to the emergency declaration under the [place] Act and the National Emergencies Act, 1135 waiver authority and the Coronavirus Preparedness and Response Supplemental Appropriations Act, this Virtual  Visit was conducted, with patient's consent, to reduce the patient's risk of exposure to COVID-19 and provide continuity of care for an established patient.     Services were provided through a video synchronous discussion virtually to substitute for in-person clinic visit.    Over 50% of today's office visit was spent in face to face time reviewing test results/records, prognosis, importance of compliance, education about disease process, benefits of medications, instructions for management of acute flare-ups, and follow up plans.  Total time spent was 4:20 minutes.    ASSESSMENT AND PLAN:  (Medical Decision Making)    Impression: 79 y.o. female here for follow up on Stage 3 COPD. Hx COPD, pleural plaque with asbestos exposure, nocturnal hypoxia, GERD, CKD, osteoperosis, ROSANA, colon cancer.     1. Stage 3 severe COPD by GOLD

## 2025-03-13 RX ORDER — METOPROLOL SUCCINATE 25 MG/1
25 TABLET, EXTENDED RELEASE ORAL DAILY
Qty: 30 TABLET | Refills: 0 | Status: SHIPPED | OUTPATIENT
Start: 2025-03-13

## 2025-03-13 NOTE — TELEPHONE ENCOUNTER
Called pt's daughter asked if taking metoprolol 1 tab or 0.5 tab. Pt's daughter states full tab. Advised per Dr. Tejada's last OV note suppose to be on 0.5 tab. But if doing fine on the full tab keep as is until follow up appt. Can discuss with Dr. Tejada at follow up appt, but to keep log of BP readings and bring to appt as well.     Pt's daughter gave a verbal understanding.

## 2025-03-13 NOTE — TELEPHONE ENCOUNTER
MEDICATION REFILL REQUEST      Name of Medication:  Metoprolol succinate  Dose:  25 mg  Frequency:  QD  Quantity:  90  Days' supply:  90 with 3 refills      Pharmacy Name/Location:  [phone]

## 2025-04-02 ENCOUNTER — TELEPHONE (OUTPATIENT)
Dept: RHEUMATOLOGY | Age: 80
End: 2025-04-02

## 2025-04-02 NOTE — TELEPHONE ENCOUNTER
PHONE CALL from patient's daughter Jennifer.   Patient was due for Prolia 1/17/25. Last given 7/17/24.   Had been in the hospital when scheduled to see Dr. Ren in January, and appt was not every RS.     Scheduled her for Prolia 4/9/25. Labs in February. Follow up with Dr. Sotelo 10/14/25. BMD 11/2024, instructed to continue Prolia by Dr. Ren.   Lab Results   Component Value Date     01/18/2025    K 4.3 01/18/2025     01/18/2025    CO2 25 01/18/2025    BUN 17 01/18/2025    CREATININE 1.14 (H) 01/18/2025    GLUCOSE 136 (H) 01/18/2025    CALCIUM 9.0 01/18/2025    BILITOT 0.4 01/18/2025    ALKPHOS 46 01/18/2025    AST 67 (H) 01/18/2025    ALT 23 01/18/2025    LABGLOM 48 (L) 01/18/2025    GFRAA >60 07/20/2022    AGRATIO 1.6 02/17/2022    GLOB 3.2 01/18/2025     Vitamin D 2/18/25= 39, CMP repeated 2/18/25 also, calcium 9.2

## 2025-04-03 RX ORDER — ALBUTEROL SULFATE 90 UG/1
4 INHALANT RESPIRATORY (INHALATION) PRN
OUTPATIENT
Start: 2025-04-03

## 2025-04-03 RX ORDER — HYDROCORTISONE SODIUM SUCCINATE 100 MG/2ML
100 INJECTION INTRAMUSCULAR; INTRAVENOUS
OUTPATIENT
Start: 2025-04-03

## 2025-04-03 RX ORDER — ONDANSETRON 2 MG/ML
8 INJECTION INTRAMUSCULAR; INTRAVENOUS
OUTPATIENT
Start: 2025-04-03

## 2025-04-03 RX ORDER — ACETAMINOPHEN 325 MG/1
650 TABLET ORAL
OUTPATIENT
Start: 2025-04-03

## 2025-04-03 RX ORDER — DIPHENHYDRAMINE HYDROCHLORIDE 50 MG/ML
50 INJECTION, SOLUTION INTRAMUSCULAR; INTRAVENOUS
OUTPATIENT
Start: 2025-04-03

## 2025-04-03 RX ORDER — SODIUM CHLORIDE 9 MG/ML
INJECTION, SOLUTION INTRAVENOUS CONTINUOUS
OUTPATIENT
Start: 2025-04-03

## 2025-04-03 RX ORDER — EPINEPHRINE 1 MG/ML
0.3 INJECTION, SOLUTION, CONCENTRATE INTRAVENOUS PRN
OUTPATIENT
Start: 2025-04-03

## 2025-04-03 RX ORDER — FAMOTIDINE 10 MG/ML
20 INJECTION, SOLUTION INTRAVENOUS
OUTPATIENT
Start: 2025-04-03

## 2025-04-09 ENCOUNTER — CLINICAL SUPPORT (OUTPATIENT)
Dept: RHEUMATOLOGY | Age: 80
End: 2025-04-09
Payer: MEDICARE

## 2025-04-09 VITALS — TEMPERATURE: 97.4 F | SYSTOLIC BLOOD PRESSURE: 141 MMHG | HEART RATE: 78 BPM | DIASTOLIC BLOOD PRESSURE: 86 MMHG

## 2025-04-09 DIAGNOSIS — M81.0 POSTMENOPAUSAL OSTEOPOROSIS: Primary | ICD-10-CM

## 2025-04-09 PROCEDURE — 96372 THER/PROPH/DIAG INJ SC/IM: CPT | Performed by: INTERNAL MEDICINE

## 2025-04-09 RX ORDER — DIPHENHYDRAMINE HYDROCHLORIDE 50 MG/ML
50 INJECTION, SOLUTION INTRAMUSCULAR; INTRAVENOUS
Status: DISCONTINUED | OUTPATIENT
Start: 2025-04-09 | End: 2025-04-09 | Stop reason: HOSPADM

## 2025-04-09 RX ORDER — HYDROCORTISONE SODIUM SUCCINATE 100 MG/2ML
100 INJECTION INTRAMUSCULAR; INTRAVENOUS
Status: DISCONTINUED | OUTPATIENT
Start: 2025-04-09 | End: 2025-04-09 | Stop reason: HOSPADM

## 2025-04-09 RX ORDER — FAMOTIDINE 10 MG/ML
20 INJECTION, SOLUTION INTRAVENOUS
Status: DISCONTINUED | OUTPATIENT
Start: 2025-04-09 | End: 2025-04-09 | Stop reason: HOSPADM

## 2025-04-09 RX ORDER — HYDROCORTISONE SODIUM SUCCINATE 100 MG/2ML
100 INJECTION INTRAMUSCULAR; INTRAVENOUS
OUTPATIENT
Start: 2025-07-16

## 2025-04-09 RX ORDER — DIPHENHYDRAMINE HYDROCHLORIDE 50 MG/ML
50 INJECTION, SOLUTION INTRAMUSCULAR; INTRAVENOUS
OUTPATIENT
Start: 2025-07-16

## 2025-04-09 RX ORDER — SODIUM CHLORIDE 9 MG/ML
INJECTION, SOLUTION INTRAVENOUS CONTINUOUS
OUTPATIENT
Start: 2025-07-16

## 2025-04-09 RX ORDER — SODIUM CHLORIDE 9 MG/ML
INJECTION, SOLUTION INTRAVENOUS CONTINUOUS
Status: DISCONTINUED | OUTPATIENT
Start: 2025-04-09 | End: 2025-04-09 | Stop reason: HOSPADM

## 2025-04-09 RX ORDER — ONDANSETRON 2 MG/ML
8 INJECTION INTRAMUSCULAR; INTRAVENOUS
Status: DISCONTINUED | OUTPATIENT
Start: 2025-04-09 | End: 2025-04-09 | Stop reason: HOSPADM

## 2025-04-09 RX ORDER — EPINEPHRINE 1 MG/ML
0.3 INJECTION, SOLUTION, CONCENTRATE INTRAVENOUS PRN
Status: DISCONTINUED | OUTPATIENT
Start: 2025-04-09 | End: 2025-04-09 | Stop reason: HOSPADM

## 2025-04-09 RX ORDER — ACETAMINOPHEN 325 MG/1
650 TABLET ORAL
Status: DISCONTINUED | OUTPATIENT
Start: 2025-04-09 | End: 2025-04-09 | Stop reason: HOSPADM

## 2025-04-09 RX ORDER — FAMOTIDINE 10 MG/ML
20 INJECTION, SOLUTION INTRAVENOUS
OUTPATIENT
Start: 2025-07-16

## 2025-04-09 RX ORDER — ALBUTEROL SULFATE 90 UG/1
4 INHALANT RESPIRATORY (INHALATION) PRN
Status: DISCONTINUED | OUTPATIENT
Start: 2025-04-09 | End: 2025-04-09 | Stop reason: HOSPADM

## 2025-04-09 RX ORDER — ALBUTEROL SULFATE 90 UG/1
4 INHALANT RESPIRATORY (INHALATION) PRN
OUTPATIENT
Start: 2025-07-16

## 2025-04-09 RX ORDER — EPINEPHRINE 1 MG/ML
0.3 INJECTION, SOLUTION, CONCENTRATE INTRAVENOUS PRN
OUTPATIENT
Start: 2025-07-16

## 2025-04-09 RX ORDER — ONDANSETRON 2 MG/ML
8 INJECTION INTRAMUSCULAR; INTRAVENOUS
OUTPATIENT
Start: 2025-07-16

## 2025-04-09 RX ORDER — ACETAMINOPHEN 325 MG/1
650 TABLET ORAL
OUTPATIENT
Start: 2025-07-16

## 2025-04-09 NOTE — PROGRESS NOTES
[facility]  [address] [place] [address], [address]  [place] [address]  Office : [phone], Fax: [phone]       # 12 Prolia 60mg/ml injected SC today into left arm. Patient tolerated injection well. Advised patient to continue with calcium and vitamin D post injection. Advised patient to call with any problems post injection.     Patient Medication Supplied? no  Patient discharged feeling well and instructed to call the office with any issues.    Pre-infusion/injection questionairre for osteoporosis    1. Have you had or are you planning any dental work? Yes- possibly within the next 6-12 months. Pt instructed to inform her oral surgeon that she is on Prolia    2. Are you taking your calcium and vitamin D? Yes    3. When was your last osteoporosis treatment? 7/17/24    4. Have you had any recent fractures? No    5. Are you currently in skilled nursing or in patient rehab? No

## 2025-04-14 RX ORDER — METOPROLOL SUCCINATE 25 MG/1
25 TABLET, EXTENDED RELEASE ORAL DAILY
Qty: 90 TABLET | Refills: 1 | Status: SHIPPED | OUTPATIENT
Start: 2025-04-14

## 2025-04-14 NOTE — TELEPHONE ENCOUNTER
MEDICATION REFILL REQUEST      Name of Medication:  metoprolol Succinate  Dose:  25 mg  Frequency:  QD  Quantity:  90  Days' supply:  90      Pharmacy Name/Location:  [phone]    Pt is out of meds and need this called in today please she has a apt next week but is out of meds

## 2025-04-25 ENCOUNTER — OFFICE VISIT (OUTPATIENT)
Age: 80
End: 2025-04-25
Payer: MEDICARE

## 2025-04-25 ENCOUNTER — CLINICAL SUPPORT (OUTPATIENT)
Age: 80
End: 2025-04-25

## 2025-04-25 VITALS
WEIGHT: 150 LBS | HEIGHT: 62 IN | SYSTOLIC BLOOD PRESSURE: 112 MMHG | DIASTOLIC BLOOD PRESSURE: 68 MMHG | HEART RATE: 72 BPM | BODY MASS INDEX: 27.6 KG/M2

## 2025-04-25 DIAGNOSIS — I42.8 NICM (NONISCHEMIC CARDIOMYOPATHY) (HCC): ICD-10-CM

## 2025-04-25 DIAGNOSIS — I47.29 NSVT (NONSUSTAINED VENTRICULAR TACHYCARDIA) (HCC): Primary | ICD-10-CM

## 2025-04-25 DIAGNOSIS — I50.22 CHRONIC SYSTOLIC CONGESTIVE HEART FAILURE (HCC): ICD-10-CM

## 2025-04-25 DIAGNOSIS — Z95.810 PRESENCE OF AUTOMATIC (IMPLANTABLE) CARDIAC DEFIBRILLATOR: ICD-10-CM

## 2025-04-25 DIAGNOSIS — I25.119 ATHEROSCLEROSIS OF NATIVE CORONARY ARTERY OF NATIVE HEART WITH ANGINA PECTORIS: ICD-10-CM

## 2025-04-25 DIAGNOSIS — I50.22 CHRONIC SYSTOLIC CONGESTIVE HEART FAILURE (HCC): Primary | ICD-10-CM

## 2025-04-25 DIAGNOSIS — I42.0 DILATED CARDIOMYOPATHY (HCC): ICD-10-CM

## 2025-04-25 PROCEDURE — 99214 OFFICE O/P EST MOD 30 MIN: CPT | Performed by: INTERNAL MEDICINE

## 2025-04-25 PROCEDURE — G8428 CUR MEDS NOT DOCUMENT: HCPCS | Performed by: INTERNAL MEDICINE

## 2025-04-25 PROCEDURE — 1123F ACP DISCUSS/DSCN MKR DOCD: CPT | Performed by: INTERNAL MEDICINE

## 2025-04-25 PROCEDURE — 1090F PRES/ABSN URINE INCON ASSESS: CPT | Performed by: INTERNAL MEDICINE

## 2025-04-25 PROCEDURE — 1036F TOBACCO NON-USER: CPT | Performed by: INTERNAL MEDICINE

## 2025-04-25 PROCEDURE — G8399 PT W/DXA RESULTS DOCUMENT: HCPCS | Performed by: INTERNAL MEDICINE

## 2025-04-25 PROCEDURE — G8417 CALC BMI ABV UP PARAM F/U: HCPCS | Performed by: INTERNAL MEDICINE

## 2025-04-25 PROCEDURE — 1126F AMNT PAIN NOTED NONE PRSNT: CPT | Performed by: INTERNAL MEDICINE

## 2025-04-25 NOTE — PROGRESS NOTES
[address] [facility], [place] [address]  PHONE: [phone]     25    NAME:  Rekha Palmer  : 1945  MRN: [id number]       SUBJECTIVE:   Rekha Palmer is a 79 y.o. female seen for a follow up visit regarding the following:     Chief Complaint   Patient presents with    Congestive Heart Failure       HPI: Here for vascular dz follow up.   2017 TIA sx  She has crohn's dz.     Echo  showing EF 30-35%, mod MR   3/2018: ICD implant   Echo 3/2022: EF 40-45%     LHC/RHC  2022:     Mild nonobstructive coronary artery disease    Low normal LV systolic function    Mild pulmonary hypertension      Carotid 2024: DEIDRE: Ultrasound evidence of 50 to 69% stenosis.      Dx with dementia, struggling now.    No illness, done well.  Here with family today.    On lasix PRN now, following weights.   Some CHUA, not as active.   No CP, pressure.  Using walker/cane now, seeing ortho for knee injections.     Following weights with lasix plan. lasix about 1-2 x per week.       She has NYHA Class II sx now.        Patient denies recent history of orthopnea, PND, excessive dizziness and/or syncope.           Past Medical History, Past Surgical History, Family history, Social History, and Medications were all reviewed with the patient today and updated as necessary.     Current Outpatient Medications   Medication Sig Dispense Refill    metoprolol succinate (TOPROL XL) 25 MG extended release tablet Take 1 tablet by mouth daily 90 tablet 1    pantoprazole (PROTONIX) 40 MG tablet Take 1 tablet by mouth every morning (before breakfast) 90 tablet 1    QUEtiapine (SEROQUEL) 25 MG tablet Take 1 tablet by mouth at bedtime 45 tablet 0    donepezil (ARICEPT) 5 MG tablet Take 1 tablet by mouth nightly 90 tablet 3    citalopram (CELEXA) 40 MG tablet Take 1 tablet by mouth daily 90 tablet 3    albuterol sulfate HFA (VENTOLIN HFA) 108 (90 Base) MCG/ACT inhaler Inhale 2 puffs into the lungs 4 times daily

## 2025-04-29 ENCOUNTER — OFFICE VISIT (OUTPATIENT)
Dept: NEUROLOGY | Age: 80
End: 2025-04-29
Payer: MEDICARE

## 2025-04-29 DIAGNOSIS — G30.1 MODERATE LATE ONSET ALZHEIMER'S DEMENTIA WITH PSYCHOTIC DISTURBANCE (HCC): Primary | ICD-10-CM

## 2025-04-29 DIAGNOSIS — F02.B2 MODERATE LATE ONSET ALZHEIMER'S DEMENTIA WITH PSYCHOTIC DISTURBANCE (HCC): Primary | ICD-10-CM

## 2025-04-29 DIAGNOSIS — G45.9 TIA (TRANSIENT ISCHEMIC ATTACK): ICD-10-CM

## 2025-04-29 PROCEDURE — 1090F PRES/ABSN URINE INCON ASSESS: CPT | Performed by: PSYCHIATRY & NEUROLOGY

## 2025-04-29 PROCEDURE — G8427 DOCREV CUR MEDS BY ELIG CLIN: HCPCS | Performed by: PSYCHIATRY & NEUROLOGY

## 2025-04-29 PROCEDURE — 1123F ACP DISCUSS/DSCN MKR DOCD: CPT | Performed by: PSYCHIATRY & NEUROLOGY

## 2025-04-29 PROCEDURE — 99215 OFFICE O/P EST HI 40 MIN: CPT | Performed by: PSYCHIATRY & NEUROLOGY

## 2025-04-29 PROCEDURE — G8417 CALC BMI ABV UP PARAM F/U: HCPCS | Performed by: PSYCHIATRY & NEUROLOGY

## 2025-04-29 PROCEDURE — 1036F TOBACCO NON-USER: CPT | Performed by: PSYCHIATRY & NEUROLOGY

## 2025-04-29 PROCEDURE — G8399 PT W/DXA RESULTS DOCUMENT: HCPCS | Performed by: PSYCHIATRY & NEUROLOGY

## 2025-04-29 PROCEDURE — 1159F MED LIST DOCD IN RCRD: CPT | Performed by: PSYCHIATRY & NEUROLOGY

## 2025-04-29 PROCEDURE — 1160F RVW MEDS BY RX/DR IN RCRD: CPT | Performed by: PSYCHIATRY & NEUROLOGY

## 2025-04-29 RX ORDER — DONEPEZIL HYDROCHLORIDE 10 MG/1
10 TABLET, FILM COATED ORAL NIGHTLY
Qty: 90 TABLET | Refills: 3 | Status: SHIPPED | OUTPATIENT
Start: 2025-04-29

## 2025-04-29 RX ORDER — ALPRAZOLAM 0.25 MG
0.25 TABLET ORAL EVERY 12 HOURS PRN
Qty: 60 TABLET | Refills: 5 | Status: SHIPPED | OUTPATIENT
Start: 2025-04-29 | End: 2025-05-29

## 2025-04-29 ASSESSMENT — ENCOUNTER SYMPTOMS
RESPIRATORY NEGATIVE: 1
GASTROINTESTINAL NEGATIVE: 1
EYES NEGATIVE: 1
ALLERGIC/IMMUNOLOGIC NEGATIVE: 1

## 2025-04-29 NOTE — PROGRESS NOTES
[facility]    Social Connections     Frequency of Communication with Friends and Family: Not asked     Frequency of Social Gatherings with Friends and Family: Not asked   Intimate Partner Violence: Unknown (3/20/2021)    Received from [facility]    Intimate Partner Violence     Fear of Current or Ex-Partner: Not asked     Emotionally Abused: Not asked     Physically Abused: Not asked     Sexually Abused: Not asked   Housing Stability: Low Risk  (7/30/2024)    Housing Stability Vital Sign     Unable to Pay for Housing in the Last Year: No     Number of Places Lived in the Last Year: 1     Unstable Housing in the Last Year: No       Family History:   Family History   Problem Relation Age of Onset    Cancer Mother         Bone CA, brain tumor    Heart Failure Father     Breast Cancer Neg Hx        Current Outpatient Medications on File Prior to Visit   Medication Sig Dispense Refill    metoprolol succinate (TOPROL XL) 25 MG extended release tablet Take 1 tablet by mouth daily 90 tablet 1    sucralfate (CARAFATE) 1 GM tablet Take 1 tablet by mouth 4 times daily (Patient not taking: Reported on 4/25/2025) 120 tablet 3    pantoprazole (PROTONIX) 40 MG tablet Take 1 tablet by mouth every morning (before breakfast) 90 tablet 1    QUEtiapine (SEROQUEL) 25 MG tablet Take 1 tablet by mouth at bedtime 45 tablet 0    hydrOXYzine HCl (ATARAX) 25 MG tablet TAKE 1 TABLET BY MOUTH THREE TIMES A DAY FOR ANXIETY/AGITATION (Patient not taking: Reported on 4/25/2025)      traMADol HCl 25 MG TABS Take 1 tablet by mouth every 8 hours as needed      donepezil (ARICEPT) 5 MG tablet Take 1 tablet by mouth nightly 90 tablet 3    citalopram (CELEXA) 40 MG tablet Take 1 tablet by mouth daily 90 tablet 3    albuterol sulfate HFA (VENTOLIN HFA) 108 (90 Base) MCG/ACT inhaler Inhale 2 puffs into the lungs 4 times daily as needed for Wheezing 18 g 5    Budeson-Glycopyrrol-Formoterol (BREZTRI AEROSPHERE) 160-9-4.8

## 2025-05-07 ENCOUNTER — APPOINTMENT (OUTPATIENT)
Dept: GENERAL RADIOLOGY | Age: 80
End: 2025-05-07
Payer: MEDICARE

## 2025-05-07 ENCOUNTER — APPOINTMENT (OUTPATIENT)
Dept: CT IMAGING | Age: 80
End: 2025-05-07
Payer: MEDICARE

## 2025-05-07 ENCOUNTER — HOSPITAL ENCOUNTER (EMERGENCY)
Age: 80
Discharge: HOME | End: 2025-05-07
Attending: EMERGENCY MEDICINE
Payer: MEDICARE

## 2025-05-07 VITALS
HEIGHT: 62 IN | RESPIRATION RATE: 16 BRPM | DIASTOLIC BLOOD PRESSURE: 75 MMHG | BODY MASS INDEX: 27.6 KG/M2 | HEART RATE: 80 BPM | TEMPERATURE: 98.7 F | WEIGHT: 150 LBS | SYSTOLIC BLOOD PRESSURE: 101 MMHG | OXYGEN SATURATION: 95 %

## 2025-05-07 DIAGNOSIS — M54.50 ACUTE RIGHT-SIDED LOW BACK PAIN WITHOUT SCIATICA: Primary | ICD-10-CM

## 2025-05-07 LAB
ALBUMIN SERPL-MCNC: 3.7 G/DL (ref 3.2–4.6)
ALBUMIN/GLOB SERPL: 1.1 (ref 1–1.9)
ALP SERPL-CCNC: 76 U/L (ref 35–104)
ALT SERPL-CCNC: 17 U/L (ref 8–45)
ANION GAP SERPL CALC-SCNC: 11 MMOL/L (ref 7–16)
AST SERPL-CCNC: 26 U/L (ref 15–37)
BASOPHILS # BLD: 0.05 K/UL (ref 0–0.2)
BASOPHILS NFR BLD: 0.4 % (ref 0–2)
BILIRUB SERPL-MCNC: 0.7 MG/DL (ref 0–1.2)
BUN SERPL-MCNC: 18 MG/DL (ref 8–23)
CALCIUM SERPL-MCNC: 9.9 MG/DL (ref 8.8–10.2)
CHLORIDE SERPL-SCNC: 99 MMOL/L (ref 98–107)
CO2 SERPL-SCNC: 25 MMOL/L (ref 20–29)
CREAT SERPL-MCNC: 1.06 MG/DL (ref 0.6–1.1)
DIFFERENTIAL METHOD BLD: ABNORMAL
EOSINOPHIL # BLD: 0.08 K/UL (ref 0–0.8)
EOSINOPHIL NFR BLD: 0.7 % (ref 0.5–7.8)
ERYTHROCYTE [DISTWIDTH] IN BLOOD BY AUTOMATED COUNT: 13.4 % (ref 11.9–14.6)
GLOBULIN SER CALC-MCNC: 3.5 G/DL (ref 2.3–3.5)
GLUCOSE SERPL-MCNC: 139 MG/DL (ref 70–99)
HCT VFR BLD AUTO: 43.8 % (ref 35.8–46.3)
HGB BLD-MCNC: 13.9 G/DL (ref 11.7–15.4)
IMM GRANULOCYTES # BLD AUTO: 0.06 K/UL (ref 0–0.5)
IMM GRANULOCYTES NFR BLD AUTO: 0.5 % (ref 0–5)
LYMPHOCYTES # BLD: 1.05 K/UL (ref 0.5–4.6)
LYMPHOCYTES NFR BLD: 9.1 % (ref 13–44)
MCH RBC QN AUTO: 30.5 PG (ref 26.1–32.9)
MCHC RBC AUTO-ENTMCNC: 31.7 G/DL (ref 31.4–35)
MCV RBC AUTO: 96.1 FL (ref 82–102)
MONOCYTES # BLD: 1.13 K/UL (ref 0.1–1.3)
MONOCYTES NFR BLD: 9.8 % (ref 4–12)
NEUTS SEG # BLD: 9.15 K/UL (ref 1.7–8.2)
NEUTS SEG NFR BLD: 79.5 % (ref 43–78)
NRBC # BLD: 0 K/UL (ref 0–0.2)
PLATELET # BLD AUTO: 171 K/UL (ref 150–450)
PMV BLD AUTO: 10.4 FL (ref 9.4–12.3)
POTASSIUM SERPL-SCNC: 4.2 MMOL/L (ref 3.5–5.1)
PROT SERPL-MCNC: 7.2 G/DL (ref 6.3–8.2)
RBC # BLD AUTO: 4.56 M/UL (ref 4.05–5.2)
SODIUM SERPL-SCNC: 136 MMOL/L (ref 136–145)
WBC # BLD AUTO: 11.5 K/UL (ref 4.3–11.1)

## 2025-05-07 PROCEDURE — 99284 EMERGENCY DEPT VISIT MOD MDM: CPT

## 2025-05-07 PROCEDURE — 85025 COMPLETE CBC W/AUTO DIFF WBC: CPT

## 2025-05-07 PROCEDURE — 72080 X-RAY EXAM THORACOLMB 2/> VW: CPT

## 2025-05-07 PROCEDURE — 6370000000 HC RX 637 (ALT 250 FOR IP): Performed by: EMERGENCY MEDICINE

## 2025-05-07 PROCEDURE — 80053 COMPREHEN METABOLIC PANEL: CPT

## 2025-05-07 PROCEDURE — 71046 X-RAY EXAM CHEST 2 VIEWS: CPT

## 2025-05-07 PROCEDURE — 71250 CT THORAX DX C-: CPT

## 2025-05-07 RX ORDER — LIDOCAINE 4 G/G
1 PATCH TOPICAL
Status: DISCONTINUED | OUTPATIENT
Start: 2025-05-07 | End: 2025-05-07 | Stop reason: HOSPADM

## 2025-05-07 RX ORDER — ONDANSETRON 4 MG/1
4 TABLET, ORALLY DISINTEGRATING ORAL ONCE
Status: COMPLETED | OUTPATIENT
Start: 2025-05-07 | End: 2025-05-07

## 2025-05-07 RX ORDER — OXYCODONE HYDROCHLORIDE 5 MG/1
5 TABLET ORAL EVERY 6 HOURS PRN
Qty: 12 TABLET | Refills: 0 | Status: SHIPPED | OUTPATIENT
Start: 2025-05-07 | End: 2025-05-10

## 2025-05-07 RX ORDER — OXYCODONE HYDROCHLORIDE 5 MG/1
2.5 TABLET ORAL
Refills: 0 | Status: COMPLETED | OUTPATIENT
Start: 2025-05-07 | End: 2025-05-07

## 2025-05-07 RX ORDER — ONDANSETRON 4 MG/1
4 TABLET, ORALLY DISINTEGRATING ORAL 3 TIMES DAILY PRN
Qty: 15 TABLET | Refills: 0 | Status: SHIPPED | OUTPATIENT
Start: 2025-05-07

## 2025-05-07 RX ADMIN — OXYCODONE 2.5 MG: 5 TABLET ORAL at 13:30

## 2025-05-07 RX ADMIN — ONDANSETRON 4 MG: 4 TABLET, ORALLY DISINTEGRATING ORAL at 15:35

## 2025-05-07 ASSESSMENT — PAIN DESCRIPTION - LOCATION
LOCATION: BACK
LOCATION: BACK

## 2025-05-07 ASSESSMENT — ENCOUNTER SYMPTOMS
COLOR CHANGE: 0
SHORTNESS OF BREATH: 0
COUGH: 0

## 2025-05-07 ASSESSMENT — PAIN SCALES - GENERAL
PAINLEVEL_OUTOF10: 4
PAINLEVEL_OUTOF10: 9

## 2025-05-07 ASSESSMENT — PAIN DESCRIPTION - ORIENTATION: ORIENTATION: MID;UPPER

## 2025-05-07 ASSESSMENT — PAIN - FUNCTIONAL ASSESSMENT: PAIN_FUNCTIONAL_ASSESSMENT: 0-10

## 2025-05-07 ASSESSMENT — PAIN DESCRIPTION - DESCRIPTORS: DESCRIPTORS: ACHING;SHOOTING

## 2025-05-07 NOTE — ED PROVIDER NOTES
Emergency Department Provider Note       PCP: John Reece MD   Age: 79 y.o.   Sex: female     DISPOSITION Decision To Discharge 05/07/2025 03:02:45 PM   DISPOSITION CONDITION Stable            ICD-10-CM    1. Acute right-sided low back pain without sciatica  M54.50 oxyCODONE (ROXICODONE) 5 MG immediate release tablet    Lower thoracic/acute/right          Medical Decision Making     Mechanical seeming pain to her periscapular region somewhat worse to the right than left.  No present rash or redness.  There are focal areas where you can palpate that seem much worse and there is a report that the pain at times radiates from that area around to the inframammary region.  Chance that this could be an early shingles like event without skin manifestations.  Patient and family are to check closely and return with any changes.  Patient lives with her daughter who accompanies to her today and have discussed need to be judicious at her age with pain medications.     1 acute complicated illness or injury.  Prescription drug management performed.    I independently ordered and reviewed each unique test.  I reviewed external records: Recent care and labs    The patients assessment required an independent historian: Daughters.  The reason they were needed is their observations and concerns.  I interpreted the X-rays chest x-ray showed no acute pneumonia but did have some changes of concern with which reason the CT scan was done..  I interpreted the CT Scan CT scan shows no acute process but calcific changes consistent with patient's later reported history of asbestos type exposure.              History     Patient with mid scapular/thoracic region discomfort that has acute cramping pains with certain movements and first noted after being in bed last night.  Recalls no trauma.  Does have history of recurring thoracic region compression fractures.  She has had kyphoplasty x 3 to this region.  She has had shingles in the

## 2025-05-07 NOTE — DISCHARGE INSTRUCTIONS
Follow closely for any changes such as fever rash or shingles-like changes or other changes such as redness to the skin  Present studies have not defined cause of your pain so use a lower threshold to return with any changes

## 2025-05-07 NOTE — ED TRIAGE NOTES
Pt 78 y/o female arrives to ED with a complaint  of back pain that started when she woke up this morning, pt states when she walks or takes a deep breath it hurts. Pt denies trauma or injury to that area. Pt has a history osteoporosis.

## 2025-05-27 ENCOUNTER — TELEPHONE (OUTPATIENT)
Age: 80
End: 2025-05-27

## 2025-05-27 NOTE — TELEPHONE ENCOUNTER
Mary (RN) with [facility] [phone] called into device stating pt's HR has been high at times. She will be sending in a remote transmission. Please call and advise

## 2025-05-27 NOTE — TELEPHONE ENCOUNTER
Patient at [facility] in [place]. Patient HR has been elevated above her normal - running 111-120. /70. Hr elevation occurring since yesterday- had episode of low BP 95/63- was given 1/2 liter of fluid. Patient feeling \"palpitations\". Denies any other symptoms. [facility] reports patient having significant amount of pain but is getting pain meds as ordered. Device interrogative- see below. Will address with MD.

## 2025-05-27 NOTE — TELEPHONE ENCOUNTER
[facility] ICD set VVI 40. Episodes of brief pAT lasting seconds. Rvp 0% Normal device function.    Please assess any additional clinical concerns.

## 2025-05-28 ENCOUNTER — OFFICE VISIT (OUTPATIENT)
Age: 80
End: 2025-05-28
Payer: MEDICARE

## 2025-05-28 VITALS
DIASTOLIC BLOOD PRESSURE: 52 MMHG | SYSTOLIC BLOOD PRESSURE: 88 MMHG | BODY MASS INDEX: 27.44 KG/M2 | HEART RATE: 109 BPM | HEIGHT: 62 IN

## 2025-05-28 DIAGNOSIS — Z86.73 H/O TIA (TRANSIENT ISCHEMIC ATTACK) AND STROKE: ICD-10-CM

## 2025-05-28 DIAGNOSIS — R00.2 PALPITATIONS: Primary | ICD-10-CM

## 2025-05-28 DIAGNOSIS — I25.119 ATHEROSCLEROSIS OF NATIVE CORONARY ARTERY OF NATIVE HEART WITH ANGINA PECTORIS: ICD-10-CM

## 2025-05-28 DIAGNOSIS — I50.22 CHRONIC SYSTOLIC CONGESTIVE HEART FAILURE (HCC): ICD-10-CM

## 2025-05-28 DIAGNOSIS — I42.8 NICM (NONISCHEMIC CARDIOMYOPATHY) (HCC): ICD-10-CM

## 2025-05-28 PROCEDURE — 1123F ACP DISCUSS/DSCN MKR DOCD: CPT | Performed by: INTERNAL MEDICINE

## 2025-05-28 PROCEDURE — 93000 ELECTROCARDIOGRAM COMPLETE: CPT | Performed by: INTERNAL MEDICINE

## 2025-05-28 PROCEDURE — 1090F PRES/ABSN URINE INCON ASSESS: CPT | Performed by: INTERNAL MEDICINE

## 2025-05-28 PROCEDURE — G8428 CUR MEDS NOT DOCUMENT: HCPCS | Performed by: INTERNAL MEDICINE

## 2025-05-28 PROCEDURE — 1036F TOBACCO NON-USER: CPT | Performed by: INTERNAL MEDICINE

## 2025-05-28 PROCEDURE — G8399 PT W/DXA RESULTS DOCUMENT: HCPCS | Performed by: INTERNAL MEDICINE

## 2025-05-28 PROCEDURE — 99214 OFFICE O/P EST MOD 30 MIN: CPT | Performed by: INTERNAL MEDICINE

## 2025-05-28 PROCEDURE — G8417 CALC BMI ABV UP PARAM F/U: HCPCS | Performed by: INTERNAL MEDICINE

## 2025-05-28 RX ORDER — SENNOSIDES AND DOCUSATE SODIUM 8.6; 5 MG/1; MG/1
TABLET ORAL
COMMUNITY
Start: 2025-05-19

## 2025-05-28 RX ORDER — OMEPRAZOLE 20 MG/1
20 CAPSULE, DELAYED RELEASE ORAL 2 TIMES DAILY
COMMUNITY

## 2025-05-28 RX ORDER — GABAPENTIN 100 MG/1
200 CAPSULE ORAL NIGHTLY
COMMUNITY
Start: 2025-05-19 | End: 2025-06-18

## 2025-05-28 NOTE — TELEPHONE ENCOUNTER
[facility] notified of MD response. Patient needs to be seen this week. Available appointment today to see Dr de la fuente at 3:45 pm. They will arrange transport for patient.

## 2025-05-28 NOTE — TELEPHONE ENCOUNTER
Oscar Duarte MD  You18 minutes ago (8:29 AM)       I cannot evaluate her over the phone.  Would bring her in for a visit with someone who has an opening.  Needs to be seen this week.

## 2025-06-04 ASSESSMENT — ENCOUNTER SYMPTOMS
ABDOMINAL PAIN: 0
SHORTNESS OF BREATH: 0

## 2025-06-04 NOTE — PROGRESS NOTES
surgery, cement put in, Dr Hargrove    TOTAL COLECTOMY      COLON CANCER x 2     Family History   Problem Relation Age of Onset    Cancer Mother         Bone CA, brain tumor    Heart Failure Father     Breast Cancer Neg Hx      Social History     Tobacco Use    Smoking status: Former     Current packs/day: 0.00     Average packs/day: 1 pack/day for 50.0 years (50.0 ttl pk-yrs)     Types: Cigarettes     Start date: 1968     Quit date: 2018     Years since quittin.5     Passive exposure: Past    Smokeless tobacco: Never   Substance Use Topics    Alcohol use: No       ROS:    Review of Systems   Constitutional: Negative for chills, diaphoresis and fever.   HENT:  Negative for hearing loss.    Eyes:  Negative for visual disturbance.   Cardiovascular:  Positive for irregular heartbeat.        As per the HPI   Respiratory:  Negative for shortness of breath.    Hematologic/Lymphatic: Does not bruise/bleed easily.   Gastrointestinal:  Negative for abdominal pain.   Genitourinary:  Negative for dysuria.   Neurological:  Negative for focal weakness.   Psychiatric/Behavioral:  Negative for suicidal ideas.           PHYSICAL EXAM:   BP (!) 88/52   Pulse (!) 109   Ht 1.575 m (5' 2\")   BMI 27.44 kg/m²      Wt Readings from Last 3 Encounters:   25 68 kg (150 lb)   25 68 kg (150 lb)   25 68 kg (150 lb)     BP Readings from Last 3 Encounters:   25 (!) 88/52   25 101/75   25 112/68     Pulse Readings from Last 3 Encounters:   25 (!) 109   25 80   25 72           Physical Exam  Vitals reviewed.   Constitutional:       Appearance: Normal appearance.      Comments: Appears younger than stated age   HENT:      Head: Normocephalic and atraumatic.   Eyes:      General: No scleral icterus.  Neck:      Vascular: No carotid bruit.   Cardiovascular:      Rate and Rhythm: Normal rate and regular rhythm.      Heart sounds: No murmur heard.     No gallop.   Pulmonary:

## 2025-08-14 PROCEDURE — 93295 DEV INTERROG REMOTE 1/2/MLT: CPT | Performed by: INTERNAL MEDICINE

## 2025-08-14 PROCEDURE — 93296 REM INTERROG EVL PM/IDS: CPT | Performed by: INTERNAL MEDICINE

## 2025-08-20 ENCOUNTER — TELEPHONE (OUTPATIENT)
Age: 80
End: 2025-08-20

## (undated) DEVICE — CATHETER ANGIO 5FR L100CM GRY S STL NYL JL3.5 3 SEG BRAID L

## (undated) DEVICE — CATHETER DIAG AD 5FR L110CM 145DEG COR GRY HYDRPHLC NYL

## (undated) DEVICE — CATHETER ANGIO NTR 0.045 INX5 FRX100 CM THRULUMEN EXPO

## (undated) DEVICE — HI-TORQUE VERSACORE MODIFIED J GUIDE WIRE SYSTEM 145 CM: Brand: HI-TORQUE VERSACORE

## (undated) DEVICE — CATHETER THRMDIL 7FR L110CM STD PULM ART 4 INFUS LUMN SWN

## (undated) DEVICE — GLIDESHEATH SLENDER STAINLESS STEEL KIT: Brand: GLIDESHEATH SLENDER

## (undated) DEVICE — INTRODUCER SHTH 6FR L11CM 0.038IN STD SIDEPRT EXTN 3 W

## (undated) DEVICE — GUIDEWIRE 035IN 210CM PTFE COAT FIX COR J TIP 15MM FIRM BODY

## (undated) DEVICE — INTRODUCER SHTH 7FR CANN L11CM 0.038IN STD ORNG W/ MINI

## (undated) DEVICE — GUIDE NDL ST W/ 14 X 147CM TELESCOPICALLY FLD CIV FLX CVR

## (undated) DEVICE — RADIFOCUS OPTITORQUE ANGIOGRAPHIC CATHETER: Brand: OPTITORQUE